# Patient Record
Sex: MALE | Race: WHITE | NOT HISPANIC OR LATINO | Employment: OTHER | URBAN - NONMETROPOLITAN AREA
[De-identification: names, ages, dates, MRNs, and addresses within clinical notes are randomized per-mention and may not be internally consistent; named-entity substitution may affect disease eponyms.]

---

## 2017-03-20 RX ORDER — PRASUGREL HYDROCHLORIDE 5 MG/1
TABLET, COATED ORAL
Qty: 30 TABLET | Refills: 0 | OUTPATIENT
Start: 2017-03-20

## 2017-03-22 NOTE — TELEPHONE ENCOUNTER
Needs refill of Effient 5mg sent to [facility].  Has appt October 16, 2017 for yearly.  Lamont Castellon, CMA

## 2017-03-23 RX ORDER — PRASUGREL 5 MG/1
5 TABLET, FILM COATED ORAL DAILY
Qty: 60 TABLET | Refills: 11 | Status: SHIPPED | OUTPATIENT
Start: 2017-03-23 | End: 2017-10-27 | Stop reason: SDUPTHER

## 2017-09-05 ENCOUNTER — HOSPITAL ENCOUNTER (OUTPATIENT)
Dept: GENERAL RADIOLOGY | Facility: HOSPITAL | Age: 52
Discharge: HOME | End: 2017-09-05
Attending: UROLOGY | Admitting: UROLOGY

## 2017-09-05 ENCOUNTER — OFFICE VISIT (OUTPATIENT)
Dept: UROLOGY | Facility: CLINIC | Age: 52
End: 2017-09-05

## 2017-09-05 ENCOUNTER — TELEPHONE (OUTPATIENT)
Dept: UROLOGY | Facility: CLINIC | Age: 52
End: 2017-09-05

## 2017-09-05 VITALS
TEMPERATURE: 98.6 F | WEIGHT: 164.4 LBS | BODY MASS INDEX: 22.27 KG/M2 | HEIGHT: 72 IN | DIASTOLIC BLOOD PRESSURE: 81 MMHG | SYSTOLIC BLOOD PRESSURE: 138 MMHG

## 2017-09-05 DIAGNOSIS — R10.9 FLANK PAIN: ICD-10-CM

## 2017-09-05 DIAGNOSIS — N20.0 KIDNEY STONE: Primary | ICD-10-CM

## 2017-09-05 DIAGNOSIS — Z87.442 HISTORY OF KIDNEY STONES: Primary | ICD-10-CM

## 2017-09-05 LAB
BILIRUB BLD-MCNC: NEGATIVE MG/DL
CLARITY, POC: CLEAR
COLOR UR: YELLOW
GLUCOSE UR STRIP-MCNC: NEGATIVE MG/DL
KETONES UR QL: NEGATIVE
LEUKOCYTE EST, POC: NEGATIVE
NITRITE UR-MCNC: NEGATIVE MG/ML
PH UR: 5.5 [PH] (ref 5–8)
PROT UR STRIP-MCNC: NEGATIVE MG/DL
RBC # UR STRIP: ABNORMAL /UL
SP GR UR: 1.03 (ref 1–1.03)
UROBILINOGEN UR QL: NORMAL

## 2017-09-05 PROCEDURE — 99213 OFFICE O/P EST LOW 20 MIN: CPT | Performed by: UROLOGY

## 2017-09-05 PROCEDURE — 81003 URINALYSIS AUTO W/O SCOPE: CPT | Performed by: UROLOGY

## 2017-09-05 PROCEDURE — 74000 HC ABDOMEN KUB: CPT

## 2017-09-05 NOTE — PROGRESS NOTES
Mr. Francis is 51 y.o. male    Chief Complaint   Patient presents with   • Flank Pain       Flank Pain   This is a new problem. The current episode started today. The problem occurs constantly. The problem is unchanged. The pain is present in the gluteal. The pain is severe. The pain is the same all the time. The symptoms are aggravated by position. Pertinent negatives include no abdominal pain or fever. Risk factors: history of kidney stones. He has tried nothing for the symptoms. The treatment provided no relief.       The following portions of the patient's history were reviewed and updated as appropriate: allergies, current medications, past family history, past medical history, past social history, past surgical history and problem list.    Review of Systems   Constitutional: Negative for chills and fever.   Gastrointestinal: Negative for abdominal pain, anal bleeding and blood in stool.   Genitourinary: Positive for flank pain. Negative for difficulty urinating, frequency, hematuria and urgency.         Current Outpatient Prescriptions:   •  aspirin 81 MG EC tablet, Take 81 mg by mouth daily., Disp: , Rfl:   •  atorvastatin (LIPITOR) 40 MG tablet, Take 40 mg by mouth daily., Disp: , Rfl:   •  doxycycline (VIBRAMYCIN) 100 MG capsule, Take 100 mg by mouth 2 (two) times a day., Disp: , Rfl:   •  Fexofenadine HCl (MUCINEX ALLERGY PO), Take  by mouth., Disp: , Rfl:   •  ketorolac (TORADOL) 10 MG tablet, Take  by mouth every 6 (six) hours as needed for moderate pain (4-6)., Disp: , Rfl:   •  losartan (COZAAR) 50 MG tablet, Take 50 mg by mouth daily., Disp: , Rfl:   •  METOPROLOL SUCCINATE ER PO, Take 25 mg by mouth Daily. 1/2 tablet qhs, Disp: , Rfl:   •  prasugrel (EFFIENT) 5 MG tablet, Take 1 tablet by mouth Daily., Disp: 60 tablet, Rfl: 11    Past Medical History:   Diagnosis Date   • Bilateral kidney stones    • Heart attack    • Left ureteral stone        Past Surgical History:   Procedure Laterality Date   •  "CORONARY ANGIOPLASTY WITH STENT PLACEMENT     • SHOULDER SURGERY     • TUMOR REMOVAL         Social History     Social History   • Marital status:      Spouse name: N/A   • Number of children: N/A   • Years of education: N/A     Social History Main Topics   • Smoking status: Never Smoker   • Smokeless tobacco: Never Used   • Alcohol use No      Comment: occassionally   • Drug use: None   • Sexual activity: Not Asked     Other Topics Concern   • None     Social History Narrative       Family History   Problem Relation Age of Onset   • No Known Problems Father    • No Known Problems Mother        Objective    /81  Temp 98.6 °F (37 °C)  Ht 72\" (182.9 cm)  Wt 164 lb 6.4 oz (74.6 kg)  BMI 22.3 kg/m2    Physical Exam    Office Visit on 09/08/2016   Component Date Value Ref Range Status   • Color 09/08/2016 Yellow  Yellow, Straw, Dark Yellow, Vandana Final   • Clarity, UA 09/08/2016 Clear  Clear Final   • Glucose, UA 09/08/2016 Negative  Negative mg/dL Final   • Bilirubin 09/08/2016 Negative  Negative Final   • Ketones, UA 09/08/2016 Negative  Negative Final   • Specific Gravity  09/08/2016 1.020  1.005 - 1.030 Final   • Blood, UA 09/08/2016 Negative  Negative Final   • pH, Urine 09/08/2016 5.5  5.0 - 8.0 Final   • Protein, POC 09/08/2016 Trace* Negative mg/dL Final   • Urobilinogen, UA 09/08/2016 Normal  Normal Final   • Leukocytes 09/08/2016 Negative  Negative Final   • Nitrite, UA 09/08/2016 Negative  Negative Final       Results for orders placed or performed in visit on 09/05/17   POC Urinalysis Dipstick, Automated   Result Value Ref Range    Color Yellow Yellow, Straw, Dark Yellow, Vandana    Clarity, UA Clear Clear    Glucose, UA Negative Negative, 1000 mg/dL (3+) mg/dL    Bilirubin Negative Negative    Ketones, UA Negative Negative    Specific Gravity  1.030 1.005 - 1.030    Blood, UA Trace (A) Negative    pH, Urine 5.5 5.0 - 8.0    Protein, POC Negative Negative mg/dL    Urobilinogen, UA Normal Normal "    Leukocytes Negative Negative    Nitrite, UA Negative Negative     Assessment and Plan    Iftikhar was seen today for flank pain.    Diagnoses and all orders for this visit:    History of kidney stones  -     POC Urinalysis Dipstick, Automated  -     Cancel: POC Urinalysis Dipstick, Automated    Flank pain  -     CT Abdomen Pelvis Stone Protocol    New flank pain.  He has a history of stones.  I reviewed his KUB as below.  Possible calcification in the region of the kidney but nothing in the area of the presumed ureter.  I discussed with him that with his significant history of stones as well as severe flank pain this could be a small stone which is either poorly calcified or not visible due to overlying gas.  The next best step would be CT abdomen pelvis stone protocol to rule out stone.  He is afebrile without nausea or vomiting.  I discussed with him that to get a CT scan done today he would need to go to the emergency room.  Otherwise I can see him in my office tomorrow with a CT scan prior.  At this point he would like to try to see me in the office but no 0 emergency room with increased pain chills fevers nausea or vomiting.    KUB independent review    A KUB is available for me to review today.  The image is inspected for a bowel gas pattern and the general bone structure of the spine and pelvis. The kidneys are then inspected closely.  Renal outline is noted if identifiable. The kidney, collecting system, and anticipated path of the ureter are examined for calcifications including those in the true pelvis.  This film reveals:    On the right there is a calcification in what appears to be the upper pole of the kidney, no ureteral stones..    On the left there are no calcificaitons seen in the kidney or the expected course of the ureter. .

## 2017-10-27 ENCOUNTER — OFFICE VISIT (OUTPATIENT)
Dept: CARDIOLOGY | Facility: CLINIC | Age: 52
End: 2017-10-27

## 2017-10-27 VITALS
HEIGHT: 72 IN | OXYGEN SATURATION: 98 % | DIASTOLIC BLOOD PRESSURE: 70 MMHG | WEIGHT: 165 LBS | HEART RATE: 87 BPM | SYSTOLIC BLOOD PRESSURE: 110 MMHG | BODY MASS INDEX: 22.35 KG/M2

## 2017-10-27 DIAGNOSIS — I25.10 CORONARY ARTERY DISEASE INVOLVING NATIVE CORONARY ARTERY OF NATIVE HEART WITHOUT ANGINA PECTORIS: Primary | ICD-10-CM

## 2017-10-27 DIAGNOSIS — Z95.5 S/P CORONARY ARTERY STENT PLACEMENT: ICD-10-CM

## 2017-10-27 DIAGNOSIS — K21.9 GASTROESOPHAGEAL REFLUX DISEASE WITHOUT ESOPHAGITIS: ICD-10-CM

## 2017-10-27 DIAGNOSIS — I10 ESSENTIAL HYPERTENSION: ICD-10-CM

## 2017-10-27 DIAGNOSIS — E78.2 MIXED HYPERLIPIDEMIA: ICD-10-CM

## 2017-10-27 PROCEDURE — 99213 OFFICE O/P EST LOW 20 MIN: CPT | Performed by: NURSE PRACTITIONER

## 2017-10-27 PROCEDURE — 93000 ELECTROCARDIOGRAM COMPLETE: CPT | Performed by: NURSE PRACTITIONER

## 2017-10-27 RX ORDER — METOPROLOL SUCCINATE 25 MG/1
12.5 TABLET, EXTENDED RELEASE ORAL DAILY
Qty: 45 TABLET | Refills: 3 | Status: SHIPPED | OUTPATIENT
Start: 2017-10-27 | End: 2018-11-06 | Stop reason: SDUPTHER

## 2017-10-27 RX ORDER — LOSARTAN POTASSIUM 50 MG/1
50 TABLET ORAL DAILY
Qty: 90 TABLET | Refills: 3 | Status: SHIPPED | OUTPATIENT
Start: 2017-10-27 | End: 2018-10-18 | Stop reason: SDUPTHER

## 2017-10-27 RX ORDER — PRASUGREL 5 MG/1
5 TABLET, FILM COATED ORAL DAILY
Qty: 90 TABLET | Refills: 3 | Status: SHIPPED | OUTPATIENT
Start: 2017-10-27 | End: 2018-11-06 | Stop reason: SDUPTHER

## 2017-10-27 RX ORDER — ATORVASTATIN CALCIUM 40 MG/1
40 TABLET, FILM COATED ORAL DAILY
Qty: 90 TABLET | Refills: 3 | Status: SHIPPED | OUTPATIENT
Start: 2017-10-27 | End: 2018-10-29 | Stop reason: SDUPTHER

## 2017-10-27 NOTE — PROGRESS NOTES
Subjective:     Encounter Date:10/27/2017      Patient ID: Iftikhar Francis is a 51 y.o. male. He presents today for follow up of coronary artery disease s/p drug-eluting stent placement to the right coronary artery in 2014, hypertension, hyperlipidemia and gastroesophageal reflux disease. He denies any chest pain, shortness of breath, palpitations, dizziness, syncope, orthopnea, PND or swelling. He does report decreased stamina since 2014, but he relates this to deconditioning. Blood pressure and cholesterol are reported to be well controlled. He states that overall he has felt well since his last visit.     Chief Complaint:  Coronary Artery Disease   Presents for follow-up visit. Pertinent negatives include no chest pain, chest pressure, chest tightness, dizziness, leg swelling, muscle weakness, palpitations, shortness of breath or weight gain. Risk factors include hyperlipidemia. The symptoms have been stable. Compliance with diet is variable. Compliance with exercise is variable. Compliance with medications is good.   Hypertension   This is a chronic problem. The current episode started more than 1 year ago. The problem is controlled. Pertinent negatives include no anxiety, blurred vision, chest pain, headaches, malaise/fatigue, neck pain, orthopnea, palpitations, peripheral edema, PND, shortness of breath or sweats. Risk factors for coronary artery disease include dyslipidemia and male gender. Past treatments include alpha 1 blockers and beta blockers. The current treatment provides significant improvement. Hypertensive end-organ damage includes CAD/MI.   Hyperlipidemia   This is a chronic problem. The current episode started more than 1 year ago. The problem is controlled. Recent lipid tests were reviewed and are normal. Pertinent negatives include no chest pain or shortness of breath. Current antihyperlipidemic treatment includes statins. The current treatment provides significant improvement of lipids.  Risk factors for coronary artery disease include dyslipidemia, hypertension and male sex.       The following portions of the patient's history were reviewed and updated as appropriate: allergies, current medications, past family history, past medical history, past social history, past surgical history and problem list.     Allergies   Allergen Reactions   • Levaquin [Levofloxacin]        Current Outpatient Prescriptions:   •  aspirin 81 MG EC tablet, Take 81 mg by mouth daily., Disp: , Rfl:   •  atorvastatin (LIPITOR) 40 MG tablet, Take 1 tablet by mouth Daily., Disp: 90 tablet, Rfl: 3  •  losartan (COZAAR) 50 MG tablet, Take 1 tablet by mouth Daily., Disp: 90 tablet, Rfl: 3  •  metoprolol succinate XL (TOPROL-XL) 25 MG 24 hr tablet, Take 0.5 tablets by mouth Daily. 1/2 tablet qhs, Disp: 45 tablet, Rfl: 3  •  prasugrel (EFFIENT) 5 MG tablet, Take 1 tablet by mouth Daily., Disp: 90 tablet, Rfl: 3  Past Medical History:   Diagnosis Date   • Bilateral kidney stones    • Heart attack    • Left ureteral stone    • Sleep apnea      Past Surgical History:   Procedure Laterality Date   • CORONARY ANGIOPLASTY WITH STENT PLACEMENT     • SHOULDER SURGERY     • TUMOR REMOVAL       Family History   Problem Relation Age of Onset   • No Known Problems Father    • No Known Problems Mother      Social History     Social History   • Marital status:      Spouse name: N/A   • Number of children: N/A   • Years of education: N/A     Occupational History   • Not on file.     Social History Main Topics   • Smoking status: Never Smoker   • Smokeless tobacco: Never Used   • Alcohol use Yes      Comment: occassionally   • Drug use: No   • Sexual activity: Defer     Other Topics Concern   • Not on file     Social History Narrative         Review of Systems   Constitution: Negative for chills, decreased appetite, fever, weakness, malaise/fatigue, night sweats, weight gain and weight loss.   HENT: Negative for nosebleeds.    Eyes:  "Negative for blurred vision and visual disturbance.   Cardiovascular: Negative for chest pain, dyspnea on exertion, leg swelling, near-syncope, orthopnea, palpitations, paroxysmal nocturnal dyspnea and syncope.   Respiratory: Negative for chest tightness, cough, hemoptysis, shortness of breath, snoring and wheezing.    Endocrine: Negative for cold intolerance and heat intolerance.   Hematologic/Lymphatic: Does not bruise/bleed easily.   Skin: Negative for rash.   Musculoskeletal: Negative for back pain, falls, muscle weakness and neck pain.   Gastrointestinal: Negative for abdominal pain, change in bowel habit, constipation, diarrhea, dysphagia, heartburn, nausea and vomiting.   Genitourinary: Negative for hematuria.   Neurological: Negative for dizziness, headaches and light-headedness.   Psychiatric/Behavioral: Negative for altered mental status.   Allergic/Immunologic: Negative for persistent infections.         ECG 12 Lead  Date/Time: 10/27/2017 2:58 PM  Performed by: SEJAL MARTINES  Authorized by: SEJAL MARTINES   Comparison: compared with previous ECG from 7/19/2016  Similar to previous ECG  Rhythm: sinus rhythm  Rate: normal  BPM: 78  Clinical impression: normal ECG        /70  Pulse 87  Ht 72\" (182.9 cm)  Wt 165 lb (74.8 kg)  SpO2 98%  BMI 22.38 kg/m2         Objective:     Physical Exam   Constitutional: He is oriented to person, place, and time. Vital signs are normal. He appears well-developed and well-nourished. No distress.   HENT:   Head: Normocephalic and atraumatic.   Right Ear: External ear normal.   Left Ear: External ear normal.   Eyes: Conjunctivae are normal. Pupils are equal, round, and reactive to light. Right eye exhibits no discharge. Left eye exhibits no discharge.   Neck: Normal range of motion. Neck supple. No JVD present. Carotid bruit is not present. No thyromegaly present.   Cardiovascular: Normal rate, regular rhythm, normal heart sounds and intact distal pulses.  PMI is " not displaced.  Exam reveals no gallop, no friction rub and no decreased pulses.    No murmur heard.  Pulses:       Radial pulses are 2+ on the right side, and 2+ on the left side.        Dorsalis pedis pulses are 2+ on the right side, and 2+ on the left side.        Posterior tibial pulses are 2+ on the right side, and 2+ on the left side.   Pulmonary/Chest: Effort normal and breath sounds normal. No respiratory distress. He has no decreased breath sounds. He has no wheezes. He has no rhonchi. He has no rales. He exhibits no tenderness.   Abdominal: Soft. Bowel sounds are normal. He exhibits no distension. There is no tenderness.   Musculoskeletal: Normal range of motion. He exhibits no edema.   Neurological: He is alert and oriented to person, place, and time.   Skin: Skin is warm and dry. No rash noted. He is not diaphoretic. No erythema. No pallor.   Psychiatric: He has a normal mood and affect. His behavior is normal. Judgment and thought content normal.   Vitals reviewed.        Assessment:          Diagnosis Plan   1. Coronary artery disease involving native coronary artery of native heart without angina pectoris  No clinical signs of ischemia.    2. S/P coronary artery stent placement  Remains on effient, aspirin, ARB and ACE.    3. Essential hypertension  Well controlled.    4. Mixed hyperlipidemia  Obtain records from Dr. Eng.    5. Gastroesophageal reflux disease without esophagitis            Plan:       1. Continue all medications as previously prescribed.  2. Continue heart healthy diet and regular exercise.  3. Report any worsening symptoms.  4. Follow up with PCP for blood pressure and cholesterol management and routine lab work. Have records sent to [facility] office.   5. Follow up with Dr. Perkins in one year, or sooner if needed.

## 2018-03-30 ENCOUNTER — OFFICE VISIT (OUTPATIENT)
Dept: NEUROLOGY | Age: 53
End: 2018-03-30
Payer: COMMERCIAL

## 2018-03-30 VITALS
BODY MASS INDEX: 22.48 KG/M2 | SYSTOLIC BLOOD PRESSURE: 118 MMHG | WEIGHT: 166 LBS | HEIGHT: 72 IN | DIASTOLIC BLOOD PRESSURE: 76 MMHG

## 2018-03-30 DIAGNOSIS — Z88.9 HISTORY OF ADVERSE DRUG REACTION: ICD-10-CM

## 2018-03-30 DIAGNOSIS — Z86.79 HISTORY OF CORONARY ARTERY DISEASE: ICD-10-CM

## 2018-03-30 DIAGNOSIS — H53.2 DIPLOPIA: Primary | ICD-10-CM

## 2018-03-30 DIAGNOSIS — H53.2 DIPLOPIA: ICD-10-CM

## 2018-03-30 PROCEDURE — 99204 OFFICE O/P NEW MOD 45 MIN: CPT | Performed by: PSYCHIATRY & NEUROLOGY

## 2018-03-30 RX ORDER — PYRIDOSTIGMINE BROMIDE 60 MG/1
TABLET ORAL
Qty: 90 TABLET | Refills: 5 | Status: SHIPPED | OUTPATIENT
Start: 2018-03-30 | End: 2018-04-30

## 2018-03-31 NOTE — PROGRESS NOTES
marked  Sleep: []  Insomnia [] Sleep Disturbance [] Snoring [] Restless Legs [] Daytime Sleepiness [] Sleep Apnea  [x] Denies all unless marked         Current Outpatient Prescriptions   Medication Sig Dispense Refill    pyridostigmine (MESTINON) 60 MG tablet Take 1/2 to 1 pill up to 3 times daily 90 tablet 5    losartan (COZAAR) 50 MG tablet Take 50 mg by mouth daily      prasugrel (EFFIENT) 5 MG TABS Take 5 mg by mouth daily      Aspirin (ASPIR-81 PO) Take 1 tablet by mouth daily       atorvastatin (LIPITOR) 40 MG tablet Take 40 mg by mouth daily      metoprolol (TOPROL-XL) 25 MG XL tablet Take 12.5 mg by mouth daily        No current facility-administered medications for this visit. Outpatient Prescriptions Marked as Taking for the 3/30/18 encounter (Office Visit) with Socrates Mckeon MD   Medication Sig Dispense Refill    pyridostigmine (MESTINON) 60 MG tablet Take 1/2 to 1 pill up to 3 times daily 90 tablet 5    losartan (COZAAR) 50 MG tablet Take 50 mg by mouth daily      prasugrel (EFFIENT) 5 MG TABS Take 5 mg by mouth daily      Aspirin (ASPIR-81 PO) Take 1 tablet by mouth daily       atorvastatin (LIPITOR) 40 MG tablet Take 40 mg by mouth daily      metoprolol (TOPROL-XL) 25 MG XL tablet Take 12.5 mg by mouth daily          /76   Ht 6' (1.829 m)   Wt 166 lb (75.3 kg)   BMI 22.51 kg/m²       Constitutional - well developed, well nourished. Eyes - conjunctiva normal.  Pupils react to light  Ear, nose, throat -hearing intact to finger rub No scars, masses, or lesions over external nose or ears, no atrophy of tongue  Neck-symmetric, no masses noted, no jugular vein distension. No bruits noted.   Respiration- chest wall appears symmetric, good expansion,   normal effort without use of accessory muscles  Cardiovascular- RRR  Musculoskeletal - no significant wasting of muscles noted, no bony deformities, gait no gross ataxia  Extremities-no clubbing, cyanosis or edema  Skin - warm,

## 2018-04-02 LAB
ACETYLCHOL MODUL AB: 19 %
ACETYLCHOLINE BLOCKING AB: 24 % (ref 0–26)

## 2018-04-03 ENCOUNTER — TELEPHONE (OUTPATIENT)
Dept: NEUROLOGY | Age: 53
End: 2018-04-03

## 2018-04-03 LAB
ACETYLCHOLINE BINDING ANTIBODY: 3.9 NMOL/L (ref 0–0.4)
STRIATED MUSCLE AB TITER: ABNORMAL
STRIATED MUSCLE AB: DETECTED

## 2018-04-06 ENCOUNTER — OFFICE VISIT (OUTPATIENT)
Dept: NEUROLOGY | Age: 53
End: 2018-04-06
Payer: COMMERCIAL

## 2018-04-06 VITALS
HEIGHT: 72 IN | BODY MASS INDEX: 22.21 KG/M2 | SYSTOLIC BLOOD PRESSURE: 116 MMHG | WEIGHT: 164 LBS | DIASTOLIC BLOOD PRESSURE: 80 MMHG

## 2018-04-06 DIAGNOSIS — G70.00 MYASTHENIA GRAVIS (HCC): Primary | ICD-10-CM

## 2018-04-06 DIAGNOSIS — H53.2 DIPLOPIA: ICD-10-CM

## 2018-04-06 DIAGNOSIS — Z88.9 HISTORY OF ADVERSE DRUG REACTION: ICD-10-CM

## 2018-04-06 PROCEDURE — 99215 OFFICE O/P EST HI 40 MIN: CPT | Performed by: PSYCHIATRY & NEUROLOGY

## 2018-04-06 RX ORDER — GUAIFENESIN 600 MG/1
1200 TABLET, EXTENDED RELEASE ORAL 2 TIMES DAILY
Qty: 120 TABLET | Refills: 5 | Status: SHIPPED | OUTPATIENT
Start: 2018-04-06 | End: 2018-11-30

## 2018-04-06 RX ORDER — VENLAFAXINE HYDROCHLORIDE 37.5 MG/1
37.5 CAPSULE, EXTENDED RELEASE ORAL DAILY
Qty: 30 CAPSULE | Refills: 2 | Status: SHIPPED | OUTPATIENT
Start: 2018-04-06 | End: 2018-04-30 | Stop reason: CLARIF

## 2018-04-06 RX ORDER — PYRIDOSTIGMINE BROMIDE 60 MG/1
TABLET ORAL
Qty: 150 TABLET | Refills: 5 | Status: SHIPPED | OUTPATIENT
Start: 2018-04-06 | End: 2018-04-30

## 2018-04-06 RX ORDER — GLYCOPYRROLATE 2 MG/1
2 TABLET ORAL 2 TIMES DAILY
Qty: 90 TABLET | Refills: 3 | Status: SHIPPED | OUTPATIENT
Start: 2018-04-06 | End: 2018-08-30

## 2018-04-12 ENCOUNTER — TELEPHONE (OUTPATIENT)
Dept: CARDIOLOGY | Age: 53
End: 2018-04-12

## 2018-04-19 ENCOUNTER — TELEPHONE (OUTPATIENT)
Dept: NEUROLOGY | Age: 53
End: 2018-04-19

## 2018-04-20 RX ORDER — OMEPRAZOLE 20 MG/1
20 CAPSULE, DELAYED RELEASE ORAL DAILY
Qty: 30 CAPSULE | Refills: 3 | Status: SHIPPED | OUTPATIENT
Start: 2018-04-20 | End: 2018-08-22 | Stop reason: SDUPTHER

## 2018-04-20 RX ORDER — PREDNISONE 20 MG/1
TABLET ORAL
Qty: 60 TABLET | Refills: 0 | Status: SHIPPED | OUTPATIENT
Start: 2018-04-20 | End: 2018-05-25 | Stop reason: SDUPTHER

## 2018-04-30 ENCOUNTER — OFFICE VISIT (OUTPATIENT)
Dept: NEUROLOGY | Age: 53
End: 2018-04-30
Payer: COMMERCIAL

## 2018-04-30 VITALS
HEIGHT: 72 IN | DIASTOLIC BLOOD PRESSURE: 70 MMHG | WEIGHT: 166 LBS | SYSTOLIC BLOOD PRESSURE: 108 MMHG | BODY MASS INDEX: 22.48 KG/M2

## 2018-04-30 DIAGNOSIS — Z86.79 HISTORY OF CORONARY ARTERY DISEASE: ICD-10-CM

## 2018-04-30 DIAGNOSIS — G70.00 MYASTHENIA GRAVIS (HCC): Primary | ICD-10-CM

## 2018-04-30 DIAGNOSIS — Z88.9 HISTORY OF ADVERSE DRUG REACTION: ICD-10-CM

## 2018-04-30 DIAGNOSIS — H53.2 DIPLOPIA: ICD-10-CM

## 2018-04-30 PROCEDURE — 99214 OFFICE O/P EST MOD 30 MIN: CPT | Performed by: PSYCHIATRY & NEUROLOGY

## 2018-04-30 RX ORDER — ESCITALOPRAM OXALATE 10 MG/1
10 TABLET ORAL DAILY
Qty: 30 TABLET | Refills: 3 | Status: SHIPPED | OUTPATIENT
Start: 2018-04-30 | End: 2018-08-30

## 2018-05-22 ENCOUNTER — TELEPHONE (OUTPATIENT)
Dept: NEUROLOGY | Age: 53
End: 2018-05-22

## 2018-05-25 RX ORDER — PREDNISONE 20 MG/1
TABLET ORAL
Qty: 60 TABLET | Refills: 0 | Status: SHIPPED | OUTPATIENT
Start: 2018-05-25 | End: 2018-05-30 | Stop reason: SDUPTHER

## 2018-05-30 ENCOUNTER — OFFICE VISIT (OUTPATIENT)
Dept: NEUROLOGY | Age: 53
End: 2018-05-30
Payer: COMMERCIAL

## 2018-05-30 VITALS
DIASTOLIC BLOOD PRESSURE: 76 MMHG | HEIGHT: 72 IN | BODY MASS INDEX: 22.21 KG/M2 | SYSTOLIC BLOOD PRESSURE: 117 MMHG | WEIGHT: 164 LBS

## 2018-05-30 DIAGNOSIS — G70.00 MYASTHENIA GRAVIS (HCC): Primary | ICD-10-CM

## 2018-05-30 DIAGNOSIS — Z88.9 HISTORY OF ADVERSE DRUG REACTION: ICD-10-CM

## 2018-05-30 DIAGNOSIS — G70.00 MYASTHENIA GRAVIS (HCC): ICD-10-CM

## 2018-05-30 DIAGNOSIS — H53.2 DIPLOPIA: ICD-10-CM

## 2018-05-30 LAB
ALBUMIN SERPL-MCNC: 4.6 G/DL (ref 3.5–5.2)
ALP BLD-CCNC: 81 U/L (ref 40–130)
ALT SERPL-CCNC: 21 U/L (ref 5–41)
ANION GAP SERPL CALCULATED.3IONS-SCNC: 17 MMOL/L (ref 7–19)
AST SERPL-CCNC: 13 U/L (ref 5–40)
BILIRUB SERPL-MCNC: 0.6 MG/DL (ref 0.2–1.2)
BUN BLDV-MCNC: 25 MG/DL (ref 6–20)
CALCIUM SERPL-MCNC: 9.6 MG/DL (ref 8.6–10)
CHLORIDE BLD-SCNC: 93 MMOL/L (ref 98–111)
CO2: 27 MMOL/L (ref 22–29)
CREAT SERPL-MCNC: 0.9 MG/DL (ref 0.5–1.2)
GFR NON-AFRICAN AMERICAN: >60
GLUCOSE BLD-MCNC: 146 MG/DL (ref 74–109)
POTASSIUM SERPL-SCNC: 4.5 MMOL/L (ref 3.5–5)
SODIUM BLD-SCNC: 137 MMOL/L (ref 136–145)
TOTAL PROTEIN: 8.2 G/DL (ref 6.6–8.7)

## 2018-05-30 PROCEDURE — 99214 OFFICE O/P EST MOD 30 MIN: CPT | Performed by: PSYCHIATRY & NEUROLOGY

## 2018-05-30 RX ORDER — ESCITALOPRAM OXALATE 20 MG/1
20 TABLET ORAL DAILY
Qty: 30 TABLET | Refills: 3 | Status: SHIPPED | OUTPATIENT
Start: 2018-05-30 | End: 2019-03-18

## 2018-05-30 RX ORDER — PREDNISONE 20 MG/1
TABLET ORAL
Qty: 60 TABLET | Refills: 5 | Status: SHIPPED | OUTPATIENT
Start: 2018-05-30 | End: 2018-11-30 | Stop reason: SDUPTHER

## 2018-05-31 ENCOUNTER — TELEPHONE (OUTPATIENT)
Dept: NEUROLOGY | Age: 53
End: 2018-05-31

## 2018-08-22 RX ORDER — OMEPRAZOLE 20 MG/1
CAPSULE, DELAYED RELEASE ORAL
Qty: 30 CAPSULE | Refills: 3 | Status: SHIPPED | OUTPATIENT
Start: 2018-08-22 | End: 2018-11-30 | Stop reason: SDUPTHER

## 2018-08-30 ENCOUNTER — OFFICE VISIT (OUTPATIENT)
Dept: NEUROLOGY | Age: 53
End: 2018-08-30
Payer: COMMERCIAL

## 2018-08-30 VITALS
HEIGHT: 72 IN | DIASTOLIC BLOOD PRESSURE: 98 MMHG | WEIGHT: 172 LBS | SYSTOLIC BLOOD PRESSURE: 142 MMHG | BODY MASS INDEX: 23.3 KG/M2

## 2018-08-30 DIAGNOSIS — G70.00 MYASTHENIA GRAVIS (HCC): ICD-10-CM

## 2018-08-30 DIAGNOSIS — Z86.79 HISTORY OF CORONARY ARTERY DISEASE: ICD-10-CM

## 2018-08-30 DIAGNOSIS — H53.2 DIPLOPIA: ICD-10-CM

## 2018-08-30 DIAGNOSIS — Z88.9 HISTORY OF ADVERSE DRUG REACTION: ICD-10-CM

## 2018-08-30 DIAGNOSIS — G62.9 NEUROPATHY: ICD-10-CM

## 2018-08-30 DIAGNOSIS — G70.00 MYASTHENIA GRAVIS (HCC): Primary | ICD-10-CM

## 2018-08-30 LAB
AST SERPL-CCNC: 14 U/L (ref 5–40)
C-REACTIVE PROTEIN: 1.42 MG/DL (ref 0–0.5)

## 2018-08-30 PROCEDURE — 99214 OFFICE O/P EST MOD 30 MIN: CPT | Performed by: PSYCHIATRY & NEUROLOGY

## 2018-08-30 RX ORDER — KETOROLAC TROMETHAMINE 10 MG/1
TABLET, FILM COATED ORAL
COMMUNITY
End: 2018-11-30 | Stop reason: CLARIF

## 2018-08-31 NOTE — PROGRESS NOTES
metoprolol (TOPROL-XL) 25 MG XL tablet Take 12.5 mg by mouth daily        No current facility-administered medications for this visit. Outpatient Prescriptions Marked as Taking for the 8/30/18 encounter (Office Visit) with Angeli Jerez MD   Medication Sig Dispense Refill    ketorolac (TORADOL) 10 MG tablet ketorolac 10 mg tablet      omeprazole (PRILOSEC) 20 MG delayed release capsule TAKE 1 CAPSULE BY MOUTH ONCE DAILY 30 capsule 3    predniSONE (DELTASONE) 20 MG tablet Take two each am 60 tablet 5    escitalopram (LEXAPRO) 20 MG tablet Take 1 tablet by mouth daily 30 tablet 3    guaiFENesin (MUCINEX) 600 MG extended release tablet Take 2 tablets by mouth 2 times daily 120 tablet 5    losartan (COZAAR) 50 MG tablet Take 50 mg by mouth daily      prasugrel (EFFIENT) 5 MG TABS Take 5 mg by mouth daily      Aspirin (ASPIR-81 PO) Take 1 tablet by mouth daily       atorvastatin (LIPITOR) 40 MG tablet Take 40 mg by mouth daily      metoprolol (TOPROL-XL) 25 MG XL tablet Take 12.5 mg by mouth daily          BP (!) 142/98   Ht 6' (1.829 m)   Wt 172 lb (78 kg)   BMI 23.33 kg/m²       Constitutional - well developed, well nourished. Eyes - conjunctiva normal.  Pupils react to light  Ear, nose, throat -hearing intact to finger rub No scars, masses, or lesions over external nose or ears, no atrophy of tongue  Neck-symmetric, no masses noted, no jugular vein distension. No bruits noted. Respiration- chest wall appears symmetric, good expansion,   normal effort without use of accessory muscles  Cardiovascular- RRR  Musculoskeletal - no significant wasting of muscles noted, no bony deformities, gait no gross ataxia  Extremities-no clubbing, cyanosis or edema  Skin - warm, dry, and intact. No rash, erythema, or pallor.   Psychiatric - mood, affect, and behavior appear normal.      Neurological exam  Awake, alert, fluent oriented x 3 appropriate affect  Attention and concentration appear appropriate  Recent and remote memory appears unremarkable  Speech normal without dysarthria  No clear issues with language of fund of knowledge    Cranial Nerve Exam   CN II- Visual fields grossly unremarkable. VA adequate. Discs sharp  CN III, IV,VI- PERRLA, EOMI, No nystagmus, conjugate eye movements, No ptosis. No double vision. CN VII-no facial asymmetry  CN VIII-Hearing intact   CN IX and X- Palate elevates in midline  CN XI-good shoulder shrug  CN XII-Tongue midline with no fasciculations or fibrillations    Motor Exam  V/V throughout upper and lower extremities bilaterally, no cogwheeling, normal tone    Reflexes   2+ biceps bilaterally  2+ brachioradialis  2+ triceps  2+patella  2+ ankle jerks  No clonus ankles  No Lazcano's sign bilateral hands. No Babinski sign. sensory-decreased pinprick in toes. Normal vibration sense  Tremors- no tremors in hands or head noted    Gait  Normal base and speed  No ataxia. No Romberg sign    Coordination  Finger to nose and SULMA-unremarkable    No results found for: [id number]  Lab Results   Component Value Date    WBC 18.7 (H) 07/18/2016    HGB 14.9 07/18/2016    HCT 43.7 07/18/2016    MCV 87.6 07/18/2016     (H) 07/18/2016     Lab Results   Component Value Date     05/30/2018    K 4.5 05/30/2018    CL 93 (L) 05/30/2018    CO2 27 05/30/2018    BUN 25 (H) 05/30/2018    CREATININE 0.9 05/30/2018    GLUCOSE 146 (H) 05/30/2018    CALCIUM 9.6 05/30/2018    PROT 8.2 05/30/2018    LABALBU 4.6 05/30/2018    BILITOT 0.6 05/30/2018    ALKPHOS 81 05/30/2018    AST 14 08/30/2018    ALT 21 05/30/2018    LABGLOM >60 05/30/2018    GLOB 3.8 07/18/2016           Assessment    ICD-10-CM ICD-9-CM    1. Myasthenia gravis ([facility] G70.00 358.00 Miscellaneous Sendout 1      Electrophoresis Protein, Serum with Reflex to Immunofixation      C-Reactive Protein      AST      Nerve Conduction Test with EMG      Meningococcal MPSV4 (age 2y and older) SC ([address] [place] [address])   2.  History of adverse drug reaction Z88.9

## 2018-09-02 LAB
ALBUMIN SERPL-MCNC: 4.53 G/DL (ref 3.75–5.01)
ALPHA-1-GLOBULIN: 0.43 G/DL (ref 0.19–0.46)
ALPHA-2-GLOBULIN: 1.21 G/DL (ref 0.48–1.05)
BETA GLOBULIN: 0.93 G/DL (ref 0.48–1.1)
GAMMA GLOBULIN: 1.11 G/DL (ref 0.62–1.51)
IMMUNOFIXATION REFLEX: ABNORMAL
SPE/IFE INTERPRETATION: ABNORMAL
TOTAL PROTEIN: 8.2 G/DL (ref 6–8.3)

## 2018-09-11 ENCOUNTER — HOSPITAL ENCOUNTER (OUTPATIENT)
Dept: NEUROLOGY | Age: 53
Discharge: HOME | End: 2018-09-11
Payer: COMMERCIAL

## 2018-09-11 ENCOUNTER — TELEPHONE (OUTPATIENT)
Dept: NEUROLOGY | Age: 53
End: 2018-09-11

## 2018-09-11 DIAGNOSIS — G70.00 MYASTHENIA GRAVIS (HCC): Primary | ICD-10-CM

## 2018-09-11 DIAGNOSIS — G70.00 MYASTHENIA GRAVIS (HCC): ICD-10-CM

## 2018-09-11 DIAGNOSIS — G62.9 NEUROPATHY: ICD-10-CM

## 2018-09-11 LAB
ALBUMIN SERPL-MCNC: 4.6 G/DL (ref 3.5–5.2)
ALP BLD-CCNC: 105 U/L (ref 40–130)
ALT SERPL-CCNC: 38 U/L (ref 5–41)
ANION GAP SERPL CALCULATED.3IONS-SCNC: 13 MMOL/L (ref 7–19)
AST SERPL-CCNC: 14 U/L (ref 5–40)
BILIRUB SERPL-MCNC: 0.5 MG/DL (ref 0.2–1.2)
BUN BLDV-MCNC: 31 MG/DL (ref 6–20)
CALCIUM SERPL-MCNC: 9.8 MG/DL (ref 8.6–10)
CHLORIDE BLD-SCNC: 99 MMOL/L (ref 98–111)
CO2: 28 MMOL/L (ref 22–29)
CREAT SERPL-MCNC: 1 MG/DL (ref 0.5–1.2)
GFR NON-AFRICAN AMERICAN: >60
GLUCOSE BLD-MCNC: 165 MG/DL (ref 74–109)
MAGNESIUM: 2.1 MG/DL (ref 1.6–2.6)
POTASSIUM SERPL-SCNC: 4.7 MMOL/L (ref 3.5–5)
SODIUM BLD-SCNC: 140 MMOL/L (ref 136–145)
TOTAL PROTEIN: 7.2 G/DL (ref 6.6–8.7)

## 2018-09-11 PROCEDURE — 95909 NRV CNDJ TST 5-6 STUDIES: CPT

## 2018-09-11 PROCEDURE — 95909 NRV CNDJ TST 5-6 STUDIES: CPT | Performed by: PSYCHIATRY & NEUROLOGY

## 2018-09-11 PROCEDURE — 95886 MUSC TEST DONE W/N TEST COMP: CPT

## 2018-09-11 PROCEDURE — 95886 MUSC TEST DONE W/N TEST COMP: CPT | Performed by: PSYCHIATRY & NEUROLOGY

## 2018-09-11 RX ORDER — BUSPIRONE HYDROCHLORIDE 10 MG/1
10 TABLET ORAL 3 TIMES DAILY
Qty: 90 TABLET | Refills: 5 | Status: SHIPPED | OUTPATIENT
Start: 2018-09-11

## 2018-09-11 NOTE — TELEPHONE ENCOUNTER
----- Message from Quinton Guthrie MD sent at 9/11/2018 11:26 AM CDT -----  Let pt know that his labs were all ok except for elevate BG of 165.   Send these to pcp too please

## 2018-09-13 ENCOUNTER — TELEPHONE (OUTPATIENT)
Dept: NEUROLOGY | Age: 53
End: 2018-09-13

## 2018-09-17 ENCOUNTER — TELEPHONE (OUTPATIENT)
Dept: NEUROLOGY | Age: 53
End: 2018-09-17

## 2018-09-17 NOTE — TELEPHONE ENCOUNTER
Called gillian and she stated patient is wanting to get infusion done on oct 16 that will do vac A the first week of oct

## 2018-09-17 NOTE — TELEPHONE ENCOUNTER
The patients wife called and stated that she has some questions about a shot and the patients infusion.

## 2018-09-17 NOTE — TELEPHONE ENCOUNTER
Call from [facility] at Dr Maryan Kwon request call about patients injection.

## 2018-09-18 NOTE — TELEPHONE ENCOUNTER
Brenda Gonzales called back and stated that they can not get meningococcal group b because it comes in a group and they don't use that enough for then to order that much. Patient can actually pick it up at [facility] and take to them to give. Called gillian and informed of all this and understands.

## 2018-09-24 ENCOUNTER — TELEPHONE (OUTPATIENT)
Dept: NEUROLOGY | Age: 53
End: 2018-09-24

## 2018-09-27 ENCOUNTER — TELEPHONE (OUTPATIENT)
Dept: NEUROLOGY | Age: 53
End: 2018-09-27

## 2018-10-12 LAB
Lab: NORMAL
REPORT: NORMAL
THIS TEST SENT TO: NORMAL

## 2018-10-16 ENCOUNTER — HOSPITAL ENCOUNTER (OUTPATIENT)
Dept: INFUSION THERAPY | Age: 53
Setting detail: INFUSION SERIES
Discharge: HOME | End: 2018-10-16
Payer: COMMERCIAL

## 2018-10-16 VITALS
DIASTOLIC BLOOD PRESSURE: 82 MMHG | TEMPERATURE: 97.8 F | OXYGEN SATURATION: 98 % | HEART RATE: 84 BPM | SYSTOLIC BLOOD PRESSURE: 132 MMHG

## 2018-10-16 DIAGNOSIS — G70.00 MYASTHENIA GRAVIS (HCC): ICD-10-CM

## 2018-10-16 DIAGNOSIS — G70.00 MG (MYASTHENIA GRAVIS) (HCC): Primary | ICD-10-CM

## 2018-10-16 PROCEDURE — 6360000002 HC RX W HCPCS: Performed by: PSYCHIATRY & NEUROLOGY

## 2018-10-16 PROCEDURE — 2580000003 HC RX 258: Performed by: PSYCHIATRY & NEUROLOGY

## 2018-10-16 PROCEDURE — 96413 CHEMO IV INFUSION 1 HR: CPT

## 2018-10-16 RX ADMIN — ECULIZUMAB 900 MG: 300 INJECTION, SOLUTION, CONCENTRATE INTRAVENOUS at 09:20

## 2018-10-18 RX ORDER — LOSARTAN POTASSIUM 50 MG/1
TABLET ORAL
Qty: 30 TABLET | Refills: 1 | Status: SHIPPED | OUTPATIENT
Start: 2018-10-18 | End: 2018-11-06 | Stop reason: SDUPTHER

## 2018-10-22 ENCOUNTER — HOSPITAL ENCOUNTER (OUTPATIENT)
Dept: INFUSION THERAPY | Age: 53
Setting detail: INFUSION SERIES
Discharge: HOME | End: 2018-10-22
Payer: COMMERCIAL

## 2018-10-22 VITALS
HEART RATE: 88 BPM | TEMPERATURE: 97.8 F | SYSTOLIC BLOOD PRESSURE: 147 MMHG | RESPIRATION RATE: 17 BRPM | DIASTOLIC BLOOD PRESSURE: 98 MMHG

## 2018-10-22 DIAGNOSIS — G70.00 MYASTHENIA GRAVIS (HCC): ICD-10-CM

## 2018-10-22 PROCEDURE — 6360000002 HC RX W HCPCS: Performed by: PSYCHIATRY & NEUROLOGY

## 2018-10-22 PROCEDURE — 96365 THER/PROPH/DIAG IV INF INIT: CPT

## 2018-10-22 PROCEDURE — 2580000003 HC RX 258: Performed by: PSYCHIATRY & NEUROLOGY

## 2018-10-22 RX ADMIN — ECULIZUMAB 900 MG: 300 INJECTION, SOLUTION, CONCENTRATE INTRAVENOUS at 07:35

## 2018-10-29 ENCOUNTER — HOSPITAL ENCOUNTER (OUTPATIENT)
Dept: INFUSION THERAPY | Age: 53
Setting detail: INFUSION SERIES
Discharge: HOME | End: 2018-10-29
Payer: COMMERCIAL

## 2018-10-29 VITALS
TEMPERATURE: 97.7 F | HEART RATE: 96 BPM | RESPIRATION RATE: 17 BRPM | SYSTOLIC BLOOD PRESSURE: 136 MMHG | DIASTOLIC BLOOD PRESSURE: 89 MMHG

## 2018-10-29 DIAGNOSIS — G70.00 MYASTHENIA GRAVIS (HCC): ICD-10-CM

## 2018-10-29 PROCEDURE — 6360000002 HC RX W HCPCS: Performed by: PSYCHIATRY & NEUROLOGY

## 2018-10-29 PROCEDURE — 96365 THER/PROPH/DIAG IV INF INIT: CPT

## 2018-10-29 PROCEDURE — 2580000003 HC RX 258: Performed by: PSYCHIATRY & NEUROLOGY

## 2018-10-29 RX ORDER — ATORVASTATIN CALCIUM 40 MG/1
TABLET, FILM COATED ORAL
Qty: 90 TABLET | Refills: 3 | Status: SHIPPED | OUTPATIENT
Start: 2018-10-29 | End: 2018-11-06 | Stop reason: SDUPTHER

## 2018-10-29 RX ADMIN — ECULIZUMAB 900 MG: 300 INJECTION, SOLUTION, CONCENTRATE INTRAVENOUS at 07:33

## 2018-11-05 ENCOUNTER — HOSPITAL ENCOUNTER (OUTPATIENT)
Dept: INFUSION THERAPY | Age: 53
Setting detail: INFUSION SERIES
Discharge: HOME | End: 2018-11-05
Payer: COMMERCIAL

## 2018-11-05 VITALS
SYSTOLIC BLOOD PRESSURE: 140 MMHG | RESPIRATION RATE: 16 BRPM | DIASTOLIC BLOOD PRESSURE: 88 MMHG | HEART RATE: 110 BPM | TEMPERATURE: 98.5 F

## 2018-11-05 DIAGNOSIS — G70.00 MYASTHENIA GRAVIS (HCC): ICD-10-CM

## 2018-11-05 DIAGNOSIS — G70.00 MG (MYASTHENIA GRAVIS) (HCC): Primary | ICD-10-CM

## 2018-11-05 PROCEDURE — 96413 CHEMO IV INFUSION 1 HR: CPT

## 2018-11-05 PROCEDURE — 6360000002 HC RX W HCPCS: Performed by: PSYCHIATRY & NEUROLOGY

## 2018-11-05 PROCEDURE — 2580000003 HC RX 258: Performed by: PSYCHIATRY & NEUROLOGY

## 2018-11-05 RX ORDER — PROMETHAZINE HYDROCHLORIDE 25 MG/ML
25 INJECTION, SOLUTION INTRAMUSCULAR; INTRAVENOUS ONCE
Status: COMPLETED | OUTPATIENT
Start: 2018-11-05 | End: 2018-11-05

## 2018-11-05 RX ADMIN — ECULIZUMAB 900 MG: 300 INJECTION, SOLUTION, CONCENTRATE INTRAVENOUS at 07:52

## 2018-11-05 RX ADMIN — PROMETHAZINE HYDROCHLORIDE 25 MG: 25 INJECTION INTRAMUSCULAR; INTRAVENOUS at 09:09

## 2018-11-05 NOTE — PROGRESS NOTES
TALKED WITH PATIENT WIFE, INFORMED THAT PATIENT IS DROWSY AFTER PHENERGAN, AND THAT HE WOULD NEED SOMEONE TO DRIVE HIM HOME.

## 2018-11-05 NOTE — PROGRESS NOTES
PATIENT C/O NAUSEA, DRY HEAVES, 140/86, , R 16, DR. Brie Herron OFFICE NOTIFIED, DR. Brie Herron NO IN TODAY, ORDERS RECEIVED FROM DR. AZUL FOR PHENERGAN.

## 2018-11-06 ENCOUNTER — OFFICE VISIT (OUTPATIENT)
Dept: CARDIOLOGY | Facility: CLINIC | Age: 53
End: 2018-11-06

## 2018-11-06 VITALS
HEART RATE: 117 BPM | WEIGHT: 169 LBS | BODY MASS INDEX: 22.89 KG/M2 | HEIGHT: 72 IN | DIASTOLIC BLOOD PRESSURE: 86 MMHG | SYSTOLIC BLOOD PRESSURE: 112 MMHG

## 2018-11-06 DIAGNOSIS — E78.2 MIXED HYPERLIPIDEMIA: ICD-10-CM

## 2018-11-06 DIAGNOSIS — I25.10 CORONARY ARTERY DISEASE INVOLVING NATIVE CORONARY ARTERY OF NATIVE HEART WITHOUT ANGINA PECTORIS: Primary | ICD-10-CM

## 2018-11-06 DIAGNOSIS — I10 ESSENTIAL HYPERTENSION: ICD-10-CM

## 2018-11-06 DIAGNOSIS — Z95.5 S/P CORONARY ARTERY STENT PLACEMENT: ICD-10-CM

## 2018-11-06 DIAGNOSIS — K21.9 GASTROESOPHAGEAL REFLUX DISEASE WITHOUT ESOPHAGITIS: ICD-10-CM

## 2018-11-06 DIAGNOSIS — G70.00 MYASTHENIA GRAVIS (HCC): ICD-10-CM

## 2018-11-06 PROCEDURE — 93000 ELECTROCARDIOGRAM COMPLETE: CPT | Performed by: NURSE PRACTITIONER

## 2018-11-06 PROCEDURE — 99214 OFFICE O/P EST MOD 30 MIN: CPT | Performed by: NURSE PRACTITIONER

## 2018-11-06 RX ORDER — OMEPRAZOLE 20 MG/1
20 CAPSULE, DELAYED RELEASE ORAL DAILY
COMMUNITY

## 2018-11-06 RX ORDER — PRASUGREL 5 MG/1
5 TABLET, FILM COATED ORAL DAILY
Qty: 90 TABLET | Refills: 3 | Status: SHIPPED | OUTPATIENT
Start: 2018-11-06 | End: 2019-11-08

## 2018-11-06 RX ORDER — PREDNISONE 10 MG/1
20 TABLET ORAL DAILY
Status: ON HOLD | COMMUNITY
End: 2022-12-08

## 2018-11-06 RX ORDER — METOPROLOL SUCCINATE 25 MG/1
25 TABLET, EXTENDED RELEASE ORAL DAILY
Qty: 90 TABLET | Refills: 3 | Status: SHIPPED | OUTPATIENT
Start: 2018-11-06 | End: 2020-11-13 | Stop reason: SDUPTHER

## 2018-11-06 RX ORDER — LOSARTAN POTASSIUM 50 MG/1
50 TABLET ORAL DAILY
Qty: 90 TABLET | Refills: 3 | Status: SHIPPED | OUTPATIENT
Start: 2018-11-06 | End: 2019-12-02 | Stop reason: SDUPTHER

## 2018-11-06 RX ORDER — ATORVASTATIN CALCIUM 40 MG/1
40 TABLET, FILM COATED ORAL DAILY
Qty: 90 TABLET | Refills: 3 | Status: SHIPPED | OUTPATIENT
Start: 2018-11-06 | End: 2019-12-30

## 2018-11-06 NOTE — PROGRESS NOTES
Subjective:     Encounter Date:11/06/2018      Patient ID: Iftikhar Francis is a 53 y.o. male. He presents today for follow up of coronary artery disease s/p drug-eluting stent placement to the right coronary artery in 2014, hypertension, hyperlipidemia and gastroesophageal reflux disease. He denies any chest pain, shortness of breath, palpitations, dizziness, syncope, orthopnea, PND or swelling. He does report decreased stamina and fatigue, however he has recently been diagnosed with and is being treated for myasthenia gravis. Blood pressure and cholesterol are reported to be well controlled. He states that overall he has felt well since his last visit.     Chief Complaint: routine follow up  Coronary Artery Disease   Presents for follow-up visit. Pertinent negatives include no chest pain, chest pressure, chest tightness, dizziness, leg swelling, muscle weakness, palpitations, shortness of breath or weight gain. Risk factors include hyperlipidemia. The symptoms have been stable. Compliance with diet is variable. Compliance with exercise is variable. Compliance with medications is good.   Hypertension   This is a chronic problem. The current episode started more than 1 year ago. The problem is controlled. Pertinent negatives include no anxiety, blurred vision, chest pain, headaches, malaise/fatigue, neck pain, orthopnea, palpitations, peripheral edema, PND, shortness of breath or sweats. Risk factors for coronary artery disease include dyslipidemia and male gender. Current antihypertension treatment includes alpha 1 blockers and beta blockers. The current treatment provides significant improvement. Hypertensive end-organ damage includes CAD/MI.   Hyperlipidemia   This is a chronic problem. The current episode started more than 1 year ago. The problem is controlled. Recent lipid tests were reviewed and are normal. Pertinent negatives include no chest pain or shortness of breath. Current antihyperlipidemic  treatment includes statins. The current treatment provides significant improvement of lipids. Risk factors for coronary artery disease include dyslipidemia, hypertension and male sex.       The following portions of the patient's history were reviewed and updated as appropriate: allergies, current medications, past family history, past medical history, past social history, past surgical history and problem list.     Allergies   Allergen Reactions   • Avelox [Moxifloxacin] Unknown (See Comments)     Not Documented   • Levaquin [Levofloxacin]        Current Outpatient Prescriptions:   •  aspirin 81 MG EC tablet, Take 81 mg by mouth daily., Disp: , Rfl:   •  atorvastatin (LIPITOR) 40 MG tablet, TAKE ONE TABLET BY MOUTH ONCE DAILY, Disp: 90 tablet, Rfl: 3  •  losartan (COZAAR) 50 MG tablet, TAKE ONE TABLET BY MOUTH ONCE DAILY, Disp: 30 tablet, Rfl: 1  •  metFORMIN (GLUCOPHAGE) 500 MG tablet, Take 500 mg by mouth 2 (Two) Times a Day With Meals., Disp: , Rfl:   •  metoprolol succinate XL (TOPROL-XL) 25 MG 24 hr tablet, Take 0.5 tablets by mouth Daily. 1/2 tablet qhs, Disp: 45 tablet, Rfl: 3  •  omeprazole (priLOSEC) 20 MG capsule, Take 20 mg by mouth Daily., Disp: , Rfl:   •  prasugrel (EFFIENT) 5 MG tablet, Take 1 tablet by mouth Daily., Disp: 90 tablet, Rfl: 3  •  predniSONE (DELTASONE) 20 MG tablet, Take 20 mg by mouth Daily., Disp: , Rfl:   Past Medical History:   Diagnosis Date   • Bilateral kidney stones    • Heart attack (CMS/HCC)    • Left ureteral stone    • Peptic ulceration    • Sleep apnea      Past Surgical History:   Procedure Laterality Date   • CORONARY ANGIOPLASTY WITH STENT PLACEMENT     • HERNIA REPAIR Left     Inguinal Hernia   • SHOULDER SURGERY     • TUMOR REMOVAL       Family History   Problem Relation Age of Onset   • No Known Problems Father    • No Known Problems Mother      Social History     Social History   • Marital status:      Spouse name: N/A   • Number of children: N/A   • Years of  "education: N/A     Occupational History   • Not on file.     Social History Main Topics   • Smoking status: Never Smoker   • Smokeless tobacco: Never Used   • Alcohol use Yes      Comment: occassionally   • Drug use: No   • Sexual activity: Defer     Other Topics Concern   • Not on file     Social History Narrative   • No narrative on file         Review of Systems   Constitution: Negative for chills, decreased appetite, fever, weakness, malaise/fatigue, night sweats, weight gain and weight loss.   HENT: Negative for nosebleeds.    Eyes: Negative for blurred vision and visual disturbance.   Cardiovascular: Negative for chest pain, dyspnea on exertion, leg swelling, near-syncope, orthopnea, palpitations, paroxysmal nocturnal dyspnea and syncope.   Respiratory: Negative for chest tightness, cough, hemoptysis, shortness of breath, snoring and wheezing.    Endocrine: Negative for cold intolerance and heat intolerance.   Hematologic/Lymphatic: Does not bruise/bleed easily.   Skin: Negative for rash.   Musculoskeletal: Negative for back pain, falls, muscle weakness and neck pain.   Gastrointestinal: Negative for abdominal pain, change in bowel habit, constipation, diarrhea, dysphagia, heartburn, nausea and vomiting.   Genitourinary: Negative for hematuria.   Neurological: Negative for dizziness, headaches and light-headedness.   Psychiatric/Behavioral: Negative for altered mental status.   Allergic/Immunologic: Negative for persistent infections.         ECG 12 Lead  Date/Time: 11/6/2018 3:18 PM  Performed by: SEJAL MARTINES  Authorized by: SEJAL MARTINES   Comparison: compared with previous ECG from 10/27/2017  Rhythm: sinus tachycardia  Rate: tachycardic  BPM: 117  Clinical impression: abnormal ECG        /86   Pulse 117   Ht 182.9 cm (72\")   Wt 76.7 kg (169 lb)   BMI 22.92 kg/m²          Objective:     Physical Exam   Constitutional: He is oriented to person, place, and time. Vital signs are normal. He " appears well-developed and well-nourished. No distress.   HENT:   Head: Normocephalic and atraumatic.   Right Ear: External ear normal.   Left Ear: External ear normal.   Eyes: Pupils are equal, round, and reactive to light. Conjunctivae are normal. Right eye exhibits no discharge. Left eye exhibits no discharge.   Neck: Normal range of motion. Neck supple. No JVD present. Carotid bruit is not present. No thyromegaly present.   Cardiovascular: Regular rhythm, normal heart sounds and intact distal pulses.  Tachycardia present.  PMI is not displaced.  Exam reveals no gallop, no friction rub and no decreased pulses.    No murmur heard.  Pulses:       Radial pulses are 2+ on the right side, and 2+ on the left side.        Dorsalis pedis pulses are 2+ on the right side, and 2+ on the left side.        Posterior tibial pulses are 2+ on the right side, and 2+ on the left side.   Pulmonary/Chest: Effort normal and breath sounds normal. No respiratory distress. He has no decreased breath sounds. He has no wheezes. He has no rhonchi. He has no rales. He exhibits no tenderness.   Abdominal: Soft. Bowel sounds are normal. He exhibits no distension. There is no tenderness.   Musculoskeletal: Normal range of motion. He exhibits no edema.   Neurological: He is alert and oriented to person, place, and time.   Skin: Skin is warm and dry. No rash noted. He is not diaphoretic. No erythema. No pallor.   Psychiatric: He has a normal mood and affect. His behavior is normal. Judgment and thought content normal.   Vitals reviewed.        Assessment:          Diagnosis Plan   1. Coronary artery disease involving native coronary artery of native heart without angina pectoris  No clinical signs of ischemia.    2. S/P coronary artery stent placement  Remains on effient, aspirin, ARB and ACE.    3. Essential hypertension  Well controlled.    4. Mixed hyperlipidemia  Managed by PCP. On statin.    5. Gastroesophageal reflux disease without  esophagitis            Plan:       1. Tachycardic in office and reports has been tachycardic recently. Increase metoprolol succinate to 25 mg daily. Continue medications as previously prescribed.  2. Report any worsening symptoms.  3. Report any signs of bleeding.  4. Continue heart healthy diet and regular exercise as tolerated.   5. Follow up with PCP for blood pressure and cholesterol management and routine lab work.  6. Follow up with Dr. Perkins in one year, or sooner if needed.

## 2018-11-12 ENCOUNTER — HOSPITAL ENCOUNTER (OUTPATIENT)
Dept: INFUSION THERAPY | Age: 53
Setting detail: INFUSION SERIES
Discharge: HOME | End: 2018-11-12
Payer: COMMERCIAL

## 2018-11-12 VITALS
HEART RATE: 95 BPM | SYSTOLIC BLOOD PRESSURE: 125 MMHG | RESPIRATION RATE: 17 BRPM | TEMPERATURE: 97.3 F | DIASTOLIC BLOOD PRESSURE: 83 MMHG

## 2018-11-12 DIAGNOSIS — G70.00 MYASTHENIA GRAVIS (HCC): ICD-10-CM

## 2018-11-12 PROCEDURE — 2580000003 HC RX 258: Performed by: PSYCHIATRY & NEUROLOGY

## 2018-11-12 PROCEDURE — 96365 THER/PROPH/DIAG IV INF INIT: CPT

## 2018-11-12 PROCEDURE — 6360000002 HC RX W HCPCS: Performed by: PSYCHIATRY & NEUROLOGY

## 2018-11-12 RX ADMIN — ECULIZUMAB 1200 MG: 300 INJECTION, SOLUTION, CONCENTRATE INTRAVENOUS at 07:30

## 2018-11-26 ENCOUNTER — HOSPITAL ENCOUNTER (OUTPATIENT)
Dept: INFUSION THERAPY | Age: 53
Setting detail: INFUSION SERIES
Discharge: HOME | End: 2018-11-26
Payer: COMMERCIAL

## 2018-11-26 VITALS
HEART RATE: 98 BPM | DIASTOLIC BLOOD PRESSURE: 81 MMHG | RESPIRATION RATE: 17 BRPM | SYSTOLIC BLOOD PRESSURE: 130 MMHG | TEMPERATURE: 97.7 F

## 2018-11-26 DIAGNOSIS — G70.00 MYASTHENIA GRAVIS (HCC): ICD-10-CM

## 2018-11-26 PROCEDURE — 96413 CHEMO IV INFUSION 1 HR: CPT

## 2018-11-26 PROCEDURE — 96365 THER/PROPH/DIAG IV INF INIT: CPT

## 2018-11-26 PROCEDURE — 6360000002 HC RX W HCPCS: Performed by: PSYCHIATRY & NEUROLOGY

## 2018-11-26 PROCEDURE — 2580000003 HC RX 258: Performed by: PSYCHIATRY & NEUROLOGY

## 2018-11-26 RX ADMIN — ECULIZUMAB 1200 MG: 300 INJECTION, SOLUTION, CONCENTRATE INTRAVENOUS at 07:48

## 2018-11-30 ENCOUNTER — OFFICE VISIT (OUTPATIENT)
Dept: NEUROLOGY | Age: 53
End: 2018-11-30
Payer: COMMERCIAL

## 2018-11-30 VITALS
BODY MASS INDEX: 26.31 KG/M2 | WEIGHT: 194 LBS | SYSTOLIC BLOOD PRESSURE: 134 MMHG | DIASTOLIC BLOOD PRESSURE: 89 MMHG | HEART RATE: 96 BPM

## 2018-11-30 DIAGNOSIS — R73.9 HYPERGLYCEMIA: ICD-10-CM

## 2018-11-30 DIAGNOSIS — G70.00 MG (MYASTHENIA GRAVIS) (HCC): Primary | ICD-10-CM

## 2018-11-30 DIAGNOSIS — G70.00 MG (MYASTHENIA GRAVIS) (HCC): ICD-10-CM

## 2018-11-30 DIAGNOSIS — G62.9 NEUROPATHY: ICD-10-CM

## 2018-11-30 LAB
ALBUMIN SERPL-MCNC: 4.5 G/DL (ref 3.5–5.2)
ALP BLD-CCNC: 85 U/L (ref 40–130)
ALT SERPL-CCNC: 100 U/L (ref 5–41)
ANION GAP SERPL CALCULATED.3IONS-SCNC: 18 MMOL/L (ref 7–19)
AST SERPL-CCNC: 28 U/L (ref 5–40)
BASOPHILS ABSOLUTE: 0 K/UL (ref 0–0.2)
BASOPHILS RELATIVE PERCENT: 0.3 % (ref 0–1)
BILIRUB SERPL-MCNC: 0.5 MG/DL (ref 0.2–1.2)
BUN BLDV-MCNC: 23 MG/DL (ref 6–20)
CALCIUM SERPL-MCNC: 9.5 MG/DL (ref 8.6–10)
CHLORIDE BLD-SCNC: 97 MMOL/L (ref 98–111)
CO2: 27 MMOL/L (ref 22–29)
CREAT SERPL-MCNC: 1 MG/DL (ref 0.5–1.2)
EOSINOPHILS ABSOLUTE: 0.1 K/UL (ref 0–0.6)
EOSINOPHILS RELATIVE PERCENT: 1 % (ref 0–5)
GFR NON-AFRICAN AMERICAN: >60
GLUCOSE BLD-MCNC: 149 MG/DL (ref 74–109)
HBA1C MFR BLD: 7.7 % (ref 4–6)
HCT VFR BLD CALC: 46.8 % (ref 42–52)
HEMOGLOBIN: 15.1 G/DL (ref 14–18)
LYMPHOCYTES ABSOLUTE: 2.4 K/UL (ref 1.1–4.5)
LYMPHOCYTES RELATIVE PERCENT: 20.2 % (ref 20–40)
MCH RBC QN AUTO: 29.9 PG (ref 27–31)
MCHC RBC AUTO-ENTMCNC: 32.3 G/DL (ref 33–37)
MCV RBC AUTO: 92.7 FL (ref 80–94)
MONOCYTES ABSOLUTE: 1 K/UL (ref 0–0.9)
MONOCYTES RELATIVE PERCENT: 8.4 % (ref 0–10)
NEUTROPHILS ABSOLUTE: 8 K/UL (ref 1.5–7.5)
NEUTROPHILS RELATIVE PERCENT: 68.8 % (ref 50–65)
PDW BLD-RTO: 15 % (ref 11.5–14.5)
PLATELET # BLD: 289 K/UL (ref 130–400)
PMV BLD AUTO: 9.7 FL (ref 9.4–12.4)
POTASSIUM SERPL-SCNC: 4 MMOL/L (ref 3.5–5)
RBC # BLD: 5.05 M/UL (ref 4.7–6.1)
SODIUM BLD-SCNC: 142 MMOL/L (ref 136–145)
TOTAL PROTEIN: 7.2 G/DL (ref 6.6–8.7)
WBC # BLD: 11.7 K/UL (ref 4.8–10.8)

## 2018-11-30 PROCEDURE — 99214 OFFICE O/P EST MOD 30 MIN: CPT | Performed by: PSYCHIATRY & NEUROLOGY

## 2018-11-30 RX ORDER — OMEPRAZOLE 20 MG/1
20 CAPSULE, DELAYED RELEASE ORAL 2 TIMES DAILY
Qty: 60 CAPSULE | Refills: 5 | Status: SHIPPED | OUTPATIENT
Start: 2018-11-30 | End: 2019-12-02 | Stop reason: SDUPTHER

## 2018-11-30 RX ORDER — BUSPIRONE HYDROCHLORIDE 15 MG/1
15 TABLET ORAL 3 TIMES DAILY
Qty: 90 TABLET | Refills: 5 | Status: SHIPPED | OUTPATIENT
Start: 2018-11-30 | End: 2019-03-18

## 2018-11-30 RX ORDER — PREDNISONE 20 MG/1
TABLET ORAL
Qty: 60 TABLET | Refills: 5 | Status: SHIPPED | OUTPATIENT
Start: 2018-11-30 | End: 2019-06-10

## 2018-12-01 NOTE — PROGRESS NOTES
increasing his Prilosec to twice a day and adding Zantac. Active Ambulatory Problems     Diagnosis Date Noted    Biceps tendinitis of left shoulder 06/22/2016    Myasthenia gravis [facility])      Resolved Ambulatory Problems     Diagnosis Date Noted    No Resolved Ambulatory Problems     Past Medical History:   Diagnosis Date    CAD (coronary artery disease)     GERD (gastroesophageal reflux disease)     Hyperlipidemia     Hypertension     IBS (irritable bowel syndrome)     Myasthenia gravis (HCC)     Shoulder pain        Past Surgical History:   Procedure Laterality Date    CORONARY ANGIOPLASTY WITH STENT PLACEMENT  2 yr. 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 Social History Main Topics    Smoking status: Never Smoker    Smokeless tobacco: Never Used    Alcohol use 0.6 oz/week     1 Cans of beer per week      Comment: 2-3 per month    Drug use: No    Sexual activity: Not on file     Other Topics Concern    Not on file     Social History Narrative    No narrative on file       Review of Systems    Constitutional - No fever or chills. yes diaphoresis or significant fatigue. HENT -  No tinnitus or significant hearing loss.   Eyes - no sudden vision change or eye pain  Respiratory - no significant shortness of breath or cough  Cardiovascular - no chest pain No palpitations or significant leg asymmetry. Face is puffy appearing  CN VIII-Hearing intact   CN IX and X- Palate elevates in midline  CN XI-good shoulder shrug  CN XII-Tongue midline with no fasciculations or fibrillations    Motor Exam  V/V throughout upper and lower extremities bilaterally, no cogwheeling, normal tone    Reflexes   2+ biceps bilaterally  2+ brachioradialis  2+ triceps  2+patella  2+ ankle jerks  No clonus ankles  No Lazcano's sign bilateral hands. No Babinski sign. sensory-decreased pinprick in toes. Normal vibration sense  Tremors- no tremors in hands or head noted    Gait  Normal base and speed  No ataxia. No Romberg sign    Coordination  Finger to nose and SULMA-unremarkable    No results found for: [id number]  Lab Results   Component Value Date    WBC 11.7 (H) 11/30/2018    HGB 15.1 11/30/2018    HCT 46.8 11/30/2018    MCV 92.7 11/30/2018     11/30/2018     Lab Results   Component Value Date     11/30/2018    K 4.0 11/30/2018    CL 97 (L) 11/30/2018    CO2 27 11/30/2018    BUN 23 (H) 11/30/2018    CREATININE 1.0 11/30/2018    GLUCOSE 149 (H) 11/30/2018    CALCIUM 9.5 11/30/2018    PROT 7.2 11/30/2018    LABALBU 4.5 11/30/2018    BILITOT 0.5 11/30/2018    ALKPHOS 85 11/30/2018    AST 28 11/30/2018     (H) 11/30/2018    LABGLOM >60 11/30/2018    GLOB 3.8 07/18/2016           Assessment    ICD-10-CM    1. MG (myasthenia gravis) ([facility] [address] G70.00 CBC Auto Differential     Comprehensive Metabolic Panel     Hemoglobin A1C     busPIRone (BUSPAR) 15 MG tablet   2. Neuropathy G62.9    3. Hyperglycemia R73.9 Hemoglobin A1C     His myasthenia gravis has definitely improved . At this point I would like him to try to reduce his prednisone to 30 mg daily. I would like him to get it off of this as soon as we can. Hopefully after a couple of more months of Soliris he will be able to. Blood work was ordered. BuSpar will be increased.  He has been asked to get on over-the-counter calcium and vitamin D supplements as well.  Plan          Pt warned of potential side effects of medication changes/new medicines. They are to call for new or ongoing difficulties. Return in about 3 months (around 2/28/2019).     (Please note that portions of this note were completed with a voice recognition program. Efforts were made to edit the dictations but occasionally words are mis-transcribed.)

## 2018-12-10 ENCOUNTER — HOSPITAL ENCOUNTER (OUTPATIENT)
Dept: INFUSION THERAPY | Age: 53
Setting detail: INFUSION SERIES
Discharge: HOME | End: 2018-12-10
Payer: COMMERCIAL

## 2018-12-10 VITALS
SYSTOLIC BLOOD PRESSURE: 123 MMHG | DIASTOLIC BLOOD PRESSURE: 81 MMHG | TEMPERATURE: 97.8 F | HEART RATE: 94 BPM | RESPIRATION RATE: 18 BRPM | OXYGEN SATURATION: 99 %

## 2018-12-10 DIAGNOSIS — G70.00 MYASTHENIA GRAVIS (HCC): ICD-10-CM

## 2018-12-10 PROCEDURE — 96413 CHEMO IV INFUSION 1 HR: CPT

## 2018-12-10 PROCEDURE — 6360000002 HC RX W HCPCS: Performed by: PSYCHIATRY & NEUROLOGY

## 2018-12-10 PROCEDURE — 2580000003 HC RX 258: Performed by: PSYCHIATRY & NEUROLOGY

## 2018-12-10 RX ADMIN — ECULIZUMAB 1200 MG: 300 INJECTION, SOLUTION, CONCENTRATE INTRAVENOUS at 07:31

## 2018-12-26 ENCOUNTER — HOSPITAL ENCOUNTER (OUTPATIENT)
Dept: INFUSION THERAPY | Age: 53
Setting detail: INFUSION SERIES
Discharge: HOME | End: 2018-12-26
Payer: COMMERCIAL

## 2018-12-26 VITALS
SYSTOLIC BLOOD PRESSURE: 161 MMHG | HEART RATE: 94 BPM | RESPIRATION RATE: 17 BRPM | TEMPERATURE: 98 F | DIASTOLIC BLOOD PRESSURE: 97 MMHG

## 2018-12-26 DIAGNOSIS — G70.00 MYASTHENIA GRAVIS (HCC): ICD-10-CM

## 2018-12-26 PROCEDURE — 6360000002 HC RX W HCPCS: Performed by: PSYCHIATRY & NEUROLOGY

## 2018-12-26 PROCEDURE — 2580000003 HC RX 258: Performed by: PSYCHIATRY & NEUROLOGY

## 2018-12-26 PROCEDURE — 96413 CHEMO IV INFUSION 1 HR: CPT

## 2018-12-26 RX ADMIN — ECULIZUMAB 1200 MG: 300 INJECTION, SOLUTION, CONCENTRATE INTRAVENOUS at 08:15

## 2019-01-02 ENCOUNTER — APPOINTMENT (OUTPATIENT)
Dept: CT IMAGING | Facility: HOSPITAL | Age: 54
End: 2019-01-02

## 2019-01-02 ENCOUNTER — HOSPITAL ENCOUNTER (EMERGENCY)
Facility: HOSPITAL | Age: 54
Discharge: HOME | End: 2019-01-02
Attending: EMERGENCY MEDICINE | Admitting: EMERGENCY MEDICINE

## 2019-01-02 ENCOUNTER — TELEPHONE (OUTPATIENT)
Dept: UROLOGY | Facility: CLINIC | Age: 54
End: 2019-01-02

## 2019-01-02 VITALS
HEART RATE: 112 BPM | RESPIRATION RATE: 12 BRPM | BODY MASS INDEX: 26.41 KG/M2 | OXYGEN SATURATION: 98 % | HEIGHT: 72 IN | DIASTOLIC BLOOD PRESSURE: 76 MMHG | SYSTOLIC BLOOD PRESSURE: 148 MMHG | WEIGHT: 195 LBS | TEMPERATURE: 99 F

## 2019-01-02 DIAGNOSIS — N39.0 ACUTE UTI: ICD-10-CM

## 2019-01-02 DIAGNOSIS — N20.0 KIDNEY STONE: Primary | ICD-10-CM

## 2019-01-02 DIAGNOSIS — N20.1 RIGHT URETERAL STONE: Primary | ICD-10-CM

## 2019-01-02 LAB
ALBUMIN SERPL-MCNC: 4.5 G/DL (ref 3.5–5)
ALBUMIN/GLOB SERPL: 1.5 G/DL (ref 1.1–2.5)
ALP SERPL-CCNC: 95 U/L (ref 24–120)
ALT SERPL W P-5'-P-CCNC: 141 U/L (ref 0–54)
ANION GAP SERPL CALCULATED.3IONS-SCNC: 14 MMOL/L (ref 4–13)
AST SERPL-CCNC: 124 U/L (ref 7–45)
BACTERIA UR QL AUTO: ABNORMAL /HPF
BASOPHILS # BLD AUTO: 0.04 10*3/MM3 (ref 0–0.2)
BASOPHILS NFR BLD AUTO: 0.2 % (ref 0–2)
BILIRUB SERPL-MCNC: 0.8 MG/DL (ref 0.1–1)
BILIRUB UR QL STRIP: ABNORMAL
BUN BLD-MCNC: 19 MG/DL (ref 5–21)
BUN/CREAT SERPL: 18.3 (ref 7–25)
CALCIUM SPEC-SCNC: 9.2 MG/DL (ref 8.4–10.4)
CHLORIDE SERPL-SCNC: 96 MMOL/L (ref 98–110)
CLARITY UR: ABNORMAL
CO2 SERPL-SCNC: 29 MMOL/L (ref 24–31)
COLOR UR: ABNORMAL
CREAT BLD-MCNC: 1.04 MG/DL (ref 0.5–1.4)
DEPRECATED RDW RBC AUTO: 47.4 FL (ref 40–54)
EOSINOPHIL # BLD AUTO: 0.02 10*3/MM3 (ref 0–0.7)
EOSINOPHIL NFR BLD AUTO: 0.1 % (ref 0–4)
ERYTHROCYTE [DISTWIDTH] IN BLOOD BY AUTOMATED COUNT: 14.7 % (ref 12–15)
GFR SERPL CREATININE-BSD FRML MDRD: 75 ML/MIN/1.73
GLOBULIN UR ELPH-MCNC: 3.1 GM/DL
GLUCOSE BLD-MCNC: 233 MG/DL (ref 70–100)
GLUCOSE UR STRIP-MCNC: ABNORMAL MG/DL
HCT VFR BLD AUTO: 41.8 % (ref 40–52)
HGB BLD-MCNC: 14.4 G/DL (ref 14–18)
HGB UR QL STRIP.AUTO: ABNORMAL
IMM GRANULOCYTES # BLD AUTO: 0.14 10*3/MM3 (ref 0–0.03)
IMM GRANULOCYTES NFR BLD AUTO: 0.9 % (ref 0–5)
KETONES UR QL STRIP: ABNORMAL
LEUKOCYTE ESTERASE UR QL STRIP.AUTO: NEGATIVE
LYMPHOCYTES # BLD AUTO: 0.72 10*3/MM3 (ref 0.72–4.86)
LYMPHOCYTES NFR BLD AUTO: 4.4 % (ref 15–45)
MCH RBC QN AUTO: 30.3 PG (ref 28–32)
MCHC RBC AUTO-ENTMCNC: 34.4 G/DL (ref 33–36)
MCV RBC AUTO: 87.8 FL (ref 82–95)
MONOCYTES # BLD AUTO: 0.38 10*3/MM3 (ref 0.19–1.3)
MONOCYTES NFR BLD AUTO: 2.3 % (ref 4–12)
NEUTROPHILS # BLD AUTO: 15 10*3/MM3 (ref 1.87–8.4)
NEUTROPHILS NFR BLD AUTO: 92.1 % (ref 39–78)
NITRITE UR QL STRIP: NEGATIVE
NRBC BLD AUTO-RTO: 0 /100 WBC (ref 0–0)
PH UR STRIP.AUTO: <=5 [PH] (ref 5–8)
PLATELET # BLD AUTO: 382 10*3/MM3 (ref 130–400)
PMV BLD AUTO: 9.2 FL (ref 6–12)
POTASSIUM BLD-SCNC: 4.2 MMOL/L (ref 3.5–5.3)
PROT SERPL-MCNC: 7.6 G/DL (ref 6.3–8.7)
PROT UR QL STRIP: ABNORMAL
RBC # BLD AUTO: 4.76 10*6/MM3 (ref 4.8–5.9)
RBC # UR: ABNORMAL /HPF
REF LAB TEST METHOD: ABNORMAL
SODIUM BLD-SCNC: 139 MMOL/L (ref 135–145)
SP GR UR STRIP: 1.03 (ref 1–1.03)
SQUAMOUS #/AREA URNS HPF: ABNORMAL /HPF
UROBILINOGEN UR QL STRIP: ABNORMAL
WBC NRBC COR # BLD: 16.3 10*3/MM3 (ref 4.8–10.8)
WBC UR QL AUTO: ABNORMAL /HPF

## 2019-01-02 PROCEDURE — 96374 THER/PROPH/DIAG INJ IV PUSH: CPT

## 2019-01-02 PROCEDURE — 80053 COMPREHEN METABOLIC PANEL: CPT | Performed by: EMERGENCY MEDICINE

## 2019-01-02 PROCEDURE — 74176 CT ABD & PELVIS W/O CONTRAST: CPT

## 2019-01-02 PROCEDURE — 25010000002 CEFTRIAXONE PER 250 MG: Performed by: EMERGENCY MEDICINE

## 2019-01-02 PROCEDURE — 85025 COMPLETE CBC W/AUTO DIFF WBC: CPT | Performed by: EMERGENCY MEDICINE

## 2019-01-02 PROCEDURE — 25010000002 ONDANSETRON PER 1 MG: Performed by: EMERGENCY MEDICINE

## 2019-01-02 PROCEDURE — 81001 URINALYSIS AUTO W/SCOPE: CPT | Performed by: EMERGENCY MEDICINE

## 2019-01-02 PROCEDURE — 87086 URINE CULTURE/COLONY COUNT: CPT | Performed by: EMERGENCY MEDICINE

## 2019-01-02 PROCEDURE — 99283 EMERGENCY DEPT VISIT LOW MDM: CPT

## 2019-01-02 PROCEDURE — 96375 TX/PRO/DX INJ NEW DRUG ADDON: CPT

## 2019-01-02 PROCEDURE — 96376 TX/PRO/DX INJ SAME DRUG ADON: CPT

## 2019-01-02 RX ORDER — CEFDINIR 300 MG/1
300 CAPSULE ORAL 2 TIMES DAILY
Qty: 14 CAPSULE | Refills: 0 | Status: SHIPPED | OUTPATIENT
Start: 2019-01-02 | End: 2019-11-08

## 2019-01-02 RX ORDER — ONDANSETRON 2 MG/ML
4 INJECTION INTRAMUSCULAR; INTRAVENOUS ONCE
Status: COMPLETED | OUTPATIENT
Start: 2019-01-02 | End: 2019-01-02

## 2019-01-02 RX ORDER — SODIUM CHLORIDE 0.9 % (FLUSH) 0.9 %
10 SYRINGE (ML) INJECTION AS NEEDED
Status: DISCONTINUED | OUTPATIENT
Start: 2019-01-02 | End: 2019-01-02 | Stop reason: HOSPADM

## 2019-01-02 RX ORDER — OXYCODONE AND ACETAMINOPHEN 7.5; 325 MG/1; MG/1
1 TABLET ORAL EVERY 4 HOURS PRN
Qty: 20 TABLET | Refills: 0 | Status: SHIPPED | OUTPATIENT
Start: 2019-01-02 | End: 2020-11-13

## 2019-01-02 RX ADMIN — HYDROMORPHONE HYDROCHLORIDE 1 MG: 1 INJECTION, SOLUTION INTRAMUSCULAR; INTRAVENOUS; SUBCUTANEOUS at 11:25

## 2019-01-02 RX ADMIN — CEFTRIAXONE SODIUM 1 G: 1 INJECTION, POWDER, FOR SOLUTION INTRAMUSCULAR; INTRAVENOUS at 12:10

## 2019-01-02 RX ADMIN — ONDANSETRON 4 MG: 2 INJECTION INTRAMUSCULAR; INTRAVENOUS at 10:06

## 2019-01-02 RX ADMIN — HYDROMORPHONE HYDROCHLORIDE 1 MG: 1 INJECTION, SOLUTION INTRAMUSCULAR; INTRAVENOUS; SUBCUTANEOUS at 10:06

## 2019-01-02 NOTE — ED PROVIDER NOTES
Subjective   Patient c/o continued right flank pain.  Was seen on 12/25 at [facility] and diagnosed with 3 mm stone on right.  Thought it would pass but it has not.  Went to Dr. Pearce's office without appointment to try to get help but they could not see him and sent him here.  Still hurting.        History provided by:  Patient   used: No    Flank Pain   Pain location:  R flank  Pain quality: aching    Pain radiates to:  Does not radiate  Pain severity:  Severe  Onset quality:  Gradual  Duration:  1 week  Timing:  Constant  Progression:  Unchanged  Chronicity:  New  Context: not alcohol use, not awakening from sleep, not diet changes, not eating, not laxative use, not medication withdrawal, not previous surgeries, not recent illness, not recent sexual activity, not recent travel, not retching, not sick contacts, not suspicious food intake and not trauma    Relieved by:  Nothing  Worsened by:  Nothing  Ineffective treatments:  None tried  Associated symptoms: nausea    Associated symptoms: no anorexia, no belching, no chest pain, no chills, no constipation, no cough, no diarrhea, no dysuria, no fatigue, no fever, no flatus, no hematemesis, no hematochezia, no hematuria, no melena, no shortness of breath, no sore throat, no vaginal bleeding, no vaginal discharge and no vomiting    Risk factors: no alcohol abuse, no aspirin use, not elderly, has not had multiple surgeries, no NSAID use, not obese, not pregnant and no recent hospitalization        Review of Systems   Constitutional: Negative.  Negative for chills, fatigue and fever.   HENT: Negative.  Negative for sore throat.    Respiratory: Negative.  Negative for cough and shortness of breath.    Cardiovascular: Negative.  Negative for chest pain.   Gastrointestinal: Positive for nausea. Negative for anorexia, constipation, diarrhea, flatus, hematemesis, hematochezia, melena and vomiting.   Genitourinary: Positive for flank pain. Negative for  dysuria, hematuria, vaginal bleeding and vaginal discharge.   Neurological: Negative.    Hematological: Negative.    Psychiatric/Behavioral: Negative.    All other systems reviewed and are negative.      Past Medical History:   Diagnosis Date   • Bilateral kidney stones    • Heart attack (CMS/HCC)    • Left ureteral stone    • Peptic ulceration    • Sleep apnea        Allergies   Allergen Reactions   • Avelox [Moxifloxacin] Unknown (See Comments)     Not Documented   • Levaquin [Levofloxacin]        Past Surgical History:   Procedure Laterality Date   • CORONARY ANGIOPLASTY WITH STENT PLACEMENT     • HERNIA REPAIR Left     Inguinal Hernia   • SHOULDER SURGERY     • TUMOR REMOVAL         Family History   Problem Relation Age of Onset   • No Known Problems Father    • No Known Problems Mother        Social History     Socioeconomic History   • Marital status:      Spouse name: Not on file   • Number of children: Not on file   • Years of education: Not on file   • Highest education level: Not on file   Tobacco Use   • Smoking status: Never Smoker   • Smokeless tobacco: Never Used   Substance and Sexual Activity   • Alcohol use: Yes     Comment: occassionally   • Drug use: No   • Sexual activity: Defer       Prior to Admission medications    Medication Sig Start Date End Date Taking? Authorizing Provider   aspirin 81 MG EC tablet Take 81 mg by mouth daily.    Provider, MD Rodger   atorvastatin (LIPITOR) 40 MG tablet Take 1 tablet by mouth Daily. 11/6/18   Luz Calderon APRN   losartan (COZAAR) 50 MG tablet Take 1 tablet by mouth Daily. 11/6/18   Luz Calderon APRN   metFORMIN (GLUCOPHAGE) 500 MG tablet Take 500 mg by mouth 2 (Two) Times a Day With Meals.    Provider, MD Rodger   metoprolol succinate XL (TOPROL-XL) 25 MG 24 hr tablet Take 1 tablet by mouth Daily. 1/2 tablet qhs 11/6/18   Luz Calderon APRN   omeprazole (priLOSEC) 20 MG capsule Take 20 mg by mouth Daily.    Provider, Rodger,  MD   prasugrel (EFFIENT) 5 MG tablet Take 1 tablet by mouth Daily. 11/6/18   Luz Calderon APRN   predniSONE (DELTASONE) 20 MG tablet Take 20 mg by mouth Daily.    Provider, Rodger, MD       Medications   sodium chloride 0.9 % flush 10 mL (not administered)   cefTRIAXone (ROCEPHIN) 1 g/10mL IV PUSH syringe (not administered)   HYDROmorphone (DILAUDID) injection solution 1 mg (1 mg Intravenous Given 1/2/19 1006)   ondansetron (ZOFRAN) injection 4 mg (4 mg Intravenous Given 1/2/19 1006)   HYDROmorphone (DILAUDID) injection solution 1 mg (1 mg Intravenous Given 1/2/19 1125)       Vitals:    01/02/19 0946   BP: 132/89   Pulse: 119   Resp: 18   Temp: 98 °F (36.7 °C)   SpO2: 97%         Objective   Physical Exam   Constitutional: He is oriented to person, place, and time. He appears well-developed and well-nourished.   Cardiovascular: Normal rate and regular rhythm.   Pulmonary/Chest: Effort normal and breath sounds normal.   Abdominal: Soft. Bowel sounds are normal.   Neurological: He is alert and oriented to person, place, and time.   Skin: Skin is warm and dry.   Psychiatric: He has a normal mood and affect. His behavior is normal.   Nursing note and vitals reviewed.      Procedures         Lab Results (last 24 hours)     Procedure Component Value Units Date/Time    CBC & Differential [[id number]] Collected:  01/02/19 1005    Specimen:  Blood Updated:  01/02/19 1016    Narrative:       The following orders were created for panel order CBC & Differential.  Procedure                               Abnormality         Status                     ---------                               -----------         ------                     CBC Auto Differential[[id number]]        Abnormal            Final result                 Please view results for these tests on the individual orders.    Comprehensive Metabolic Panel [[id number]]  (Abnormal) Collected:  01/02/19 1005    Specimen:  Blood Updated:  01/02/19 1039     Glucose  233 mg/dL      BUN 19 mg/dL      Creatinine 1.04 mg/dL      Sodium 139 mmol/L      Potassium 4.2 mmol/L      Chloride 96 mmol/L      CO2 29.0 mmol/L      Calcium 9.2 mg/dL      Total Protein 7.6 g/dL      Albumin 4.50 g/dL      ALT (SGPT) 141 U/L      AST (SGOT) 124 U/L      Alkaline Phosphatase 95 U/L      Total Bilirubin 0.8 mg/dL      eGFR Non African Amer 75 mL/min/1.73      Globulin 3.1 gm/dL      A/G Ratio 1.5 g/dL      BUN/Creatinine Ratio 18.3     Anion Gap 14.0 mmol/L     CBC Auto Differential [[id number]]  (Abnormal) Collected:  01/02/19 1005    Specimen:  Blood Updated:  01/02/19 1016     WBC 16.30 10*3/mm3      RBC 4.76 10*6/mm3      Hemoglobin 14.4 g/dL      Hematocrit 41.8 %      MCV 87.8 fL      MCH 30.3 pg      MCHC 34.4 g/dL      RDW 14.7 %      RDW-SD 47.4 fl      MPV 9.2 fL      Platelets 382 10*3/mm3      Neutrophil % 92.1 %      Lymphocyte % 4.4 %      Monocyte % 2.3 %      Eosinophil % 0.1 %      Basophil % 0.2 %      Immature Grans % 0.9 %      Neutrophils, Absolute 15.00 10*3/mm3      Lymphocytes, Absolute 0.72 10*3/mm3      Monocytes, Absolute 0.38 10*3/mm3      Eosinophils, Absolute 0.02 10*3/mm3      Basophils, Absolute 0.04 10*3/mm3      Immature Grans, Absolute 0.14 10*3/mm3      nRBC 0.0 /100 WBC     Urinalysis With Culture If Indicated - Urine, Clean Catch [[id number]]  (Abnormal) Collected:  01/02/19 1127    Specimen:  Urine, Clean Catch Updated:  01/02/19 1154     Color, UA Dark Yellow     Appearance, UA Turbid     pH, UA <=5.0     Specific Gravity, UA 1.028     Glucose,  mg/dL (1+)     Ketones, UA Trace     Bilirubin, UA Small (1+)     Blood, UA Moderate (2+)     Protein, UA 30 mg/dL (1+)     Leuk Esterase, UA Negative     Nitrite, UA Negative     Urobilinogen, UA 1.0 E.U./dL    Urinalysis, Microscopic Only - Urine, Clean Catch [[id number]]  (Abnormal) Collected:  01/02/19 1127    Specimen:  Urine, Clean Catch Updated:  01/02/19 1154     RBC, UA 21-30 /HPF      WBC, UA 3-5 /HPF       Bacteria, UA 2+ /HPF      Squamous Epithelial Cells, UA 0-2 /HPF      Methodology Manual Light Microscopy          CT Abdomen Pelvis Without Contrast   Final Result   1. 2-3 mm partially obstructing right renal stone at the UVJ. Mild   upstream hydroureteronephrosis.   2. Additional bilateral nonobstructing renal stones.   3. Heterogeneous fatty infiltration throughout the liver.   4. Mild bronchiolitis in the posterior basal segment left lower lobe,   likely infectious.    5. Cholelithiasis.           This report was finalized on 01/02/2019 10:41 by Dr Alvino Wilson, .          ED Course  ED Course as of Jan 02 1204   Wed Jan 02, 2019   1132 Still hurting.  Have told them of CT findings.  Will give more pain meds while waiting on u/a.  [TR]   1203 Told patient of results.  He has appointment with Dr. Pearce in AM and I told him to keep it because his stone has not passed in all this time.  In meantime we will treat for pain and infection and he is stable for DC.  [TR]      ED Course User Index  [TR] John Medina Jr., MD          MDM  Number of Diagnoses or Management Options  Acute UTI: new and requires workup  Right ureteral stone: new and requires workup     Amount and/or Complexity of Data Reviewed  Clinical lab tests: ordered and reviewed  Tests in the radiology section of CPT®: reviewed and ordered  Tests in the medicine section of CPT®: ordered and reviewed    Risk of Complications, Morbidity, and/or Mortality  Presenting problems: moderate  Diagnostic procedures: moderate  Management options: moderate    Patient Progress  Patient progress: stable      Final diagnoses:   Right ureteral stone   Acute UTI          John Medina Jr., MD  01/02/19 1204

## 2019-01-02 NOTE — TELEPHONE ENCOUNTER
This patient of Dr Pearce's that was last seen Sept 2017 came to the office this morning. He states he had been to the [facility] ED 12/25/18. He presented a copy of his CT showing a ureteral stone. Dr Whalen is on call so he reviewed the scan. Dr Whalen stated to tell the patient that he could go to the [facility] ED or wait to be seen today. He said to make sure the patient knew that he was in clinic until 3pm and we would not be able to give pain medication. The patient chose to make appt with Dr Pearce tomorrow with a KUB prior. If he feels that he needs to go to the ED he will do that. I instructed him to bring his disc from [facility]. He voiced understanding.

## 2019-01-03 ENCOUNTER — TELEPHONE (OUTPATIENT)
Dept: UROLOGY | Facility: CLINIC | Age: 54
End: 2019-01-03

## 2019-01-03 NOTE — TELEPHONE ENCOUNTER
Patient called and cancelled his appointment for today because he had already passed his kidney stone and didn't want to keep his appointment. I informed the patient of our 24 hr cancellation and no show policy since he was canceling on the same day. Patient voiced understanding.

## 2019-01-04 LAB — BACTERIA SPEC AEROBE CULT: NORMAL

## 2019-01-07 ENCOUNTER — HOSPITAL ENCOUNTER (OUTPATIENT)
Dept: INFUSION THERAPY | Age: 54
Setting detail: INFUSION SERIES
Discharge: HOME | End: 2019-01-07
Payer: COMMERCIAL

## 2019-01-07 VITALS
SYSTOLIC BLOOD PRESSURE: 137 MMHG | RESPIRATION RATE: 17 BRPM | TEMPERATURE: 97.2 F | HEART RATE: 92 BPM | DIASTOLIC BLOOD PRESSURE: 92 MMHG

## 2019-01-07 DIAGNOSIS — G70.00 MYASTHENIA GRAVIS (HCC): ICD-10-CM

## 2019-01-07 PROCEDURE — 2580000003 HC RX 258: Performed by: PSYCHIATRY & NEUROLOGY

## 2019-01-07 PROCEDURE — 6360000002 HC RX W HCPCS: Performed by: PSYCHIATRY & NEUROLOGY

## 2019-01-07 PROCEDURE — 96413 CHEMO IV INFUSION 1 HR: CPT

## 2019-01-07 RX ADMIN — ECULIZUMAB 1200 MG: 300 INJECTION, SOLUTION, CONCENTRATE INTRAVENOUS at 07:58

## 2019-01-09 RX ORDER — OMEPRAZOLE 20 MG/1
20 CAPSULE, DELAYED RELEASE ORAL DAILY
Qty: 30 CAPSULE | Refills: 3 | Status: SHIPPED | OUTPATIENT
Start: 2019-01-09 | End: 2019-03-18

## 2019-01-21 ENCOUNTER — HOSPITAL ENCOUNTER (OUTPATIENT)
Dept: INFUSION THERAPY | Age: 54
Setting detail: INFUSION SERIES
Discharge: HOME | End: 2019-01-21
Payer: COMMERCIAL

## 2019-01-21 VITALS
SYSTOLIC BLOOD PRESSURE: 146 MMHG | TEMPERATURE: 97.8 F | DIASTOLIC BLOOD PRESSURE: 94 MMHG | RESPIRATION RATE: 17 BRPM | HEART RATE: 107 BPM

## 2019-01-21 DIAGNOSIS — G70.00 MYASTHENIA GRAVIS (HCC): ICD-10-CM

## 2019-01-21 PROCEDURE — 96413 CHEMO IV INFUSION 1 HR: CPT

## 2019-01-21 PROCEDURE — 6360000002 HC RX W HCPCS: Performed by: PSYCHIATRY & NEUROLOGY

## 2019-01-21 PROCEDURE — 2580000003 HC RX 258: Performed by: PSYCHIATRY & NEUROLOGY

## 2019-01-21 RX ADMIN — ECULIZUMAB 1200 MG: 300 INJECTION, SOLUTION, CONCENTRATE INTRAVENOUS at 13:52

## 2019-01-28 ENCOUNTER — TELEPHONE (OUTPATIENT)
Dept: NEUROLOGY | Age: 54
End: 2019-01-28

## 2019-02-04 ENCOUNTER — HOSPITAL ENCOUNTER (OUTPATIENT)
Dept: INFUSION THERAPY | Age: 54
Setting detail: INFUSION SERIES
Discharge: HOME | End: 2019-02-04
Payer: COMMERCIAL

## 2019-02-04 VITALS
DIASTOLIC BLOOD PRESSURE: 98 MMHG | TEMPERATURE: 97.3 F | RESPIRATION RATE: 17 BRPM | SYSTOLIC BLOOD PRESSURE: 144 MMHG | HEART RATE: 94 BPM

## 2019-02-04 DIAGNOSIS — G70.00 MYASTHENIA GRAVIS (HCC): ICD-10-CM

## 2019-02-04 PROCEDURE — 6360000002 HC RX W HCPCS: Performed by: PSYCHIATRY & NEUROLOGY

## 2019-02-04 PROCEDURE — 96413 CHEMO IV INFUSION 1 HR: CPT

## 2019-02-04 PROCEDURE — 2580000003 HC RX 258: Performed by: PSYCHIATRY & NEUROLOGY

## 2019-02-04 RX ADMIN — ECULIZUMAB 1200 MG: 300 INJECTION, SOLUTION, CONCENTRATE INTRAVENOUS at 07:30

## 2019-02-12 ENCOUNTER — TELEPHONE (OUTPATIENT)
Dept: NEUROLOGY | Age: 54
End: 2019-02-12

## 2019-02-18 ENCOUNTER — HOSPITAL ENCOUNTER (OUTPATIENT)
Dept: INFUSION THERAPY | Age: 54
Setting detail: INFUSION SERIES
Discharge: HOME | End: 2019-02-18
Payer: COMMERCIAL

## 2019-02-18 VITALS
HEART RATE: 97 BPM | TEMPERATURE: 98.2 F | RESPIRATION RATE: 17 BRPM | OXYGEN SATURATION: 96 % | SYSTOLIC BLOOD PRESSURE: 128 MMHG | DIASTOLIC BLOOD PRESSURE: 81 MMHG

## 2019-02-18 DIAGNOSIS — G70.00 MYASTHENIA GRAVIS (HCC): ICD-10-CM

## 2019-02-18 PROCEDURE — 96365 THER/PROPH/DIAG IV INF INIT: CPT

## 2019-02-18 PROCEDURE — 6360000002 HC RX W HCPCS: Performed by: PSYCHIATRY & NEUROLOGY

## 2019-02-18 PROCEDURE — 96413 CHEMO IV INFUSION 1 HR: CPT

## 2019-02-18 PROCEDURE — 2580000003 HC RX 258: Performed by: PSYCHIATRY & NEUROLOGY

## 2019-02-18 RX ADMIN — ECULIZUMAB 1200 MG: 300 INJECTION, SOLUTION, CONCENTRATE INTRAVENOUS at 08:26

## 2019-03-04 ENCOUNTER — HOSPITAL ENCOUNTER (OUTPATIENT)
Dept: INFUSION THERAPY | Age: 54
Setting detail: INFUSION SERIES
Discharge: HOME | End: 2019-03-04
Payer: COMMERCIAL

## 2019-03-04 VITALS
TEMPERATURE: 98.1 F | SYSTOLIC BLOOD PRESSURE: 139 MMHG | DIASTOLIC BLOOD PRESSURE: 95 MMHG | RESPIRATION RATE: 17 BRPM | HEART RATE: 104 BPM

## 2019-03-04 DIAGNOSIS — G70.00 MYASTHENIA GRAVIS (HCC): Primary | ICD-10-CM

## 2019-03-04 PROCEDURE — 6360000002 HC RX W HCPCS: Performed by: PSYCHIATRY & NEUROLOGY

## 2019-03-04 PROCEDURE — 96413 CHEMO IV INFUSION 1 HR: CPT

## 2019-03-04 PROCEDURE — 2580000003 HC RX 258: Performed by: PSYCHIATRY & NEUROLOGY

## 2019-03-04 RX ADMIN — ECULIZUMAB 1200 MG: 300 INJECTION, SOLUTION, CONCENTRATE INTRAVENOUS at 07:13

## 2019-03-18 ENCOUNTER — HOSPITAL ENCOUNTER (OUTPATIENT)
Dept: INFUSION THERAPY | Age: 54
Setting detail: INFUSION SERIES
Discharge: HOME | End: 2019-03-18
Payer: COMMERCIAL

## 2019-03-18 ENCOUNTER — OFFICE VISIT (OUTPATIENT)
Dept: NEUROLOGY | Age: 54
End: 2019-03-18
Payer: COMMERCIAL

## 2019-03-18 VITALS
HEIGHT: 72 IN | SYSTOLIC BLOOD PRESSURE: 118 MMHG | DIASTOLIC BLOOD PRESSURE: 82 MMHG | HEART RATE: 104 BPM | WEIGHT: 198 LBS | BODY MASS INDEX: 26.82 KG/M2

## 2019-03-18 VITALS
RESPIRATION RATE: 17 BRPM | TEMPERATURE: 97.2 F | DIASTOLIC BLOOD PRESSURE: 89 MMHG | HEART RATE: 102 BPM | SYSTOLIC BLOOD PRESSURE: 136 MMHG

## 2019-03-18 DIAGNOSIS — G62.9 NEUROPATHY: ICD-10-CM

## 2019-03-18 DIAGNOSIS — Z88.9 HISTORY OF ADVERSE DRUG REACTION: ICD-10-CM

## 2019-03-18 DIAGNOSIS — G70.00 MG (MYASTHENIA GRAVIS) (HCC): Primary | ICD-10-CM

## 2019-03-18 DIAGNOSIS — R11.0 NAUSEA: ICD-10-CM

## 2019-03-18 DIAGNOSIS — G70.00 MYASTHENIA GRAVIS (HCC): Primary | ICD-10-CM

## 2019-03-18 PROCEDURE — 99214 OFFICE O/P EST MOD 30 MIN: CPT | Performed by: PSYCHIATRY & NEUROLOGY

## 2019-03-18 PROCEDURE — 96413 CHEMO IV INFUSION 1 HR: CPT

## 2019-03-18 PROCEDURE — 6360000002 HC RX W HCPCS: Performed by: PSYCHIATRY & NEUROLOGY

## 2019-03-18 PROCEDURE — 2580000003 HC RX 258: Performed by: PSYCHIATRY & NEUROLOGY

## 2019-03-18 RX ORDER — PREDNISONE 10 MG/1
TABLET ORAL
Qty: 30 TABLET | Refills: 0 | Status: SHIPPED | OUTPATIENT
Start: 2019-03-18 | End: 2020-04-20 | Stop reason: SDUPTHER

## 2019-03-18 RX ADMIN — ECULIZUMAB 1200 MG: 300 INJECTION, SOLUTION, CONCENTRATE INTRAVENOUS at 07:30

## 2019-03-18 NOTE — DISCHARGE INSTR - COC
Continuity of Care Form    Patient Name: Judit Ruffin   :  1965  MRN:  [id number]    Admit date:  3/18/2019  Discharge date:  ***    Code Status Order: No Order   Advance Directives:     Admitting Physician:  No admitting provider for patient encounter. PCP: Anastasiya Taylor MD    Discharging Nurse: [facility] Unit/Room#: No information available for this encounter. Discharging Unit Phone Number: ***    Emergency Contact:   Extended Emergency Contact Information  Primary Emergency Contact: Karli Ta  Address: [address] [facility], [address] [place], [place] [address] Phone: [phone]  Mobile Phone: [phone]  Relation: Spouse    Past Surgical History:  Past Surgical History:   Procedure Laterality Date    CORONARY ANGIOPLASTY WITH STENT PLACEMENT  2 yr. ago    HERNIA REPAIR      SHOULDER ARTHROSCOPY Left 2016    LEFT SHOULDER ARTHROSCOPY, BICEPS TENODESIS, SUBACROMIAL DECOMPRESSION performed by Carola Lazo MD at [address] [place] [address]      \"thynoma tumor\"; per dr. Aisha Cosby       Immunization History: There is no immunization history for the selected administration types on file for this patient.     Active Problems:  Patient Active Problem List   Diagnosis Code    Biceps tendinitis of left shoulder M75.22    [facility] G70.00       Isolation/Infection:   Isolation          No Isolation            Nurse Assessment:  Last Vital Signs: /87   Pulse 86   Temp 97.2 °F (36.2 °C)   Resp 17     Last documented pain score (0-10 scale):    Last Weight:   Wt Readings from Last 1 Encounters:   18 194 lb (88 kg)     Mental Status:  {IP PT MENTAL STATUS:}    IV Access:  { [facility] IV ACCESS:163302873}    Nursing Mobility/ADLs:  Walking   {CHP DME ATXK:378049791}  Transfer  {CHP DME WEVJ:344429609}  Bathing  {CHP DME RWJW:037586192}  Dressing  {CHP DME VGJW:686086925}  Toileting  {CHP DME TIQ}  Feeding  {CHP DME KD:027777827}  Med Admin  {[facility] DME DYLZ:985491336}  Med Delivery   { MIL[facility]RO MED Delivery:196201292}    Wound Care Documentation and Therapy:        Elimination:  Continence:   · Bowel: {YES / GC:24530}  · Bladder: {YES / ND:99556}  Urinary Catheter: {Urinary Catheter:516427122}   Colostomy/Ileostomy/Ileal Conduit: {YES / MV:13463}       Date of Last BM: ***  No intake or output data in the 24 hours ending 19 0712  No intake/output data recorded.     Safety Concerns:     [address] [employer] Safety Concerns:686936572}    Impairments/Disabilities:      [address] [employer] Impairments/Disabilities:881339749}    Nutrition Therapy:  Current Nutrition Therapy:   [address] [employer] Diet List:830979312}    Routes of Feeding: {CHP DME Other Feedings:236247310}  Liquids: {Slp liquid thickness:66241}  Daily Fluid Restriction: {CHP DME Yes amt example:596491616}  Last Modified Barium Swallow with Video (Video Swallowing Test): {Done Not Done RBYD:556750946}    Treatments at the Time of Hospital Discharge:   Respiratory Treatments: ***  Oxygen Therapy:  {Therapy; copd oxygen:49632}  Ventilator:    { CC Vent RJZE:442675622}    Rehab Therapies: {THERAPEUTIC INTERVENTION:4499643482}  Weight Bearing Status/Restrictions: [employer] Weight Bearin}  Other Medical Equipment (for information only, NOT a DME order):  {EQUIPMENT:757642219}  Other Treatments: ***    Patient's personal belongings (please select all that are sent with patient):  {[facility] DME Belongings:895194409}    RN SIGNATURE:  {Esignature:632978143}    CASE MANAGEMENT/SOCIAL WORK SECTION    Inpatient Status Date: ***    Readmission Risk Assessment Score:  Readmission Risk              Risk of Unplanned Readmission:        0           Discharging to Facility/ Agency   · Name:   · Address:  · Phone:  · Fax:    Dialysis Facility (if applicable)   · Name:  · Address:  · Dialysis Schedule:  · Phone:  · Fax:    / signature: {Esignature:149042749}    PHYSICIAN SECTION    Prognosis: {Prognosis:9860667262}    Condition at Discharge: Rah Parker Patient Condition:211814410}    Rehab Potential (if transferring to Rehab): {Prognosis:6272382558}    Recommended Labs or Other Treatments After Discharge: ***    Physician Certification: I certify the above information and transfer of Camilla Cantor  is necessary for the continuing treatment of the diagnosis listed and that he requires {Admit to Appropriate Level of Care:89124} for {GREATER/LESS:121744546} 30 days.      Update Admission H&P: {CHP DME Changes in MDGBV:178480368}    PHYSICIAN SIGNATURE:  {Esignature:718484541}

## 2019-03-28 ENCOUNTER — TELEPHONE (OUTPATIENT)
Dept: CARDIOLOGY | Facility: CLINIC | Age: 54
End: 2019-03-28

## 2019-03-28 NOTE — TELEPHONE ENCOUNTER
Lina farooq sent a request for surgical clearance through the fax to have pt come off the Effient x 3 days prior to endoscopy 3/29/19.  This was in the blue folder but not addressed yet.  They have called to get the clearance.  Can you please advise as soon as possible so I can tell them something today.  Lamont Castellon, CMA

## 2019-04-01 ENCOUNTER — HOSPITAL ENCOUNTER (OUTPATIENT)
Dept: INFUSION THERAPY | Age: 54
Setting detail: INFUSION SERIES
Discharge: HOME | End: 2019-04-01
Payer: COMMERCIAL

## 2019-04-01 VITALS
RESPIRATION RATE: 17 BRPM | HEART RATE: 97 BPM | TEMPERATURE: 97.5 F | DIASTOLIC BLOOD PRESSURE: 98 MMHG | SYSTOLIC BLOOD PRESSURE: 158 MMHG

## 2019-04-01 DIAGNOSIS — G70.00 MYASTHENIA GRAVIS (HCC): Primary | ICD-10-CM

## 2019-04-01 PROCEDURE — 96413 CHEMO IV INFUSION 1 HR: CPT

## 2019-04-01 PROCEDURE — 6360000002 HC RX W HCPCS: Performed by: PSYCHIATRY & NEUROLOGY

## 2019-04-01 PROCEDURE — 2580000003 HC RX 258: Performed by: PSYCHIATRY & NEUROLOGY

## 2019-04-01 RX ADMIN — ECULIZUMAB 1200 MG: 300 INJECTION, SOLUTION, CONCENTRATE INTRAVENOUS at 07:21

## 2019-04-15 ENCOUNTER — HOSPITAL ENCOUNTER (OUTPATIENT)
Dept: INFUSION THERAPY | Age: 54
Setting detail: INFUSION SERIES
Discharge: HOME | End: 2019-04-15
Payer: COMMERCIAL

## 2019-04-15 VITALS
DIASTOLIC BLOOD PRESSURE: 94 MMHG | SYSTOLIC BLOOD PRESSURE: 151 MMHG | TEMPERATURE: 98.4 F | HEART RATE: 71 BPM | RESPIRATION RATE: 17 BRPM

## 2019-04-15 DIAGNOSIS — G70.00 MYASTHENIA GRAVIS (HCC): Primary | ICD-10-CM

## 2019-04-15 PROCEDURE — 96413 CHEMO IV INFUSION 1 HR: CPT

## 2019-04-15 PROCEDURE — 2580000003 HC RX 258: Performed by: PSYCHIATRY & NEUROLOGY

## 2019-04-15 PROCEDURE — 6360000002 HC RX W HCPCS: Performed by: PSYCHIATRY & NEUROLOGY

## 2019-04-15 RX ADMIN — ECULIZUMAB 1200 MG: 300 INJECTION, SOLUTION, CONCENTRATE INTRAVENOUS at 07:30

## 2019-04-29 ENCOUNTER — HOSPITAL ENCOUNTER (OUTPATIENT)
Dept: INFUSION THERAPY | Age: 54
Setting detail: INFUSION SERIES
Discharge: HOME | End: 2019-04-29
Payer: COMMERCIAL

## 2019-04-29 VITALS
RESPIRATION RATE: 17 BRPM | SYSTOLIC BLOOD PRESSURE: 130 MMHG | DIASTOLIC BLOOD PRESSURE: 83 MMHG | TEMPERATURE: 97 F | HEART RATE: 86 BPM

## 2019-04-29 DIAGNOSIS — G70.00 MYASTHENIA GRAVIS (HCC): Primary | ICD-10-CM

## 2019-04-29 PROCEDURE — 96413 CHEMO IV INFUSION 1 HR: CPT

## 2019-04-29 PROCEDURE — 2580000003 HC RX 258: Performed by: PSYCHIATRY & NEUROLOGY

## 2019-04-29 PROCEDURE — 6360000002 HC RX W HCPCS: Performed by: PSYCHIATRY & NEUROLOGY

## 2019-04-29 RX ADMIN — ECULIZUMAB 1200 MG: 300 INJECTION, SOLUTION, CONCENTRATE INTRAVENOUS at 07:46

## 2019-05-07 RX ORDER — PREDNISONE 10 MG/1
TABLET ORAL
Qty: 30 TABLET | Refills: 0 | OUTPATIENT
Start: 2019-05-07

## 2019-05-08 ENCOUNTER — TELEPHONE (OUTPATIENT)
Dept: NEUROLOGY | Age: 54
End: 2019-05-08

## 2019-05-08 NOTE — TELEPHONE ENCOUNTER
Paitents wife Zeenat Chiu states that at last visit you all discuss that Willis Pantoja has had a cpap for many years but doesn't tolerate well. She said that you suggested he might tolerate a bipap a little better and they were to let us know if interested. Patient would like to get the process for this started. Please advise. ..

## 2019-05-09 DIAGNOSIS — Z86.69 HISTORY OF SLEEP APNEA: Primary | ICD-10-CM

## 2019-05-09 NOTE — TELEPHONE ENCOUNTER
I think that's a good idea. Please talk with our sleep people and see what needs to be done to get the ball rolling.  Thank you

## 2019-05-09 NOTE — TELEPHONE ENCOUNTER
I contacted patients wife Chrissy Shana to let her know we had placed the order for Stephany Montero to see Dr Zuri Johnson for sleep and while in the chart noticed the referral had already been processed and that he had an appt for 6/24 @ 3:00 with a 2:45 arrival so I went ahead and let her know this as well. Chrissy Saldana voiced understanding.

## 2019-05-09 NOTE — TELEPHONE ENCOUNTER
I was told in order for them to see Wally or Alisha Lamas there needs to be something in your office note documenting the sleep issue and that he needs to see one of the sleep providers. There also needs to be a sleep referral put on the chart but I think I can do that for you.

## 2019-05-13 ENCOUNTER — HOSPITAL ENCOUNTER (OUTPATIENT)
Dept: INFUSION THERAPY | Age: 54
Setting detail: INFUSION SERIES
Discharge: HOME | End: 2019-05-13
Payer: COMMERCIAL

## 2019-05-13 VITALS
TEMPERATURE: 97.4 F | SYSTOLIC BLOOD PRESSURE: 120 MMHG | HEART RATE: 85 BPM | DIASTOLIC BLOOD PRESSURE: 85 MMHG | RESPIRATION RATE: 17 BRPM

## 2019-05-13 DIAGNOSIS — G70.00 MYASTHENIA GRAVIS (HCC): Primary | ICD-10-CM

## 2019-05-13 PROCEDURE — 6360000002 HC RX W HCPCS: Performed by: PSYCHIATRY & NEUROLOGY

## 2019-05-13 PROCEDURE — 2580000003 HC RX 258: Performed by: PSYCHIATRY & NEUROLOGY

## 2019-05-13 PROCEDURE — 96413 CHEMO IV INFUSION 1 HR: CPT

## 2019-05-13 RX ADMIN — ECULIZUMAB 1200 MG: 300 INJECTION, SOLUTION, CONCENTRATE INTRAVENOUS at 07:16

## 2019-05-28 ENCOUNTER — HOSPITAL ENCOUNTER (OUTPATIENT)
Dept: INFUSION THERAPY | Age: 54
Setting detail: INFUSION SERIES
Discharge: HOME | End: 2019-05-28
Payer: COMMERCIAL

## 2019-05-28 VITALS
HEART RATE: 83 BPM | TEMPERATURE: 97.4 F | DIASTOLIC BLOOD PRESSURE: 81 MMHG | RESPIRATION RATE: 17 BRPM | SYSTOLIC BLOOD PRESSURE: 115 MMHG

## 2019-05-28 DIAGNOSIS — G70.00 MYASTHENIA GRAVIS (HCC): Primary | ICD-10-CM

## 2019-05-28 PROCEDURE — 96413 CHEMO IV INFUSION 1 HR: CPT

## 2019-05-28 PROCEDURE — 6360000002 HC RX W HCPCS: Performed by: PSYCHIATRY & NEUROLOGY

## 2019-05-28 PROCEDURE — 2580000003 HC RX 258: Performed by: PSYCHIATRY & NEUROLOGY

## 2019-05-28 RX ADMIN — ECULIZUMAB 1200 MG: 300 INJECTION, SOLUTION, CONCENTRATE INTRAVENOUS at 07:08

## 2019-05-28 NOTE — DISCHARGE INSTR - COC
Continuity of Care Form    Patient Name: Branden Gonzalez   :  1965  MRN:  [id number]    Admit date:  2019  Discharge date:  ***    Code Status Order: No Order   Advance Directives:     Admitting Physician:  No admitting provider for patient encounter. PCP: Trevor Christy MD    Discharging Nurse: [facility] Unit/Room#: No information available for this encounter. Discharging Unit Phone Number: ***    Emergency Contact:   Extended Emergency Contact Information  Primary Emergency Contact: Karli Ta  Address: [address] [place] [address], [address] [place] [address] Phone: [phone]  Mobile Phone: [phone]  Relation: Spouse    Past Surgical History:  Past Surgical History:   Procedure Laterality Date    CORONARY ANGIOPLASTY WITH STENT PLACEMENT  2 yr. ago    HERNIA REPAIR      SHOULDER ARTHROSCOPY Left 2016    LEFT SHOULDER ARTHROSCOPY, BICEPS TENODESIS, SUBACROMIAL DECOMPRESSION performed by Sukhjinder Watts MD at [address] [place] [address]      \"thynoma tumor\"; per dr. Claudy Leblanc       Immunization History: There is no immunization history for the selected administration types on file for this patient.     Active Problems:  Patient Active Problem List   Diagnosis Code    Biceps tendinitis of left shoulder M75.22    [facility] G70.00       Isolation/Infection:   Isolation          No Isolation            Nurse Assessment:  Last Vital Signs: /79   Pulse 70   Temp 97.4 °F (36.3 °C)   Resp 17     Last documented pain score (0-10 scale):    Last Weight:   Wt Readings from Last 1 Encounters:   19 198 lb (89.8 kg)     Mental Status:  {IP PT MENTAL STATUS:}    IV Access:  { MIL[place]RO IV ACCESS:522856397}    Nursing Mobility/ADLs:  Walking   {CHP DME JVOR:523126782}  Transfer  {CHP DME KTRQ:579430693}  Bathing  {CHP DME SEIM:822725583}  Dressing  {CHP DME RRZH:046502055}  Toileting  {CHP DME WXGI:459932336}  Feeding  {CHP DME SMVA:710562866}  Med Admin  {[facility] DME KUED:657586595}  Med Delivery   { [facility] MED Delivery:698279677}    Wound Care Documentation and Therapy:        Elimination:  Continence:   · Bowel: {YES / DO:01165}  · Bladder: {YES / IQ:97388}  Urinary Catheter: {Urinary Catheter:940189826}   Colostomy/Ileostomy/Ileal Conduit: {YES / KT:81867}       Date of Last BM: ***  No intake or output data in the 24 hours ending 19 0812  No intake/output data recorded.     Safety Concerns:     [employer] Safety Concerns:993556245}    Impairments/Disabilities:      [address] [employer] Impairments/Disabilities:584149174}    Nutrition Therapy:  Current Nutrition Therapy:   [employer] Diet List:726897470}    Routes of Feeding: {CHP DME Other Feedings:917135581}  Liquids: {Slp liquid thickness:94727}  Daily Fluid Restriction: {CHP DME Yes amt example:515683326}  Last Modified Barium Swallow with Video (Video Swallowing Test): {Done Not Done CRNV:522865351}    Treatments at the Time of Hospital Discharge:   Respiratory Treatments: ***  Oxygen Therapy:  {Therapy; copd oxygen:74930}  Ventilator:    { CC Vent BTFT:479807292}    Rehab Therapies: {THERAPEUTIC INTERVENTION:2926170020}  Weight Bearing Status/Restrictions: [address] [employer] Weight Bearin}  Other Medical Equipment (for information only, NOT a DME order):  {EQUIPMENT:869163161}  Other Treatments: ***    Patient's personal belongings (please select all that are sent with patient):  {[facility] DME Belongings:285780999}    RN SIGNATURE:  {Esignature:918685954}    CASE MANAGEMENT/SOCIAL WORK SECTION    Inpatient Status Date: ***    Readmission Risk Assessment Score:  Readmission Risk              Risk of Unplanned Readmission:        0           Discharging to Facility/ Agency   · Name:   · Address:  · Phone:  · Fax:    Dialysis Facility (if applicable)   · Name:  · Address:  · Dialysis Schedule:  · Phone:  · Fax:    / signature: {Esignature:520410346}    PHYSICIAN SECTION    Prognosis: {Prognosis:2022407605}    Condition at Discharge: Rah Parker Patient Condition:303817144}    Rehab Potential (if transferring to Rehab): {Prognosis:4977190561}    Recommended Labs or Other Treatments After Discharge: ***    Physician Certification: I certify the above information and transfer of Dahlia Torres  is necessary for the continuing treatment of the diagnosis listed and that he requires {Admit to Appropriate Level of Care:28498} for {GREATER/LESS:790720413} 30 days.      Update Admission H&P: {CHP DME Changes in XXTHN:271245238}    PHYSICIAN SIGNATURE:  {Esignature:154604688}

## 2019-06-10 ENCOUNTER — HOSPITAL ENCOUNTER (OUTPATIENT)
Dept: INFUSION THERAPY | Age: 54
Setting detail: INFUSION SERIES
Discharge: HOME | End: 2019-06-10
Payer: COMMERCIAL

## 2019-06-10 ENCOUNTER — OFFICE VISIT (OUTPATIENT)
Dept: NEUROLOGY | Age: 54
End: 2019-06-10
Payer: COMMERCIAL

## 2019-06-10 VITALS
WEIGHT: 192 LBS | BODY MASS INDEX: 26.01 KG/M2 | DIASTOLIC BLOOD PRESSURE: 94 MMHG | HEIGHT: 72 IN | HEART RATE: 71 BPM | SYSTOLIC BLOOD PRESSURE: 141 MMHG

## 2019-06-10 VITALS
DIASTOLIC BLOOD PRESSURE: 92 MMHG | RESPIRATION RATE: 17 BRPM | SYSTOLIC BLOOD PRESSURE: 132 MMHG | TEMPERATURE: 97.5 F | HEART RATE: 68 BPM

## 2019-06-10 DIAGNOSIS — G62.9 NEUROPATHY: ICD-10-CM

## 2019-06-10 DIAGNOSIS — G70.00 MG (MYASTHENIA GRAVIS) (HCC): ICD-10-CM

## 2019-06-10 DIAGNOSIS — Z88.9 HISTORY OF ADVERSE DRUG REACTION: ICD-10-CM

## 2019-06-10 DIAGNOSIS — G70.00 MG (MYASTHENIA GRAVIS) (HCC): Primary | ICD-10-CM

## 2019-06-10 DIAGNOSIS — Z86.69 HISTORY OF SLEEP APNEA: ICD-10-CM

## 2019-06-10 DIAGNOSIS — G70.00 MYASTHENIA GRAVIS (HCC): Primary | ICD-10-CM

## 2019-06-10 LAB
ALBUMIN SERPL-MCNC: 4.3 G/DL (ref 3.5–5.2)
ALP BLD-CCNC: 90 U/L (ref 40–130)
ALT SERPL-CCNC: 30 U/L (ref 5–41)
ANION GAP SERPL CALCULATED.3IONS-SCNC: 15 MMOL/L (ref 7–19)
AST SERPL-CCNC: 25 U/L (ref 5–40)
BASOPHILS ABSOLUTE: 0 K/UL (ref 0–0.2)
BASOPHILS RELATIVE PERCENT: 0.4 % (ref 0–1)
BILIRUB SERPL-MCNC: 0.4 MG/DL (ref 0.2–1.2)
BUN BLDV-MCNC: 14 MG/DL (ref 6–20)
CALCIUM SERPL-MCNC: 9.6 MG/DL (ref 8.6–10)
CHLORIDE BLD-SCNC: 100 MMOL/L (ref 98–111)
CO2: 26 MMOL/L (ref 22–29)
CREAT SERPL-MCNC: 1 MG/DL (ref 0.5–1.2)
EOSINOPHILS ABSOLUTE: 0.1 K/UL (ref 0–0.6)
EOSINOPHILS RELATIVE PERCENT: 0.5 % (ref 0–5)
GFR NON-AFRICAN AMERICAN: >60
GLUCOSE BLD-MCNC: 145 MG/DL (ref 74–109)
HCT VFR BLD CALC: 43.2 % (ref 42–52)
HEMOGLOBIN: 13.8 G/DL (ref 14–18)
LYMPHOCYTES ABSOLUTE: 1.3 K/UL (ref 1.1–4.5)
LYMPHOCYTES RELATIVE PERCENT: 12.8 % (ref 20–40)
MCH RBC QN AUTO: 28 PG (ref 27–31)
MCHC RBC AUTO-ENTMCNC: 31.9 G/DL (ref 33–37)
MCV RBC AUTO: 87.6 FL (ref 80–94)
MONOCYTES ABSOLUTE: 0.6 K/UL (ref 0–0.9)
MONOCYTES RELATIVE PERCENT: 5.3 % (ref 0–10)
NEUTROPHILS ABSOLUTE: 8.3 K/UL (ref 1.5–7.5)
NEUTROPHILS RELATIVE PERCENT: 80.4 % (ref 50–65)
PDW BLD-RTO: 13.8 % (ref 11.5–14.5)
PLATELET # BLD: 518 K/UL (ref 130–400)
PMV BLD AUTO: 9.6 FL (ref 9.4–12.4)
POTASSIUM SERPL-SCNC: 4.6 MMOL/L (ref 3.5–5)
RBC # BLD: 4.93 M/UL (ref 4.7–6.1)
SODIUM BLD-SCNC: 141 MMOL/L (ref 136–145)
TOTAL CK: 152 U/L (ref 39–308)
TOTAL PROTEIN: 8.1 G/DL (ref 6.6–8.7)
TSH REFLEX FT4: 0.94 UIU/ML (ref 0.35–5.5)
WBC # BLD: 10.3 K/UL (ref 4.8–10.8)

## 2019-06-10 PROCEDURE — 6360000002 HC RX W HCPCS: Performed by: PSYCHIATRY & NEUROLOGY

## 2019-06-10 PROCEDURE — 2580000003 HC RX 258: Performed by: PSYCHIATRY & NEUROLOGY

## 2019-06-10 PROCEDURE — 96413 CHEMO IV INFUSION 1 HR: CPT

## 2019-06-10 PROCEDURE — 99214 OFFICE O/P EST MOD 30 MIN: CPT | Performed by: PSYCHIATRY & NEUROLOGY

## 2019-06-10 RX ORDER — BUSPIRONE HYDROCHLORIDE 15 MG/1
15 TABLET ORAL 2 TIMES DAILY
Qty: 60 TABLET | Refills: 5 | Status: SHIPPED | OUTPATIENT
Start: 2019-06-10 | End: 2020-02-04 | Stop reason: SDUPTHER

## 2019-06-10 RX ADMIN — ECULIZUMAB 1200 MG: 300 INJECTION, SOLUTION, CONCENTRATE INTRAVENOUS at 07:19

## 2019-06-10 NOTE — PROGRESS NOTES
Chief Complaint   Patient presents with    Follow-up     Patient is here for a 3mo follow up for myasthenia gravis. Keo Haynes is a 48y.o. year old male who is seen for evaluation Of his  myasthenia gravis. He had been having double vision for a couple of months prior to presentation. Has resolved since getting on prednisone . In 9/18 I had him reduce it to 30 mg every other day alternating with 40 mg. Double vision returned. He has been taking Soliris the past several months. Currently getting this every 2 weeks. He has been able to once again reduce his prednisone to 5 mg 3 times a week. He is holding his own. His cushingoid difficulties have been significantly reduced. Denies any droopy eyelids or trouble chewing, swallowing, shortness of air. He does complain of easy fatigability  which is worse at the end of the day. He also has obstructive sleep apnea but does not wear his machine. He has an appointment with Dr. Jess Wilhelm later in the month. His early morning nausea has been some better as of late. He had a relatively unremarkable EGD. He has not lost weight. Interestingly, he had a thymus tumor removed back in 2004. He has a history of having Bilateral ptosis in the past when exposed to Levaquin and Avelox. He had this problem before he had his thymus removed. Had an MRI of the head that was normal. His acetylcholine receptor antibodies came back positive and supportive of diagnosis of myasthenia gravis. He took Mestinon 120 mg 3 times a day and noticed no clear improvement. He is no longer on this. Anxiety continues to be problematic for him. He cannot remember to take his BuSpar but twice a day. He has a history of bronchiectasis and has some chronic lung difficulties and cough. Also has a history of coronary artery disease and stents. Otherwise denies any focal neurological signs or symptoms. Does have some acquired hearing loss for unclear reasons remotely. Takes Effient. Recently had a normal vitamin D level. Nerve conduction studies 9/18 showed a mild neuropathy. Antibodies for Lambert-Eaton syndrome were negative. Active Ambulatory Problems     Diagnosis Date Noted    Biceps tendinitis of left shoulder 06/22/2016    Myasthenia gravis [facility])      Resolved Ambulatory Problems     Diagnosis Date Noted    No Resolved Ambulatory Problems     Past Medical History:   Diagnosis Date    CAD (coronary artery disease)     GERD (gastroesophageal reflux disease)     Hyperlipidemia     Hypertension     IBS (irritable bowel syndrome)     Kidney stones     Myasthenia gravis ([address]     Shoulder pain        Past Surgical History:   Procedure Laterality Date    CORONARY ANGIOPLASTY WITH STENT PLACEMENT  2 yr. ago    HERNIA REPAIR      SHOULDER ARTHROSCOPY Left 6/23/2016    LEFT SHOULDER ARTHROSCOPY, BICEPS TENODESIS, SUBACROMIAL DECOMPRESSION performed by Arti Kern MD at [address] [place] [address]      \"thynoma tumor\"; per dr. Gurwinder Franz       Family History   Problem Relation Age of Onset    Heart Disease Maternal Grandfather     Heart Disease Paternal Grandfather        Allergies   Allergen Reactions    Avelox [Moxifloxacin]     Levaquin [Levofloxacin]        Social History     Socioeconomic History    Marital status:      Spouse name: Not on file    Number of children: Not on file    Years of education: Not on file    Highest education level: Not on file   Occupational History    Not on file   Social Needs    Financial resource strain: Not on file    Food insecurity:     Worry: Not on file     Inability: Not on file    Transportation needs:     Medical: Not on file     Non-medical: Not on file   Tobacco Use    Smoking status: Never Smoker    Smokeless tobacco: Never Used   Substance and Sexual Activity    Alcohol use:  Yes     Alcohol/week: 0.6 oz     Types: 1 Cans of beer per week     Comment: 2-3 per month    Drug use: No    Sexual activity: Not on file   Lifestyle    Physical activity:     Days per week: Not on file     Minutes per session: Not on file    Stress: Not on file   Relationships    Social connections:     Talks on phone: Not on file     Gets together: Not on file     Attends [employer] service: Not on file     Active member of club or organization: Not on file     Attends meetings of clubs or organizations: Not on file     Relationship status: Not on file    Intimate partner violence:     Fear of current or ex partner: Not on file     Emotionally abused: Not on file     Physically abused: Not on file     Forced sexual activity: Not on file   Other Topics Concern    Not on file   Social History Narrative    Not on file       Review of Systems       Constitutional: []Fever []Sweats []Chills [] Recent Injury   [x] Denies all unless marked  HENT:[]Headache  [] Head Injury  [] Sore Throat  [] Ear Pain  [] Dizziness [] Hearing Loss   [x] Denies all unless marked  Spine:  [] Neck pain  [] Back pain  [] Sciaticia  [x] Denies all unless marked  Cardiovascular:[]Chest Pain []Palpitations [] Heart Disease  [x] Denies all unless marked  Pulmonary: []Shortness of Breath []Cough   [x] Denies all unless marked  Gastrointestinal:  []Abdominal Pain  []Blood in Stool  []Diarrhea []Constipation []Nausea  []Vomiting  [x] Denies all unless marked  Genitourinary:  [] Dysuria [] Frequency  [] Incontinence [] Urgency   [x] Denies all unless marked  Musculoskeletal: [] Arthralgia  [] Myalgias [] Muscle cramps  [] Muscle twitches   [x] Denies all unless marked   Extremities:   [] Pain   [] Swelling   [x] Denies all unless marked  Skin:[] Rash  [] Color Change  [x] Denies all unless marked  Neurological:[] Visual Disturbance [] Double Vision [] Slurred Speech [] Trouble swallowing  [] Vertigo [] Tingling [] Numbness [] Weakness [] Loss of Balance   [] Loss of Consciousness [] Memory Loss [] Seizures  [x] Denies all unless marked  Psychiatric/Behavioral:[] Depression [] Anxiety  [x] Denies all unless marked  Sleep: []  Insomnia [] Sleep Disturbance [] Snoring [] Restless Legs [] Daytime Sleepiness [] Sleep Apnea  [x] Denies all unless marked               Current Outpatient Medications   Medication Sig Dispense Refill    busPIRone (BUSPAR) 15 MG tablet Take 15 mg by mouth 2 times daily 60 tablet 5    predniSONE (DELTASONE) 10 MG tablet Take 1 every other day (Patient taking differently: Take 5 mg by mouth every other day Take 1 every other day) 30 tablet 0    metFORMIN (GLUCOPHAGE) 500 MG tablet Take 500 mg by mouth 2 times daily (with meals)      omeprazole (PRILOSEC) 20 MG delayed release capsule Take 1 capsule by mouth 2 times daily 60 capsule 5    eculizumab (SOLIRIS) 300 MG/30ML SOLN Infuse 120 mLs intravenously every 14 days week 5, week 7, week 9 and so on. 180.04 mL 3    busPIRone (BUSPAR) 10 MG tablet Take 1 tablet by mouth 3 times daily 90 tablet 5    losartan (COZAAR) 50 MG tablet Take 50 mg by mouth daily      prasugrel (EFFIENT) 5 MG TABS Take 5 mg by mouth daily      Aspirin (ASPIR-81 PO) Take 1 tablet by mouth daily       atorvastatin (LIPITOR) 40 MG tablet Take 40 mg by mouth daily Indications: taking 0.5 tablets by mouth daily       metoprolol succinate (TOPROL XL) 100 MG extended release tablet Take 150 mg by mouth daily Indications: 1.5 daily        No current facility-administered medications for this visit.         Outpatient Medications Marked as Taking for the 6/10/19 encounter (Office Visit) with Howard Villela MD   Medication Sig Dispense Refill    busPIRone (BUSPAR) 15 MG tablet Take 15 mg by mouth 2 times daily 60 tablet 5    predniSONE (DELTASONE) 10 MG tablet Take 1 every other day (Patient taking differently: Take 5 mg by mouth every other day Take 1 every other day) 30 tablet 0    metFORMIN (GLUCOPHAGE) 500 MG tablet Take 500 mg by mouth 2 times daily (with meals)      omeprazole (PRILOSEC) 20 MG delayed release capsule Take 1 capsule by mouth 2 times daily 60 capsule 5    eculizumab (SOLIRIS) 300 MG/30ML SOLN Infuse 120 mLs intravenously every 14 days week 5, week 7, week 9 and so on. 180.04 mL 3    busPIRone (BUSPAR) 10 MG tablet Take 1 tablet by mouth 3 times daily 90 tablet 5    losartan (COZAAR) 50 MG tablet Take 50 mg by mouth daily      prasugrel (EFFIENT) 5 MG TABS Take 5 mg by mouth daily      Aspirin (ASPIR-81 PO) Take 1 tablet by mouth daily       atorvastatin (LIPITOR) 40 MG tablet Take 40 mg by mouth daily Indications: taking 0.5 tablets by mouth daily       metoprolol succinate (TOPROL XL) 100 MG extended release tablet Take 150 mg by mouth daily Indications: 1.5 daily          BP (!) 141/94   Pulse 71   Ht 6' (1.829 m)   Wt 192 lb (87.1 kg)   BMI 26.04 kg/m²       Constitutional - well developed, well nourished. Eyes - conjunctiva normal.  Pupils react to light  Ear, nose, throat -hearing intact to finger rub No scars, masses, or lesions over external nose or ears, no atrophy of tongue  Neck-symmetric, no masses noted, no jugular vein distension. No bruits noted. Respiration- chest wall appears symmetric, good expansion,   normal effort without use of accessory muscles  Cardiovascular- RRR  Musculoskeletal - no significant wasting of muscles noted, no bony deformities, gait no gross ataxia  Extremities-no clubbing, cyanosis or edema  Skin - warm, dry, and intact. No rash, erythema, or pallor. Psychiatric - mood, affect, and behavior appear normal.      Neurological exam  Awake, alert, fluent oriented x 3 appropriate affect  Attention and concentration appear appropriate  Recent and remote memory appears unremarkable  Speech normal without dysarthria  No clear issues with language of fund of knowledge    Cranial Nerve Exam   CN II- Visual fields grossly unremarkable. VA adequate. Discs sharp  CN III, IV,VI- PERRLA, EOMI, No nystagmus, conjugate eye movements, No ptosis. No double vision.   CN VII-no facial asymmetry. CN VIII-Hearing intact   CN IX and X- Palate elevates in midline  CN XI-good shoulder shrug  CN XII-Tongue midline with no fasciculations or fibrillations  Normal obicularis oris and oculi strength  Motor Exam  V/V throughout upper and lower extremities bilaterally, no cogwheeling, normal tone    Reflexes   2+ biceps bilaterally  2+ brachioradialis  2+ triceps  2+patella  2+ ankle jerks  Sensory-normal vibration sense. Decreased pinprick in the toes  Tremors- no tremors in hands or head noted    Gait  Normal base and speed  No ataxia. No Romberg sign    Coordination  Finger to nose and SULMA-unremarkable    No results found for: [id number]  Lab Results   Component Value Date    WBC 11.7 (H) 11/30/2018    HGB 15.1 11/30/2018    HCT 46.8 11/30/2018    MCV 92.7 11/30/2018     11/30/2018     Lab Results   Component Value Date     11/30/2018    K 4.0 11/30/2018    CL 97 (L) 11/30/2018    CO2 27 11/30/2018    BUN 23 (H) 11/30/2018    CREATININE 1.0 11/30/2018    GLUCOSE 149 (H) 11/30/2018    CALCIUM 9.5 11/30/2018    PROT 7.2 11/30/2018    LABALBU 4.5 11/30/2018    BILITOT 0.5 11/30/2018    ALKPHOS 85 11/30/2018    AST 28 11/30/2018     (H) 11/30/2018    LABGLOM >60 11/30/2018    GLOB 3.8 07/18/2016           Assessment    ICD-10-CM    1. MG (myasthenia gravis) ([facility] G70.00 CK     TSH WITH REFLEX TO FT4     CBC Auto Differential     Comprehensive Metabolic Panel   2. History of sleep apnea Z86.69    3. Neuropathy G62.9    4. History of adverse drug reaction Z88.9      His myasthenia gravis is stable. He will continue his current treatment strategies which include prednisone 5 mg 3 times a week and Solaris. BuSpar increased from 10 to 15 mg twice a day. Blood work ordered. Follow-up with Dr. Ahsan Mahan for sleep apnea. Neuropathy is no worse. Plan          Return in about 3 months (around 9/10/2019).     (Please note that portions of this note were completed with a voice recognition

## 2019-06-25 ENCOUNTER — OFFICE VISIT (OUTPATIENT)
Dept: NEUROLOGY | Age: 54
End: 2019-06-25
Payer: COMMERCIAL

## 2019-06-25 ENCOUNTER — HOSPITAL ENCOUNTER (OUTPATIENT)
Dept: INFUSION THERAPY | Age: 54
Setting detail: INFUSION SERIES
Discharge: HOME | End: 2019-06-25
Payer: COMMERCIAL

## 2019-06-25 VITALS
TEMPERATURE: 97.5 F | HEART RATE: 69 BPM | DIASTOLIC BLOOD PRESSURE: 74 MMHG | SYSTOLIC BLOOD PRESSURE: 110 MMHG | OXYGEN SATURATION: 96 % | RESPIRATION RATE: 1 BRPM

## 2019-06-25 VITALS
OXYGEN SATURATION: 94 % | DIASTOLIC BLOOD PRESSURE: 83 MMHG | WEIGHT: 188 LBS | BODY MASS INDEX: 25.47 KG/M2 | HEART RATE: 91 BPM | HEIGHT: 72 IN | SYSTOLIC BLOOD PRESSURE: 127 MMHG

## 2019-06-25 DIAGNOSIS — G47.33 SLEEP APNEA, OBSTRUCTIVE: Primary | ICD-10-CM

## 2019-06-25 DIAGNOSIS — G70.00 MYASTHENIA GRAVIS (HCC): Primary | ICD-10-CM

## 2019-06-25 PROCEDURE — 6360000002 HC RX W HCPCS: Performed by: PSYCHIATRY & NEUROLOGY

## 2019-06-25 PROCEDURE — 2580000003 HC RX 258: Performed by: PSYCHIATRY & NEUROLOGY

## 2019-06-25 PROCEDURE — 96413 CHEMO IV INFUSION 1 HR: CPT

## 2019-06-25 PROCEDURE — 99203 OFFICE O/P NEW LOW 30 MIN: CPT | Performed by: PSYCHIATRY & NEUROLOGY

## 2019-06-25 RX ADMIN — ECULIZUMAB 1200 MG: 300 INJECTION, SOLUTION, CONCENTRATE INTRAVENOUS at 07:04

## 2019-06-25 NOTE — PROGRESS NOTES
PLACEMENT  2 yr. ago    HERNIA REPAIR      SHOULDER ARTHROSCOPY Left 6/23/2016    LEFT SHOULDER ARTHROSCOPY, BICEPS TENODESIS, SUBACROMIAL DECOMPRESSION performed by Velvet Bills MD at [facility]      \"thynoma tumor\"; per dr. Camilo Jurado  · None    Significant Injuries  · None    Habits  [facility] reports that he has never smoked. He has never used smokeless tobacco. He reports that he drinks about 0.6 oz of alcohol per week. He reports that he does not use drugs. Current Outpatient Medications   Medication Sig Dispense Refill    busPIRone (BUSPAR) 15 MG tablet Take 15 mg by mouth 2 times daily 60 tablet 5    predniSONE (DELTASONE) 10 MG tablet Take 1 every other day (Patient taking differently: Take 5 mg by mouth every other day Take 1 every other day) 30 tablet 0    metFORMIN (GLUCOPHAGE) 500 MG tablet Take 500 mg by mouth 2 times daily (with meals)      omeprazole (PRILOSEC) 20 MG delayed release capsule Take 1 capsule by mouth 2 times daily 60 capsule 5    eculizumab (SOLIRIS) 300 MG/30ML SOLN Infuse 120 mLs intravenously every 14 days week 5, week 7, week 9 and so on. 180.04 mL 3    busPIRone (BUSPAR) 10 MG tablet Take 1 tablet by mouth 3 times daily 90 tablet 5    losartan (COZAAR) 50 MG tablet Take 50 mg by mouth daily      prasugrel (EFFIENT) 5 MG TABS Take 5 mg by mouth daily      Aspirin (ASPIR-81 PO) Take 1 tablet by mouth daily       atorvastatin (LIPITOR) 40 MG tablet Take 40 mg by mouth daily Indications: taking 0.5 tablets by mouth daily       metoprolol succinate (TOPROL XL) 100 MG extended release tablet Take 150 mg by mouth daily Indications: 1.5 daily        No current facility-administered medications for this visit.         Allergies as of 06/25/2019 - Review Complete 06/25/2019   Allergen Reaction Noted    Avelox [moxifloxacin]  01/18/2016    Levaquin [levofloxacin]  01/18/2016       FAMILY HISTORY:   Family History   Problem Relation Age of Onset    Heart Disease Maternal Grandfather     Heart Disease Paternal Grandfather        SOCIAL HISTORY:   Army Garcia is , lives in [place], [place], and works at [employer]. REVIEW OF SYSTEMS:  Review of Systems   Constitutional: Positive for fatigue. Negative for chills and fever. HENT: Positive for hearing loss. Negative for congestion, rhinorrhea, sinus pressure and tinnitus. Eyes: Negative for photophobia and visual disturbance. Respiratory: Negative for cough and shortness of breath. Cardiovascular: Negative for chest pain and palpitations. Gastrointestinal: Negative for nausea and vomiting. Endocrine: Negative for cold intolerance and heat intolerance. Genitourinary: Negative for frequency and urgency. Musculoskeletal: Positive for arthralgias. Negative for back pain. Skin: Negative for rash and wound. Allergic/Immunologic: Negative for environmental allergies and food allergies. Neurological: Negative for dizziness, tremors, seizures, syncope, facial asymmetry, speech difficulty, weakness, light-headedness, numbness and headaches. Hematological: Negative for adenopathy. Does not bruise/bleed easily. Psychiatric/Behavioral: Negative for dysphoric mood. The patient is nervous/anxious. Sleep: Positive for snoring, periods of not breathing, decreased concentration, excessive daytime sleepiness, difficulty falling asleep once awakened, morning fatigue, daytime fatigue and witnessed apneic episodes. Negative for sinus problems, restless legs.      PHYSICAL EXAMINATION:  Examination:  Vitals:  /83   Pulse 91   Ht 6' (1.829 m)   Wt 188 lb (85.3 kg)   SpO2 94%   BMI 25.50 kg/m²   General appearance:  alert and cooperative with exam  HEENT:  Sclera clear, anicteric, Oropharynx clear, no lesions, Neck supple with midline trachea, Thyroid without masses and Trachea midline  Mallampati 3  Heart[de-identified]  regular rate and rhythm, S1, S2 normal, no murmur, click, rub or gallop  Lungs:  clear to auscultation bilaterally  Extremities:extremities normal, atraumatic, no cyanosis or edema  Neurologic:  Extraocular movements are intact without nystagmus. Visual fields are full to confrontation. Facial movements are symmetrical and normal.  Speech is precise. Extremitystrength is normal in both uppers and lowers. Deep tendon reflexes are intact and symmetrical.  Rapid alternating movements are unimpaired. Finger-to-nose testing is performed well, without dysmetria. Gait is normal.      IMPRESSION:    ICD-10-CM    1. Sleep apnea, obstructive G47.33      I discussed the diagnosis of obstructive sleep apnea with Debbie Nicole including thepathophysiology (namely the mechanism of breathing and obstruction of upper airway, interruptions of sleep, hypoxemia, hypercapnia, and results of repetitive sympathetic activation), risks, evaluation, and treatment options. I discussed the risks of driving when drowsy and advised that Debbie Nicole not drive when drowsy and avoid sedatingmedications and respiratory suppressants. PLAN:  1. Split night PSG, may need BiPAP since he failed CPAP in the past  2.  FU per protocol    Laine Chadwick M.D.

## 2019-07-02 RX ORDER — PREDNISONE 1 MG/1
TABLET ORAL
Qty: 30 TABLET | Refills: 0 | Status: SHIPPED | OUTPATIENT
Start: 2019-07-02

## 2019-07-08 ENCOUNTER — APPOINTMENT (OUTPATIENT)
Dept: INFUSION THERAPY | Age: 54
End: 2019-07-08
Payer: COMMERCIAL

## 2019-07-09 ENCOUNTER — HOSPITAL ENCOUNTER (OUTPATIENT)
Dept: INFUSION THERAPY | Age: 54
Setting detail: INFUSION SERIES
Discharge: HOME | End: 2019-07-09
Payer: COMMERCIAL

## 2019-07-09 VITALS
RESPIRATION RATE: 16 BRPM | DIASTOLIC BLOOD PRESSURE: 77 MMHG | TEMPERATURE: 98 F | HEART RATE: 88 BPM | SYSTOLIC BLOOD PRESSURE: 125 MMHG

## 2019-07-09 DIAGNOSIS — G70.00 MYASTHENIA GRAVIS (HCC): Primary | ICD-10-CM

## 2019-07-09 PROCEDURE — 96413 CHEMO IV INFUSION 1 HR: CPT

## 2019-07-09 PROCEDURE — 6360000002 HC RX W HCPCS: Performed by: PSYCHIATRY & NEUROLOGY

## 2019-07-09 PROCEDURE — 2580000003 HC RX 258: Performed by: PSYCHIATRY & NEUROLOGY

## 2019-07-09 RX ADMIN — ECULIZUMAB 1200 MG: 300 INJECTION, SOLUTION, CONCENTRATE INTRAVENOUS at 14:33

## 2019-07-12 ASSESSMENT — ENCOUNTER SYMPTOMS
VOMITING: 0
SHORTNESS OF BREATH: 0
SINUS PRESSURE: 0
RHINORRHEA: 0
NAUSEA: 0
COUGH: 0
BACK PAIN: 0
PHOTOPHOBIA: 0

## 2019-07-19 DIAGNOSIS — G70.00 MG (MYASTHENIA GRAVIS) (HCC): ICD-10-CM

## 2019-07-22 ENCOUNTER — HOSPITAL ENCOUNTER (OUTPATIENT)
Dept: INFUSION THERAPY | Age: 54
Setting detail: INFUSION SERIES
Discharge: HOME | End: 2019-07-22
Payer: COMMERCIAL

## 2019-07-22 VITALS
TEMPERATURE: 97 F | HEART RATE: 71 BPM | DIASTOLIC BLOOD PRESSURE: 75 MMHG | RESPIRATION RATE: 18 BRPM | SYSTOLIC BLOOD PRESSURE: 111 MMHG

## 2019-07-22 DIAGNOSIS — G70.00 MYASTHENIA GRAVIS (HCC): Primary | ICD-10-CM

## 2019-07-22 PROCEDURE — 2580000003 HC RX 258: Performed by: PSYCHIATRY & NEUROLOGY

## 2019-07-22 PROCEDURE — 6360000002 HC RX W HCPCS: Performed by: PSYCHIATRY & NEUROLOGY

## 2019-07-22 PROCEDURE — 96413 CHEMO IV INFUSION 1 HR: CPT

## 2019-07-22 RX ADMIN — ECULIZUMAB 1200 MG: 300 INJECTION, SOLUTION, CONCENTRATE INTRAVENOUS at 07:15

## 2019-07-30 ENCOUNTER — TELEPHONE (OUTPATIENT)
Dept: NEUROLOGY | Age: 54
End: 2019-07-30

## 2019-07-30 NOTE — TELEPHONE ENCOUNTER
Stephy Louis @ [facility] called stated that a PA is needed for the medication Soliris - call [phone] use reference # [id number]

## 2019-08-05 ENCOUNTER — HOSPITAL ENCOUNTER (OUTPATIENT)
Dept: INFUSION THERAPY | Age: 54
Setting detail: INFUSION SERIES
Discharge: HOME | End: 2019-08-05
Payer: COMMERCIAL

## 2019-08-05 ENCOUNTER — TELEPHONE (OUTPATIENT)
Dept: NEUROLOGY | Age: 54
End: 2019-08-05

## 2019-08-05 VITALS
RESPIRATION RATE: 17 BRPM | SYSTOLIC BLOOD PRESSURE: 127 MMHG | DIASTOLIC BLOOD PRESSURE: 84 MMHG | HEART RATE: 67 BPM | TEMPERATURE: 96.9 F

## 2019-08-05 DIAGNOSIS — G70.00 MYASTHENIA GRAVIS (HCC): Primary | ICD-10-CM

## 2019-08-05 PROCEDURE — 2580000003 HC RX 258: Performed by: PSYCHIATRY & NEUROLOGY

## 2019-08-05 PROCEDURE — 96413 CHEMO IV INFUSION 1 HR: CPT

## 2019-08-05 PROCEDURE — 6360000002 HC RX W HCPCS: Performed by: PSYCHIATRY & NEUROLOGY

## 2019-08-05 RX ADMIN — ECULIZUMAB 1200 MG: 300 INJECTION, SOLUTION, CONCENTRATE INTRAVENOUS at 07:16

## 2019-08-05 NOTE — TELEPHONE ENCOUNTER
Called patient to let him know that his appointment for 09-16-19 with Dr. Javier Rao had to be rescheduled due to the provider will be out of the office. NO answer. Left a VM regarding when I have his appointment rescheduled too.

## 2019-08-05 NOTE — TELEPHONE ENCOUNTER
Patient/Patient Spouse contacted the office on the need to contact about the Prior authorization on the infusion for Soliris. Called infusion locally and was instructed to contact Ilana. Called [phone] and spoke with Cristina Cortez. This patient has been denied for this infusion do to the Provider is not part of the Soliris REMS program.Found out information for the program and signed up Dr. Brianna Mera with the help of his MA. (See additional information in [facility] [address] this does not directly pertain to the single patient,wanted it on file for records.)    Patient is aware of the steps the office is currently taking and understands that the office will contact with an update as soon as one is available. Information for a Peer to Peer for insurance if needed [phone]  Case # [id number].

## 2019-08-07 ENCOUNTER — TELEPHONE (OUTPATIENT)
Dept: NEUROLOGY | Age: 54
End: 2019-08-07

## 2019-08-07 NOTE — TELEPHONE ENCOUNTER
Called and spoke with the patients Wife [facility]. Let the patient know that the Doctor has been enrolled into the REMS Program.

## 2019-08-14 ENCOUNTER — TELEPHONE (OUTPATIENT)
Dept: NEUROLOGY | Age: 54
End: 2019-08-14

## 2019-08-15 ENCOUNTER — TELEPHONE (OUTPATIENT)
Dept: NEUROLOGY | Age: 54
End: 2019-08-15

## 2019-08-15 NOTE — TELEPHONE ENCOUNTER
[address] [place] [address]. Called [phone] and left a voicemail with Tobi Garcia. Left information to do a prior authorization for The lapse of authorization from Feb/March -July . Call [phone]  Ref # [id number]      Patient wife states she believes the last infusion they paid for was in March ?

## 2019-08-19 ENCOUNTER — HOSPITAL ENCOUNTER (OUTPATIENT)
Dept: INFUSION THERAPY | Age: 54
Setting detail: INFUSION SERIES
Discharge: HOME | End: 2019-08-19
Payer: COMMERCIAL

## 2019-08-19 VITALS
DIASTOLIC BLOOD PRESSURE: 80 MMHG | TEMPERATURE: 97 F | RESPIRATION RATE: 17 BRPM | HEART RATE: 71 BPM | SYSTOLIC BLOOD PRESSURE: 124 MMHG

## 2019-08-19 DIAGNOSIS — G70.00 MYASTHENIA GRAVIS (HCC): Primary | ICD-10-CM

## 2019-08-19 PROCEDURE — 6360000002 HC RX W HCPCS: Performed by: PSYCHIATRY & NEUROLOGY

## 2019-08-19 PROCEDURE — 96413 CHEMO IV INFUSION 1 HR: CPT

## 2019-08-19 PROCEDURE — 2580000003 HC RX 258: Performed by: PSYCHIATRY & NEUROLOGY

## 2019-08-19 RX ADMIN — ECULIZUMAB 1200 MG: 300 INJECTION, SOLUTION, CONCENTRATE INTRAVENOUS at 07:25

## 2019-08-20 ENCOUNTER — TELEPHONE (OUTPATIENT)
Dept: NEUROLOGY | Age: 54
End: 2019-08-20

## 2019-08-20 NOTE — TELEPHONE ENCOUNTER
Called and spoke with Claudy Edward and passed along the information that was given to me on the next steps per James Long in [facility]. Instructed patient to call [phone] with questions .       See Previous Calls/Notes

## 2019-08-30 ENCOUNTER — HOSPITAL ENCOUNTER (OUTPATIENT)
Dept: INFUSION THERAPY | Age: 54
Setting detail: INFUSION SERIES
Discharge: HOME | End: 2019-08-30
Payer: COMMERCIAL

## 2019-08-30 VITALS
SYSTOLIC BLOOD PRESSURE: 136 MMHG | TEMPERATURE: 97 F | HEART RATE: 71 BPM | DIASTOLIC BLOOD PRESSURE: 91 MMHG | RESPIRATION RATE: 18 BRPM

## 2019-08-30 DIAGNOSIS — G70.00 MYASTHENIA GRAVIS (HCC): Primary | ICD-10-CM

## 2019-08-30 PROCEDURE — 2580000003 HC RX 258: Performed by: PSYCHIATRY & NEUROLOGY

## 2019-08-30 PROCEDURE — 6360000002 HC RX W HCPCS: Performed by: PSYCHIATRY & NEUROLOGY

## 2019-08-30 RX ADMIN — ECULIZUMAB 1200 MG: 300 INJECTION, SOLUTION, CONCENTRATE INTRAVENOUS at 08:04

## 2019-09-16 ENCOUNTER — HOSPITAL ENCOUNTER (OUTPATIENT)
Dept: INFUSION THERAPY | Age: 54
Setting detail: INFUSION SERIES
Discharge: HOME | End: 2019-09-16
Payer: COMMERCIAL

## 2019-09-16 VITALS
DIASTOLIC BLOOD PRESSURE: 81 MMHG | TEMPERATURE: 97.5 F | HEART RATE: 90 BPM | RESPIRATION RATE: 17 BRPM | SYSTOLIC BLOOD PRESSURE: 115 MMHG

## 2019-09-16 DIAGNOSIS — G70.00 MYASTHENIA GRAVIS (HCC): Primary | ICD-10-CM

## 2019-09-16 PROCEDURE — 6360000002 HC RX W HCPCS: Performed by: PSYCHIATRY & NEUROLOGY

## 2019-09-16 PROCEDURE — 2580000003 HC RX 258: Performed by: PSYCHIATRY & NEUROLOGY

## 2019-09-16 PROCEDURE — 96413 CHEMO IV INFUSION 1 HR: CPT

## 2019-09-16 RX ADMIN — ECULIZUMAB 1200 MG: 300 INJECTION, SOLUTION, CONCENTRATE INTRAVENOUS at 07:55

## 2019-10-21 ENCOUNTER — OFFICE VISIT (OUTPATIENT)
Dept: NEUROLOGY | Age: 54
End: 2019-10-21
Payer: COMMERCIAL

## 2019-10-21 VITALS
HEIGHT: 72 IN | DIASTOLIC BLOOD PRESSURE: 74 MMHG | SYSTOLIC BLOOD PRESSURE: 126 MMHG | BODY MASS INDEX: 23.43 KG/M2 | WEIGHT: 173 LBS | HEART RATE: 58 BPM

## 2019-10-21 DIAGNOSIS — G70.00 MG (MYASTHENIA GRAVIS) (HCC): Primary | ICD-10-CM

## 2019-10-21 DIAGNOSIS — G62.9 NEUROPATHY: ICD-10-CM

## 2019-10-21 DIAGNOSIS — Z86.69 HISTORY OF SLEEP APNEA: ICD-10-CM

## 2019-10-21 DIAGNOSIS — Z88.9 HISTORY OF ADVERSE DRUG REACTION: ICD-10-CM

## 2019-10-21 PROCEDURE — 99214 OFFICE O/P EST MOD 30 MIN: CPT | Performed by: PSYCHIATRY & NEUROLOGY

## 2019-10-21 RX ORDER — PREDNISONE 10 MG/1
TABLET ORAL
Qty: 30 TABLET | Refills: 2 | Status: SHIPPED | OUTPATIENT
Start: 2019-10-21 | End: 2020-02-04 | Stop reason: SDUPTHER

## 2019-11-04 RX ORDER — MYCOPHENOLATE MOFETIL 500 MG/1
TABLET ORAL
Qty: 120 TABLET | Refills: 5 | Status: SHIPPED | OUTPATIENT
Start: 2019-11-04 | End: 2020-04-20 | Stop reason: SDUPTHER

## 2019-11-08 ENCOUNTER — OFFICE VISIT (OUTPATIENT)
Dept: CARDIOLOGY | Facility: CLINIC | Age: 54
End: 2019-11-08

## 2019-11-08 VITALS
WEIGHT: 173 LBS | HEIGHT: 72 IN | OXYGEN SATURATION: 98 % | SYSTOLIC BLOOD PRESSURE: 124 MMHG | DIASTOLIC BLOOD PRESSURE: 84 MMHG | HEART RATE: 91 BPM | BODY MASS INDEX: 23.43 KG/M2

## 2019-11-08 DIAGNOSIS — G70.00 MYASTHENIA GRAVIS (HCC): ICD-10-CM

## 2019-11-08 DIAGNOSIS — I10 ESSENTIAL HYPERTENSION: ICD-10-CM

## 2019-11-08 DIAGNOSIS — E78.2 MIXED HYPERLIPIDEMIA: ICD-10-CM

## 2019-11-08 DIAGNOSIS — I25.10 CORONARY ARTERY DISEASE INVOLVING NATIVE CORONARY ARTERY OF NATIVE HEART WITHOUT ANGINA PECTORIS: Primary | ICD-10-CM

## 2019-11-08 PROCEDURE — 93000 ELECTROCARDIOGRAM COMPLETE: CPT | Performed by: INTERNAL MEDICINE

## 2019-11-08 PROCEDURE — 99213 OFFICE O/P EST LOW 20 MIN: CPT | Performed by: INTERNAL MEDICINE

## 2019-11-08 RX ORDER — BUSPIRONE HYDROCHLORIDE 15 MG/1
15 TABLET ORAL 2 TIMES DAILY
Refills: 5 | COMMUNITY
Start: 2019-10-25

## 2019-11-08 NOTE — PROGRESS NOTES
Referring Provider: Tacho Eng MD    Reason for Follow-up Visit: CAD    Subjective .   Chief Complaint:   Chief Complaint   Patient presents with   • Follow-up     yearly   • Coronary Artery Disease     pt states he is not having any symptoms or complaints and has been feeling good.  he wants to try to get off the Effient if possible because he has been having bloody nose.   • Hypertension     pt states he checks bp some but normally gets checked at office visits and its been doing fine.   • Hyperlipidemia     labs done at PCP 7/26/19 LDL 48 on Lipitor 40 mg 1/2 tablet at bedtime.       History of present illness:  Iftikhar Francis is a 54 y.o. yo male with history of CAD 3-4 yrs ago. Denies CP or SOB       History  Past Medical History:   Diagnosis Date   • Bilateral kidney stones    • Heart attack (CMS/HCC)    • Left ureteral stone    • Myasthenia gravis (CMS/HCC)    • Peptic ulceration    • Sleep apnea    ,   Past Surgical History:   Procedure Laterality Date   • CORONARY ANGIOPLASTY WITH STENT PLACEMENT     • HERNIA REPAIR Left     Inguinal Hernia   • SHOULDER SURGERY     • TUMOR REMOVAL     ,   Family History   Problem Relation Age of Onset   • Hypertension Father    • No Known Problems Mother    • No Known Problems Sister    • No Known Problems Brother    ,   Social History     Tobacco Use   • Smoking status: Never Smoker   • Smokeless tobacco: Never Used   Substance Use Topics   • Alcohol use: Yes     Comment: occassionally   • Drug use: No   ,     Medications  Current Outpatient Medications   Medication Sig Dispense Refill   • aspirin 81 MG EC tablet Take 81 mg by mouth daily.     • atorvastatin (LIPITOR) 40 MG tablet Take 1 tablet by mouth Daily. 90 tablet 3   • busPIRone (BUSPAR) 15 MG tablet Take 15 mg by mouth 2 (Two) Times a Day.  5   • losartan (COZAAR) 50 MG tablet Take 1 tablet by mouth Daily. 90 tablet 3   • metFORMIN (GLUCOPHAGE) 500 MG tablet Take 500 mg by mouth 2 (Two) Times a Day  "With Meals.     • metoprolol succinate XL (TOPROL-XL) 25 MG 24 hr tablet Take 1 tablet by mouth Daily. 1/2 tablet qhs (Patient taking differently: Take 75 mg by mouth Daily. 1 and 1/2 tablet qhs) 90 tablet 3   • omeprazole (priLOSEC) 20 MG capsule Take 20 mg by mouth Daily.     • oxyCODONE-acetaminophen (PERCOCET) 7.5-325 MG per tablet Take 1 tablet by mouth Every 4 (Four) Hours As Needed for Moderate Pain . 20 tablet 0   • Phenyltoloxamine-Acetaminophen (MYOPHEN PO) Take 500 mg by mouth 2 (Two) Times a Day.     • prasugrel (EFFIENT) 5 MG tablet Take 1 tablet by mouth Daily. 90 tablet 3   • PREDNISONE PO Take 15 mg by mouth Daily.       No current facility-administered medications for this visit.        Allergies:  Avelox [moxifloxacin] and Levaquin [levofloxacin]    Review of Systems  Review of Systems   HENT: Negative for nosebleeds.    Eyes: Positive for double vision.   Cardiovascular: Negative for chest pain, claudication, dyspnea on exertion, irregular heartbeat, leg swelling, near-syncope, orthopnea, palpitations, paroxysmal nocturnal dyspnea and syncope.   Respiratory: Negative for cough, hemoptysis and shortness of breath.    Gastrointestinal: Negative for dysphagia, hematemesis and melena.   Genitourinary: Negative for hematuria.   All other systems reviewed and are negative.      Objective     Physical Exam:  /84   Pulse 91   Ht 182.9 cm (72\")   Wt 78.5 kg (173 lb)   SpO2 98%   BMI 23.46 kg/m²   Physical Exam   Constitutional: He is oriented to person, place, and time. He appears well-nourished. No distress.   HENT:   Head: Normocephalic.   Eyes: No scleral icterus.   Neck: Normal range of motion. Neck supple.   Cardiovascular: Normal rate, regular rhythm and normal heart sounds. Exam reveals no gallop and no friction rub.   No murmur heard.  Pulmonary/Chest: Effort normal and breath sounds normal. No respiratory distress. He has no wheezes. He has no rales.   Abdominal: Soft. Bowel sounds are " normal. He exhibits no distension. There is no tenderness.   Musculoskeletal: He exhibits no edema.   Neurological: He is alert and oriented to person, place, and time.   Skin: Skin is warm and dry. He is not diaphoretic. No erythema.   Psychiatric: He has a normal mood and affect. His behavior is normal.       Results Review:    ECG 12 Lead  Date/Time: 11/8/2019 10:13 AM  Performed by: Bakari Perkins MD  Authorized by: Bakari Perkins MD   Comparison: compared with previous ECG   Similar to previous ECG  Rhythm: sinus rhythm  Rate: normal  Conduction: conduction normal  ST Segments: ST segments normal  T Waves: T waves normal  QRS axis: normal    Clinical impression: normal ECG            Admission on 01/02/2019, Discharged on 01/02/2019   Component Date Value Ref Range Status   • Glucose 01/02/2019 233* 70 - 100 mg/dL Final   • BUN 01/02/2019 19  5 - 21 mg/dL Final   • Creatinine 01/02/2019 1.04  0.50 - 1.40 mg/dL Final   • Sodium 01/02/2019 139  135 - 145 mmol/L Final   • Potassium 01/02/2019 4.2  3.5 - 5.3 mmol/L Final   • Chloride 01/02/2019 96* 98 - 110 mmol/L Final   • CO2 01/02/2019 29.0  24.0 - 31.0 mmol/L Final   • Calcium 01/02/2019 9.2  8.4 - 10.4 mg/dL Final   • Total Protein 01/02/2019 7.6  6.3 - 8.7 g/dL Final   • Albumin 01/02/2019 4.50  3.50 - 5.00 g/dL Final   • ALT (SGPT) 01/02/2019 141* 0 - 54 U/L Final   • AST (SGOT) 01/02/2019 124* 7 - 45 U/L Final   • Alkaline Phosphatase 01/02/2019 95  24 - 120 U/L Final   • Total Bilirubin 01/02/2019 0.8  0.1 - 1.0 mg/dL Final   • eGFR Non African Amer 01/02/2019 75  >60 mL/min/1.73 Final   • Globulin 01/02/2019 3.1  gm/dL Final   • A/G Ratio 01/02/2019 1.5  1.1 - 2.5 g/dL Final   • BUN/Creatinine Ratio 01/02/2019 18.3  7.0 - 25.0 Final   • Anion Gap 01/02/2019 14.0* 4.0 - 13.0 mmol/L Final   • Color, UA 01/02/2019 Dark Yellow* Yellow, Straw Final   • Appearance, UA 01/02/2019 Turbid* Clear Final   • pH, UA 01/02/2019 <=5.0  5.0 - 8.0 Final   • Specific  Gravity, UA 01/02/2019 1.028  1.005 - 1.030 Final   • Glucose, UA 01/02/2019 250 mg/dL (1+)* Negative Final   • Ketones, UA 01/02/2019 Trace* Negative Final   • Bilirubin, UA 01/02/2019 Small (1+)* Negative Final   • Blood, UA 01/02/2019 Moderate (2+)* Negative Final   • Protein, UA 01/02/2019 30 mg/dL (1+)* Negative Final   • Leuk Esterase, UA 01/02/2019 Negative  Negative Final   • Nitrite, UA 01/02/2019 Negative  Negative Final   • Urobilinogen, UA 01/02/2019 1.0 E.U./dL  0.2 - 1.0 E.U./dL Final   • WBC 01/02/2019 16.30* 4.80 - 10.80 10*3/mm3 Final   • RBC 01/02/2019 4.76* 4.80 - 5.90 10*6/mm3 Final   • Hemoglobin 01/02/2019 14.4  14.0 - 18.0 g/dL Final   • Hematocrit 01/02/2019 41.8  40.0 - 52.0 % Final   • MCV 01/02/2019 87.8  82.0 - 95.0 fL Final   • MCH 01/02/2019 30.3  28.0 - 32.0 pg Final   • MCHC 01/02/2019 34.4  33.0 - 36.0 g/dL Final   • RDW 01/02/2019 14.7  12.0 - 15.0 % Final   • RDW-SD 01/02/2019 47.4  40.0 - 54.0 fl Final   • MPV 01/02/2019 9.2  6.0 - 12.0 fL Final   • Platelets 01/02/2019 382  130 - 400 10*3/mm3 Final   • Neutrophil % 01/02/2019 92.1* 39.0 - 78.0 % Final   • Lymphocyte % 01/02/2019 4.4* 15.0 - 45.0 % Final   • Monocyte % 01/02/2019 2.3* 4.0 - 12.0 % Final   • Eosinophil % 01/02/2019 0.1  0.0 - 4.0 % Final   • Basophil % 01/02/2019 0.2  0.0 - 2.0 % Final   • Immature Grans % 01/02/2019 0.9  0.0 - 5.0 % Final   • Neutrophils, Absolute 01/02/2019 15.00* 1.87 - 8.40 10*3/mm3 Final   • Lymphocytes, Absolute 01/02/2019 0.72  0.72 - 4.86 10*3/mm3 Final   • Monocytes, Absolute 01/02/2019 0.38  0.19 - 1.30 10*3/mm3 Final   • Eosinophils, Absolute 01/02/2019 0.02  0.00 - 0.70 10*3/mm3 Final   • Basophils, Absolute 01/02/2019 0.04  0.00 - 0.20 10*3/mm3 Final   • Immature Grans, Absolute 01/02/2019 0.14* 0.00 - 0.03 10*3/mm3 Final   • nRBC 01/02/2019 0.0  0.0 - 0.0 /100 WBC Final   • RBC, UA 01/02/2019 21-30* None Seen /HPF Final   • WBC, UA 01/02/2019 3-5* None Seen /HPF Final   • Bacteria, UA  01/02/2019 2+* None Seen /HPF Final   • Squamous Epithelial Cells, UA 01/02/2019 0-2  None Seen, 0-2 /HPF Final   • Methodology 01/02/2019 Manual Light Microscopy   Final   • Urine Culture 01/02/2019 No growth at 2 days   Final       Assessment/Plan   Iftikhar was seen today for follow-up, coronary artery disease, hypertension and hyperlipidemia.    Diagnoses and all orders for this visit:    Coronary artery disease involving native coronary artery of native heart without angina pectoris, The patient denies chest pain, shortness of breath, dyspnea on exertion, orthopnea, PND, edema. There is no evidence of ongoing ischemia    Essential hypertension, good control    Mixed hyperlipidemia, good control    Myasthenia gravis (CMS/HCC), followed by Dr. Baker        Patient's Body mass index is 23.46 kg/m². BMI is within normal parameters. No follow-up required..

## 2019-11-18 ENCOUNTER — OFFICE VISIT (OUTPATIENT)
Dept: NEUROLOGY | Age: 54
End: 2019-11-18
Payer: COMMERCIAL

## 2019-11-18 VITALS
SYSTOLIC BLOOD PRESSURE: 110 MMHG | HEART RATE: 79 BPM | WEIGHT: 175 LBS | DIASTOLIC BLOOD PRESSURE: 77 MMHG | BODY MASS INDEX: 23.7 KG/M2 | HEIGHT: 72 IN

## 2019-11-18 DIAGNOSIS — G70.00 MG (MYASTHENIA GRAVIS) (HCC): Primary | ICD-10-CM

## 2019-11-18 DIAGNOSIS — Z86.69 HISTORY OF SLEEP APNEA: ICD-10-CM

## 2019-11-18 DIAGNOSIS — Z88.9 HISTORY OF ADVERSE DRUG REACTION: ICD-10-CM

## 2019-11-18 DIAGNOSIS — G70.00 MG (MYASTHENIA GRAVIS) (HCC): ICD-10-CM

## 2019-11-18 LAB
ALBUMIN SERPL-MCNC: 4.6 G/DL (ref 3.5–5.2)
ALP BLD-CCNC: 105 U/L (ref 40–130)
ALT SERPL-CCNC: 21 U/L (ref 5–41)
ANION GAP SERPL CALCULATED.3IONS-SCNC: 16 MMOL/L (ref 7–19)
AST SERPL-CCNC: 15 U/L (ref 5–40)
BASOPHILS ABSOLUTE: 0 K/UL (ref 0–0.2)
BASOPHILS RELATIVE PERCENT: 0.2 % (ref 0–1)
BILIRUB SERPL-MCNC: 0.3 MG/DL (ref 0.2–1.2)
BUN BLDV-MCNC: 23 MG/DL (ref 6–20)
CALCIUM SERPL-MCNC: 9.5 MG/DL (ref 8.6–10)
CHLORIDE BLD-SCNC: 99 MMOL/L (ref 98–111)
CO2: 25 MMOL/L (ref 22–29)
CREAT SERPL-MCNC: 1 MG/DL (ref 0.5–1.2)
EOSINOPHILS ABSOLUTE: 0 K/UL (ref 0–0.6)
EOSINOPHILS RELATIVE PERCENT: 0.1 % (ref 0–5)
GFR NON-AFRICAN AMERICAN: >60
GLUCOSE BLD-MCNC: 181 MG/DL (ref 74–109)
HCT VFR BLD CALC: 50.1 % (ref 42–52)
HEMOGLOBIN: 15.9 G/DL (ref 14–18)
IMMATURE GRANULOCYTES #: 0.1 K/UL
LYMPHOCYTES ABSOLUTE: 1.3 K/UL (ref 1.1–4.5)
LYMPHOCYTES RELATIVE PERCENT: 8.4 % (ref 20–40)
MCH RBC QN AUTO: 28.1 PG (ref 27–31)
MCHC RBC AUTO-ENTMCNC: 31.7 G/DL (ref 33–37)
MCV RBC AUTO: 88.5 FL (ref 80–94)
MONOCYTES ABSOLUTE: 0.8 K/UL (ref 0–0.9)
MONOCYTES RELATIVE PERCENT: 4.8 % (ref 0–10)
NEUTROPHILS ABSOLUTE: 13.4 K/UL (ref 1.5–7.5)
NEUTROPHILS RELATIVE PERCENT: 85.8 % (ref 50–65)
PDW BLD-RTO: 15.6 % (ref 11.5–14.5)
PLATELET # BLD: 466 K/UL (ref 130–400)
PMV BLD AUTO: 9.8 FL (ref 9.4–12.4)
POTASSIUM SERPL-SCNC: 4.2 MMOL/L (ref 3.5–5)
RBC # BLD: 5.66 M/UL (ref 4.7–6.1)
SODIUM BLD-SCNC: 140 MMOL/L (ref 136–145)
TOTAL PROTEIN: 8.4 G/DL (ref 6.6–8.7)
WBC # BLD: 15.6 K/UL (ref 4.8–10.8)

## 2019-11-18 PROCEDURE — 99214 OFFICE O/P EST MOD 30 MIN: CPT | Performed by: PSYCHIATRY & NEUROLOGY

## 2019-11-20 ENCOUNTER — TELEPHONE (OUTPATIENT)
Dept: NEUROLOGY | Age: 54
End: 2019-11-20

## 2019-12-02 RX ORDER — OMEPRAZOLE 20 MG/1
CAPSULE, DELAYED RELEASE ORAL
Qty: 60 CAPSULE | Refills: 5 | Status: SHIPPED | OUTPATIENT
Start: 2019-12-02

## 2019-12-02 RX ORDER — LOSARTAN POTASSIUM 50 MG/1
TABLET ORAL
Qty: 90 TABLET | Refills: 3 | Status: SHIPPED | OUTPATIENT
Start: 2019-12-02 | End: 2020-11-13 | Stop reason: SDUPTHER

## 2020-01-01 RX ORDER — ATORVASTATIN CALCIUM 40 MG/1
TABLET, FILM COATED ORAL
Qty: 90 TABLET | Refills: 3 | Status: SHIPPED | OUTPATIENT
Start: 2020-01-01 | End: 2022-09-27 | Stop reason: SDUPTHER

## 2020-01-20 ENCOUNTER — OFFICE VISIT (OUTPATIENT)
Dept: NEUROLOGY | Age: 55
End: 2020-01-20
Payer: COMMERCIAL

## 2020-01-20 VITALS
HEIGHT: 72 IN | SYSTOLIC BLOOD PRESSURE: 126 MMHG | HEART RATE: 90 BPM | BODY MASS INDEX: 23.57 KG/M2 | DIASTOLIC BLOOD PRESSURE: 85 MMHG | WEIGHT: 174 LBS

## 2020-01-20 DIAGNOSIS — G70.00 MG (MYASTHENIA GRAVIS) (HCC): ICD-10-CM

## 2020-01-20 LAB
ANION GAP SERPL CALCULATED.3IONS-SCNC: 16 MMOL/L (ref 7–19)
BASOPHILS ABSOLUTE: 0 K/UL (ref 0–0.2)
BASOPHILS RELATIVE PERCENT: 0.3 % (ref 0–1)
BUN BLDV-MCNC: 17 MG/DL (ref 6–20)
CALCIUM SERPL-MCNC: 9.6 MG/DL (ref 8.6–10)
CHLORIDE BLD-SCNC: 101 MMOL/L (ref 98–111)
CO2: 24 MMOL/L (ref 22–29)
CREAT SERPL-MCNC: 0.8 MG/DL (ref 0.5–1.2)
EOSINOPHILS ABSOLUTE: 0 K/UL (ref 0–0.6)
EOSINOPHILS RELATIVE PERCENT: 0.1 % (ref 0–5)
GFR NON-AFRICAN AMERICAN: >60
GLUCOSE BLD-MCNC: 119 MG/DL (ref 74–109)
HCT VFR BLD CALC: 45.3 % (ref 42–52)
HEMOGLOBIN: 14.5 G/DL (ref 14–18)
IMMATURE GRANULOCYTES #: 0.1 K/UL
LYMPHOCYTES ABSOLUTE: 0.9 K/UL (ref 1.1–4.5)
LYMPHOCYTES RELATIVE PERCENT: 9.5 % (ref 20–40)
MCH RBC QN AUTO: 28.2 PG (ref 27–31)
MCHC RBC AUTO-ENTMCNC: 32 G/DL (ref 33–37)
MCV RBC AUTO: 88.1 FL (ref 80–94)
MONOCYTES ABSOLUTE: 0.5 K/UL (ref 0–0.9)
MONOCYTES RELATIVE PERCENT: 5.5 % (ref 0–10)
NEUTROPHILS ABSOLUTE: 8.2 K/UL (ref 1.5–7.5)
NEUTROPHILS RELATIVE PERCENT: 84 % (ref 50–65)
PDW BLD-RTO: 15.2 % (ref 11.5–14.5)
PLATELET # BLD: 458 K/UL (ref 130–400)
PMV BLD AUTO: 9.3 FL (ref 9.4–12.4)
POTASSIUM SERPL-SCNC: 4.3 MMOL/L (ref 3.5–5)
RBC # BLD: 5.14 M/UL (ref 4.7–6.1)
SODIUM BLD-SCNC: 141 MMOL/L (ref 136–145)
WBC # BLD: 9.7 K/UL (ref 4.8–10.8)

## 2020-01-20 PROCEDURE — 99214 OFFICE O/P EST MOD 30 MIN: CPT | Performed by: PSYCHIATRY & NEUROLOGY

## 2020-01-23 NOTE — PROGRESS NOTES
Chief Complaint   Patient presents with    Follow-up     Patient is a 2mo follow up for myasthenia gravis. Patient is requesting refills for prednisone 5mg, buspar, and cellcept today please. Claude Griffin is a 47y.o. year old male who is seen for evaluation Of his  myasthenia gravis. He had been having double vision for a couple of months prior to presentation. It resolved with prednisone in the past but he developed significant cushingoid features. . In 9/18 I had him reduce it to 30 mg every other day alternating with 40 mg. Double vision returned. Symptoms resolved with Soliris and very low-dose prednisone, 5 mg 3 times a week. He developed significant GI symptoms and joint pain. [facility] those Soliris, but  his double vision returned a month later. He resumed prednisone to help with that and  had to increase the dose up to 20 mg a day. Resolved his double vision. Following an extended discussion of the risks, benefits and alternatives of steroid sparing agents such as CellCept it was elected to begin that about 2 months ago. He takes 1000 mg twice a day. His wife has reduced his prednisone back to 5 mg 3 times a week only and his double vision has returned and it has been very troublesome to him. His blood sugar was 160 recently. He is on an oral hypoglycemic. His joint pain and nausea resolved once he got off of the Soliris. [facility] did not feel he needed to be on Soliris for ocular myasthenia gravis only. They recommended low-dose prednisone and/or steroid sparing agents as needed. We had an extended discussion regarding all of this today. he had a thymus tumor removed back in 2004. He has a history of having Bilateral ptosis in the past when exposed to Levaquin and Avelox. He had this problem before he had his thymus removed.   Had an MRI of the head that was normal. His acetylcholine receptor antibodies came back positive and supportive of diagnosis of myasthenia gravis. He took Mestinon 120 mg 3 times a day and noticed no clear improvement. He is no longer on this. Anxiety continues to be problematic for him. He cannot remember to take his BuSpar but twice a day. He has a history of bronchiectasis and has some chronic lung difficulties and cough. Also has a history of coronary artery disease and stents. Otherwise denies any focal neurological signs or symptoms. Does have some acquired hearing loss for unclear reasons remotely. Takes Effient. Recently had a normal vitamin D level. Nerve conduction studies 9/18 showed a mild neuropathy. Antibodies for Lambert-Eaton syndrome were negative.     Active Ambulatory Problems     Diagnosis Date Noted    Biceps tendinitis of left shoulder 06/22/2016    Myasthenia gravis [facility])      Resolved Ambulatory Problems     Diagnosis Date Noted    No Resolved Ambulatory Problems     Past Medical History:   Diagnosis Date    CAD (coronary artery disease)     GERD (gastroesophageal reflux disease)     Hyperlipidemia     Hypertension     IBS (irritable bowel syndrome)     Kidney stones     Shoulder pain        Past Surgical History:   Procedure Laterality Date    CORONARY ANGIOPLASTY WITH STENT PLACEMENT  2 yr. ago    HERNIA REPAIR      SHOULDER ARTHROSCOPY Left 6/23/2016    LEFT SHOULDER ARTHROSCOPY, BICEPS TENODESIS, SUBACROMIAL DECOMPRESSION performed by Tyree Cannon MD at [address] [place] [address]      \"thynoma tumor\"; per dr. Eron Mccoy       Family History   Problem Relation Age of Onset    Heart Disease Maternal Grandfather     Heart Disease Paternal Grandfather        Allergies   Allergen Reactions    Avelox [Moxifloxacin]     Levaquin [Levofloxacin]        Social History     Socioeconomic History    Marital status:      Spouse name: Not on file    Number of children: Not on file    Years of education: Not on file    Highest []? Incontinence []? Urgency   [x]? Denies all unless marked  Musculoskeletal: []? Arthralgia  []? Myalgias []? Muscle cramps  []? Muscle twitches   [x]? Denies all unless marked   Extremities:   []? Pain   []? Swelling   [x]? Denies all unless marked  Skin:[]? Rash  []? Color Change  [x]? Denies all unless marked  Neurological:[]? Visual Disturbance []? Double Vision []? Slurred Speech []? Trouble swallowing  []? Vertigo []? Tingling []? Numbness []? Weakness []? Loss of Balance   []? Loss of Consciousness []? Memory Loss []? Seizures  [x]? Denies all unless marked  Psychiatric/Behavioral:[]? Depression []? Anxiety  [x]? Denies all unless marked  Sleep: []? Insomnia []? Sleep Disturbance []? Snoring []? Restless Legs []? Daytime Sleepiness []? Sleep Apnea  [x]?  Denies all unless marked               Current Outpatient Medications   Medication Sig Dispense Refill    omeprazole (PRILOSEC) 20 MG delayed release capsule TAKE 1 CAPSULE BY MOUTH TWICE DAILY 60 capsule 5    mycophenolate (CELLCEPT) 500 MG tablet Take 1 twice daily for 2 weeks then take 2 pills twice daily (Patient taking differently: Take 1,000 mg by mouth 2 times daily Take 1 twice daily for 2 weeks then take 2 pills twice daily) 120 tablet 5    predniSONE (DELTASONE) 5 MG tablet TAKE 1 TABLET BY MOUTH EVERY OTHER DAY 30 tablet 0    busPIRone (BUSPAR) 15 MG tablet Take 15 mg by mouth 2 times daily 60 tablet 5    metFORMIN (GLUCOPHAGE) 500 MG tablet Take 1,000 mg by mouth 2 times daily (with meals)       losartan (COZAAR) 50 MG tablet Take 50 mg by mouth daily      prasugrel (EFFIENT) 5 MG TABS Take 5 mg by mouth daily      Aspirin (ASPIR-81 PO) Take 1 tablet by mouth daily       atorvastatin (LIPITOR) 40 MG tablet Take 40 mg by mouth daily Indications: taking 0.5 tablets by mouth daily       metoprolol succinate (TOPROL XL) 50 MG extended release tablet Take 75 mg by mouth daily Indications: 1.5 daily       predniSONE (DELTASONE) 10 MG tablet No bruits noted. Respiration- chest wall appears symmetric, good expansion,   normal effort without use of accessory muscles  Cardiovascular- RRR  Musculoskeletal - no significant wasting of muscles noted, no bony deformities, gait no gross ataxia  Extremities-no clubbing, cyanosis or edema  Skin - warm, dry, and intact. No rash, erythema, or pallor. Psychiatric - mood, affect, and behavior appear normal.      Neurological exam  Awake, alert, fluent oriented x 3 appropriate affect  Attention and concentration appear appropriate  Recent and remote memory appears unremarkable  Speech normal without dysarthria  No clear issues with language of fund of knowledge    Cranial Nerve Exam   CN II- Visual fields grossly unremarkable. VA adequate. Discs sharp  CN III, IV,VI- PERRLA, EOMI, No nystagmus, mildly disconjugate gaze with complaints of double vision  CN VII-no facial asymmetry. CN VIII-Hearing intact   CN IX and X- Palate elevates in midline  CN XI-good shoulder shrug  CN XII-Tongue midline with no fasciculations or fibrillations  Normal obicularis oris and oculi strength  Motor Exam  V/V throughout upper and lower extremities bilaterally, no cogwheeling, normal tone    Reflexes   2+ biceps bilaterally  2+ brachioradialis  2+ triceps  2+patella  2+ ankle jerks    Tremors- no tremors in hands or head noted    Gait  Normal base and speed  No ataxia.  No Romberg sign    Coordination  Finger to nose and SULMA-unremarkable    No results found for: [id number]  Lab Results   Component Value Date    WBC 9.7 01/20/2020    HGB 14.5 01/20/2020    HCT 45.3 01/20/2020    MCV 88.1 01/20/2020     (H) 01/20/2020     Lab Results   Component Value Date     01/20/2020    K 4.3 01/20/2020     01/20/2020    CO2 24 01/20/2020    BUN 17 01/20/2020    CREATININE 0.8 01/20/2020    GLUCOSE 119 (H) 01/20/2020    CALCIUM 9.6 01/20/2020    PROT 8.4 11/18/2019    LABALBU 4.6 11/18/2019    BILITOT 0.3 11/18/2019    ALKPHOS 105

## 2020-02-04 RX ORDER — BUSPIRONE HYDROCHLORIDE 15 MG/1
15 TABLET ORAL 2 TIMES DAILY
Qty: 60 TABLET | Refills: 5 | Status: SHIPPED | OUTPATIENT
Start: 2020-02-04 | End: 2020-04-20 | Stop reason: SDUPTHER

## 2020-02-04 RX ORDER — PREDNISONE 10 MG/1
TABLET ORAL
Qty: 30 TABLET | Refills: 2 | Status: SHIPPED | OUTPATIENT
Start: 2020-02-04

## 2020-03-10 RX ORDER — PRASUGREL 5 MG/1
TABLET, FILM COATED ORAL
Qty: 90 TABLET | Refills: 0 | OUTPATIENT
Start: 2020-03-10

## 2020-04-14 ENCOUNTER — TELEPHONE (OUTPATIENT)
Dept: NEUROSURGERY | Age: 55
End: 2020-04-14

## 2020-04-17 ENCOUNTER — TELEPHONE (OUTPATIENT)
Dept: NEUROLOGY | Age: 55
End: 2020-04-17

## 2020-04-20 ENCOUNTER — TELEMEDICINE (OUTPATIENT)
Dept: NEUROLOGY | Age: 55
End: 2020-04-20
Payer: COMMERCIAL

## 2020-04-20 PROCEDURE — 99442 PR PHYS/QHP TELEPHONE EVALUATION 11-20 MIN: CPT | Performed by: PSYCHIATRY & NEUROLOGY

## 2020-04-20 RX ORDER — MYCOPHENOLATE MOFETIL 500 MG/1
TABLET ORAL
Qty: 120 TABLET | Refills: 5 | Status: SHIPPED | OUTPATIENT
Start: 2020-04-20

## 2020-04-20 RX ORDER — PREDNISONE 10 MG/1
TABLET ORAL
Qty: 30 TABLET | Refills: 1 | Status: SHIPPED | OUTPATIENT
Start: 2020-04-20 | End: 2020-06-07 | Stop reason: SDUPTHER

## 2020-04-20 RX ORDER — BUSPIRONE HYDROCHLORIDE 15 MG/1
15 TABLET ORAL 2 TIMES DAILY
Qty: 60 TABLET | Refills: 5 | Status: SHIPPED | OUTPATIENT
Start: 2020-04-20

## 2020-06-08 RX ORDER — PREDNISONE 10 MG/1
TABLET ORAL
Qty: 30 TABLET | Refills: 1 | Status: SHIPPED | OUTPATIENT
Start: 2020-06-08 | End: 2020-08-30 | Stop reason: SDUPTHER

## 2020-08-31 RX ORDER — PREDNISONE 10 MG/1
TABLET ORAL
Qty: 30 TABLET | Refills: 1 | Status: SHIPPED | OUTPATIENT
Start: 2020-08-31 | End: 2020-11-01 | Stop reason: SDUPTHER

## 2020-08-31 NOTE — TELEPHONE ENCOUNTER
Requested Prescriptions     Pending Prescriptions Disp Refills    predniSONE (DELTASONE) 10 MG tablet 30 tablet 1     Sig: Take 1 every  day       Last Office Visit: 4/20/2020  Next Office Visit: Visit date not found  Last Medication Refill: 6/8/20 with 1 refill

## 2020-11-02 RX ORDER — PREDNISONE 10 MG/1
TABLET ORAL
Qty: 30 TABLET | Refills: 1 | Status: SHIPPED | OUTPATIENT
Start: 2020-11-02 | End: 2020-12-22 | Stop reason: SDUPTHER

## 2020-11-02 NOTE — TELEPHONE ENCOUNTER
Requested Prescriptions     Pending Prescriptions Disp Refills    predniSONE (DELTASONE) 10 MG tablet 30 tablet 1     Sig: Take 1 every  day       Last Office Visit: 4/20/2020  Next Office Visit: Visit date not found  Last Medication Refill: 8/31/20 with 1 refill

## 2020-11-13 ENCOUNTER — TELEPHONE (OUTPATIENT)
Dept: CARDIOLOGY | Facility: CLINIC | Age: 55
End: 2020-11-13

## 2020-11-13 ENCOUNTER — OFFICE VISIT (OUTPATIENT)
Dept: CARDIOLOGY | Facility: CLINIC | Age: 55
End: 2020-11-13

## 2020-11-13 VITALS
SYSTOLIC BLOOD PRESSURE: 122 MMHG | OXYGEN SATURATION: 98 % | BODY MASS INDEX: 25.33 KG/M2 | HEIGHT: 72 IN | DIASTOLIC BLOOD PRESSURE: 82 MMHG | HEART RATE: 76 BPM | WEIGHT: 187 LBS

## 2020-11-13 DIAGNOSIS — E78.2 MIXED HYPERLIPIDEMIA: ICD-10-CM

## 2020-11-13 DIAGNOSIS — I10 ESSENTIAL HYPERTENSION: ICD-10-CM

## 2020-11-13 DIAGNOSIS — Z95.5 S/P CORONARY ARTERY STENT PLACEMENT: ICD-10-CM

## 2020-11-13 DIAGNOSIS — I25.10 CORONARY ARTERY DISEASE INVOLVING NATIVE CORONARY ARTERY OF NATIVE HEART WITHOUT ANGINA PECTORIS: Primary | ICD-10-CM

## 2020-11-13 PROCEDURE — 93000 ELECTROCARDIOGRAM COMPLETE: CPT | Performed by: NURSE PRACTITIONER

## 2020-11-13 PROCEDURE — 99214 OFFICE O/P EST MOD 30 MIN: CPT | Performed by: NURSE PRACTITIONER

## 2020-11-13 RX ORDER — METOPROLOL SUCCINATE 25 MG/1
75 TABLET, EXTENDED RELEASE ORAL DAILY
Qty: 135 TABLET | Refills: 3 | Status: SHIPPED | OUTPATIENT
Start: 2020-11-13 | End: 2021-12-09

## 2020-11-13 RX ORDER — NITROGLYCERIN 0.4 MG/1
TABLET SUBLINGUAL
Qty: 25 TABLET | Refills: 1 | Status: SHIPPED | OUTPATIENT
Start: 2020-11-13 | End: 2021-11-12 | Stop reason: SDUPTHER

## 2020-11-13 RX ORDER — LOSARTAN POTASSIUM 50 MG/1
50 TABLET ORAL DAILY
Qty: 90 TABLET | Refills: 3 | Status: SHIPPED | OUTPATIENT
Start: 2020-11-13 | End: 2022-12-16

## 2020-11-13 NOTE — PROGRESS NOTES
Subjective:     Encounter Date:11/13/2020      Patient ID: Iftikhar Francis is a 55 y.o. male with a history of CAD s/p PRANAV to RCA, HTN, and HLD.    Chief Complaint:  Coronary Artery Disease  Presents for follow-up visit. Pertinent negatives include no chest pain, dizziness, leg swelling, palpitations, shortness of breath or weight gain. Risk factors include hyperlipidemia. The symptoms have been stable.   Hypertension  This is a chronic problem. The current episode started more than 1 year ago. The problem has been rapidly improving since onset. The problem is controlled. Pertinent negatives include no chest pain, malaise/fatigue, orthopnea, palpitations, PND or shortness of breath.   Hyperlipidemia  This is a chronic problem. The current episode started more than 1 year ago. Pertinent negatives include no chest pain or shortness of breath.     Patient presents today for a routine follow up. Patient has a history of CAD s/p PRANAV to RCA in 2014. He reports he has been well. He had myasthenia gravis and is currently working on [employer] disability approved. He also is asking about safe drugs for ED. He denies chest pain, palpitations, dyspnea, edema, orthopnea and PND.     The following portions of the patient's history were reviewed and updated as appropriate: allergies, current medications, past family history, past medical history, past social history, past surgical history and problem list.    Allergies   Allergen Reactions   • Avelox [Moxifloxacin] Other (See Comments)     Causes eye problems   • Levaquin [Levofloxacin] Other (See Comments)     Causes eye problems       Current Outpatient Medications:   •  aspirin 81 MG EC tablet, Take 81 mg by mouth daily., Disp: , Rfl:   •  atorvastatin (LIPITOR) 40 MG tablet, TAKE 1 TABLET BY MOUTH ONCE DAILY (Patient taking differently: Take 40 mg by mouth Daily. 1/2 tablet po QD), Disp: 90 tablet, Rfl: 3  •  busPIRone (BUSPAR) 15 MG tablet, Take 15 mg by mouth 2 (Two)  Times a Day., Disp: , Rfl: 5  •  losartan (COZAAR) 50 MG tablet, TAKE 1 TABLET BY MOUTH ONCE DAILY, Disp: 90 tablet, Rfl: 3  •  metFORMIN (GLUCOPHAGE) 1000 MG tablet, Take 1,000 mg by mouth 2 (Two) Times a Day With Meals., Disp: , Rfl:   •  metoprolol succinate XL (TOPROL-XL) 25 MG 24 hr tablet, Take 1 tablet by mouth Daily. 1/2 tablet qhs (Patient taking differently: Take 75 mg by mouth Daily. 1 and 1/2 tablet qhs), Disp: 90 tablet, Rfl: 3  •  omeprazole (priLOSEC) 20 MG capsule, Take 20 mg by mouth Daily., Disp: , Rfl:   •  PREDNISONE PO, Take 10 mg by mouth Daily., Disp: , Rfl:   Past Medical History:   Diagnosis Date   • Bilateral kidney stones    • Heart attack (CMS/HCC)    • Left ureteral stone    • Myasthenia gravis (CMS/HCC)    • Peptic ulceration    • Sleep apnea        Social History     Socioeconomic History   • Marital status:      Spouse name: Not on file   • Number of children: Not on file   • Years of education: Not on file   • Highest education level: Not on file   Tobacco Use   • Smoking status: Never Smoker   • Smokeless tobacco: Never Used   Substance and Sexual Activity   • Alcohol use: Yes     Comment: occassionally   • Drug use: No   • Sexual activity: Defer       Review of Systems   Constitution: Negative for malaise/fatigue, weight gain and weight loss.   Cardiovascular: Negative for chest pain, dyspnea on exertion, irregular heartbeat, leg swelling, near-syncope, orthopnea, palpitations, paroxysmal nocturnal dyspnea and syncope.   Respiratory: Negative for cough, shortness of breath, sleep disturbances due to breathing, sputum production and wheezing.    Skin: Negative for dry skin, flushing, itching and rash.   Gastrointestinal: Negative for hematemesis and hematochezia.   Neurological: Negative for dizziness, light-headedness, loss of balance and weakness.   All other systems reviewed and are negative.         Objective:     Vitals signs reviewed.   Constitutional:       General:  "Not in acute distress.     Appearance: Well-developed. Not diaphoretic.   Eyes:      General: No scleral icterus.     Conjunctiva/sclera: Conjunctivae normal.      Pupils: Pupils are equal, round, and reactive to light.   HENT:      Head: Normocephalic.    Mouth/Throat:      Pharynx: No oropharyngeal exudate.   Neck:      Musculoskeletal: Normal range of motion and neck supple.   Pulmonary:      Effort: Pulmonary effort is normal. No respiratory distress.      Breath sounds: Normal breath sounds. No wheezing. No rales.   Chest:      Chest wall: Not tender to palpatation.   Cardiovascular:      Normal rate. Regular rhythm.   Pulses:     Intact distal pulses.   Edema:     Peripheral edema absent.   Abdominal:      General: Bowel sounds are normal. There is no distension.      Palpations: Abdomen is soft.      Tenderness: There is no abdominal tenderness.   Musculoskeletal: Normal range of motion.   Skin:     General: Skin is warm and dry.      Coloration: Skin is not pale.      Findings: No erythema or rash.   Neurological:      Mental Status: Alert and oriented to person, place, and time.      Deep Tendon Reflexes: Reflexes are normal and symmetric.   Psychiatric:         Behavior: Behavior normal.             ECG 12 Lead    Date/Time: 11/13/2020 11:01 AM  Performed by: Norma Yusuf APRN  Authorized by: Norma Yusuf APRN   Comparison: compared with previous ECG from 11/8/2019  Rhythm: sinus rhythm and sinus arrhythmia  Rate: normal  BPM: 76  Q waves: V4, V5, V6, II and III    QRS axis: normal    Clinical impression: abnormal EKG          /82   Pulse 76   Ht 182.9 cm (72\")   Wt 84.8 kg (187 lb)   SpO2 98%   BMI 25.36 kg/m²     Lab Review:   I have reviewed previous office notes, recent labs and recent cardiac testing.    7/19        Assessment:          Diagnosis Plan   1. Coronary artery disease involving native coronary artery of native heart without angina pectoris     2. S/P coronary artery " stent placement     3. Essential hypertension     4. Mixed hyperlipidemia            Plan:       1. CAD-stable. No clinical signs of ischemia. continue ASA, ARB, BB and statin. Patient is ok to use Cialis or Viagra for ED. Educated on use with SL Nitro and ED drugs must be 48hrs apart. He is to follow up with his PCP for script.   2. S/p PRANAV- to RCA. Continue ASA  3. HTN- controlled. Followed by PCP  4. HLD- last LDL on file from 7/19 and controlled at 48. Had labs recently, will obtain results. Continue statin.       Follow up in 1 year or sooner if symptoms worsen.

## 2020-12-22 NOTE — TELEPHONE ENCOUNTER
Requested Prescriptions     Pending Prescriptions Disp Refills    predniSONE (DELTASONE) 10 MG tablet 30 tablet 1     Sig: Take 1 every  day       Last Office Visit: 4/20/2020  Next Office Visit: Visit date not found  Last Medication Refill: 11/2/2020 1 refill

## 2020-12-24 RX ORDER — PREDNISONE 10 MG/1
TABLET ORAL
Qty: 30 TABLET | Refills: 1 | Status: SHIPPED | OUTPATIENT
Start: 2020-12-24

## 2021-11-12 ENCOUNTER — OFFICE VISIT (OUTPATIENT)
Dept: CARDIOLOGY | Facility: CLINIC | Age: 56
End: 2021-11-12

## 2021-11-12 VITALS
DIASTOLIC BLOOD PRESSURE: 70 MMHG | HEART RATE: 72 BPM | HEIGHT: 72 IN | SYSTOLIC BLOOD PRESSURE: 128 MMHG | WEIGHT: 191 LBS | OXYGEN SATURATION: 96 % | BODY MASS INDEX: 25.87 KG/M2

## 2021-11-12 DIAGNOSIS — I10 ESSENTIAL HYPERTENSION: ICD-10-CM

## 2021-11-12 DIAGNOSIS — E78.2 MIXED HYPERLIPIDEMIA: Primary | ICD-10-CM

## 2021-11-12 DIAGNOSIS — I25.10 CORONARY ARTERY DISEASE INVOLVING NATIVE CORONARY ARTERY OF NATIVE HEART WITHOUT ANGINA PECTORIS: ICD-10-CM

## 2021-11-12 PROCEDURE — 93000 ELECTROCARDIOGRAM COMPLETE: CPT | Performed by: INTERNAL MEDICINE

## 2021-11-12 PROCEDURE — 99214 OFFICE O/P EST MOD 30 MIN: CPT | Performed by: INTERNAL MEDICINE

## 2021-11-12 RX ORDER — SITAGLIPTIN 100 MG/1
100 TABLET, FILM COATED ORAL DAILY
COMMUNITY
Start: 2021-09-21

## 2021-11-12 RX ORDER — FENOFIBRATE 160 MG/1
160 TABLET ORAL DAILY
COMMUNITY

## 2021-11-12 RX ORDER — ONDANSETRON 4 MG/1
4 TABLET, FILM COATED ORAL EVERY 8 HOURS PRN
COMMUNITY

## 2021-11-12 RX ORDER — NITROGLYCERIN 0.4 MG/1
TABLET SUBLINGUAL
Qty: 25 TABLET | Refills: 1 | Status: SHIPPED | OUTPATIENT
Start: 2021-11-12 | End: 2022-09-27 | Stop reason: SDUPTHER

## 2021-11-12 NOTE — PROGRESS NOTES
Referring Provider: Tacho Eng MD    Reason for Follow-up Visit: CASD    Subjective .   Chief Complaint:   Chief Complaint   Patient presents with   • Follow-up     yearly   • Coronary Artery Disease     pt states he is doing well with no complaints of symptoms   • Hypertension     pt states he checks at home and its usually good.   • Hyperlipidemia     labs obtained 11/2020 LDL unable to calculate.  pt is on Lipitor 40       History of present illness:  Iftikhar Francis is a 56 y.o. yo male with CAD, s/p PRANAV around 2014 in for routine follow up. He denies any chest pain or SOB.       History  Past Medical History:   Diagnosis Date   • Bilateral kidney stones    • Heart attack (HCC)    • Left ureteral stone    • Myasthenia gravis (HCC)    • Peptic ulceration    • Sleep apnea    ,   Past Surgical History:   Procedure Laterality Date   • CORONARY ANGIOPLASTY WITH STENT PLACEMENT     • HERNIA REPAIR Left     Inguinal Hernia   • SHOULDER SURGERY     • TUMOR REMOVAL     ,   Family History   Problem Relation Age of Onset   • Hypertension Father    • No Known Problems Mother    • No Known Problems Sister    • No Known Problems Brother    ,   Social History     Tobacco Use   • Smoking status: Never Smoker   • Smokeless tobacco: Never Used   Vaping Use   • Vaping Use: Never used   Substance Use Topics   • Alcohol use: Yes     Comment: occassionally   • Drug use: Never   ,     Medications  Current Outpatient Medications   Medication Sig Dispense Refill   • aspirin 81 MG EC tablet Take 81 mg by mouth daily.     • atorvastatin (LIPITOR) 40 MG tablet TAKE 1 TABLET BY MOUTH ONCE DAILY (Patient taking differently: Take 20 mg by mouth Daily.) 90 tablet 3   • busPIRone (BUSPAR) 15 MG tablet Take 15 mg by mouth 2 (Two) Times a Day.  5   • fenofibrate 160 MG tablet Take 160 mg by mouth Daily.     • Januvia 100 MG tablet Take 100 mg by mouth Daily.     • losartan (COZAAR) 50 MG tablet Take 1 tablet by mouth Daily. 90  "tablet 3   • metFORMIN (GLUCOPHAGE) 1000 MG tablet Take 1,000 mg by mouth 2 (Two) Times a Day With Meals.     • metoprolol succinate XL (TOPROL-XL) 25 MG 24 hr tablet Take 3 tablets by mouth Daily. 1/2 tablet qhs 135 tablet 3   • nitroglycerin (NITROSTAT) 0.4 MG SL tablet 1 under the tongue as needed for angina, may repeat q5mins for up three doses 25 tablet 1   • omeprazole (priLOSEC) 20 MG capsule Take 20 mg by mouth Daily.     • ondansetron (ZOFRAN) 4 MG tablet Take 4 mg by mouth Every 8 (Eight) Hours As Needed for Nausea or Vomiting.     • predniSONE (DELTASONE) 20 MG tablet Take 20 mg by mouth Daily.       No current facility-administered medications for this visit.       Allergies:  Avelox [moxifloxacin] and Levaquin [levofloxacin]    Review of Systems  Review of Systems   HENT: Negative for nosebleeds.    Cardiovascular: Negative for chest pain, claudication, dyspnea on exertion, leg swelling, near-syncope, orthopnea, palpitations, paroxysmal nocturnal dyspnea and syncope.   Respiratory: Negative for hemoptysis and shortness of breath.    Gastrointestinal: Negative for melena.   Genitourinary: Negative for hematuria.   All other systems reviewed and are negative.      Objective     Physical Exam:  /70   Pulse 72   Ht 182.9 cm (72\")   Wt 86.6 kg (191 lb)   SpO2 96%   BMI 25.90 kg/m²   Pulmonary:      Effort: Pulmonary effort is normal.      Breath sounds: Normal breath sounds.   Cardiovascular:      Normal rate. Regular rhythm.      Murmurs: There is no murmur.   Edema:     Peripheral edema absent.         Results Review:    ECG 12 Lead    Date/Time: 11/12/2021 10:06 AM  Performed by: Bakari Perkins MD  Authorized by: Bakari Perkins MD   Comparison: compared with previous ECG   Similar to previous ECG  Rhythm: sinus rhythm  Rate: normal  Conduction: conduction normal  ST Segments: ST segments normal  T Waves: T waves normal  QRS axis: normal    Clinical impression: normal ECG            Admission on " 01/02/2019, Discharged on 01/02/2019   Component Date Value Ref Range Status   • Glucose 01/02/2019 233* 70 - 100 mg/dL Final   • BUN 01/02/2019 19  5 - 21 mg/dL Final   • Creatinine 01/02/2019 1.04  0.50 - 1.40 mg/dL Final   • Sodium 01/02/2019 139  135 - 145 mmol/L Final   • Potassium 01/02/2019 4.2  3.5 - 5.3 mmol/L Final   • Chloride 01/02/2019 96* 98 - 110 mmol/L Final   • CO2 01/02/2019 29.0  24.0 - 31.0 mmol/L Final   • Calcium 01/02/2019 9.2  8.4 - 10.4 mg/dL Final   • Total Protein 01/02/2019 7.6  6.3 - 8.7 g/dL Final   • Albumin 01/02/2019 4.50  3.50 - 5.00 g/dL Final   • ALT (SGPT) 01/02/2019 141* 0 - 54 U/L Final   • AST (SGOT) 01/02/2019 124* 7 - 45 U/L Final   • Alkaline Phosphatase 01/02/2019 95  24 - 120 U/L Final   • Total Bilirubin 01/02/2019 0.8  0.1 - 1.0 mg/dL Final   • eGFR Non African Amer 01/02/2019 75  >60 mL/min/1.73 Final   • Globulin 01/02/2019 3.1  gm/dL Final   • A/G Ratio 01/02/2019 1.5  1.1 - 2.5 g/dL Final   • BUN/Creatinine Ratio 01/02/2019 18.3  7.0 - 25.0 Final   • Anion Gap 01/02/2019 14.0* 4.0 - 13.0 mmol/L Final   • Color, UA 01/02/2019 Dark Yellow* Yellow, Straw Final   • Appearance, UA 01/02/2019 Turbid* Clear Final   • pH, UA 01/02/2019 <=5.0  5.0 - 8.0 Final   • Specific Gravity, UA 01/02/2019 1.028  1.005 - 1.030 Final   • Glucose, UA 01/02/2019 250 mg/dL (1+)* Negative Final   • Ketones, UA 01/02/2019 Trace* Negative Final   • Bilirubin, UA 01/02/2019 Small (1+)* Negative Final   • Blood, UA 01/02/2019 Moderate (2+)* Negative Final   • Protein, UA 01/02/2019 30 mg/dL (1+)* Negative Final   • Leuk Esterase, UA 01/02/2019 Negative  Negative Final   • Nitrite, UA 01/02/2019 Negative  Negative Final   • Urobilinogen, UA 01/02/2019 1.0 E.U./dL  0.2 - 1.0 E.U./dL Final   • WBC 01/02/2019 16.30* 4.80 - 10.80 10*3/mm3 Final   • RBC 01/02/2019 4.76* 4.80 - 5.90 10*6/mm3 Final   • Hemoglobin 01/02/2019 14.4  14.0 - 18.0 g/dL Final   • Hematocrit 01/02/2019 41.8  40.0 - 52.0 % Final    • MCV 01/02/2019 87.8  82.0 - 95.0 fL Final   • MCH 01/02/2019 30.3  28.0 - 32.0 pg Final   • MCHC 01/02/2019 34.4  33.0 - 36.0 g/dL Final   • RDW 01/02/2019 14.7  12.0 - 15.0 % Final   • RDW-SD 01/02/2019 47.4  40.0 - 54.0 fl Final   • MPV 01/02/2019 9.2  6.0 - 12.0 fL Final   • Platelets 01/02/2019 382  130 - 400 10*3/mm3 Final   • Neutrophil % 01/02/2019 92.1* 39.0 - 78.0 % Final   • Lymphocyte % 01/02/2019 4.4* 15.0 - 45.0 % Final   • Monocyte % 01/02/2019 2.3* 4.0 - 12.0 % Final   • Eosinophil % 01/02/2019 0.1  0.0 - 4.0 % Final   • Basophil % 01/02/2019 0.2  0.0 - 2.0 % Final   • Immature Grans % 01/02/2019 0.9  0.0 - 5.0 % Final   • Neutrophils, Absolute 01/02/2019 15.00* 1.87 - 8.40 10*3/mm3 Final   • Lymphocytes, Absolute 01/02/2019 0.72  0.72 - 4.86 10*3/mm3 Final   • Monocytes, Absolute 01/02/2019 0.38  0.19 - 1.30 10*3/mm3 Final   • Eosinophils, Absolute 01/02/2019 0.02  0.00 - 0.70 10*3/mm3 Final   • Basophils, Absolute 01/02/2019 0.04  0.00 - 0.20 10*3/mm3 Final   • Immature Grans, Absolute 01/02/2019 0.14* 0.00 - 0.03 10*3/mm3 Final   • nRBC 01/02/2019 0.0  0.0 - 0.0 /100 WBC Final   • RBC, UA 01/02/2019 21-30* None Seen /HPF Final   • WBC, UA 01/02/2019 3-5* None Seen /HPF Final   • Bacteria, UA 01/02/2019 2+* None Seen /HPF Final   • Squamous Epithelial Cells, UA 01/02/2019 0-2  None Seen, 0-2 /HPF Final   • Methodology 01/02/2019 Manual Light Microscopy   Final   • Urine Culture 01/02/2019 No growth at 2 days   Final       Assessment/Plan   Problem List Items Addressed This Visit        Cardiac and Vasculature    Coronary artery disease involving native coronary artery of native heart without angina pectoris    Current Assessment & Plan     The patient denies chest pain, shortness of breath, dyspnea on exertion, orthopnea, PND, edema. There is no evidence of ongoing ischemia           Essential hypertension    Current Assessment & Plan     Adequate control         Mixed hyperlipidemia - Primary     Current Assessment & Plan     Triglycerides are very high and making measurement of LDL impossible. Followed by Dr. Eng.  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 His beta blocker may need to be changed to Cardizem also.         Relevant Medications    fenofibrate 160 MG tablet          Medical Complexity  must have 2 out of 3     Moderate Complexity Level 4           1 of the following medical problems:          []One chronic illness with mild exacerbation         [x]Two or more stable chronic illness          [x]One new problem  []One acute illness with systemic symptoms    Complexity of Data  Reviewed (1 out of the 3 following categories)      Category 1 tests, documents, historian (must have 3 points)       []Review of prior external records  []Review of results of unique tests  []Ordering unique tests  []Assessment requires an independent historian    Category 2 Interpretation of tests   []Independent interpretation of test read by another doc    Category 3 Discuss Management/tests  []Discussion with external physician    Risk of complications and/or morbidity          [x]Prescription Drug Management

## 2021-11-12 NOTE — ASSESSMENT & PLAN NOTE
Triglycerides are very high and making measurement of LDL impossible. Followed by Dr. Eng.  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 His beta blocker may need to be changed to Cardizem also.

## 2021-12-02 ENCOUNTER — TELEPHONE (OUTPATIENT)
Dept: CARDIOLOGY | Facility: CLINIC | Age: 56
End: 2021-12-02

## 2021-12-02 NOTE — TELEPHONE ENCOUNTER
PTS WIFE CALLED YESTERDAY  LEFT A  MSG ASKING IF WE GOT PTS LAST LAB FROM HIS PCP.  I CHECKED AND CALLED HER BACK TODAY AND LEFT A  MSG LETTING HER KNOW THE LAST LAB WE GOT WAS FROM 11/11/21 AND THE TRIGLYCERIDES WERE HIGH AND THIS WAS ADDRESSED AT HIS LAST OV ON 11/12.  I TOLD HER WE HAD NOT GOTTEN ANYTHING AFTER THAT DATE.  HIS PCP MANAGES HIS CHOLESTEROL.  I TOLD HER TO CALL ME BACK SO WE CAN DISCUSS. THIS.  Lamont Castellon, CMA

## 2021-12-08 ENCOUNTER — TELEPHONE (OUTPATIENT)
Dept: CARDIOLOGY | Facility: CLINIC | Age: 56
End: 2021-12-08

## 2021-12-08 NOTE — TELEPHONE ENCOUNTER
Pts wife called to let us know that pt had lab done and this was sent to us from his pcp.  She said you wanted to see them to adjust his medication.  Please review, these are posted under the lab tab.  Let me know what you want to do and I will call him.  Lamont Castellon, CMA

## 2021-12-09 DIAGNOSIS — E78.2 MIXED HYPERLIPIDEMIA: Primary | ICD-10-CM

## 2021-12-09 RX ORDER — DILTIAZEM HYDROCHLORIDE 180 MG/1
180 CAPSULE, COATED, EXTENDED RELEASE ORAL DAILY
Qty: 90 CAPSULE | Refills: 3 | Status: SHIPPED | OUTPATIENT
Start: 2021-12-09 | End: 2022-09-27

## 2021-12-09 NOTE — TELEPHONE ENCOUNTER
I stopped his beta blocker and started cardizem. Ordered a TSH and a repeat lipid profile    Message text from Dr. Perkins     I called Dr. Eng's office and spoke with Azul because we did not get the TSH.  All we got was the Lipid and CMP.  They will fax his last one that was done in May.  I called the pt and spoke with his wife Deborah and told her about the change in the medication.  She stated understanding.  I also told her we did not get the TSH so I have mailed her the order to take to local lab to get done.  She stated understanding.  Lamont Castellon, CMA

## 2022-01-05 ENCOUNTER — TELEPHONE (OUTPATIENT)
Dept: CARDIOLOGY | Facility: CLINIC | Age: 57
End: 2022-01-05

## 2022-01-05 NOTE — TELEPHONE ENCOUNTER
Pts wife called asking if we got the labs pt [place] [facility] [place] lipid and [place]SH.  I told her no but I can request them.  I sent a request to [facility] lab and medical records to obtain the labs.  Lamont Castellon, CMA

## 2022-01-06 RX ORDER — ICOSAPENT ETHYL 1000 MG/1
2 CAPSULE ORAL 2 TIMES DAILY WITH MEALS
Qty: 360 CAPSULE | Refills: 3 | Status: SHIPPED | OUTPATIENT
Start: 2022-01-06

## 2022-01-07 ENCOUNTER — TELEPHONE (OUTPATIENT)
Dept: CARDIOLOGY | Facility: CLINIC | Age: 57
End: 2022-01-07

## 2022-01-07 NOTE — TELEPHONE ENCOUNTER
I called Pt to inform of lab results for Lipid panel and that the trig were really high, spoke to his wife Deborah.  Told her the Vascepa has been sent to his pharmacy and that we will need to have labs done again in 3 months.  Pt will be having his labs done again in April by his PCP.  Told pts wife to make sure we get a copy so we can see if this medication is helping.  She stated understanding.  Lamont Castellon, CMA

## 2022-09-10 ENCOUNTER — HOSPITAL ENCOUNTER (INPATIENT)
Facility: HOSPITAL | Age: 57
LOS: 3 days | Discharge: HOME | End: 2022-09-13
Attending: EMERGENCY MEDICINE | Admitting: EMERGENCY MEDICINE

## 2022-09-10 DIAGNOSIS — I21.4 NSTEMI (NON-ST ELEVATED MYOCARDIAL INFARCTION): Primary | ICD-10-CM

## 2022-09-10 LAB
ANION GAP SERPL CALCULATED.3IONS-SCNC: 12 MMOL/L (ref 5–15)
APTT PPP: 192.4 SECONDS (ref 24.1–35)
APTT PPP: 33.6 SECONDS (ref 24.1–35)
BASOPHILS # BLD AUTO: 0.02 10*3/MM3 (ref 0–0.2)
BASOPHILS NFR BLD AUTO: 0.2 % (ref 0–1.5)
BUN SERPL-MCNC: 15 MG/DL (ref 6–20)
BUN/CREAT SERPL: 22.1 (ref 7–25)
CALCIUM SPEC-SCNC: 9.5 MG/DL (ref 8.6–10.5)
CHLORIDE SERPL-SCNC: 93 MMOL/L (ref 98–107)
CO2 SERPL-SCNC: 25 MMOL/L (ref 22–29)
CREAT SERPL-MCNC: 0.68 MG/DL (ref 0.76–1.27)
DEPRECATED RDW RBC AUTO: 47.4 FL (ref 37–54)
EGFRCR SERPLBLD CKD-EPI 2021: 109.1 ML/MIN/1.73
EOSINOPHIL # BLD AUTO: 0.02 10*3/MM3 (ref 0–0.4)
EOSINOPHIL NFR BLD AUTO: 0.2 % (ref 0.3–6.2)
ERYTHROCYTE [DISTWIDTH] IN BLOOD BY AUTOMATED COUNT: 14.7 % (ref 12.3–15.4)
GLUCOSE BLDC GLUCOMTR-MCNC: 242 MG/DL (ref 70–130)
GLUCOSE SERPL-MCNC: 223 MG/DL (ref 65–99)
HCT VFR BLD AUTO: 45.7 % (ref 37.5–51)
HGB BLD-MCNC: 15.3 G/DL (ref 13–17.7)
IMM GRANULOCYTES # BLD AUTO: 0.08 10*3/MM3 (ref 0–0.05)
IMM GRANULOCYTES NFR BLD AUTO: 0.7 % (ref 0–0.5)
INR PPP: 0.87 (ref 0.91–1.09)
LYMPHOCYTES # BLD AUTO: 0.67 10*3/MM3 (ref 0.7–3.1)
LYMPHOCYTES NFR BLD AUTO: 6.1 % (ref 19.6–45.3)
MCH RBC QN AUTO: 29.6 PG (ref 26.6–33)
MCHC RBC AUTO-ENTMCNC: 33.5 G/DL (ref 31.5–35.7)
MCV RBC AUTO: 88.4 FL (ref 79–97)
MONOCYTES # BLD AUTO: 0.28 10*3/MM3 (ref 0.1–0.9)
MONOCYTES NFR BLD AUTO: 2.6 % (ref 5–12)
NEUTROPHILS NFR BLD AUTO: 9.88 10*3/MM3 (ref 1.7–7)
NEUTROPHILS NFR BLD AUTO: 90.2 % (ref 42.7–76)
NRBC BLD AUTO-RTO: 0 /100 WBC (ref 0–0.2)
PLATELET # BLD AUTO: 362 10*3/MM3 (ref 140–450)
PMV BLD AUTO: 10.1 FL (ref 6–12)
POTASSIUM SERPL-SCNC: 5.6 MMOL/L (ref 3.5–5.2)
PROTHROMBIN TIME: 11.5 SECONDS (ref 11.9–14.6)
RBC # BLD AUTO: 5.17 10*6/MM3 (ref 4.14–5.8)
SODIUM SERPL-SCNC: 130 MMOL/L (ref 136–145)
TROPONIN T SERPL-MCNC: 0.23 NG/ML (ref 0–0.03)
WBC NRBC COR # BLD: 10.95 10*3/MM3 (ref 3.4–10.8)

## 2022-09-10 PROCEDURE — 25010000002 HEPARIN (PORCINE) PER 1000 UNITS: Performed by: EMERGENCY MEDICINE

## 2022-09-10 PROCEDURE — 84484 ASSAY OF TROPONIN QUANT: CPT | Performed by: EMERGENCY MEDICINE

## 2022-09-10 PROCEDURE — 80048 BASIC METABOLIC PNL TOTAL CA: CPT | Performed by: EMERGENCY MEDICINE

## 2022-09-10 PROCEDURE — 63710000001 DIPHENHYDRAMINE PER 50 MG: Performed by: EMERGENCY MEDICINE

## 2022-09-10 PROCEDURE — 63710000001 PREDNISONE PER 5 MG: Performed by: EMERGENCY MEDICINE

## 2022-09-10 PROCEDURE — 63710000001 INSULIN LISPRO (HUMAN) PER 5 UNITS: Performed by: EMERGENCY MEDICINE

## 2022-09-10 PROCEDURE — 85025 COMPLETE CBC W/AUTO DIFF WBC: CPT | Performed by: EMERGENCY MEDICINE

## 2022-09-10 PROCEDURE — 25010000002 HEPARIN (PORCINE) 25000-0.45 UT/250ML-% SOLUTION: Performed by: EMERGENCY MEDICINE

## 2022-09-10 PROCEDURE — 99223 1ST HOSP IP/OBS HIGH 75: CPT | Performed by: EMERGENCY MEDICINE

## 2022-09-10 PROCEDURE — 85730 THROMBOPLASTIN TIME PARTIAL: CPT | Performed by: EMERGENCY MEDICINE

## 2022-09-10 PROCEDURE — 82962 GLUCOSE BLOOD TEST: CPT

## 2022-09-10 PROCEDURE — 85610 PROTHROMBIN TIME: CPT | Performed by: EMERGENCY MEDICINE

## 2022-09-10 RX ORDER — MULTIPLE VITAMINS W/ MINERALS TAB 9MG-400MCG
1 TAB ORAL DAILY
COMMUNITY
End: 2022-09-27

## 2022-09-10 RX ORDER — PREDNISONE 10 MG/1
10 TABLET ORAL 2 TIMES DAILY WITH MEALS
Status: DISCONTINUED | OUTPATIENT
Start: 2022-09-10 | End: 2022-09-13 | Stop reason: HOSPADM

## 2022-09-10 RX ORDER — DILTIAZEM HYDROCHLORIDE 180 MG/1
180 CAPSULE, COATED, EXTENDED RELEASE ORAL DAILY
Status: DISCONTINUED | OUTPATIENT
Start: 2022-09-11 | End: 2022-09-13 | Stop reason: HOSPADM

## 2022-09-10 RX ORDER — NITROGLYCERIN 20 MG/100ML
10-50 INJECTION INTRAVENOUS
Status: DISCONTINUED | OUTPATIENT
Start: 2022-09-10 | End: 2022-09-11

## 2022-09-10 RX ORDER — SODIUM CHLORIDE 0.9 % (FLUSH) 0.9 %
10 SYRINGE (ML) INJECTION AS NEEDED
Status: DISCONTINUED | OUTPATIENT
Start: 2022-09-10 | End: 2022-09-13 | Stop reason: HOSPADM

## 2022-09-10 RX ORDER — DEXTROSE MONOHYDRATE 25 G/50ML
25 INJECTION, SOLUTION INTRAVENOUS
Status: DISCONTINUED | OUTPATIENT
Start: 2022-09-10 | End: 2022-09-13 | Stop reason: HOSPADM

## 2022-09-10 RX ORDER — PANTOPRAZOLE SODIUM 40 MG/1
40 TABLET, DELAYED RELEASE ORAL EVERY MORNING
Status: DISCONTINUED | OUTPATIENT
Start: 2022-09-11 | End: 2022-09-13 | Stop reason: HOSPADM

## 2022-09-10 RX ORDER — ACETAMINOPHEN 325 MG/1
650 TABLET ORAL EVERY 6 HOURS PRN
Status: DISCONTINUED | OUTPATIENT
Start: 2022-09-10 | End: 2022-09-13 | Stop reason: HOSPADM

## 2022-09-10 RX ORDER — HEPARIN SODIUM 10000 [USP'U]/100ML
11.8 INJECTION, SOLUTION INTRAVENOUS
Status: DISCONTINUED | OUTPATIENT
Start: 2022-09-10 | End: 2022-09-11

## 2022-09-10 RX ORDER — ATORVASTATIN CALCIUM 40 MG/1
40 TABLET, FILM COATED ORAL NIGHTLY
Status: DISCONTINUED | OUTPATIENT
Start: 2022-09-10 | End: 2022-09-13 | Stop reason: HOSPADM

## 2022-09-10 RX ORDER — NITROGLYCERIN 20 MG/100ML
INJECTION INTRAVENOUS
Status: COMPLETED
Start: 2022-09-10 | End: 2022-09-10

## 2022-09-10 RX ORDER — NITROGLYCERIN 0.4 MG/1
0.4 TABLET SUBLINGUAL
Status: DISCONTINUED | OUTPATIENT
Start: 2022-09-10 | End: 2022-09-13 | Stop reason: HOSPADM

## 2022-09-10 RX ORDER — DIPHENHYDRAMINE HCL 25 MG
25 CAPSULE ORAL NIGHTLY PRN
Status: DISCONTINUED | OUTPATIENT
Start: 2022-09-10 | End: 2022-09-13 | Stop reason: HOSPADM

## 2022-09-10 RX ORDER — HEPARIN SODIUM 1000 [USP'U]/ML
60 INJECTION, SOLUTION INTRAVENOUS; SUBCUTANEOUS AS NEEDED
Status: DISCONTINUED | OUTPATIENT
Start: 2022-09-10 | End: 2022-09-11

## 2022-09-10 RX ORDER — SODIUM CHLORIDE 0.9 % (FLUSH) 0.9 %
10 SYRINGE (ML) INJECTION EVERY 12 HOURS SCHEDULED
Status: DISCONTINUED | OUTPATIENT
Start: 2022-09-10 | End: 2022-09-13 | Stop reason: HOSPADM

## 2022-09-10 RX ORDER — LOSARTAN POTASSIUM 50 MG/1
50 TABLET ORAL DAILY
Status: DISCONTINUED | OUTPATIENT
Start: 2022-09-11 | End: 2022-09-13 | Stop reason: HOSPADM

## 2022-09-10 RX ORDER — ASPIRIN 81 MG/1
81 TABLET ORAL DAILY
Status: DISCONTINUED | OUTPATIENT
Start: 2022-09-11 | End: 2022-09-13 | Stop reason: HOSPADM

## 2022-09-10 RX ORDER — HEPARIN SODIUM 1000 [USP'U]/ML
4000 INJECTION, SOLUTION INTRAVENOUS; SUBCUTANEOUS ONCE
Status: COMPLETED | OUTPATIENT
Start: 2022-09-10 | End: 2022-09-10

## 2022-09-10 RX ORDER — INSULIN LISPRO 100 [IU]/ML
0-14 INJECTION, SOLUTION INTRAVENOUS; SUBCUTANEOUS
Status: DISCONTINUED | OUTPATIENT
Start: 2022-09-10 | End: 2022-09-13 | Stop reason: HOSPADM

## 2022-09-10 RX ORDER — NICOTINE POLACRILEX 4 MG
15 LOZENGE BUCCAL
Status: DISCONTINUED | OUTPATIENT
Start: 2022-09-10 | End: 2022-09-13 | Stop reason: HOSPADM

## 2022-09-10 RX ORDER — GLIMEPIRIDE 4 MG/1
4 TABLET ORAL
COMMUNITY
End: 2022-09-27

## 2022-09-10 RX ORDER — HEPARIN SODIUM 1000 [USP'U]/ML
30 INJECTION, SOLUTION INTRAVENOUS; SUBCUTANEOUS AS NEEDED
Status: DISCONTINUED | OUTPATIENT
Start: 2022-09-10 | End: 2022-09-11

## 2022-09-10 RX ADMIN — Medication 10 ML: at 21:02

## 2022-09-10 RX ADMIN — SODIUM ZIRCONIUM CYCLOSILICATE 10 G: 10 POWDER, FOR SUSPENSION ORAL at 18:52

## 2022-09-10 RX ADMIN — NITROGLYCERIN 5 MCG/MIN: 20 INJECTION INTRAVENOUS at 15:52

## 2022-09-10 RX ADMIN — HEPARIN SODIUM 11.8 UNITS/KG/HR: 10000 INJECTION, SOLUTION INTRAVENOUS at 17:39

## 2022-09-10 RX ADMIN — INSULIN LISPRO 5 UNITS: 100 INJECTION, SOLUTION INTRAVENOUS; SUBCUTANEOUS at 18:52

## 2022-09-10 RX ADMIN — HEPARIN SODIUM 4000 UNITS: 1000 INJECTION, SOLUTION INTRAVENOUS; SUBCUTANEOUS at 17:39

## 2022-09-10 RX ADMIN — ACETAMINOPHEN 650 MG: 325 TABLET ORAL at 21:02

## 2022-09-10 RX ADMIN — BUSPIRONE HYDROCHLORIDE 15 MG: 10 TABLET ORAL at 21:02

## 2022-09-10 RX ADMIN — SODIUM BICARBONATE 25 MEQ: 84 INJECTION INTRAVENOUS at 19:37

## 2022-09-10 RX ADMIN — DIPHENHYDRAMINE HYDROCHLORIDE 25 MG: 25 CAPSULE ORAL at 21:02

## 2022-09-10 RX ADMIN — PREDNISONE 10 MG: 10 TABLET ORAL at 21:03

## 2022-09-10 RX ADMIN — ATORVASTATIN CALCIUM 40 MG: 40 TABLET, FILM COATED ORAL at 21:03

## 2022-09-10 NOTE — H&P
[facility] - CARDIOLOGY  HISTORY AND PHYSICAL    Date of Admission: 9/10/2022  Primary Care Physician: Tacho Eng MD    Subjective     Chief Complaint: Chest pain    History of Present Illness     Patient is a 56-year-old male with a past medical history of coronary artery disease status post stent placement in the remote past, diabetes, hypertension, hyperlipidemia and myasthenia gravis who presented to [facility] from an outside facility for NSTEMI.    Patient states that he has been feeling poorly for the past week.  He has been getting periodic episodes of chest pressure and tightness.  He has also had progressively worsening numbness and tingling in his left arm and right arm.    Initial troponin was 146 (upper limit of normal is 60 at outside facility) with repeat this morning trending up to 439.  This afternoon, repeat trended up to 1037.  Another repeat trended up to 1563.  Echocardiogram obtained showed normal systolic function with ejection fraction 50%.  Small hypokinetic area in the inferior lateral wall noticed.  No significant valve disease.    Patient was evaluated by Dr. Gallo, cardiology, who recommended transfer to [facility] for further management.    Review of Systems   Constitutional: Negative for fatigue.   HENT: Negative for trouble swallowing.    Eyes: Negative for pain.   Respiratory: Positive for chest tightness. Negative for cough, shortness of breath and wheezing.    Cardiovascular: Negative for chest pain, palpitations and leg swelling.   Gastrointestinal: Negative for abdominal pain, nausea and vomiting.   Musculoskeletal: Negative for arthralgias.   Skin: Negative for color change.   Neurological: Positive for weakness and numbness.   Psychiatric/Behavioral: Negative for confusion.      Otherwise complete ROS reviewed and negative except as mentioned in the HPI.    Past Medical History:   Past Medical History:   Diagnosis Date   • Bilateral kidney stones    •  Heart attack (HCC)    • Left ureteral stone    • Myasthenia gravis (HCC)    • Peptic ulceration    • Sleep apnea        Past Surgical History:  Past Surgical History:   Procedure Laterality Date   • CORONARY ANGIOPLASTY WITH STENT PLACEMENT     • HERNIA REPAIR Left     Inguinal Hernia   • SHOULDER SURGERY     • TUMOR REMOVAL         Family History: family history includes Hypertension in his father; No Known Problems in his brother, mother, and sister.    Social History:  reports that he has never smoked. He has never used smokeless tobacco. He reports current alcohol use. He reports that he does not use drugs.    Medications:  Prior to Admission medications    Medication Sig Start Date End Date Taking? Authorizing Provider   aspirin 81 MG EC tablet Take 81 mg by mouth daily.   Yes Rodger Schmitz MD   atorvastatin (LIPITOR) 40 MG tablet TAKE 1 TABLET BY MOUTH ONCE DAILY 1/1/20  Yes Bakari Perkins MD   busPIRone (BUSPAR) 15 MG tablet Take 15 mg by mouth 2 (Two) Times a Day. 10/25/19  Yes Rodger Schmitz MD   Dapagliflozin Propanediol (FARXIGA PO) Take 5 mg by mouth Daily.   Yes ProviderRodger MD   dilTIAZem CD (Cardizem CD) 180 MG 24 hr capsule Take 1 capsule by mouth Daily. 12/9/21  Yes Bakari Perkins MD   fenofibrate 160 MG tablet Take 160 mg by mouth Daily.   Yes ProviderRodger MD   glimepiride (AMARYL) 4 MG tablet Take 4 mg by mouth Every Morning Before Breakfast.   Yes ProviderRodger MD   icosapent ethyl (VASCEPA) 1 g capsule capsule Take 2 g by mouth 2 (Two) Times a Day With Meals.  Patient taking differently: Take 1 g by mouth 2 (Two) Times a Day With Meals. 1/6/22  Yes Bakari Perkins MD   Januvia 100 MG tablet Take 100 mg by mouth Daily. 9/21/21  Yes ProviderRodger MD   losartan (COZAAR) 50 MG tablet Take 1 tablet by mouth Daily. 11/13/20  Yes Norma Yusuf APRN   metFORMIN (GLUCOPHAGE) 1000 MG tablet Take 1,000 mg by mouth 2 (Two) Times a Day With Meals.   Yes  "Rodger Schmitz MD   multivitamin with minerals (multivitamin, eye vitamin,) tablet tablet Take 1 tablet by mouth Daily.   Yes Rodger Schmitz MD   omeprazole (priLOSEC) 20 MG capsule Take 20 mg by mouth Daily.   Yes Rodger Schmitz MD   predniSONE (DELTASONE) 20 MG tablet Take 20 mg by mouth Daily.   Yes Rodger Schmitz MD   vitamin D3 125 MCG (5000 UT) capsule capsule Take 5,000 Units by mouth Daily.   Yes Rodger Schmitz MD   nitroglycerin (NITROSTAT) 0.4 MG SL tablet 1 under the tongue as needed for angina, may repeat q5mins for up three doses 11/12/21   Bakari Perkins MD   ondansetron (ZOFRAN) 4 MG tablet Take 4 mg by mouth Every 8 (Eight) Hours As Needed for Nausea or Vomiting.    Rodger Schmitz MD     Allergies:  Allergies   Allergen Reactions   • Avelox [Moxifloxacin] Other (See Comments)     Causes eye problems   • Levaquin [Levofloxacin] Other (See Comments)     Causes eye problems       Objective     Vital Signs: BP (!) 129/104   Pulse 102   Temp 98.5 °F (36.9 °C) (Oral)   Resp 14   Ht 182.9 cm (72\")   Wt 85 kg (187 lb 8 oz)   SpO2 95%   BMI 25.43 kg/m²     Vitals and nursing note reviewed.   Constitutional:       Appearance: Normal and healthy appearance. Well-developed and not in distress.   Eyes:      Extraocular Movements: Extraocular movements intact.      Pupils: Pupils are equal, round, and reactive to light.   HENT:      Head: Normocephalic and atraumatic.    Mouth/Throat:      Pharynx: Oropharynx is clear.   Neck:      Vascular: JVD normal.      Trachea: Trachea normal.   Pulmonary:      Effort: Pulmonary effort is normal.      Breath sounds: Normal breath sounds. No wheezing. No rhonchi. No rales.   Cardiovascular:      PMI at left midclavicular line. Tachycardia present. Regular rhythm. Normal S1. Normal S2.      Murmurs: There is no murmur.      No gallop. No rub.   Pulses:     Dorsalis pedis: 2+ bilaterally.     Posterior tibial: 2+ " bilaterally.  Abdominal:      General: Bowel sounds are normal.      Palpations: Abdomen is soft.      Tenderness: There is no abdominal tenderness.   Musculoskeletal: Normal range of motion.      Cervical back: Normal range of motion and neck supple. Skin:     General: Skin is warm and dry.      Capillary Refill: Capillary refill takes less than 2 seconds.   Feet:      Right foot:      Skin integrity: Skin integrity normal.      Left foot:      Skin integrity: Skin integrity normal.   Neurological:      Mental Status: Alert and oriented to person, place and time.      Cranial Nerves: Cranial nerves are intact.      Sensory: Sensation is intact.      Motor: Motor function is intact.      Coordination: Coordination is intact.   Psychiatric:         Speech: Speech normal.         Behavior: Behavior is cooperative.               Assessment / Plan        Active Hospital Problems    Diagnosis    • NSTEMI, initial episode of care (HCC)    • Myasthenia gravis ([facility], [facility])    • Coronary artery disease involving native coronary artery of native heart without angina pectoris    • S/P coronary artery stent placement    • Essential hypertension    • Mixed hyperlipidemia      Plan:    Continue on nitro drip  We will start heparin drip  Tentatively plan for diagnostic left heart catheterization in the morning to assess patient's coronary arteries    Modify cardiovascular risk factors and optimize medical regimen prior to discharge      Code Status: Full     I discussed the patients findings and my recommendations with: Patient and family    Estimated length of stay: 1 to 2 days    Tal Greenberg DO  Interventional cardiology  [facility]  09/10/22   16:31 CDT

## 2022-09-10 NOTE — PLAN OF CARE
Outcome Evaluation: Direct admit from [facility] with chest pain and upper extremity numbness.  Nitro drip infusing, currently rating chest pain 2/10.  Cardiology to evaluate.

## 2022-09-11 LAB
ANION GAP SERPL CALCULATED.3IONS-SCNC: 14 MMOL/L (ref 5–15)
APTT PPP: 39.3 SECONDS (ref 24.1–35)
APTT PPP: 82.4 SECONDS (ref 24.1–35)
BASOPHILS # BLD AUTO: 0.02 10*3/MM3 (ref 0–0.2)
BASOPHILS NFR BLD AUTO: 0.2 % (ref 0–1.5)
BUN SERPL-MCNC: 20 MG/DL (ref 6–20)
BUN/CREAT SERPL: 26 (ref 7–25)
CALCIUM SPEC-SCNC: 8.8 MG/DL (ref 8.6–10.5)
CHLORIDE SERPL-SCNC: 98 MMOL/L (ref 98–107)
CO2 SERPL-SCNC: 24 MMOL/L (ref 22–29)
CREAT SERPL-MCNC: 0.77 MG/DL (ref 0.76–1.27)
DEPRECATED RDW RBC AUTO: 47.1 FL (ref 37–54)
EGFRCR SERPLBLD CKD-EPI 2021: 105.1 ML/MIN/1.73
EOSINOPHIL # BLD AUTO: 0.1 10*3/MM3 (ref 0–0.4)
EOSINOPHIL NFR BLD AUTO: 1.2 % (ref 0.3–6.2)
ERYTHROCYTE [DISTWIDTH] IN BLOOD BY AUTOMATED COUNT: 14.7 % (ref 12.3–15.4)
GLUCOSE BLDC GLUCOMTR-MCNC: 244 MG/DL (ref 70–130)
GLUCOSE BLDC GLUCOMTR-MCNC: 245 MG/DL (ref 70–130)
GLUCOSE BLDC GLUCOMTR-MCNC: 253 MG/DL (ref 70–130)
GLUCOSE SERPL-MCNC: 218 MG/DL (ref 65–99)
HCT VFR BLD AUTO: 42.5 % (ref 37.5–51)
HGB BLD-MCNC: 14.7 G/DL (ref 13–17.7)
IMM GRANULOCYTES # BLD AUTO: 0.08 10*3/MM3 (ref 0–0.05)
IMM GRANULOCYTES NFR BLD AUTO: 0.9 % (ref 0–0.5)
LYMPHOCYTES # BLD AUTO: 1.56 10*3/MM3 (ref 0.7–3.1)
LYMPHOCYTES NFR BLD AUTO: 18.1 % (ref 19.6–45.3)
MCH RBC QN AUTO: 30.2 PG (ref 26.6–33)
MCHC RBC AUTO-ENTMCNC: 34.6 G/DL (ref 31.5–35.7)
MCV RBC AUTO: 87.4 FL (ref 79–97)
MONOCYTES # BLD AUTO: 0.73 10*3/MM3 (ref 0.1–0.9)
MONOCYTES NFR BLD AUTO: 8.5 % (ref 5–12)
NEUTROPHILS NFR BLD AUTO: 6.13 10*3/MM3 (ref 1.7–7)
NEUTROPHILS NFR BLD AUTO: 71.1 % (ref 42.7–76)
NRBC BLD AUTO-RTO: 0 /100 WBC (ref 0–0.2)
PLATELET # BLD AUTO: 342 10*3/MM3 (ref 140–450)
PMV BLD AUTO: 10.2 FL (ref 6–12)
POTASSIUM SERPL-SCNC: 4.1 MMOL/L (ref 3.5–5.2)
RBC # BLD AUTO: 4.86 10*6/MM3 (ref 4.14–5.8)
SODIUM SERPL-SCNC: 136 MMOL/L (ref 136–145)
TROPONIN T SERPL-MCNC: 0.11 NG/ML (ref 0–0.03)
WBC NRBC COR # BLD: 8.62 10*3/MM3 (ref 3.4–10.8)

## 2022-09-11 PROCEDURE — C9600 PERC DRUG-EL COR STENT SING: HCPCS | Performed by: EMERGENCY MEDICINE

## 2022-09-11 PROCEDURE — 99153 MOD SED SAME PHYS/QHP EA: CPT | Performed by: EMERGENCY MEDICINE

## 2022-09-11 PROCEDURE — C1874 STENT, COATED/COV W/DEL SYS: HCPCS | Performed by: EMERGENCY MEDICINE

## 2022-09-11 PROCEDURE — 93010 ELECTROCARDIOGRAM REPORT: CPT | Performed by: INTERNAL MEDICINE

## 2022-09-11 PROCEDURE — 93005 ELECTROCARDIOGRAM TRACING: CPT | Performed by: EMERGENCY MEDICINE

## 2022-09-11 PROCEDURE — C1725 CATH, TRANSLUMIN NON-LASER: HCPCS | Performed by: EMERGENCY MEDICINE

## 2022-09-11 PROCEDURE — 99152 MOD SED SAME PHYS/QHP 5/>YRS: CPT | Performed by: EMERGENCY MEDICINE

## 2022-09-11 PROCEDURE — 02713ZZ DILATION OF CORONARY ARTERY, TWO ARTERIES, PERCUTANEOUS APPROACH: ICD-10-PCS | Performed by: EMERGENCY MEDICINE

## 2022-09-11 PROCEDURE — 85730 THROMBOPLASTIN TIME PARTIAL: CPT | Performed by: EMERGENCY MEDICINE

## 2022-09-11 PROCEDURE — 25010000002 FENTANYL CITRATE (PF) 50 MCG/ML SOLUTION: Performed by: EMERGENCY MEDICINE

## 2022-09-11 PROCEDURE — 25010000002 DIPHENHYDRAMINE PER 50 MG: Performed by: EMERGENCY MEDICINE

## 2022-09-11 PROCEDURE — 0 IOPAMIDOL PER 1 ML: Performed by: EMERGENCY MEDICINE

## 2022-09-11 PROCEDURE — 63710000001 INSULIN LISPRO (HUMAN) PER 5 UNITS: Performed by: EMERGENCY MEDICINE

## 2022-09-11 PROCEDURE — 25010000002 EPTIFIBATIDE PER 5 MG: Performed by: EMERGENCY MEDICINE

## 2022-09-11 PROCEDURE — 25010000002 HEPARIN (PORCINE) PER 1000 UNITS: Performed by: EMERGENCY MEDICINE

## 2022-09-11 PROCEDURE — B2111ZZ FLUOROSCOPY OF MULTIPLE CORONARY ARTERIES USING LOW OSMOLAR CONTRAST: ICD-10-PCS | Performed by: EMERGENCY MEDICINE

## 2022-09-11 PROCEDURE — 25010000002 HEPARIN (PORCINE) 2000-0.9 UNIT/L-% SOLUTION: Performed by: EMERGENCY MEDICINE

## 2022-09-11 PROCEDURE — 92928 PRQ TCAT PLMT NTRAC ST 1 LES: CPT | Performed by: EMERGENCY MEDICINE

## 2022-09-11 PROCEDURE — 82962 GLUCOSE BLOOD TEST: CPT

## 2022-09-11 PROCEDURE — 93458 L HRT ARTERY/VENTRICLE ANGIO: CPT | Performed by: EMERGENCY MEDICINE

## 2022-09-11 PROCEDURE — 63710000001 PREDNISONE PER 5 MG: Performed by: EMERGENCY MEDICINE

## 2022-09-11 PROCEDURE — 25010000002 MIDAZOLAM HCL (PF) 5 MG/5ML SOLUTION: Performed by: EMERGENCY MEDICINE

## 2022-09-11 PROCEDURE — 85347 COAGULATION TIME ACTIVATED: CPT

## 2022-09-11 PROCEDURE — B2151ZZ FLUOROSCOPY OF LEFT HEART USING LOW OSMOLAR CONTRAST: ICD-10-PCS | Performed by: EMERGENCY MEDICINE

## 2022-09-11 PROCEDURE — 027136Z DILATION OF CORONARY ARTERY, TWO ARTERIES WITH THREE DRUG-ELUTING INTRALUMINAL DEVICES, PERCUTANEOUS APPROACH: ICD-10-PCS | Performed by: EMERGENCY MEDICINE

## 2022-09-11 PROCEDURE — 84484 ASSAY OF TROPONIN QUANT: CPT | Performed by: EMERGENCY MEDICINE

## 2022-09-11 PROCEDURE — 25010000002 HEPARIN (PORCINE) 1000-0.9 UT/500ML-% SOLUTION: Performed by: EMERGENCY MEDICINE

## 2022-09-11 PROCEDURE — C1887 CATHETER, GUIDING: HCPCS | Performed by: EMERGENCY MEDICINE

## 2022-09-11 PROCEDURE — C1769 GUIDE WIRE: HCPCS | Performed by: EMERGENCY MEDICINE

## 2022-09-11 PROCEDURE — 80048 BASIC METABOLIC PNL TOTAL CA: CPT | Performed by: EMERGENCY MEDICINE

## 2022-09-11 PROCEDURE — C1894 INTRO/SHEATH, NON-LASER: HCPCS | Performed by: EMERGENCY MEDICINE

## 2022-09-11 PROCEDURE — 4A023N7 MEASUREMENT OF CARDIAC SAMPLING AND PRESSURE, LEFT HEART, PERCUTANEOUS APPROACH: ICD-10-PCS | Performed by: EMERGENCY MEDICINE

## 2022-09-11 PROCEDURE — 63710000001 DIPHENHYDRAMINE PER 50 MG: Performed by: EMERGENCY MEDICINE

## 2022-09-11 PROCEDURE — 85025 COMPLETE CBC W/AUTO DIFF WBC: CPT | Performed by: EMERGENCY MEDICINE

## 2022-09-11 PROCEDURE — 25010000002 MORPHINE PER 10 MG: Performed by: EMERGENCY MEDICINE

## 2022-09-11 DEVICE — XIENCE SKYPOINT™ EVEROLIMUS ELUTING CORONARY STENT SYSTEM 3.00 MM X 18 MM / RAPID-EXCHANGE
Type: IMPLANTABLE DEVICE | Status: FUNCTIONAL
Brand: XIENCE SKYPOINT™

## 2022-09-11 DEVICE — XIENCE SKYPOINT™ EVEROLIMUS ELUTING CORONARY STENT SYSTEM 2.50 MM X 12 MM / RAPID-EXCHANGE
Type: IMPLANTABLE DEVICE | Status: FUNCTIONAL
Brand: XIENCE SKYPOINT™

## 2022-09-11 DEVICE — XIENCE SKYPOINT™ EVEROLIMUS ELUTING CORONARY STENT SYSTEM 2.75 MM X 15 MM / RAPID-EXCHANGE
Type: IMPLANTABLE DEVICE | Status: FUNCTIONAL
Brand: XIENCE SKYPOINT™

## 2022-09-11 RX ORDER — RANOLAZINE 500 MG/1
500 TABLET, EXTENDED RELEASE ORAL EVERY 12 HOURS SCHEDULED
Status: DISCONTINUED | OUTPATIENT
Start: 2022-09-11 | End: 2022-09-13 | Stop reason: HOSPADM

## 2022-09-11 RX ORDER — NITROGLYCERIN 20 MG/100ML
10-50 INJECTION INTRAVENOUS
Status: DISCONTINUED | OUTPATIENT
Start: 2022-09-11 | End: 2022-09-13 | Stop reason: HOSPADM

## 2022-09-11 RX ORDER — FENTANYL CITRATE 50 UG/ML
INJECTION, SOLUTION INTRAMUSCULAR; INTRAVENOUS AS NEEDED
Status: DISCONTINUED | OUTPATIENT
Start: 2022-09-11 | End: 2022-09-11 | Stop reason: HOSPADM

## 2022-09-11 RX ORDER — NALOXONE HCL 0.4 MG/ML
0.4 VIAL (ML) INJECTION
Status: DISCONTINUED | OUTPATIENT
Start: 2022-09-11 | End: 2022-09-13 | Stop reason: HOSPADM

## 2022-09-11 RX ORDER — ONDANSETRON 4 MG/1
4 TABLET, FILM COATED ORAL EVERY 6 HOURS PRN
Status: DISCONTINUED | OUTPATIENT
Start: 2022-09-11 | End: 2022-09-13 | Stop reason: HOSPADM

## 2022-09-11 RX ORDER — ISOSORBIDE MONONITRATE 60 MG/1
60 TABLET, EXTENDED RELEASE ORAL
Status: DISCONTINUED | OUTPATIENT
Start: 2022-09-11 | End: 2022-09-13 | Stop reason: HOSPADM

## 2022-09-11 RX ORDER — LIDOCAINE HYDROCHLORIDE 20 MG/ML
INJECTION, SOLUTION INFILTRATION; PERINEURAL AS NEEDED
Status: DISCONTINUED | OUTPATIENT
Start: 2022-09-11 | End: 2022-09-11 | Stop reason: HOSPADM

## 2022-09-11 RX ORDER — METOPROLOL TARTRATE 5 MG/5ML
5 INJECTION INTRAVENOUS EVERY 8 HOURS
Status: DISCONTINUED | OUTPATIENT
Start: 2022-09-11 | End: 2022-09-13 | Stop reason: HOSPADM

## 2022-09-11 RX ORDER — DIPHENHYDRAMINE HYDROCHLORIDE 50 MG/ML
INJECTION INTRAMUSCULAR; INTRAVENOUS AS NEEDED
Status: DISCONTINUED | OUTPATIENT
Start: 2022-09-11 | End: 2022-09-11 | Stop reason: HOSPADM

## 2022-09-11 RX ORDER — DILTIAZEM HYDROCHLORIDE 5 MG/ML
5 INJECTION INTRAVENOUS ONCE
Status: COMPLETED | OUTPATIENT
Start: 2022-09-11 | End: 2022-09-11

## 2022-09-11 RX ORDER — EPTIFIBATIDE 0.75 MG/ML
INJECTION, SOLUTION INTRAVENOUS CONTINUOUS PRN
Status: COMPLETED | OUTPATIENT
Start: 2022-09-11 | End: 2022-09-11

## 2022-09-11 RX ORDER — SODIUM CHLORIDE 9 MG/ML
1 INJECTION, SOLUTION INTRAVENOUS CONTINUOUS
Status: DISPENSED | OUTPATIENT
Start: 2022-09-11 | End: 2022-09-11

## 2022-09-11 RX ORDER — SODIUM CHLORIDE 9 MG/ML
100 INJECTION, SOLUTION INTRAVENOUS CONTINUOUS
Status: DISCONTINUED | OUTPATIENT
Start: 2022-09-11 | End: 2022-09-13 | Stop reason: HOSPADM

## 2022-09-11 RX ORDER — HEPARIN SODIUM 200 [USP'U]/100ML
INJECTION, SOLUTION INTRAVENOUS AS NEEDED
Status: DISCONTINUED | OUTPATIENT
Start: 2022-09-11 | End: 2022-09-11 | Stop reason: HOSPADM

## 2022-09-11 RX ORDER — ONDANSETRON 2 MG/ML
4 INJECTION INTRAMUSCULAR; INTRAVENOUS EVERY 6 HOURS PRN
Status: DISCONTINUED | OUTPATIENT
Start: 2022-09-11 | End: 2022-09-13 | Stop reason: HOSPADM

## 2022-09-11 RX ORDER — MIDAZOLAM HYDROCHLORIDE 1 MG/ML
INJECTION, SOLUTION INTRAMUSCULAR; INTRAVENOUS AS NEEDED
Status: DISCONTINUED | OUTPATIENT
Start: 2022-09-11 | End: 2022-09-11 | Stop reason: HOSPADM

## 2022-09-11 RX ORDER — ACETAMINOPHEN 325 MG/1
650 TABLET ORAL EVERY 4 HOURS PRN
Status: DISCONTINUED | OUTPATIENT
Start: 2022-09-11 | End: 2022-09-13 | Stop reason: HOSPADM

## 2022-09-11 RX ORDER — MORPHINE SULFATE 2 MG/ML
1 INJECTION, SOLUTION INTRAMUSCULAR; INTRAVENOUS EVERY 4 HOURS PRN
Status: DISPENSED | OUTPATIENT
Start: 2022-09-11 | End: 2022-09-12

## 2022-09-11 RX ORDER — HEPARIN SODIUM 1000 [USP'U]/ML
INJECTION, SOLUTION INTRAVENOUS; SUBCUTANEOUS AS NEEDED
Status: DISCONTINUED | OUTPATIENT
Start: 2022-09-11 | End: 2022-09-11 | Stop reason: HOSPADM

## 2022-09-11 RX ADMIN — HEPARIN SODIUM 5100 UNITS: 1000 INJECTION, SOLUTION INTRAVENOUS; SUBCUTANEOUS at 01:32

## 2022-09-11 RX ADMIN — PANTOPRAZOLE SODIUM 40 MG: 40 TABLET, DELAYED RELEASE ORAL at 07:38

## 2022-09-11 RX ADMIN — ASPIRIN 81 MG: 81 TABLET, COATED ORAL at 16:16

## 2022-09-11 RX ADMIN — MORPHINE SULFATE 1 MG: 2 INJECTION, SOLUTION INTRAMUSCULAR; INTRAVENOUS at 11:07

## 2022-09-11 RX ADMIN — INSULIN LISPRO 5 UNITS: 100 INJECTION, SOLUTION INTRAVENOUS; SUBCUTANEOUS at 11:55

## 2022-09-11 RX ADMIN — ACETAMINOPHEN 650 MG: 325 TABLET ORAL at 21:20

## 2022-09-11 RX ADMIN — INSULIN LISPRO 8 UNITS: 100 INJECTION, SOLUTION INTRAVENOUS; SUBCUTANEOUS at 16:57

## 2022-09-11 RX ADMIN — DILTIAZEM HYDROCHLORIDE 5 MG: 5 INJECTION INTRAVENOUS at 13:03

## 2022-09-11 RX ADMIN — Medication 10 ML: at 21:21

## 2022-09-11 RX ADMIN — ISOSORBIDE MONONITRATE 60 MG: 60 TABLET, EXTENDED RELEASE ORAL at 12:40

## 2022-09-11 RX ADMIN — BUSPIRONE HYDROCHLORIDE 15 MG: 10 TABLET ORAL at 21:20

## 2022-09-11 RX ADMIN — METOPROLOL TARTRATE 5 MG: 5 INJECTION INTRAVENOUS at 21:21

## 2022-09-11 RX ADMIN — SODIUM CHLORIDE 100 ML/HR: 9 INJECTION, SOLUTION INTRAVENOUS at 23:22

## 2022-09-11 RX ADMIN — BUSPIRONE HYDROCHLORIDE 15 MG: 10 TABLET ORAL at 16:16

## 2022-09-11 RX ADMIN — DILTIAZEM HYDROCHLORIDE 180 MG: 180 CAPSULE, COATED, EXTENDED RELEASE ORAL at 10:48

## 2022-09-11 RX ADMIN — TICAGRELOR 90 MG: 90 TABLET ORAL at 21:21

## 2022-09-11 RX ADMIN — ACETAMINOPHEN 650 MG: 325 TABLET ORAL at 11:46

## 2022-09-11 RX ADMIN — DIPHENHYDRAMINE HYDROCHLORIDE 25 MG: 25 CAPSULE ORAL at 21:21

## 2022-09-11 RX ADMIN — PREDNISONE 10 MG: 10 TABLET ORAL at 07:38

## 2022-09-11 RX ADMIN — LOSARTAN POTASSIUM 50 MG: 50 TABLET, FILM COATED ORAL at 10:48

## 2022-09-11 RX ADMIN — ATORVASTATIN CALCIUM 40 MG: 40 TABLET, FILM COATED ORAL at 21:21

## 2022-09-11 RX ADMIN — RANOLAZINE 500 MG: 500 TABLET, FILM COATED, EXTENDED RELEASE ORAL at 12:40

## 2022-09-11 RX ADMIN — PREDNISONE 10 MG: 10 TABLET ORAL at 17:17

## 2022-09-11 RX ADMIN — INSULIN LISPRO 5 UNITS: 100 INJECTION, SOLUTION INTRAVENOUS; SUBCUTANEOUS at 07:39

## 2022-09-11 RX ADMIN — RANOLAZINE 500 MG: 500 TABLET, FILM COATED, EXTENDED RELEASE ORAL at 21:20

## 2022-09-11 RX ADMIN — SODIUM CHLORIDE 100 ML/HR: 9 INJECTION, SOLUTION INTRAVENOUS at 11:47

## 2022-09-11 NOTE — OP NOTE
"  [facility]  Date of procedure: 9/11/2022     Procedures performed:     1.  Access to the right radial artery via modified Seldinger technique  2.  Left heart catheterization with retrograde crossing the aortic valve to the left ventricle  3.  Selective bilateral coronary angiography  4.  Successful PTCA to the left circumflex with a trek Rx 2.5 x 12 mm balloon times multiple inflations  5.  Successful PTCA to the obtuse marginal branch with a trek mini Rx 2 x 12 mm balloon  6.  Successful PCI to the distal left circumflex with a Xience lily point 2.5 x 12 mm stent deployed at 12 prasanth for 45 seconds  7.  Successful PCI to the mid left circumflex with a Xience lily point 2.75 x 15 mm stent deployed at 14 prasanth for 45 seconds  8.  Successful PTCA to the mid LAD with a trek Rx 2.5 x 12 mm balloon times multiple inflations  9.  Successful PCI to the mid LAD with a Xience lily point 3 x 18 mm drug-eluting stent deployed at 13 prasanth for 45 seconds  10.  Conscious sedation with continuous hemodynamic monitoring for 1 hour and 5 minutes  11.  Patent hemostasis achieved in the right radial artery access site using a TR band applied to the right wrist once the sheath was pulled    Risk, Benefits, and Alternatives discussed with the patient and/or family.  Plan is for moderate sedation, and the patient agrees to proceed with the procedure.  An immediate assessment was done prior to the administration of moderate sedation        Indication: NSTEMI  Premedication: Versed, Fentanyl  Contrast: Isovue 260 cc   radiation: Flouro time= 11 minutes. Air Kerma= 1210 mGy  Catheters: 5F TIG, pigtail  Guiding catheter: XB 3.5  PCI Hardware: .014\" BMW wire, 014 whisper extra-support wire, trek Rx 2.5 x 12 mm balloon, trek mini Rx 2 x 12 mm balloon, Xience lily point 2.5 x 12 mm drug-eluting stent, Xience lily point 2.75 x 15 mm drug-eluting stent, Xience lily point 3 x 18 mm drug-eluting stent    Procedural details:  The " patient was brought to the cath lab and prepped and draped in the usual fashion.  Access was obtained in the right radial artery via modified Seldinger technique.  A 6 Syriac sheath was placed into the artery and flushed.  Next, a TIG catheter was inserted and used to engage the left main and selective left coronary angiography was performed in multiple views.  This catheter was then used to engage the right coronary artery and selective right coronary angiography was performed in multiple views.  Next, pigtail catheter was inserted and used to cross the aortic valve to the left ventricle where pressures were recorded.  LV ventriculography was then performed.  Next, this catheter was exchanged for an XB 3.5 catheter which was used to engage the left main.  A BMW wire was inserted and advanced into the distal left circumflex.  A trek Rx 2.5 x 12 mm balloon was inserted into the distal vessel where it was inflated multiple times.  Due to plaque shift, we had loss of flow down one of the obtuse marginal branches.  A whisper extra-support wire was inserted and advanced into this obtuse marginal branch.  A trek mini Rx 2 x 12 mm balloon was inserted into the obtuse marginal where it was inflated.  Next, a Xience lily point 2.5 x 12 mm drug-eluting stent was deployed at 12 prasanth for 45 seconds in the distal left circumflex.  Post intervention angiography was performed.  Next, a Xience lily point 2.75 x 15 mm drug-eluting stent was inserted into the mid left circumflex at 14 prasanth for 45 seconds.  Post intervention angiography was performed.  Next, both of these wires were withdrawn.  The BMW wire was inserted into the distal LAD.  The trek Rx 2.5 x 12 mm balloon was inserted into the mid LAD where it was inflated multiple times.  Next, a Xience lily point 3 x 18 mm drug-eluting stent was inserted into the mid LAD where was deployed at 13 prasanth for 45 seconds.  Post intervention angiography was performed in multiple views.   Everything was then withdrawn through the sheath and the sheath was flushed.  Patent hemostasis was achieved in the right radial artery access site using a TR band applied to the right wrist once the sheath was pulled.  Patient tolerated the procedure well without any complications.  He left the Cath Lab in stable condition.        I supervised the administration of conscious sedation by nursing staff throughout the case.  First dose was given at 0926 and the end of my face-to-face encounter was at 1031, accounting for a total of 1 hour and 5 minutes of supervision.  During the case, continuous pulse oximetry, heart rate, blood pressure, and patient status were monitored.     Findings:    Hemodynamics:   Aortic: 125/84 mmHg  LV: 161/1 mmHg  LVEDP: 18 mmHg      Left ventriculography:  1. The contractility of the left ventricle is normal.  Estimated ejection fraction 56-60%.  2. The left ventricle is normal in wall thickness and chamber size.  3. There is no significant mitral regurgitation or aortic insufficiency.    Selective coronary angiography:   Left main: Left main is a large-caliber vessel that bifurcates into the LAD and left circumflex coronary arteries, no angiographic evidence of stenosis, PATI-3 flow    LAD: The LAD is a large-caliber vessel that wraps around to the apex, there is approximately 80 to 90% stenosis in the mid LAD, the remainder of the vessel has minor luminal irregularities, PATI-3 flow..  Status post successful PTCA and PCI we had continuation of PATI-3 flow and no residual stenosis remaining.    Diagonals: The first diagonal is a moderate caliber vessel with 20 to 30% stenosis, the remaining diagonals are small caliber    Left circumflex: Left circumflex is a large-caliber vessel that has 70 to 80% stenosis in the mid vessel, 90% stenosis in the distal vessel, PATI-3 flow.  Status post successful PTCA and PCI of both of these lesions we had continuation of PATI-3 flow and no residual  stenosis remaining    Obtuse marginals: The first and second obtuse marginal are small caliber vessels the third obtuse marginal is moderate caliber, the fourth obtuse marginal is moderate caliber.  This vessel underwent PTCA with continuation of PATI-3 flow and no residual stenosis remaining    RCA: Right coronary artery is a large-caliber vessel, there is a patent stent in the proximal/mid segment with approximately 30 to 40% stenosis prior to the stent, PATI-3 flow.    PDA/MATTHIEU: Both of these are moderate caliber with no significant disease      Estimated Blood Loss: Minimal    Specimens: None    Complications: None     Impression:  1.  Coronary artery disease as described above including hemodynamically significant lesions in both the left circumflex and LAD status post successful PCI with continuation of PATI-3 flow and no residual stenosis remaining  2.  Diabetes  3.  Hypertension  4.  Hyperlipidemia  5.  Myasthenia gravis     Plan:   1.  TR band off in 2 hours, monitor overnight in the ICU with plans for transfer to the floor tomorrow and home within the next 48 hours  2.  Continue aspirin 81mg daily indefinitely, Brilinta for a minimum of 1 year.  3.  High intensity statin with Lipitor 40  4.  Continue on Cardizem 180  5.  Started on Ranexa and Imdur therapies, wean nitro drip as tolerated  6.  Follow-up with cardiology on discharge  7.  Referral for cardiac rehab    Tal Greenberg DO  Interventional cardiology  [facility]  September 11, 2022

## 2022-09-12 ENCOUNTER — APPOINTMENT (OUTPATIENT)
Dept: CARDIOLOGY | Facility: HOSPITAL | Age: 57
End: 2022-09-12

## 2022-09-12 DIAGNOSIS — Z95.5 S/P DRUG ELUTING CORONARY STENT PLACEMENT: ICD-10-CM

## 2022-09-12 DIAGNOSIS — I21.4 NSTEMI (NON-ST ELEVATED MYOCARDIAL INFARCTION): Primary | ICD-10-CM

## 2022-09-12 LAB
ACT BLD: 132 SECONDS (ref 82–152)
ACT BLD: 242 SECONDS (ref 82–152)
ACT BLD: 312 SECONDS (ref 82–152)
ANION GAP SERPL CALCULATED.3IONS-SCNC: 10 MMOL/L (ref 5–15)
BH CV ECHO LEFT VENTRICLE GLOBAL LONGITUDINAL STRAIN: -13 %
BH CV ECHO MEAS - AO MAX PG: 6.8 MMHG
BH CV ECHO MEAS - AO MEAN PG: 3 MMHG
BH CV ECHO MEAS - AO ROOT DIAM: 3.2 CM
BH CV ECHO MEAS - AO V2 MAX: 130 CM/SEC
BH CV ECHO MEAS - AO V2 VTI: 21.4 CM
BH CV ECHO MEAS - AVA(I,D): 3.1 CM2
BH CV ECHO MEAS - EDV(CUBED): 122 ML
BH CV ECHO MEAS - EDV(MOD-SP4): 86.3 ML
BH CV ECHO MEAS - EF(MOD-SP4): 57.2 %
BH CV ECHO MEAS - ESV(CUBED): 30.4 ML
BH CV ECHO MEAS - ESV(MOD-SP4): 36.9 ML
BH CV ECHO MEAS - FS: 37.1 %
BH CV ECHO MEAS - IVS/LVPW: 1.17 CM
BH CV ECHO MEAS - IVSD: 1.09 CM
BH CV ECHO MEAS - LA DIMENSION: 3.6 CM
BH CV ECHO MEAS - LAT PEAK E' VEL: 7.6 CM/SEC
BH CV ECHO MEAS - LV DIASTOLIC VOL/BSA (35-75): 41.3 CM2
BH CV ECHO MEAS - LV MASS(C)D: 181.8 GRAMS
BH CV ECHO MEAS - LV MAX PG: 4.7 MMHG
BH CV ECHO MEAS - LV MEAN PG: 3 MMHG
BH CV ECHO MEAS - LV SYSTOLIC VOL/BSA (12-30): 17.7 CM2
BH CV ECHO MEAS - LV V1 MAX: 108 CM/SEC
BH CV ECHO MEAS - LV V1 VTI: 21.3 CM
BH CV ECHO MEAS - LVIDD: 5 CM
BH CV ECHO MEAS - LVIDS: 3.1 CM
BH CV ECHO MEAS - LVOT AREA: 3.1 CM2
BH CV ECHO MEAS - LVOT DIAM: 2 CM
BH CV ECHO MEAS - LVPWD: 0.93 CM
BH CV ECHO MEAS - MED PEAK E' VEL: 5.4 CM/SEC
BH CV ECHO MEAS - MV A MAX VEL: 104 CM/SEC
BH CV ECHO MEAS - MV DEC TIME: 0.2 MSEC
BH CV ECHO MEAS - MV E MAX VEL: 97 CM/SEC
BH CV ECHO MEAS - MV E/A: 0.93
BH CV ECHO MEAS - PA V2 MAX: 84.9 CM/SEC
BH CV ECHO MEAS - RAP SYSTOLE: 5 MMHG
BH CV ECHO MEAS - RVSP: 22 MMHG
BH CV ECHO MEAS - SI(MOD-SP4): 23.6 ML/M2
BH CV ECHO MEAS - SV(LVOT): 66.9 ML
BH CV ECHO MEAS - SV(MOD-SP4): 49.4 ML
BH CV ECHO MEAS - TR MAX PG: 17 MMHG
BH CV ECHO MEAS - TR MAX VEL: 206 CM/SEC
BH CV ECHO MEASUREMENTS AVERAGE E/E' RATIO: 14.92
BUN SERPL-MCNC: 20 MG/DL (ref 6–20)
BUN/CREAT SERPL: 26.7 (ref 7–25)
CALCIUM SPEC-SCNC: 8.5 MG/DL (ref 8.6–10.5)
CHLORIDE SERPL-SCNC: 101 MMOL/L (ref 98–107)
CO2 SERPL-SCNC: 25 MMOL/L (ref 22–29)
CREAT SERPL-MCNC: 0.75 MG/DL (ref 0.76–1.27)
DEPRECATED RDW RBC AUTO: 49 FL (ref 37–54)
EGFRCR SERPLBLD CKD-EPI 2021: 105.9 ML/MIN/1.73
ERYTHROCYTE [DISTWIDTH] IN BLOOD BY AUTOMATED COUNT: 15.1 % (ref 12.3–15.4)
GLUCOSE BLDC GLUCOMTR-MCNC: 219 MG/DL (ref 70–130)
GLUCOSE BLDC GLUCOMTR-MCNC: 302 MG/DL (ref 70–130)
GLUCOSE SERPL-MCNC: 225 MG/DL (ref 65–99)
HCT VFR BLD AUTO: 40 % (ref 37.5–51)
HGB BLD-MCNC: 13.1 G/DL (ref 13–17.7)
LEFT ATRIUM VOLUME INDEX: 22.6 ML/M2
LEFT ATRIUM VOLUME: 47.2 ML
MAXIMAL PREDICTED HEART RATE: 164 BPM
MCH RBC QN AUTO: 29.4 PG (ref 26.6–33)
MCHC RBC AUTO-ENTMCNC: 32.8 G/DL (ref 31.5–35.7)
MCV RBC AUTO: 89.7 FL (ref 79–97)
PLATELET # BLD AUTO: 329 10*3/MM3 (ref 140–450)
PMV BLD AUTO: 10.5 FL (ref 6–12)
POTASSIUM SERPL-SCNC: 3.9 MMOL/L (ref 3.5–5.2)
QT INTERVAL: 354 MS
QT INTERVAL: 354 MS
QT INTERVAL: 404 MS
QTC INTERVAL: 474 MS
QTC INTERVAL: 474 MS
QTC INTERVAL: 479 MS
RBC # BLD AUTO: 4.46 10*6/MM3 (ref 4.14–5.8)
SODIUM SERPL-SCNC: 136 MMOL/L (ref 136–145)
STRESS TARGET HR: 139 BPM
WBC NRBC COR # BLD: 10.07 10*3/MM3 (ref 3.4–10.8)

## 2022-09-12 PROCEDURE — 63710000001 PREDNISONE PER 5 MG: Performed by: EMERGENCY MEDICINE

## 2022-09-12 PROCEDURE — 80048 BASIC METABOLIC PNL TOTAL CA: CPT | Performed by: EMERGENCY MEDICINE

## 2022-09-12 PROCEDURE — 93306 TTE W/DOPPLER COMPLETE: CPT

## 2022-09-12 PROCEDURE — 93306 TTE W/DOPPLER COMPLETE: CPT | Performed by: INTERNAL MEDICINE

## 2022-09-12 PROCEDURE — 82962 GLUCOSE BLOOD TEST: CPT

## 2022-09-12 PROCEDURE — 93005 ELECTROCARDIOGRAM TRACING: CPT | Performed by: EMERGENCY MEDICINE

## 2022-09-12 PROCEDURE — 63710000001 INSULIN LISPRO (HUMAN) PER 5 UNITS: Performed by: EMERGENCY MEDICINE

## 2022-09-12 PROCEDURE — 93356 MYOCRD STRAIN IMG SPCKL TRCK: CPT | Performed by: INTERNAL MEDICINE

## 2022-09-12 PROCEDURE — 85027 COMPLETE CBC AUTOMATED: CPT | Performed by: EMERGENCY MEDICINE

## 2022-09-12 PROCEDURE — 99233 SBSQ HOSP IP/OBS HIGH 50: CPT | Performed by: INTERNAL MEDICINE

## 2022-09-12 PROCEDURE — 93356 MYOCRD STRAIN IMG SPCKL TRCK: CPT

## 2022-09-12 RX ADMIN — ATORVASTATIN CALCIUM 40 MG: 40 TABLET, FILM COATED ORAL at 21:03

## 2022-09-12 RX ADMIN — ASPIRIN 81 MG: 81 TABLET, COATED ORAL at 08:11

## 2022-09-12 RX ADMIN — INSULIN LISPRO 10 UNITS: 100 INJECTION, SOLUTION INTRAVENOUS; SUBCUTANEOUS at 18:33

## 2022-09-12 RX ADMIN — ACETAMINOPHEN 650 MG: 325 TABLET ORAL at 14:23

## 2022-09-12 RX ADMIN — ACETAMINOPHEN 650 MG: 325 TABLET ORAL at 04:18

## 2022-09-12 RX ADMIN — BUSPIRONE HYDROCHLORIDE 15 MG: 10 TABLET ORAL at 21:03

## 2022-09-12 RX ADMIN — PANTOPRAZOLE SODIUM 40 MG: 40 TABLET, DELAYED RELEASE ORAL at 06:23

## 2022-09-12 RX ADMIN — PREDNISONE 10 MG: 10 TABLET ORAL at 08:13

## 2022-09-12 RX ADMIN — ISOSORBIDE MONONITRATE 60 MG: 60 TABLET, EXTENDED RELEASE ORAL at 08:11

## 2022-09-12 RX ADMIN — DILTIAZEM HYDROCHLORIDE 180 MG: 180 CAPSULE, COATED, EXTENDED RELEASE ORAL at 08:11

## 2022-09-12 RX ADMIN — RANOLAZINE 500 MG: 500 TABLET, FILM COATED, EXTENDED RELEASE ORAL at 22:36

## 2022-09-12 RX ADMIN — TICAGRELOR 90 MG: 90 TABLET ORAL at 21:03

## 2022-09-12 RX ADMIN — RANOLAZINE 500 MG: 500 TABLET, FILM COATED, EXTENDED RELEASE ORAL at 08:11

## 2022-09-12 RX ADMIN — Medication 10 ML: at 22:36

## 2022-09-12 RX ADMIN — SODIUM CHLORIDE 100 ML/HR: 9 INJECTION, SOLUTION INTRAVENOUS at 09:45

## 2022-09-12 RX ADMIN — BUSPIRONE HYDROCHLORIDE 15 MG: 10 TABLET ORAL at 08:11

## 2022-09-12 RX ADMIN — TICAGRELOR 90 MG: 90 TABLET ORAL at 08:11

## 2022-09-12 RX ADMIN — PREDNISONE 10 MG: 10 TABLET ORAL at 18:34

## 2022-09-12 RX ADMIN — INSULIN LISPRO 5 UNITS: 100 INJECTION, SOLUTION INTRAVENOUS; SUBCUTANEOUS at 07:59

## 2022-09-12 RX ADMIN — Medication 10 ML: at 08:11

## 2022-09-12 RX ADMIN — METOPROLOL TARTRATE 5 MG: 5 INJECTION INTRAVENOUS at 22:36

## 2022-09-12 RX ADMIN — METOPROLOL TARTRATE 5 MG: 5 INJECTION INTRAVENOUS at 06:23

## 2022-09-12 RX ADMIN — LOSARTAN POTASSIUM 50 MG: 50 TABLET, FILM COATED ORAL at 08:11

## 2022-09-12 NOTE — PLAN OF CARE
Goal Outcome Evaluation:  Plan of Care Reviewed With: patient        Progress: no change   No c/o chest pain or SOA. Sat in chair most of the day. Right radial puncture site CDI pulses palp. Sinus 80-92 per tele. Will continue to monitor.

## 2022-09-12 NOTE — PROGRESS NOTES
LOS: 2 days   Patient Care Team:  Tacho Eng MD as PCP - General  Tacho Eng MD as PCP - Family Medicine    Chief Complaint: Chest pain with elevated troponin.     Subjective    Iftikhar Francis is a 56 y.o. male who is being seen  Overnight feels well  No chest pain  No palpitation  No presyncope  No syncope  No orthopnea  No paroxysmal nocturnal dyspnea  Hemodynamically stable  Labs reviewed  No new issues or events  Tolerating current medications well  Satisfactory bowel and bladder activity  Satisfactory oral intake  Resting well   No pain swelling redness or discharge in the right radial arteriotomy site    Telemetry: no malignant arrhythmia. No significant pauses.    Review of Systems   Constitutional: No chills   Has fatigue   No fever.   HENT: Negative.    Eyes: Negative.    Respiratory: Negative for cough,   No chest wall soreness,   Shortness of breath,   no wheezing, no stridor.    Cardiovascular: As above  Gastrointestinal: Negative for abdominal distention,  No abdominal pain,   No blood in stool,   No constipation,   No diarrhea,   No nausea   No vomiting.   Endocrine: Negative.    Genitourinary: Negative for difficulty urinating, dysuria, flank pain and hematuria.   Musculoskeletal: Negative.    Skin: Negative for rash and wound.   Allergic/Immunologic: Negative.    Neurological: Negative for dizziness, syncope, weakness,   No light-headedness  No  headaches.   Hematological: Does not bruise/bleed easily.   Psychiatric/Behavioral: Negative for agitation or behavioral problems,   No confusion,   the patient is  nervous/anxious.       History:   Past Medical History:   Diagnosis Date   • Bilateral kidney stones    • Heart attack (HCC)    • Left ureteral stone    • Myasthenia gravis (HCC)    • Peptic ulceration    • Sleep apnea      Past Surgical History:   Procedure Laterality Date   • CORONARY ANGIOPLASTY WITH STENT PLACEMENT     • HERNIA REPAIR Left     Inguinal Hernia   •  SHOULDER SURGERY     • TUMOR REMOVAL       Social History     Socioeconomic History   • Marital status:    Tobacco Use   • Smoking status: Never Smoker   • Smokeless tobacco: Never Used   Vaping Use   • Vaping Use: Never used   Substance and Sexual Activity   • Alcohol use: Yes     Comment: occassionally   • Drug use: Never   • Sexual activity: Defer     Family History   Problem Relation Age of Onset   • Hypertension Father    • No Known Problems Mother    • No Known Problems Sister    • No Known Problems Brother        Labs:  WBC WBC   Date Value Ref Range Status   09/12/2022 10.07 3.40 - 10.80 10*3/mm3 Final   09/11/2022 8.62 3.40 - 10.80 10*3/mm3 Final   09/10/2022 10.95 (H) 3.40 - 10.80 10*3/mm3 Final      HGB Hemoglobin   Date Value Ref Range Status   09/12/2022 13.1 13.0 - 17.7 g/dL Final   09/11/2022 14.7 13.0 - 17.7 g/dL Final   09/10/2022 15.3 13.0 - 17.7 g/dL Final      HCT Hematocrit   Date Value Ref Range Status   09/12/2022 40.0 37.5 - 51.0 % Final   09/11/2022 42.5 37.5 - 51.0 % Final   09/10/2022 45.7 37.5 - 51.0 % Final      Platelets Platelets   Date Value Ref Range Status   09/12/2022 329 140 - 450 10*3/mm3 Final   09/11/2022 342 140 - 450 10*3/mm3 Final   09/10/2022 362 140 - 450 10*3/mm3 Final      MCV MCV   Date Value Ref Range Status   09/12/2022 89.7 79.0 - 97.0 fL Final   09/11/2022 87.4 79.0 - 97.0 fL Final   09/10/2022 88.4 79.0 - 97.0 fL Final        Results from last 7 days   Lab Units 09/11/22  0523 09/10/22  1655   SODIUM mmol/L 136 130*   POTASSIUM mmol/L 4.1 5.6*   CHLORIDE mmol/L 98 93*   CO2 mmol/L 24.0 25.0   BUN mg/dL 20 15   CREATININE mg/dL 0.77 0.68*   CALCIUM mg/dL 8.8 9.5   GLUCOSE mg/dL 218* 223*     Lab Results   Component Value Date    CKTOTAL 152 06/10/2019    CKMB 111.00 (H) 06/09/2014    CKMBINDEX 11.9 (H) 06/09/2014    TROPONINI 44.300 (C) 06/09/2014    TROPONINT 0.108 (C) 09/11/2022     PT/INR:    Protime   Date Value Ref Range Status   09/10/2022 11.5 (L) 11.9  - 14.6 Seconds Final   /  INR   Date Value Ref Range Status   09/10/2022 0.87 (L) 0.91 - 1.09 Final       Imaging Results (Last 72 Hours)     ** No results found for the last 72 hours. **          Objective     Allergies   Allergen Reactions   • Avelox [Moxifloxacin] Other (See Comments)     Causes eye problems   • Levaquin [Levofloxacin] Other (See Comments)     Causes eye problems       Medication Review: Performed  Current Facility-Administered Medications   Medication Dose Route Frequency Provider Last Rate Last Admin   • acetaminophen (TYLENOL) tablet 650 mg  650 mg Oral Q6H PRN Tal Greenberg DO   650 mg at 09/10/22 2102   • acetaminophen (TYLENOL) tablet 650 mg  650 mg Oral Q4H PRN Tal Greenberg DO   650 mg at 09/12/22 0418   • aspirin EC tablet 81 mg  81 mg Oral Daily Tal Greenberg DO   81 mg at 09/11/22 1616   • atorvastatin (LIPITOR) tablet 40 mg  40 mg Oral Nightly Tal Greenberg DO   40 mg at 09/11/22 2121   • busPIRone (BUSPAR) tablet 15 mg  15 mg Oral BID Tal Greenberg DO   15 mg at 09/11/22 2120   • dextrose (D50W) (25 g/50 mL) IV injection 25 g  25 g Intravenous Q15 Min PRN Tal Greenberg DO       • dextrose (GLUTOSE) oral gel 15 g  15 g Oral Q15 Min PRN Tal Greenberg DO       • dilTIAZem CD (CARDIZEM CD) 24 hr capsule 180 mg  180 mg Oral Daily Tal Greenberg DO   180 mg at 09/11/22 1048   • diphenhydrAMINE (BENADRYL) capsule 25 mg  25 mg Oral Nightly PRN Tal Greenberg DO   25 mg at 09/11/22 2121   • glucagon (human recombinant) (GLUCAGEN DIAGNOSTIC) injection 1 mg  1 mg Intramuscular Q15 Min PRN Tal Greenberg DO       • Insulin Lispro (humaLOG) injection 0-14 Units  0-14 Units Subcutaneous TID AC Tal Greenberg DO   8 Units at 09/11/22 1657   • isosorbide mononitrate (IMDUR) 24 hr tablet 60 mg  60 mg Oral Q24H Tal Greenberg DO   60 mg at 09/11/22 1245    • losartan (COZAAR) tablet 50 mg  50 mg Oral Daily Tal Greenberg, DO   50 mg at 09/11/22 1048   • metoprolol tartrate (LOPRESSOR) injection 5 mg  5 mg Intravenous Q8H Tal Greenberg, DO   5 mg at 09/12/22 0623   • Morphine sulfate (PF) injection 1 mg  1 mg Intravenous Q4H PRN Tal Greenberg, DO   1 mg at 09/11/22 1107    And   • naloxone (NARCAN) injection 0.4 mg  0.4 mg Intravenous Q5 Min PRN Tal Greenberg DO       • nitroglycerin (NITROSTAT) SL tablet 0.4 mg  0.4 mg Sublingual Q5 Min PRN Tal Greenberg DO       • nitroglycerin (TRIDIL) 200 mcg/ml infusion  10-50 mcg/min Intravenous Titrated Tal Greenberg DO 3 mL/hr at 09/11/22 1303 10 mcg/min at 09/11/22 1303   • ondansetron (ZOFRAN) tablet 4 mg  4 mg Oral Q6H PRN Tal Greenberg DO        Or   • ondansetron (ZOFRAN) injection 4 mg  4 mg Intravenous Q6H PRN Tal Greenberg DO       • pantoprazole (PROTONIX) EC tablet 40 mg  40 mg Oral QAM Tal Greenberg, DO   40 mg at 09/12/22 0623   • predniSONE (DELTASONE) tablet 10 mg  10 mg Oral BID With Meals Tal Greenberg DO   10 mg at 09/11/22 1717   • ranolazine (RANEXA) 12 hr tablet 500 mg  500 mg Oral Q12H Tal Greenberg DO   500 mg at 09/11/22 2120   • sodium chloride 0.9 % flush 10 mL  10 mL Intravenous Q12H Tal Greenberg DO   10 mL at 09/11/22 2121   • sodium chloride 0.9 % flush 10 mL  10 mL Intravenous PRN Tal Greenberg DO       • sodium chloride 0.9 % infusion  100 mL/hr Intravenous Continuous Tal Greenberg  mL/hr at 09/11/22 2322 100 mL/hr at 09/11/22 2322   • ticagrelor (BRILINTA) tablet 90 mg  90 mg Oral BID Tal Greenberg DO   90 mg at 09/11/22 2121       Vital Sign Min/Max for last 24 hours  Temp  Min: 98 °F (36.7 °C)  Max: 98.6 °F (37 °C)   BP  Min: 86/62  Max: 152/99   Pulse  Min: 74  Max: 121   Resp  Min: 12  Max: 18  "  SpO2  Min: 93 %  Max: 98 %   No data recorded   Weight  Min: 86.6 kg (191 lb)  Max: 86.6 kg (191 lb)     Flowsheet Rows    Flowsheet Row First Filed Value   Admission Height 182.9 cm (72\") Documented at 09/10/2022 1525   Admission Weight 85 kg (187 lb 8 oz) Documented at 09/10/2022 1525              Physical Exam:    General Appearance: Awake, alert, in no acute distress  Eyes: Pupils equal and reactive    Ears: Appear intact with no abnormalities noted  Nose: Nares normal, no drainage  Neck: supple, trachea midline, no carotid bruit and no JVD  Back: no kyphosis present,    Lungs: respirations regular, respirations even and respirations unlabored  Heart: normal S1, S2, no significant murmurs   No gallops or rubs  no rub and no click  Abdomen: normal bowel sounds, no tenderness   Skin: no bleeding, bruising or rash  Extremities: no cyanosis  Psychiatric/Behavioral: Negative for agitation, behavioral problems, confusion, the patient does  appear to be nervous/anxious.     Arteriotomy site healthy,  No bleeding, swelling or excessive bruising. Preserved distal perfusion.     Results Review:   I reviewed the patient's new clinical results.  I reviewed the patient's new imaging results and agree with the interpretation.  I reviewed the patient's other test results and agree with the interpretation  I personally viewed and interpreted the patient's EKG/Telemetry data    Discussed with patient  Updated patient regarding any new or relevant abnormalities on review of records or any new findings on physical exam.   Mentioned to patient about purpose of visit and desirable health short and long term goals and objectives.     Reviewed available prior notes, consults, prior visits, laboratory findings, radiology and cardiology relevant reports.   Updated chart as applicable.   I have reviewed the patient's medical history in detail and updated the computerized patient record as relevant.          Assessment & Plan       " Coronary artery disease involving native coronary artery of native heart without angina pectoris    S/P coronary artery stent placement    Essential hypertension    Mixed hyperlipidemia    Myasthenia gravis ([facility])    NSTEMI, initial episode of care ([facility])    NSTEMI (non-ST elevated myocardial infarction) ([facility])  Drug-eluting stent placement to left anterior descending coronary artery  Drug-eluting stent placement to left circumflex coronary artery    Plan    Okay to move to telemetry floor  Continue on current medication  Increase ambulation  Anticipated Home tomorrow  Outpatient cardiology follow-up within 2 weeks of discharge  Results of cardiac catheterization including imaging reviewed  Continue on dual antiplatelet therapy  Monitor for any complications in the right radial arteriotomy site which overall looks excellent  Telemetry  Deep vein thrombosis prophylaxis/precautions  Appropriate diet, fluid, sodium, caffeine, stimulants intake   Questions were encouraged, asked and answered to the patient's  understanding and satisfaction.  Compliance to diet and medications       Jose Colon MD  09/12/22  07:41 CDT    EMR Dragon/Transcription was used to dictate part of this note

## 2022-09-13 ENCOUNTER — READMISSION MANAGEMENT (OUTPATIENT)
Dept: CALL CENTER | Facility: HOSPITAL | Age: 57
End: 2022-09-13

## 2022-09-13 VITALS
RESPIRATION RATE: 16 BRPM | SYSTOLIC BLOOD PRESSURE: 138 MMHG | OXYGEN SATURATION: 96 % | HEIGHT: 72 IN | TEMPERATURE: 98.1 F | BODY MASS INDEX: 26.16 KG/M2 | DIASTOLIC BLOOD PRESSURE: 88 MMHG | HEART RATE: 88 BPM | WEIGHT: 193.13 LBS

## 2022-09-13 LAB — GLUCOSE BLDC GLUCOMTR-MCNC: 266 MG/DL (ref 70–130)

## 2022-09-13 PROCEDURE — 63710000001 PREDNISONE PER 5 MG: Performed by: EMERGENCY MEDICINE

## 2022-09-13 PROCEDURE — 82962 GLUCOSE BLOOD TEST: CPT

## 2022-09-13 PROCEDURE — 63710000001 INSULIN LISPRO (HUMAN) PER 5 UNITS: Performed by: EMERGENCY MEDICINE

## 2022-09-13 PROCEDURE — 99239 HOSP IP/OBS DSCHRG MGMT >30: CPT | Performed by: INTERNAL MEDICINE

## 2022-09-13 RX ORDER — RANOLAZINE 500 MG/1
500 TABLET, EXTENDED RELEASE ORAL EVERY 12 HOURS SCHEDULED
Qty: 60 TABLET | Refills: 1 | Status: SHIPPED | OUTPATIENT
Start: 2022-09-13 | End: 2022-11-02

## 2022-09-13 RX ORDER — ISOSORBIDE MONONITRATE 60 MG/1
60 TABLET, EXTENDED RELEASE ORAL
Qty: 60 TABLET | Refills: 0 | Status: SHIPPED | OUTPATIENT
Start: 2022-09-13 | End: 2022-10-10

## 2022-09-13 RX ADMIN — METOPROLOL TARTRATE 5 MG: 5 INJECTION INTRAVENOUS at 06:23

## 2022-09-13 RX ADMIN — RANOLAZINE 500 MG: 500 TABLET, FILM COATED, EXTENDED RELEASE ORAL at 08:02

## 2022-09-13 RX ADMIN — ISOSORBIDE MONONITRATE 60 MG: 60 TABLET, EXTENDED RELEASE ORAL at 08:02

## 2022-09-13 RX ADMIN — PREDNISONE 10 MG: 10 TABLET ORAL at 08:03

## 2022-09-13 RX ADMIN — BUSPIRONE HYDROCHLORIDE 15 MG: 10 TABLET ORAL at 08:02

## 2022-09-13 RX ADMIN — DILTIAZEM HYDROCHLORIDE 180 MG: 180 CAPSULE, COATED, EXTENDED RELEASE ORAL at 08:02

## 2022-09-13 RX ADMIN — INSULIN LISPRO 8 UNITS: 100 INJECTION, SOLUTION INTRAVENOUS; SUBCUTANEOUS at 08:00

## 2022-09-13 RX ADMIN — ASPIRIN 81 MG: 81 TABLET, COATED ORAL at 08:03

## 2022-09-13 RX ADMIN — LOSARTAN POTASSIUM 50 MG: 50 TABLET, FILM COATED ORAL at 08:02

## 2022-09-13 RX ADMIN — PANTOPRAZOLE SODIUM 40 MG: 40 TABLET, DELAYED RELEASE ORAL at 06:23

## 2022-09-13 RX ADMIN — TICAGRELOR 90 MG: 90 TABLET ORAL at 08:02

## 2022-09-13 NOTE — PAYOR COMM NOTE
"9/13/22 [facility] [phone]    [phone]    FAXING UPDATE 9/13/22.              Iftikhar Francis (56 y.o. Male)             Date of Birth   1965    Social Security Number       Address   [address] [facility] [address]    Home Phone   [phone]    MRN   [id number]       Christianity   Jewish    Marital Status                               Admission Date   9/10/22    Admission Type   Urgent    Admitting Provider   Tal Greenberg DO    Attending Provider   Tal Greenberg DO    Department, Room/Bed   [address], 464/1       Discharge Date       Discharge Disposition   Home or Self Care    Discharge Destination                               Attending Provider: Tal Greenberg DO    Allergies: Avelox [Moxifloxacin], Levaquin [Levofloxacin]    Isolation: None   Infection: None   Code Status: CPR   Advance Care Planning Activity    Ht: 182.9 cm (72\")   Wt: 87.6 kg (193 lb 2 oz)    Admission Cmt: None   Principal Problem: None                Active Insurance as of 9/10/2022     Primary Coverage     Payor Plan Insurance Group Employer/Plan Group    [employer]HE[employer] BLUE CROSS ANTHEM BLUE CROSS BLUE SHIELD PPO [id number]     Payor Plan Address Payor Plan Phone Number Payor Plan Fax Number Effective Dates    [address] [phone]  1/1/2011 - None Entered    [place] [address]       Subscriber Name Subscriber Birth Date Member ID       DEBORAH FRANCIS 5/15/1967 [id number]                 Emergency Contacts      (Rel.) Home Phone Work Phone Mobile Phone    Deborah Francis (Spouse) [phone] -- --                                  [facility] Encounter Date/Time: 9/10/2022 [facility]   Hospital Account: [id number]    MRN: [id number]   Patient:  Iftikhar Francis   Contact Serial #: [id number]   SSN:          ENCOUNTER             Patient Class: Inpatient   Unit: [address] Service: " Medicine     Bed: 464/1   Admitting Provider: Tal Greenberg*   Referring Physician: Provider, No Known   Attending Provider: Tal Greenberg*   Adm Diagnosis: NSTEMI (non-ST elevated *               PATIENT          Name: Iftikhar Francis : 1965 (56 yrs)   Address: [address] Sex: Male   City: [place] [address]   County: [facility]   Marital Status:  Ethnicity: NOT                                                                              Race: WHITE   Primary Care Provider: Tacho Eng,* Patients Phone: Home Phone: [phone]         EMERGENCY CONTACT   Contact Name Legal Guardian? Relationship to Patient Home Phone Work Phone   1. PennyDeborah  2. *No Contact Specified*      Spouse    [phone]              GUARANTOR            Guarantor: Iftikhar Francis     : 1965   Address:  [address] Sex: [place]     [place] [address]     Relation to Patient: Self       Home Phone: [phone]   Guarantor ID: [id number]       Work Phone:     GUARANTOR EMPLOYER   Employer: \[employer]"" [employer]         Status: FULL TIME   COVERAGE          PRIMARY INSURANCE   Payor: [employer] Plan: ANTHEM BLUE CROSS BLUE SHIELD PPO   Group Number: [id number] Insurance Type: INDEMNITY   Subscriber Name: DEBORAH FRANCIS Subscriber : 5/15/1967   Subscriber ID: [id number] Coverage Address: [facility] [address]  [place] [address]   Pat. Rel. to Subscriber: Spouse Coverage Phone: [phone]   SECONDARY INSURANCE   Payor: N/A Plan: N/A   Group Number:   Insurance Type:     Subscriber Name:   Subscriber :     Subscriber ID:   Coverage Address:     Pat. Rel. to Subscriber:   Coverage Phone:        Contact Serial # ([id number])         2022    Chart ID ([id number])                       Jose Colon MD    Physician   Cardiology   Progress Notes       Signed   Date of Service:  22   Creation Time:  22              Signed        Expand  AllCollapse All            Show:Clear all  [x]Manual[x]Template[]Copied    Added by:  [x]Jose Colon MD      [][place] for details          LOS: 2 days   Patient Care Team:  Tacho Eng MD as PCP - General  Tacho Eng MD as PCP - Family Medicine     Chief Complaint: Chest pain with elevated troponin.     Subjective     Iftikhar Francis is a 56 y.o. male who is being seen  Overnight feels well  No chest pain  No palpitation  No presyncope  No syncope  No orthopnea  No paroxysmal nocturnal dyspnea  Hemodynamically stable  Labs reviewed  No new issues or events  Tolerating current medications well  Satisfactory bowel and bladder activity  Satisfactory oral intake  Resting well   No pain swelling redness or discharge in the right radial arteriotomy site     Telemetry: no malignant arrhythmia. No significant pauses.     Review of Systems   Constitutional: No chills   Has fatigue   No fever.   HENT: Negative.    Eyes: Negative.    Respiratory: Negative for cough,   No chest wall soreness,   Shortness of breath,   no wheezing, no stridor.    Cardiovascular: As above  Gastrointestinal: Negative for abdominal distention,  No abdominal pain,   No blood in stool,   No constipation,   No diarrhea,   No nausea   No vomiting.   Endocrine: Negative.    Genitourinary: Negative for difficulty urinating, dysuria, flank pain and hematuria.   Musculoskeletal: Negative.    Skin: Negative for rash and wound.   Allergic/Immunologic: Negative.    Neurological: Negative for dizziness, syncope, weakness,   No light-headedness  No  headaches.   Hematological: Does not bruise/bleed easily.   Psychiatric/Behavioral: Negative for agitation or behavioral problems,   No confusion,   the patient is  nervous/anxious.        History:        Past Medical History:   Diagnosis Date   • Bilateral kidney stones     • Heart attack (HCC)     • Left ureteral stone     • Myasthenia gravis (HCC)     • Peptic ulceration     • Sleep  apnea              Past Surgical History:   Procedure Laterality Date   • CORONARY ANGIOPLASTY WITH STENT PLACEMENT       • HERNIA REPAIR Left       Inguinal Hernia   • SHOULDER SURGERY       • TUMOR REMOVAL          Social History            Socioeconomic History   • Marital status:    Tobacco Use   • Smoking status: Never Smoker   • Smokeless tobacco: Never Used   Vaping Use   • Vaping Use: Never used   Substance and Sexual Activity   • Alcohol use: Yes       Comment: occassionally   • Drug use: Never   • Sexual activity: Defer      Family History   Problem Relation Age of Onset   • Hypertension Father     • No Known Problems Mother     • No Known Problems Sister     • No Known Problems Brother           Labs:  WBC WBC   Date Value Ref Range Status   09/12/2022 10.07 3.40 - 10.80 10*3/mm3 Final   09/11/2022 8.62 3.40 - 10.80 10*3/mm3 Final   09/10/2022 10.95 (H) 3.40 - 10.80 10*3/mm3 Final       HGB       Hemoglobin   Date Value Ref Range Status   09/12/2022 13.1 13.0 - 17.7 g/dL Final   09/11/2022 14.7 13.0 - 17.7 g/dL Final   09/10/2022 15.3 13.0 - 17.7 g/dL Final       HCT       Hematocrit   Date Value Ref Range Status   09/12/2022 40.0 37.5 - 51.0 % Final   09/11/2022 42.5 37.5 - 51.0 % Final   09/10/2022 45.7 37.5 - 51.0 % Final       Platelets       Platelets   Date Value Ref Range Status   09/12/2022 329 140 - 450 10*3/mm3 Final   09/11/2022 342 140 - 450 10*3/mm3 Final   09/10/2022 362 140 - 450 10*3/mm3 Final       MCV       MCV   Date Value Ref Range Status   09/12/2022 89.7 79.0 - 97.0 fL Final   09/11/2022 87.4 79.0 - 97.0 fL Final   09/10/2022 88.4 79.0 - 97.0 fL Final                Results from last 7 days   Lab Units 09/11/22  0523 09/10/22  1655   SODIUM mmol/L 136 130*   POTASSIUM mmol/L 4.1 5.6*   CHLORIDE mmol/L 98 93*   CO2 mmol/L 24.0 25.0   BUN mg/dL 20 15   CREATININE mg/dL 0.77 0.68*   CALCIUM mg/dL 8.8 9.5   GLUCOSE mg/dL 218* 223*            Lab Results   Component Value Date      CKTOTAL 152 06/10/2019     CKMB 111.00 (H) 06/09/2014     CKMBINDEX 11.9 (H) 06/09/2014     TROPONINI 44.300 (C) 06/09/2014     TROPONINT 0.108 (C) 09/11/2022      PT/INR:          Protime   Date Value Ref Range Status   09/10/2022 11.5 (L) 11.9 - 14.6 Seconds Final   /        INR   Date Value Ref Range Status   09/10/2022 0.87 (L) 0.91 - 1.09 Final             Imaging Results (Last 72 Hours)      ** No results found for the last 72 hours. **                Objective               Allergies   Allergen Reactions   • Avelox [Moxifloxacin] Other (See Comments)       Causes eye problems   • Levaquin [Levofloxacin] Other (See Comments)       Causes eye problems         Medication Review: Performed            Current Facility-Administered Medications   Medication Dose Route Frequency Provider Last Rate Last Admin   • acetaminophen (TYLENOL) tablet 650 mg  650 mg Oral Q6H PRN Tal Greenberg DO   650 mg at 09/10/22 2102   • acetaminophen (TYLENOL) tablet 650 mg  650 mg Oral Q4H PRN Tal Greenberg DO   650 mg at 09/12/22 0418   • aspirin EC tablet 81 mg  81 mg Oral Daily Tal Greenberg DO   81 mg at 09/11/22 1616   • atorvastatin (LIPITOR) tablet 40 mg  40 mg Oral Nightly Tal Greenberg DO   40 mg at 09/11/22 2121   • busPIRone (BUSPAR) tablet 15 mg  15 mg Oral BID Tal Greenberg DO   15 mg at 09/11/22 2120   • dextrose (D50W) (25 g/50 mL) IV injection 25 g  25 g Intravenous Q15 Min PRN Tal Greenberg DO       • dextrose (GLUTOSE) oral gel 15 g  15 g Oral Q15 Min PRN Tal Greenberg DO       • dilTIAZem CD (CARDIZEM CD) 24 hr capsule 180 mg  180 mg Oral Daily Tal Greenberg DO   180 mg at 09/11/22 1048   • diphenhydrAMINE (BENADRYL) capsule 25 mg  25 mg Oral Nightly PRN Tal Greenberg DO   25 mg at 09/11/22 2121   • glucagon (human recombinant) (GLUCAGEN DIAGNOSTIC) injection 1 mg  1 mg Intramuscular Q15 Min PRN  Tal Greenberg DO       • Insulin Lispro (humaLOG) injection 0-14 Units  0-14 Units Subcutaneous TID AC Tal Greenberg DO   8 Units at 09/11/22 1657   • isosorbide mononitrate (IMDUR) 24 hr tablet 60 mg  60 mg Oral Q24H Tal Greenberg DO   60 mg at 09/11/22 1240   • losartan (COZAAR) tablet 50 mg  50 mg Oral Daily Tal Greenberg DO   50 mg at 09/11/22 1048   • metoprolol tartrate (LOPRESSOR) injection 5 mg  5 mg Intravenous Q8H Tal Greenberg DO   5 mg at 09/12/22 0623   • Morphine sulfate (PF) injection 1 mg  1 mg Intravenous Q4H PRN Tal Greenberg DO   1 mg at 09/11/22 1107     And   • naloxone (NARCAN) injection 0.4 mg  0.4 mg Intravenous Q5 Min PRN Tal Greenberg DO       • nitroglycerin (NITROSTAT) SL tablet 0.4 mg  0.4 mg Sublingual Q5 Min PRN Tal Greenberg DO       • nitroglycerin (TRIDIL) 200 mcg/ml infusion  10-50 mcg/min Intravenous Titrated Tal Greenberg DO 3 mL/hr at 09/11/22 1303 10 mcg/min at 09/11/22 1303   • ondansetron (ZOFRAN) tablet 4 mg  4 mg Oral Q6H PRN Tal Greenberg DO         Or   • ondansetron (ZOFRAN) injection 4 mg  4 mg Intravenous Q6H PRN Tal Greenberg DO       • pantoprazole (PROTONIX) EC tablet 40 mg  40 mg Oral QAM Tal Greenberg DO   40 mg at 09/12/22 0623   • predniSONE (DELTASONE) tablet 10 mg  10 mg Oral BID With Meals Tal Greenberg DO   10 mg at 09/11/22 1717   • ranolazine (RANEXA) 12 hr tablet 500 mg  500 mg Oral Q12H Tal Greenberg DO   500 mg at 09/11/22 2120   • sodium chloride 0.9 % flush 10 mL  10 mL Intravenous Q12H [place], Tal Alton,    10 mL at 09/11/22 2121   • sodium chloride 0.9 % flush 10 mL  10 mL Intravenous PRN Tal Greenberg DO       • sodium chloride 0.9 % infusion  100 mL/hr Intravenous Continuous Tal Greenberg  mL/hr at 09/11/22 1342 100 mL/hr at  "09/11/22 2322   • ticagrelor (BRILINTA) tablet 90 mg  90 mg Oral BID Tal Greenberg, DO   90 mg at 09/11/22 2121         Vital Sign Min/Max for last 24 hours  Temp  Min: 98 °F (36.7 °C)  Max: 98.6 °F (37 °C)   BP  Min: 86/62  Max: 152/99   Pulse  Min: 74  Max: 121   Resp  Min: 12  Max: 18   SpO2  Min: 93 %  Max: 98 %   No data recorded   Weight  Min: 86.6 kg (191 lb)  Max: 86.6 kg (191 lb)          Flowsheet Rows    Flowsheet Row First Filed Value   Admission Height 182.9 cm (72\") Documented at 09/10/2022 1525   Admission Weight 85 kg (187 lb 8 oz) Documented at 09/10/2022 1525                Physical Exam:     General Appearance: Awake, alert, in no acute distress  Eyes: Pupils equal and reactive    Ears: Appear intact with no abnormalities noted  Nose: Nares normal, no drainage  Neck: supple, trachea midline, no carotid bruit and no JVD  Back: no kyphosis present,    Lungs: respirations regular, respirations even and respirations unlabored  Heart: normal S1, S2, no significant murmurs   No gallops or rubs  no rub and no click  Abdomen: normal bowel sounds, no tenderness   Skin: no bleeding, bruising or rash  Extremities: no cyanosis  Psychiatric/Behavioral: Negative for agitation, behavioral problems, confusion, the patient does  appear to be nervous/anxious.     Arteriotomy site healthy,  No bleeding, swelling or excessive bruising. Preserved distal perfusion.      Results Review:   I reviewed the patient's new clinical results.  I reviewed the patient's new imaging results and agree with the interpretation.  I reviewed the patient's other test results and agree with the interpretation  I personally viewed and interpreted the patient's EKG/Telemetry data     Discussed with patient  Updated patient regarding any new or relevant abnormalities on review of records or any new findings on physical exam.   Mentioned to patient about purpose of visit and desirable health short and long term goals and " objectives.      Reviewed available prior notes, consults, prior visits, laboratory findings, radiology and cardiology relevant reports.   Updated chart as applicable.   I have reviewed the patient's medical history in detail and updated the computerized patient record as relevant.                   Assessment & Plan          Coronary artery disease involving native coronary artery of native heart without angina pectoris    S/P coronary artery stent placement    Essential hypertension    Mixed hyperlipidemia    Myasthenia gravis ([facility])    NSTEMI, initial episode of care ([facility])    NSTEMI (non-ST elevated myocardial infarction) ([facility])  Drug-eluting stent placement to left anterior descending coronary artery  Drug-eluting stent placement to left circumflex coronary artery     Plan     Okay to move to telemetry floor         Vital Sign Min/Max for last 24 hours  Temp  Min: 98 °F (36.7 °C)  Max: 98.6 °F (37 °C)   BP  Min: 86/62  Max: 152/99   Pulse  Min: 74  Max: 121   Resp  Min: 12  Max: 18   SpO2  Min: 93 %  Max: 98 %   No data recorded   Weight  Min: 86.6 kg (191 lb)  Max: 86.6 kg (191 lb)         Coronary artery disease involving native coronary artery of native heart without angina pectoris    S/P coronary artery stent placement    Essential hypertension    Mixed hyperlipidemia    Myasthenia gravis ([facility])    NSTEMI, initial episode of care ([facility])    NSTEMI (non-ST elevated myocardial infarction) ([facility])  Drug-eluting stent placement to left anterior descending coronary artery  Drug-eluting stent placement to left circumflex coronary artery     Plan     Okay to move to telemetry floor  Continue on current medication  Increase ambulation  Anticipated Home tomorrow  Outpatient cardiology follow-up within 2 weeks of discharge  Results of cardiac catheterization including imaging reviewed  Continue on dual antiplatelet therapy  Monitor for any complications in the right radial arteriotomy site which overall looks  excellent  Telemetry  Deep vein thrombosis prophylaxis/precautions  Appropriate diet, fluid, sodium, caffeine, stimulants intake   Questions were encouraged, asked and answered to the patient's  understanding and satisfaction.  Compliance to diet and medications         Jose Colon MD  09/12/22  07:41 CDT     EMR Dragon/Transcription was used to dictate part of this note               Start   Ordered Stop   09/13/22 0000  ticagrelor (BRILINTA) 90 MG tablet tablet  90 mg,   Oral,   2 Times Daily         09/13/22 0701 --   09/13/22 0000  isosorbide mononitrate (IMDUR) 60 MG 24 hr tablet  60 mg,   Oral,   Every 24 Hours Scheduled         09/13/22 0711 --   09/13/22 0000  ranolazine (RANEXA) 500 MG 12 hr tablet  500 mg,   Oral,   Every 12 Hours Scheduled         09/13/22 0711 --   09/11/22 2100  ticagrelor (BRILINTA) tablet 90 mg  90 mg,   Oral,   2 Times Daily         09/11/22 1053 --   09/11/22 1300  isosorbide mononitrate (IMDUR) 24 hr tablet 60 mg  60 mg,   Oral,   Every 24 Hours Scheduled         09/11/22 1211 --   09/11/22 1300  ranolazine (RANEXA) 12 hr tablet 500 mg  500 mg,   Oral,   Every 12 Hours Scheduled         09/11/22 1211 --   09/11/22 1300  metoprolol tartrate (LOPRESSOR) injection 5 mg  5 mg,   Intravenous,   Every 8 Hours         09/11/22 1211 --   09/11/22 1145  sodium chloride 0.9 % infusion  100 mL/hr,   Intravenous,   Continuous         09/11/22 1053 --   09/11/22 1130  nitroglycerin (TRIDIL) 200 mcg/ml infusion  10-50 mcg/min,   Intravenous,   3-15 mL/hr,   Titrated         09/11/22 1135 --   09/11/22 0900  aspirin EC tablet 81 mg  81 mg,   Oral,   Daily         09/10/22 1639 --   09/11/22 0900  dilTIAZem CD (CARDIZEM CD) 24 hr capsule 180 mg  180 mg,   Oral,   Daily         09/10/22 1639 --   09/11/22 0900  losartan (COZAAR) tablet 50 mg  50 mg,   Oral,   Daily         09/10/22 1639 --   09/11/22 0700  pantoprazole (PROTONIX) EC tablet 40 mg  40 mg,   Oral,   Every Morning         09/10/22  1639 --   09/10/22 2100  atorvastatin (LIPITOR) tablet 40 mg  40 mg,   Oral,   Nightly         09/10/22 1639 --   09/10/22 2100  busPIRone (BUSPAR) tablet 15 mg  15 mg,   Oral,   2 Times Daily         09/10/22 1639 --   09/10/22 2100  sodium chloride 0.9 % flush 10 mL  10 mL,   Intravenous,   Every 12 Hours Scheduled         09/10/22 1639 --   09/10/22 2045  predniSONE (DELTASONE) tablet 10 mg  10 mg,   Oral,   2 Times Daily With Meals       Note to Pharmacy:  For Myasthenia Gravis    09/10/22 1957 --   09/10/22 1900         Start   Ordered Stop   09/11/22 1145  sodium chloride 0.9 % infusion  100 mL/hr,   Intravenous,   Continuous         09/11/22 1053 --   09/11/22 1130  nitroglycerin (TRIDIL) 200 mcg/ml infusion  10-50 mcg/min,   Intravenous,   3-15 mL/hr,   Titrated         09/11/22 1135 --                           Discharge Summary      Jose Colon MD at 09/13/22 0702               LOS: 3 days   Patient Care Team:  Tacho Eng MD as PCP - General  Tacho Eng MD as PCP - Family Medicine    Chief Complaint:   Coronary artery disease involving native coronary artery of native heart without angina pectoris    S/P coronary artery stent placement    Essential hypertension    Mixed hyperlipidemia    Myasthenia gravis ([facility])    NSTEMI, initial episode of care ([facility])    NSTEMI (non-ST elevated myocardial infarction) ([facility])    Shortness of breath    Subjective    Hard of hearing  Difficult to communicate with him  This gets him frustrated  Overnight feels well  No chest pain  No palpitation  No presyncope  No syncope  No orthopnea  No paroxysmal nocturnal dyspnea  Hemodynamically stable  Labs reviewed  No new issues or events  Tolerating current medications well  Resting well     Interval History: Improved overall    Denies chest pain currently. Denies excessive shortness of breath. Denies abdominal pain, nausea vomiting or diarrhoea.    Telemetry: no malignant arrhythmia. No significant  pauses.    Review of Systems   Constitutional: No chills   No fatigue   No fever.   HENT: Negative.    Eyes: Negative.    Respiratory: Negative for cough,   No chest wall soreness,   Mild shortness of breath,   no wheezing, no stridor.    Cardiovascular: Negative for chest pain,   No palpitations   No significant  leg swelling.   Gastrointestinal: Negative for abdominal distention,  No abdominal pain,   No blood in stool, constipation, diarrhea, nausea or vomiting.   Endocrine: Negative.    Genitourinary: Negative for difficulty urinating, dysuria, flank pain and hematuria.   Musculoskeletal: Negative.    Skin: Negative for rash and wound.   Allergic/Immunologic: Negative.    Neurological: Negative for dizziness, syncope, weakness,   No light-headedness or headaches.   Hematological: Does not bruise/bleed easily.   Psychiatric/Behavioral: Negative for agitation, behavioral problems, confusion, the patient does not appear to be nervous/anxious.       History:   Past Medical History:   Diagnosis Date   • Bilateral kidney stones    • Heart attack (HCC)    • Left ureteral stone    • Myasthenia gravis (HCC)    • Peptic ulceration    • Sleep apnea      Past Surgical History:   Procedure Laterality Date   • CARDIAC CATHETERIZATION Right 9/11/2022    Procedure: Left Heart Cath;  Surgeon: Tal Greenberg DO;  Location:  [facility];  Service: Cardiovascular;  Laterality: Right;   • CORONARY ANGIOPLASTY WITH STENT PLACEMENT     • HERNIA REPAIR Left     Inguinal Hernia   • SHOULDER SURGERY     • TUMOR REMOVAL       Social History     Socioeconomic History   • Marital status:    Tobacco Use   • Smoking status: Never Smoker   • Smokeless tobacco: Never Used   Vaping Use   • Vaping Use: Never used   Substance and Sexual Activity   • Alcohol use: Yes     Comment: occassionally   • Drug use: Never   • Sexual activity: Defer     Family History   Problem Relation Age of Onset   • Hypertension Father     • No Known Problems Mother    • No Known Problems Sister    • No Known Problems Brother        Labs:  WBC WBC   Date Value Ref Range Status   09/12/2022 10.07 3.40 - 10.80 10*3/mm3 Final   09/11/2022 8.62 3.40 - 10.80 10*3/mm3 Final   09/10/2022 10.95 (H) 3.40 - 10.80 10*3/mm3 Final      HGB Hemoglobin   Date Value Ref Range Status   09/12/2022 13.1 13.0 - 17.7 g/dL Final   09/11/2022 14.7 13.0 - 17.7 g/dL Final   09/10/2022 15.3 13.0 - 17.7 g/dL Final      HCT Hematocrit   Date Value Ref Range Status   09/12/2022 40.0 37.5 - 51.0 % Final   09/11/2022 42.5 37.5 - 51.0 % Final   09/10/2022 45.7 37.5 - 51.0 % Final      Platlets Platelets   Date Value Ref Range Status   09/12/2022 329 140 - 450 10*3/mm3 Final   09/11/2022 342 140 - 450 10*3/mm3 Final   09/10/2022 362 140 - 450 10*3/mm3 Final      MCV MCV   Date Value Ref Range Status   09/12/2022 89.7 79.0 - 97.0 fL Final   09/11/2022 87.4 79.0 - 97.0 fL Final   09/10/2022 88.4 79.0 - 97.0 fL Final        Results from last 7 days   Lab Units 09/12/22  0737 09/11/22  0523 09/10/22  1655   SODIUM mmol/L 136 136 130*   POTASSIUM mmol/L 3.9 4.1 5.6*   CHLORIDE mmol/L 101 98 93*   CO2 mmol/L 25.0 24.0 25.0   BUN mg/dL 20 20 15   CREATININE mg/dL 0.75* 0.77 0.68*   CALCIUM mg/dL 8.5* 8.8 9.5   GLUCOSE mg/dL 225* 218* 223*     Lab Results   Component Value Date    CKTOTAL 152 06/10/2019    CKMB 111.00 (H) 06/09/2014    CKMBINDEX 11.9 (H) 06/09/2014    TROPONINI 44.300 (C) 06/09/2014    TROPONINT 0.108 (C) 09/11/2022     PT/INR:    Protime   Date Value Ref Range Status   09/10/2022 11.5 (L) 11.9 - 14.6 Seconds Final   /  INR   Date Value Ref Range Status   09/10/2022 0.87 (L) 0.91 - 1.09 Final       Imaging Results (Last 72 Hours)     ** No results found for the last 72 hours. **          Objective     Allergies   Allergen Reactions   • Avelox [Moxifloxacin] Other (See Comments)     Causes eye problems   • Levaquin [Levofloxacin] Other (See Comments)     Causes eye  problems       Medication Review: Performed  Current Facility-Administered Medications   Medication Dose Route Frequency Provider Last Rate Last Admin   • acetaminophen (TYLENOL) tablet 650 mg  650 mg Oral Q6H PRN Tal Greenberg DO   650 mg at 09/12/22 1423   • acetaminophen (TYLENOL) tablet 650 mg  650 mg Oral Q4H PRN Tal Greenberg DO   650 mg at 09/12/22 0418   • aspirin EC tablet 81 mg  81 mg Oral Daily Tal Greenberg DO   81 mg at 09/12/22 0811   • atorvastatin (LIPITOR) tablet 40 mg  40 mg Oral Nightly Tal Greenberg DO   40 mg at 09/12/22 2103   • busPIRone (BUSPAR) tablet 15 mg  15 mg Oral BID Tal Greenberg DO   15 mg at 09/12/22 2103   • dextrose (D50W) (25 g/50 mL) IV injection 25 g  25 g Intravenous Q15 Min PRN Tal Greenberg DO       • dextrose (GLUTOSE) oral gel 15 g  15 g Oral Q15 Min PRN Tal Greenberg DO       • dilTIAZem CD (CARDIZEM CD) 24 hr capsule 180 mg  180 mg Oral Daily Tal Greenberg DO   180 mg at 09/12/22 0811   • diphenhydrAMINE (BENADRYL) capsule 25 mg  25 mg Oral Nightly PRN Tal Greenberg DO   25 mg at 09/11/22 2121   • glucagon (human recombinant) (GLUCAGEN DIAGNOSTIC) injection 1 mg  1 mg Intramuscular Q15 Min PRN Tal Greenberg DO       • Insulin Lispro (humaLOG) injection 0-14 Units  0-14 Units Subcutaneous TID AC Tal Greenberg DO   10 Units at 09/12/22 1833   • isosorbide mononitrate (IMDUR) 24 hr tablet 60 mg  60 mg Oral Q24H Tal Greenberg DO   60 mg at 09/12/22 0811   • losartan (COZAAR) tablet 50 mg  50 mg Oral Daily Tal Greenberg, DO   50 mg at 09/12/22 0811   • metoprolol tartrate (LOPRESSOR) injection 5 mg  5 mg Intravenous Q8H Tal Greenberg DO   5 mg at 09/13/22 0623   • naloxone (NARCAN) injection 0.4 mg  0.4 mg Intravenous Q5 Min PRN Tal Greenberg DO       • nitroglycerin (NITROSTAT) SL  "tablet 0.4 mg  0.4 mg Sublingual Q5 Min PRN Tal Greenberg DO       • nitroglycerin (TRIDIL) 200 mcg/ml infusion  10-50 mcg/min Intravenous Titrated Tal Greenberg DO 3 mL/hr at 09/11/22 1303 10 mcg/min at 09/11/22 1303   • ondansetron (ZOFRAN) tablet 4 mg  4 mg Oral Q6H PRN Tal Greenberg DO        Or   • ondansetron (ZOFRAN) injection 4 mg  4 mg Intravenous Q6H PRN Tal Greenberg DO       • pantoprazole (PROTONIX) EC tablet 40 mg  40 mg Oral QAM Tal Greenberg DO   40 mg at 09/13/22 0623   • predniSONE (DELTASONE) tablet 10 mg  10 mg Oral BID With Meals Tal Greenberg DO   10 mg at 09/12/22 1834   • ranolazine (RANEXA) 12 hr tablet 500 mg  500 mg Oral Q12H Tal Greenberg DO   500 mg at 09/12/22 2236   • sodium chloride 0.9 % flush 10 mL  10 mL Intravenous Q12H Tal Greenberg DO   10 mL at 09/12/22 2236   • sodium chloride 0.9 % flush 10 mL  10 mL Intravenous PRN Tal Greenberg DO       • sodium chloride 0.9 % infusion  100 mL/hr Intravenous Continuous Tal Greenberg  mL/hr at 09/12/22 0945 100 mL/hr at 09/12/22 0945   • ticagrelor (BRILINTA) tablet 90 mg  90 mg Oral BID Tal Greenberg DO   90 mg at 09/12/22 2103       Vital Sign Min/Max for last 24 hours  Temp  Min: 98 °F (36.7 °C)  Max: 98.6 °F (37 °C)   BP  Min: 107/96  Max: 137/97   Pulse  Min: 76  Max: 101   Resp  Min: 16  Max: 16   SpO2  Min: 94 %  Max: 98 %   Flow (L/min)  Min: 2  Max: 2   Weight  Min: 87.6 kg (193 lb 2 oz)  Max: 87.6 kg (193 lb 3.2 oz)     Flowsheet Rows    Flowsheet Row First Filed Value   Admission Height 182.9 cm (72\") Documented at 09/10/2022 1525   Admission Weight 85 kg (187 lb 8 oz) Documented at 09/10/2022 1525          Results for orders placed during the hospital encounter of 09/10/22    Adult Transthoracic Echo Complete W/ Cont if Necessary Per Protocol    Interpretation Summary  · Left " ventricular ejection fraction appears to be 56 - 60%. Left ventricular systolic function is normal.  · Left ventricular diastolic function was indeterminate.  · Abnormal global longitudinal LV strain (GLS) = -13.0%  · Estimated right ventricular systolic pressure from tricuspid regurgitation is normal (<35 mmHg).        Consults:   Consults     No orders found from 8/12/2022 to 9/11/2022.          Procedures Performed  Procedure(s):  Left Heart Cath       Physical Exam:  General Appearance: Awake, alert, in no acute distress  Eyes: Pupils equal and reactive    Ears: Appear intact with no abnormalities noted  Nose: Nares normal, no drainage  Neck: supple, trachea midline, no carotid bruit and no JVD  Back: no kyphosis present,    Lungs: respirations regular, respirations even and respirations unlabored  Heart: normal S1, S2,  2/6 pansystolic murmur in the left sternal border,  no rub and no click  Abdomen: normal bowel sounds, no masses, no hepatomegaly, no splenomegaly, guarding and no rebound tenderness   Skin: no bleeding, bruising or rash  Extremities: no cyanosis  Psychiatric/Behavioral: Negative for agitation, behavioral problems, confusion, the patient does not appear to be nervous/anxious.     Joint pain in small, medium and large joints      Results Review:   I reviewed the patient's new clinical results.  I reviewed the patient's new imaging results and agree with the interpretation.  I reviewed the patient's other test results and agree with the interpretation  I personally viewed and interpreted the patient's EKG/Telemetry data  Discussed with patient,        Discharge diagnosis with prior    Coronary artery disease involving native coronary artery of native heart without angina pectoris    S/P coronary artery stent placement    Essential hypertension    Mixed hyperlipidemia    Myasthenia gravis ([facility])    NSTEMI, initial episode of care ([facility])    NSTEMI (non-ST elevated myocardial infarction) ([facility])  Hard of  hearing    Hospital Course  Patient is a 56 y.o. male presented for indication as above subsequently underwent evaluation and treatment as below       Condition on Discharge: Stable and Improved  ______________________________________________________________________________________________________________________________________________________________________________________________________________________________       Plan on discharge  ______________________________________________________________________________________________________________________________________________________________________________________________________________________________       OK to discharge  Low salt cardiac diet.   Discharge to home and or self care with improvement or resolution of presenting symptoms or complaints    Follow up with primary provider and primary cardiologist as scheduled   Overall improved and deemed fit for discharge  Will be provided with written discharge instructions including diet and medications including prescriptions as required and labs as applicable    Questions were encouraged, asked and answered to the patient's  understanding and satisfaction.  Continue dual antiplatelet therapy  X 1 year from time of latest percutaneous coronary intervention and placement of drug-eluting stent  Take aspirin 81 mg daily for life.     Cardiac rehab as outpatient  Difficult to communicate with him due to hearing loss.  Advised him to look into getting hearing aids.  This will help him communicate better especially given he will require cardiac rehab in future    Go to nearest emergency room if recurrence of primary complaints or any suspected disabling or life threatening symptoms or complaints such as chest pain, increasing shortness of breath, profound dizziness, weakness, significant palpitations, passing out  35 minutes spent in the discharge process.  Discussed with patient current admission diagnosis,  prognosis, tests performed and their meaning, monitoring for any signs of complications  What to watch for in the short medium and long-term post discharge course  Counseled regarding diet, fluid, sodium restriction, caffeine restriction, stimulant intake and avoidance of nonsteroidal anti-inflammatory drugs  Counseled regarding strict compliance with diet, medications, regular testing and follow-up  _______________________________________________________________________________________________________________________________________________________________________________________________________________________________________    Discharge Disposition: To home and self care  Home or Self Care    Discharge Medications     Discharge Medications      New Medications      Instructions Start Date   ticagrelor 90 MG tablet tablet  Commonly known as: BRILINTA   90 mg, Oral, 2 Times Daily         Changes to Medications      Instructions Start Date   icosapent ethyl 1 g capsule capsule  Commonly known as: VASCEPA  What changed: how much to take   2 g, Oral, 2 Times Daily With Meals         Continue These Medications      Instructions Start Date   aspirin 81 MG EC tablet   81 mg, Oral, Daily      atorvastatin 40 MG tablet  Commonly known as: LIPITOR   TAKE 1 TABLET BY MOUTH ONCE DAILY      busPIRone 15 MG tablet  Commonly known as: BUSPAR   15 mg, Oral, 2 Times Daily      dilTIAZem  MG 24 hr capsule  Commonly known as: Cardizem CD   180 mg, Oral, Daily      FARXIGA PO   5 mg, Oral, Daily      fenofibrate 160 MG tablet   160 mg, Oral, Daily      glimepiride 4 MG tablet  Commonly known as: AMARYL   4 mg, Oral, Every Morning Before Breakfast      Januvia 100 MG tablet  Generic drug: SITagliptin   100 mg, Oral, Daily      losartan 50 MG tablet  Commonly known as: COZAAR   50 mg, Oral, Daily      metFORMIN 1000 MG tablet  Commonly known as: GLUCOPHAGE   1,000 mg, Oral, 2 Times Daily With Meals      multivitamin (eye  vitamin) tablet tablet   1 tablet, Oral, Daily      nitroglycerin 0.4 MG SL tablet  Commonly known as: NITROSTAT   1 under the tongue as needed for angina, may repeat q5mins for up three doses      omeprazole 20 MG capsule  Commonly known as: priLOSEC   20 mg, Oral, Daily      ondansetron 4 MG tablet  Commonly known as: ZOFRAN   4 mg, Oral, Every 8 Hours PRN      predniSONE 20 MG tablet  Commonly known as: DELTASONE   20 mg, Oral, Daily      vitamin D3 125 MCG (5000 UT) capsule capsule   5,000 Units, Oral, Daily             Discharge Diet:  Low salt heart healthy cardiac diet    Activity at Discharge:  As tolerated    Follow-up Appointments  Future Appointments   Date Time Provider Department Center   11/14/2022  9:15 AM Bakari Perkins MD [facility]         Test Results Pending at Discharge: None       Jose Colon MD  09/13/22  07:02 CDT        Electronically signed by Jose Colon MD at 09/13/22 0704

## 2022-09-13 NOTE — PLAN OF CARE
Problem: Adult Inpatient Plan of Care  Goal: Plan of Care Review  9/13/2022 0352 by Maggie Gutierrez RN  Outcome: Ongoing, Progressing  Flowsheets (Taken 9/13/2022 0352)  Progress: no change  Outcome Evaluation: R radial cath site SUJATA, CDI, soft. Pulses palpable. SR/ST HR:  on tele. VSS. Pt wearing 2L of O2 while he sleeps due to hx of sleep apnea & he wears a CPAP at home. No c/o pain noted. Up x adlib. Voiding per urinal. Urine dark sharmila/orage in color. Plans to be discharged home today. Safety maintained.

## 2022-09-13 NOTE — DISCHARGE SUMMARY
LOS: 3 days   Patient Care Team:  Tacho Eng MD as PCP - General  Tacho Eng MD as PCP - Family Medicine    Chief Complaint:   Coronary artery disease involving native coronary artery of native heart without angina pectoris    S/P coronary artery stent placement    Essential hypertension    Mixed hyperlipidemia    Myasthenia gravis ([facility])    NSTEMI, initial episode of care ([facility])    NSTEMI (non-ST elevated myocardial infarction) ([facility])    Shortness of breath    Subjective    Hard of hearing  Difficult to communicate with him  This gets him frustrated  Overnight feels well  No chest pain  No palpitation  No presyncope  No syncope  No orthopnea  No paroxysmal nocturnal dyspnea  Hemodynamically stable  Labs reviewed  No new issues or events  Tolerating current medications well  Resting well     Interval History: Improved overall    Denies chest pain currently. Denies excessive shortness of breath. Denies abdominal pain, nausea vomiting or diarrhoea.    Telemetry: no malignant arrhythmia. No significant pauses.    Review of Systems   Constitutional: No chills   No fatigue   No fever.   HENT: Negative.    Eyes: Negative.    Respiratory: Negative for cough,   No chest wall soreness,   Mild shortness of breath,   no wheezing, no stridor.    Cardiovascular: Negative for chest pain,   No palpitations   No significant  leg swelling.   Gastrointestinal: Negative for abdominal distention,  No abdominal pain,   No blood in stool, constipation, diarrhea, nausea or vomiting.   Endocrine: Negative.    Genitourinary: Negative for difficulty urinating, dysuria, flank pain and hematuria.   Musculoskeletal: Negative.    Skin: Negative for rash and wound.   Allergic/Immunologic: Negative.    Neurological: Negative for dizziness, syncope, weakness,   No light-headedness or headaches.   Hematological: Does not bruise/bleed easily.   Psychiatric/Behavioral: Negative for agitation, behavioral problems, confusion,  the patient does not appear to be nervous/anxious.       History:   Past Medical History:   Diagnosis Date   • Bilateral kidney stones    • Heart attack (HCC)    • Left ureteral stone    • Myasthenia gravis (HCC)    • Peptic ulceration    • Sleep apnea      Past Surgical History:   Procedure Laterality Date   • CARDIAC CATHETERIZATION Right 9/11/2022    Procedure: Left Heart Cath;  Surgeon: Tal Greenberg DO;  Location:  [facility];  Service: Cardiovascular;  Laterality: Right;   • CORONARY ANGIOPLASTY WITH STENT PLACEMENT     • HERNIA REPAIR Left     Inguinal Hernia   • SHOULDER SURGERY     • TUMOR REMOVAL       Social History     Socioeconomic History   • Marital status:    Tobacco Use   • Smoking status: Never Smoker   • Smokeless tobacco: Never Used   Vaping Use   • Vaping Use: Never used   Substance and Sexual Activity   • Alcohol use: Yes     Comment: occassionally   • Drug use: Never   • Sexual activity: Defer     Family History   Problem Relation Age of Onset   • Hypertension Father    • No Known Problems Mother    • No Known Problems Sister    • No Known Problems Brother        Labs:  WBC WBC   Date Value Ref Range Status   09/12/2022 10.07 3.40 - 10.80 10*3/mm3 Final   09/11/2022 8.62 3.40 - 10.80 10*3/mm3 Final   09/10/2022 10.95 (H) 3.40 - 10.80 10*3/mm3 Final      HGB Hemoglobin   Date Value Ref Range Status   09/12/2022 13.1 13.0 - 17.7 g/dL Final   09/11/2022 14.7 13.0 - 17.7 g/dL Final   09/10/2022 15.3 13.0 - 17.7 g/dL Final      HCT Hematocrit   Date Value Ref Range Status   09/12/2022 40.0 37.5 - 51.0 % Final   09/11/2022 42.5 37.5 - 51.0 % Final   09/10/2022 45.7 37.5 - 51.0 % Final      Platlets Platelets   Date Value Ref Range Status   09/12/2022 329 140 - 450 10*3/mm3 Final   09/11/2022 342 140 - 450 10*3/mm3 Final   09/10/2022 362 140 - 450 10*3/mm3 Final      MCV MCV   Date Value Ref Range Status   09/12/2022 89.7 79.0 - 97.0 fL Final   09/11/2022 87.4  79.0 - 97.0 fL Final   09/10/2022 88.4 79.0 - 97.0 fL Final        Results from last 7 days   Lab Units 09/12/22  0737 09/11/22  0523 09/10/22  1655   SODIUM mmol/L 136 136 130*   POTASSIUM mmol/L 3.9 4.1 5.6*   CHLORIDE mmol/L 101 98 93*   CO2 mmol/L 25.0 24.0 25.0   BUN mg/dL 20 20 15   CREATININE mg/dL 0.75* 0.77 0.68*   CALCIUM mg/dL 8.5* 8.8 9.5   GLUCOSE mg/dL 225* 218* 223*     Lab Results   Component Value Date    CKTOTAL 152 06/10/2019    CKMB 111.00 (H) 06/09/2014    CKMBINDEX 11.9 (H) 06/09/2014    TROPONINI 44.300 (C) 06/09/2014    TROPONINT 0.108 (C) 09/11/2022     PT/INR:    Protime   Date Value Ref Range Status   09/10/2022 11.5 (L) 11.9 - 14.6 Seconds Final   /  INR   Date Value Ref Range Status   09/10/2022 0.87 (L) 0.91 - 1.09 Final       Imaging Results (Last 72 Hours)     ** No results found for the last 72 hours. **          Objective     Allergies   Allergen Reactions   • Avelox [Moxifloxacin] Other (See Comments)     Causes eye problems   • Levaquin [Levofloxacin] Other (See Comments)     Causes eye problems       Medication Review: Performed  Current Facility-Administered Medications   Medication Dose Route Frequency Provider Last Rate Last Admin   • acetaminophen (TYLENOL) tablet 650 mg  650 mg Oral Q6H PRN Tal Greenberg DO   650 mg at 09/12/22 1423   • acetaminophen (TYLENOL) tablet 650 mg  650 mg Oral Q4H PRN Tal Greenberg DO   650 mg at 09/12/22 0418   • aspirin EC tablet 81 mg  81 mg Oral Daily Tal Greenberg DO   81 mg at 09/12/22 0811   • atorvastatin (LIPITOR) tablet 40 mg  40 mg Oral Nightly Tal Greenberg DO   40 mg at 09/12/22 2103   • busPIRone (BUSPAR) tablet 15 mg  15 mg Oral BID Tal Greenberg DO   15 mg at 09/12/22 2103   • dextrose (D50W) (25 g/50 mL) IV injection 25 g  25 g Intravenous Q15 Min PRN Tal Greenberg DO       • dextrose (GLUTOSE) oral gel 15 g  15 g Oral Q15 Min PRN Tal Greenberg  DO [place]       • dilTIAZem CD (CARDIZEM CD) 24 hr capsule 180 mg  180 mg Oral Daily Tal Greenberg DO   180 mg at 09/12/22 0811   • diphenhydrAMINE (BENADRYL) capsule 25 mg  25 mg Oral Nightly PRN Tal Greenberg DO   25 mg at 09/11/22 2121   • glucagon (human recombinant) (GLUCAGEN DIAGNOSTIC) injection 1 mg  1 mg Intramuscular Q15 Min PRN Tal Greenberg DO       • Insulin Lispro (humaLOG) injection 0-14 Units  0-14 Units Subcutaneous TID AC Tal Greenberg DO   10 Units at 09/12/22 1833   • isosorbide mononitrate (IMDUR) 24 hr tablet 60 mg  60 mg Oral Q24H Tal Greenberg DO   60 mg at 09/12/22 0811   • losartan (COZAAR) tablet 50 mg  50 mg Oral Daily Tal Greenberg DO   50 mg at 09/12/22 0811   • metoprolol tartrate (LOPRESSOR) injection 5 mg  5 mg Intravenous Q8H Tal Greenberg DO   5 mg at 09/13/22 0623   • naloxone (NARCAN) injection 0.4 mg  0.4 mg Intravenous Q5 Min PRN Tal Greenberg DO       • nitroglycerin (NITROSTAT) SL tablet 0.4 mg  0.4 mg Sublingual Q5 Min PRN Tal Greenberg DO       • nitroglycerin (TRIDIL) 200 mcg/ml infusion  10-50 mcg/min Intravenous Titrated Tal Greenberg DO 3 mL/hr at 09/11/22 1303 10 mcg/min at 09/11/22 1303   • ondansetron (ZOFRAN) tablet 4 mg  4 mg Oral Q6H PRN Tal Greenberg DO        Or   • ondansetron (ZOFRAN) injection 4 mg  4 mg Intravenous Q6H PRN Tal Greenberg DO       • pantoprazole (PROTONIX) EC tablet 40 mg  40 mg Oral QAM Tal Greenberg DO   40 mg at 09/13/22 0623   • predniSONE (DELTASONE) tablet 10 mg  10 mg Oral BID With Meals Tal Greenberg DO   10 mg at 09/12/22 1834   • ranolazine (RANEXA) 12 hr tablet 500 mg  500 mg Oral Q12H Tal Greenberg DO   500 mg at 09/12/22 2236   • sodium chloride 0.9 % flush 10 mL  10 mL Intravenous Q12H Tal Greenberg DO   10 mL at 09/12/22  "2236   • sodium chloride 0.9 % flush 10 mL  10 mL Intravenous PRN Tal Greenberg DO       • sodium chloride 0.9 % infusion  100 mL/hr Intravenous Continuous Tal Greenberg  mL/hr at 09/12/22 0945 100 mL/hr at 09/12/22 0945   • ticagrelor (BRILINTA) tablet 90 mg  90 mg Oral BID Tal Greenberg DO   90 mg at 09/12/22 2103       Vital Sign Min/Max for last 24 hours  Temp  Min: 98 °F (36.7 °C)  Max: 98.6 °F (37 °C)   BP  Min: 107/96  Max: 137/97   Pulse  Min: 76  Max: 101   Resp  Min: 16  Max: 16   SpO2  Min: 94 %  Max: 98 %   Flow (L/min)  Min: 2  Max: 2   Weight  Min: 87.6 kg (193 lb 2 oz)  Max: 87.6 kg (193 lb 3.2 oz)     Flowsheet Rows    Flowsheet Row First Filed Value   Admission Height 182.9 cm (72\") Documented at 09/10/2022 1525   Admission Weight 85 kg (187 lb 8 oz) Documented at 09/10/2022 1525          Results for orders placed during the hospital encounter of 09/10/22    Adult Transthoracic Echo Complete W/ Cont if Necessary Per Protocol    Interpretation Summary  · Left ventricular ejection fraction appears to be 56 - 60%. Left ventricular systolic function is normal.  · Left ventricular diastolic function was indeterminate.  · Abnormal global longitudinal LV strain (GLS) = -13.0%  · Estimated right ventricular systolic pressure from tricuspid regurgitation is normal (<35 mmHg).        Consults:   Consults     No orders found from 8/12/2022 to 9/11/2022.          Procedures Performed  Procedure(s):  Left Heart Cath       Physical Exam:  General Appearance: Awake, alert, in no acute distress  Eyes: Pupils equal and reactive    Ears: Appear intact with no abnormalities noted  Nose: Nares normal, no drainage  Neck: supple, trachea midline, no carotid bruit and no JVD  Back: no kyphosis present,    Lungs: respirations regular, respirations even and respirations unlabored  Heart: normal S1, S2,  2/6 pansystolic murmur in the left sternal border,  no rub and no " click  Abdomen: normal bowel sounds, no masses, no hepatomegaly, no splenomegaly, guarding and no rebound tenderness   Skin: no bleeding, bruising or rash  Extremities: no cyanosis  Psychiatric/Behavioral: Negative for agitation, behavioral problems, confusion, the patient does not appear to be nervous/anxious.     Joint pain in small, medium and large joints      Results Review:   I reviewed the patient's new clinical results.  I reviewed the patient's new imaging results and agree with the interpretation.  I reviewed the patient's other test results and agree with the interpretation  I personally viewed and interpreted the patient's EKG/Telemetry data  Discussed with patient,        Discharge diagnosis with prior    Coronary artery disease involving native coronary artery of native heart without angina pectoris    S/P coronary artery stent placement    Essential hypertension    Mixed hyperlipidemia    Myasthenia gravis (HCC)    NSTEMI, initial episode of care (HCC)    NSTEMI (non-ST elevated myocardial infarction) (HCC)  Hard of hearing    Hospital Course  Patient is a 56 y.o. male presented for indication as above subsequently underwent evaluation and treatment as below       Condition on Discharge: Stable and Improved  ______________________________________________________________________________________________________________________________________________________________________________________________________________________________       Plan on discharge  ______________________________________________________________________________________________________________________________________________________________________________________________________________________________       OK to discharge  Low salt cardiac diet.   Discharge to home and or self care with improvement or resolution of presenting symptoms or complaints    Follow up with primary provider and primary cardiologist as scheduled   Overall  improved and deemed fit for discharge  Will be provided with written discharge instructions including diet and medications including prescriptions as required and labs as applicable    Questions were encouraged, asked and answered to the patient's  understanding and satisfaction.  Continue dual antiplatelet therapy  X 1 year from time of latest percutaneous coronary intervention and placement of drug-eluting stent  Take aspirin 81 mg daily for life.     Cardiac rehab as outpatient  Difficult to communicate with him due to hearing loss.  Advised him to look into getting hearing aids.  This will help him communicate better especially given he will require cardiac rehab in future    Go to nearest emergency room if recurrence of primary complaints or any suspected disabling or life threatening symptoms or complaints such as chest pain, increasing shortness of breath, profound dizziness, weakness, significant palpitations, passing out  35 minutes spent in the discharge process.  Discussed with patient current admission diagnosis, prognosis, tests performed and their meaning, monitoring for any signs of complications  What to watch for in the short medium and long-term post discharge course  Counseled regarding diet, fluid, sodium restriction, caffeine restriction, stimulant intake and avoidance of nonsteroidal anti-inflammatory drugs  Counseled regarding strict compliance with diet, medications, regular testing and follow-up  _______________________________________________________________________________________________________________________________________________________________________________________________________________________________________    Discharge Disposition: To home and self care  Home or Self Care    Discharge Medications     Discharge Medications      New Medications      Instructions Start Date   ticagrelor 90 MG tablet tablet  Commonly known as: BRILINTA   90 mg, Oral, 2 Times Daily          Changes to Medications      Instructions Start Date   icosapent ethyl 1 g capsule capsule  Commonly known as: VASCEPA  What changed: how much to take   2 g, Oral, 2 Times Daily With Meals         Continue These Medications      Instructions Start Date   aspirin 81 MG EC tablet   81 mg, Oral, Daily      atorvastatin 40 MG tablet  Commonly known as: LIPITOR   TAKE 1 TABLET BY MOUTH ONCE DAILY      busPIRone 15 MG tablet  Commonly known as: BUSPAR   15 mg, Oral, 2 Times Daily      dilTIAZem  MG 24 hr capsule  Commonly known as: Cardizem CD   180 mg, Oral, Daily      FARXIGA PO   5 mg, Oral, Daily      fenofibrate 160 MG tablet   160 mg, Oral, Daily      glimepiride 4 MG tablet  Commonly known as: AMARYL   4 mg, Oral, Every Morning Before Breakfast      Januvia 100 MG tablet  Generic drug: SITagliptin   100 mg, Oral, Daily      losartan 50 MG tablet  Commonly known as: COZAAR   50 mg, Oral, Daily      metFORMIN 1000 MG tablet  Commonly known as: GLUCOPHAGE   1,000 mg, Oral, 2 Times Daily With Meals      multivitamin (eye vitamin) tablet tablet   1 tablet, Oral, Daily      nitroglycerin 0.4 MG SL tablet  Commonly known as: NITROSTAT   1 under the tongue as needed for angina, may repeat q5mins for up three doses      omeprazole 20 MG capsule  Commonly known as: priLOSEC   20 mg, Oral, Daily      ondansetron 4 MG tablet  Commonly known as: ZOFRAN   4 mg, Oral, Every 8 Hours PRN      predniSONE 20 MG tablet  Commonly known as: DELTASONE   20 mg, Oral, Daily      vitamin D3 125 MCG (5000 UT) capsule capsule   5,000 Units, Oral, Daily             Discharge Diet:  Low salt heart healthy cardiac diet    Activity at Discharge:  As tolerated    Follow-up Appointments  Future Appointments   Date Time Provider Department Center   11/14/2022  9:15 AM Bakari Perkins MD [facility]         Test Results Pending at Discharge: None       Jose Colon MD  09/13/22  07:02 CDT

## 2022-09-14 NOTE — OUTREACH NOTE
Prep Survey    Flowsheet Row Responses   [facility] discharged from? [place]   Is LACE score < 7 ? No   Emergency Room discharge w/ pulse ox? No   Eligibility Readm Mgmt   Discharge diagnosis NSTEMI   Does the patient have one of the following disease processes/diagnoses(primary or secondary)? Acute MI (STEMI,NSTEMI)   Does the patient have Home health ordered? No   Is there a DME ordered? No   Prep survey completed? Yes          DAMION LOPEZ - Registered Nurse

## 2022-09-15 ENCOUNTER — READMISSION MANAGEMENT (OUTPATIENT)
Dept: CALL CENTER | Facility: HOSPITAL | Age: 57
End: 2022-09-15

## 2022-09-15 NOTE — OUTREACH NOTE
AMI Week 1 Survey    Flowsheet Row Responses   [facility] patient discharged from? [place]   Does the patient have one of the following disease processes/diagnoses(primary or secondary)? Acute MI (STEMI,NSTEMI)   Week 1 attempt successful? Yes   Call start time 1701   Call end time 1703   Discharge diagnosis NSTEMI   Person spoke with today (if not patient) and relationship verna Cabrera   Meds reviewed with patient/caregiver? Yes   Is the patient having any side effects they believe may be caused by any medication additions or changes? No   Does the patient have all prescriptions related to this admission filled (includes statins,anticoagulants,HTN meds,anti-arrhythmia meds) Yes   Is the patient taking all medications as directed (includes completed medication regime)? Yes   Does the patient have a primary care provider?  Yes   Does the patient have an appointment with their PCP,cardiologist,or clinic within 7 days of discharge? Yes   Has the patient kept scheduled appointments due by today? N/A   Has home health visited the patient within 72 hours of discharge? N/A   Psychosocial issues? No   Did the patient receive a copy of their discharge instructions? Yes   Nursing interventions Reviewed instructions with patient   What is the patient's perception of their health status since discharge? Improving   Nursing interventions Nurse provided patient education   Is the patient/caregiver able to teach back signs and symptoms of when to call for help immediately: Sudden chest discomfort, Sudden discomfort in arms, back, neck or jaw, Shortness of breath at any time, Sudden sweating or clammy skin, Nausea or vomiting, Dizziness or lightheadedness, Irregular or rapid heart rate   Nursing interventions Nurse provided patient education   Is the pateint /caregiver able to teach back the importance of cardiac rehab? Yes   Nursing interventions Provided education on importance of cardiac rehab   Is the patient/caregiver  able to teach back lifestyle changes to help prevent MIs Heart healthy diet, Regular exercise as approved by provider, Maintaining a healthy weight, Reducing stress   Is the patient/caregiver able to teach back ways to prevent a second heart attack: Take medications, Follow up with MD, Participate in Cardiac Rehab   If the patient is a current smoker, are they able to teach back resources for cessation? Not a smoker   Is the patient/caregiver able to teach back the hierarchy of who to call/visit for symptoms/problems? PCP, Specialist, Home health nurse, Urgent Care, ED, 911 Yes   Week 1 call completed? Yes          MAGED CASTANEDA - Registered Nurse

## 2022-09-27 ENCOUNTER — TELEPHONE (OUTPATIENT)
Dept: CARDIOLOGY | Facility: CLINIC | Age: 57
End: 2022-09-27

## 2022-09-27 ENCOUNTER — OFFICE VISIT (OUTPATIENT)
Dept: CARDIOLOGY | Facility: CLINIC | Age: 57
End: 2022-09-27

## 2022-09-27 VITALS
HEART RATE: 86 BPM | OXYGEN SATURATION: 98 % | HEIGHT: 72 IN | SYSTOLIC BLOOD PRESSURE: 106 MMHG | WEIGHT: 183 LBS | BODY MASS INDEX: 24.79 KG/M2 | DIASTOLIC BLOOD PRESSURE: 78 MMHG

## 2022-09-27 DIAGNOSIS — Z79.4 TYPE 2 DIABETES MELLITUS WITH OTHER CIRCULATORY COMPLICATION, WITH LONG-TERM CURRENT USE OF INSULIN: ICD-10-CM

## 2022-09-27 DIAGNOSIS — E11.59 TYPE 2 DIABETES MELLITUS WITH OTHER CIRCULATORY COMPLICATION, WITH LONG-TERM CURRENT USE OF INSULIN: ICD-10-CM

## 2022-09-27 DIAGNOSIS — I10 ESSENTIAL HYPERTENSION: ICD-10-CM

## 2022-09-27 DIAGNOSIS — I25.10 CORONARY ARTERY DISEASE INVOLVING NATIVE CORONARY ARTERY OF NATIVE HEART WITHOUT ANGINA PECTORIS: Primary | ICD-10-CM

## 2022-09-27 DIAGNOSIS — E78.2 MIXED HYPERLIPIDEMIA: ICD-10-CM

## 2022-09-27 PROCEDURE — 93000 ELECTROCARDIOGRAM COMPLETE: CPT | Performed by: NURSE PRACTITIONER

## 2022-09-27 PROCEDURE — 99214 OFFICE O/P EST MOD 30 MIN: CPT | Performed by: NURSE PRACTITIONER

## 2022-09-27 RX ORDER — NITROGLYCERIN 0.4 MG/1
TABLET SUBLINGUAL
Qty: 25 TABLET | Refills: 1 | Status: ON HOLD | OUTPATIENT
Start: 2022-09-27 | End: 2022-12-08

## 2022-09-27 RX ORDER — DILTIAZEM HYDROCHLORIDE 120 MG/1
120 CAPSULE, EXTENDED RELEASE ORAL DAILY
COMMUNITY
Start: 2022-09-16 | End: 2022-12-16

## 2022-09-27 RX ORDER — PEN NEEDLE, DIABETIC 31 GX5/16"
NEEDLE, DISPOSABLE MISCELLANEOUS SEE ADMIN INSTRUCTIONS
Status: ON HOLD | COMMUNITY
Start: 2022-09-20 | End: 2022-12-08

## 2022-09-27 RX ORDER — INSULIN ASPART 100 [IU]/ML
1-10 INJECTION, SOLUTION INTRAVENOUS; SUBCUTANEOUS
COMMUNITY
Start: 2022-09-21

## 2022-09-27 RX ORDER — ATORVASTATIN CALCIUM 40 MG/1
40 TABLET, FILM COATED ORAL DAILY
Qty: 90 TABLET | Refills: 3 | Status: SHIPPED | OUTPATIENT
Start: 2022-09-27

## 2022-09-27 NOTE — TELEPHONE ENCOUNTER
----- Message from JAE Sahu sent at 9/27/2022  9:17 AM CDT -----  Can we get most recent lipid panel from PCP?

## 2022-09-27 NOTE — PROGRESS NOTES
"    Subjective:     Encounter Date:09/27/2022      Patient ID: Iftikhar Francis is a 56 y.o. male with CAD, HTN, HLD, Myasthenia gravis, and DM2.    Chief Complaint:\"no complaints\"  Coronary Artery Disease  Presents for follow-up visit. Pertinent negatives include no chest pain, dizziness, leg swelling, palpitations, shortness of breath or weight gain. Risk factors include hyperlipidemia. The symptoms have been stable.   Hypertension  This is a chronic problem. The current episode started more than 1 year ago. The problem has been waxing and waning since onset. The problem is controlled. Associated symptoms include malaise/fatigue (chronic due to myasthenia). Pertinent negatives include no chest pain, orthopnea, palpitations, PND or shortness of breath.   Hyperlipidemia  This is a chronic problem. The current episode started more than 1 year ago. Pertinent negatives include no chest pain or shortness of breath.     Patient presents today for a hospital follow up. He is followed for CAD with stenting to the RCA in 2014. He was admitted to [facility] on 9/10 with complaints of bilateral hand tingling which progressed to bilateral arm numbness with radiation into his chest. He notes his hands had been tingling for several weeks prior to his ER visit and attributed this to his BG being elevated. He also had acute generalized lower abdominal pain. On arrival to [facility] he was noted to be hypertensive and tachycardic. High sensitivity troponin was noted to be elevated at 146 (0-60.4) and continued to increase to 1563. Echo obtained at [facility] revealed hypokinesis in the inferolateral wall. He was transferred to [facility] for cardiology intervention. Troponin on arrival to [facility] was elevated at 0.232 with repeat being 0.108. EKG at [facility] and [facility] were essentially unremarkable. He was taken to the cath lab where angiography revealed 80-90% stenosis in the mid LAD, 70-80% stenosis in the mid LCX, 90% stenosis in the distal LCX and loss of flow " down one of the OM branches due to plaque shift when the wire was advanced into the distal LCX. The mid LAD, distal LCX, and mid LCX were treated with a PRANAV and the acutely occluded OM branch was treated with balloon angioplasty. Previously placed stent within the RCA was noted to be patent. LVEF per echo was noted to be normal.     Today he reports he has been well since discharge. He saw his PCP after d/c and was started on insulin. He denies further bilateral arm tingling. He has not had labs drawn but thinks his A1C is 9 and attributes this to steroid use for his myasthenia gravis. He denies ever having chest pain, chest pressure, or chest tightness prior to his ER trip.     The following portions of the patient's history were reviewed and updated as appropriate: allergies, current medications, past family history, past medical history, past social history, past surgical history and problem list.    Allergies   Allergen Reactions   • Levofloxacin Other (See Comments)     Causes eye problems  Double vision.     • Moxifloxacin Other (See Comments)     Causes eye problems  Double vision       Current Outpatient Medications:   •  aspirin 81 MG EC tablet, Take 81 mg by mouth daily., Disp: , Rfl:   •  atorvastatin (LIPITOR) 40 MG tablet, Take 1 tablet by mouth Daily., Disp: 90 tablet, Rfl: 3  •  B-D ULTRAFINE III SHORT PEN 31G X 8 MM misc, See Admin Instructions., Disp: , Rfl:   •  busPIRone (BUSPAR) 15 MG tablet, Take 15 mg by mouth 2 (Two) Times a Day., Disp: , Rfl: 5  •  Dapagliflozin Propanediol (FARXIGA PO), Take 5 mg by mouth Daily., Disp: , Rfl:   •  dilTIAZem (TIAZAC) 120 MG 24 hr capsule, , Disp: , Rfl:   •  fenofibrate 160 MG tablet, Take 160 mg by mouth Daily., Disp: , Rfl:   •  icosapent ethyl (VASCEPA) 1 g capsule capsule, Take 2 g by mouth 2 (Two) Times a Day With Meals. (Patient taking differently: Take 1 g by mouth 2 (Two) Times a Day With Meals.), Disp: 360 capsule, Rfl: 3  •  isosorbide mononitrate  (IMDUR) 60 MG 24 hr tablet, Take 1 tablet by mouth Daily., Disp: 60 tablet, Rfl: 0  •  Januvia 100 MG tablet, Take 100 mg by mouth Daily., Disp: , Rfl:   •  losartan (COZAAR) 50 MG tablet, Take 1 tablet by mouth Daily., Disp: 90 tablet, Rfl: 3  •  metFORMIN (GLUCOPHAGE) 1000 MG tablet, Take 1,000 mg by mouth 2 (Two) Times a Day With Meals., Disp: , Rfl:   •  nitroglycerin (NITROSTAT) 0.4 MG SL tablet, 1 under the tongue as needed for angina, may repeat q5mins for up three doses, Disp: 25 tablet, Rfl: 1  •  NovoLOG FlexPen 100 UNIT/ML solution pen-injector sc pen, , Disp: , Rfl:   •  omeprazole (priLOSEC) 20 MG capsule, Take 20 mg by mouth Daily., Disp: , Rfl:   •  ondansetron (ZOFRAN) 4 MG tablet, Take 4 mg by mouth Every 8 (Eight) Hours As Needed for Nausea or Vomiting., Disp: , Rfl:   •  predniSONE (DELTASONE) 20 MG tablet, Take 20 mg by mouth Daily., Disp: , Rfl:   •  ranolazine (RANEXA) 500 MG 12 hr tablet, Take 1 tablet by mouth Every 12 (Twelve) Hours., Disp: 60 tablet, Rfl: 1  •  ticagrelor (BRILINTA) 90 MG tablet tablet, Take 1 tablet by mouth 2 (Two) Times a Day., Disp: 60 tablet, Rfl: 11  •  vitamin D3 125 MCG (5000 UT) capsule capsule, Take 5,000 Units by mouth Daily., Disp: , Rfl:   Past Medical History:   Diagnosis Date   • Bilateral kidney stones    • Heart attack (HCC)    • Left ureteral stone    • Myasthenia gravis (HCC)    • Peptic ulceration    • Sleep apnea    • Type 2 diabetes mellitus with circulatory disorder, with long-term current use of insulin (HCC) 9/27/2022       Social History     Socioeconomic History   • Marital status:    Tobacco Use   • Smoking status: Never Smoker   • Smokeless tobacco: Never Used   Vaping Use   • Vaping Use: Never used   Substance and Sexual Activity   • Alcohol use: Yes     Comment: occassionally   • Drug use: Never   • Sexual activity: Defer       Review of Systems   Constitutional: Positive for malaise/fatigue (chronic due to myasthenia). Negative for  weight gain and weight loss.   Cardiovascular: Negative for chest pain, dyspnea on exertion, irregular heartbeat, leg swelling, near-syncope, orthopnea, palpitations, paroxysmal nocturnal dyspnea and syncope.   Respiratory: Negative for cough, shortness of breath, sleep disturbances due to breathing, sputum production and wheezing.    Skin: Negative for dry skin, flushing, itching and rash.   Gastrointestinal: Negative for hematemesis and hematochezia.   Neurological: Negative for dizziness, light-headedness, loss of balance and weakness.   All other systems reviewed and are negative.         Objective:     Vitals reviewed.   Constitutional:       General: Not in acute distress.     Appearance: Healthy appearance. Well-developed. Not diaphoretic.   Eyes:      General: No scleral icterus.     Conjunctiva/sclera: Conjunctivae normal.      Pupils: Pupils are equal, round, and reactive to light.   HENT:      Head: Normocephalic.    Mouth/Throat:      Pharynx: No oropharyngeal exudate.   Neck:      Vascular: No JVR.   Pulmonary:      Effort: Pulmonary effort is normal. No respiratory distress.      Breath sounds: Normal breath sounds. No wheezing. No rhonchi. No rales.   Chest:      Chest wall: Not tender to palpatation.   Cardiovascular:      Normal rate. Regular rhythm.   Pulses:     Intact distal pulses.   Edema:     Peripheral edema absent.   Abdominal:      General: Bowel sounds are normal. There is no distension.      Palpations: Abdomen is soft.      Tenderness: There is no abdominal tenderness.   Musculoskeletal: Normal range of motion.      Cervical back: Normal range of motion and neck supple. Skin:     General: Skin is warm and dry.      Coloration: Skin is not pale.      Findings: No erythema or rash.   Neurological:      Mental Status: Alert, oriented to person, place, and time and oriented to person, place and time.      Deep Tendon Reflexes: Reflexes are normal and symmetric.   Psychiatric:          "Behavior: Behavior normal.             ECG 12 Lead    Date/Time: 9/27/2022 11:18 AM  Performed by: Norma Yusuf APRN  Authorized by: Norma Yusuf APRN   Comparison: compared with previous ECG from 9/12/2022  Similar to previous ECG  Comparison to previous ECG: QTc is no longer prolonged  Rhythm: sinus rhythm  Rate: normal  BPM: 86  Q waves: II, III, aVF, V4, V5, V6 and V3    ST Segments: ST segments normal  T Waves: T waves normal  QRS axis: normal  Other: no other findings    Clinical impression: abnormal EKG          /78 (BP Location: Left arm, Patient Position: Sitting)   Pulse 86   Ht 182.9 cm (72\")   Wt 83 kg (183 lb)   SpO2 98%   BMI 24.82 kg/m²     Lab Review:   I have reviewed previous office notes, recent hospital records, recent labs and recent cardiac testing.   Results for orders placed during the hospital encounter of 09/10/22    Adult Transthoracic Echo Complete W/ Cont if Necessary Per Protocol    Interpretation Summary  · Left ventricular ejection fraction appears to be 56 - 60%. Left ventricular systolic function is normal.  · Left ventricular diastolic function was indeterminate.  · Abnormal global longitudinal LV strain (GLS) = -13.0%  · Estimated right ventricular systolic pressure from tricuspid regurgitation is normal (<35 mmHg).      Cath 9/11/2022:  Impression:  1.  Coronary artery disease as described above including hemodynamically significant lesions in both the left circumflex and LAD status post successful PCI with continuation of PATI-3 flow and no residual stenosis remaining  2.  Diabetes  3.  Hypertension  4.  Hyperlipidemia  5.  Myasthenia gravis             Assessment:          Diagnosis Plan   1. Coronary artery disease involving native coronary artery of native heart without angina pectoris     2. Essential hypertension     3. Mixed hyperlipidemia     4. Type 2 diabetes mellitus with other circulatory complication, with long-term current use of insulin (HCC)   "          Plan:       1. CAD- stable. No clinical signs of ongoing ischemia. PRANAV to the RCA in 2014 and to the LAD and LCX on 9/11. Continue DAPT, ASA and Brilinta, for a minimum of 1 year with no missed doses. Continue Arb, Imdur, ranexa, and statin  2. HTN- elevated on arrival to ER but currently remains controlled. Continue current therapy.   3. HLD- most recent lipid on file from 12/2021 and LDL was unable to be calculated due to elevated trigs. Will obtain more recent copy from PCP. If LDL is unable to be calculated will obtain a direct LDL. I have discussed the importance of cholesterol control with a LDL goal of <70 as recommended by the AHA.    4. DM2 with CAD- likely steroid induced. Recently started on insulin per PCP. /I have discussed the importance of A1C control with a goal of <7.0.         Follow up in 3 months with Dr. Perkins or sooner if symptoms worsen.     I spent 30 minutes caring for Iftikhar on this date of service. This time includes time spent by me in the following activities:preparing for the visit, reviewing tests, obtaining and/or reviewing a separately obtained history, performing a medically appropriate examination and/or evaluation , counseling and educating the patient/family/caregiver, ordering medications, tests, or procedures, documenting information in the medical record, independently interpreting results and communicating that information with the patient/family/caregiver and care coordination     Current outpatient and discharge medications have been reconciled for the patient.  Reviewed by: JAE Sahu

## 2022-10-10 RX ORDER — ISOSORBIDE MONONITRATE 60 MG/1
TABLET, EXTENDED RELEASE ORAL
Qty: 90 TABLET | Refills: 3 | Status: ON HOLD | OUTPATIENT
Start: 2022-10-10 | End: 2022-12-08

## 2022-10-27 ENCOUNTER — TELEPHONE (OUTPATIENT)
Dept: CARDIOLOGY | Facility: CLINIC | Age: 57
End: 2022-10-27

## 2022-10-27 NOTE — TELEPHONE ENCOUNTER
Mrs Francis called to ask what Mr Francis needed to do about complaints of hematuria since his office visit with you. He feels it has been since he was started on his Brilinta.  Please advise

## 2022-10-28 NOTE — TELEPHONE ENCOUNTER
Patient's wife stated that he woke up with back pain today also with hematuria  and went to primary care for possible kidney stone. She was also informed of Mr. Francis having to stay on ASA and Brillenta. She voiced understanding.   Laura Crawley MA

## 2022-11-02 RX ORDER — RANOLAZINE 500 MG/1
TABLET, EXTENDED RELEASE ORAL
Qty: 60 TABLET | Refills: 11 | Status: ON HOLD | OUTPATIENT
Start: 2022-11-02 | End: 2022-12-08

## 2022-12-07 ENCOUNTER — HOSPITAL ENCOUNTER (INPATIENT)
Facility: HOSPITAL | Age: 57
LOS: 5 days | Discharge: HOME | End: 2022-12-12
Attending: FAMILY MEDICINE | Admitting: INTERNAL MEDICINE

## 2022-12-07 ENCOUNTER — APPOINTMENT (OUTPATIENT)
Dept: GENERAL RADIOLOGY | Facility: HOSPITAL | Age: 57
End: 2022-12-07

## 2022-12-07 ENCOUNTER — OFFICE VISIT (OUTPATIENT)
Dept: CARDIOLOGY | Facility: CLINIC | Age: 57
End: 2022-12-07

## 2022-12-07 VITALS
HEIGHT: 72 IN | BODY MASS INDEX: 24.24 KG/M2 | SYSTOLIC BLOOD PRESSURE: 92 MMHG | DIASTOLIC BLOOD PRESSURE: 60 MMHG | HEART RATE: 87 BPM | OXYGEN SATURATION: 98 % | WEIGHT: 179 LBS

## 2022-12-07 DIAGNOSIS — R00.0 WIDE-COMPLEX TACHYCARDIA: Primary | ICD-10-CM

## 2022-12-07 DIAGNOSIS — R00.0 WIDE-COMPLEX TACHYCARDIA: ICD-10-CM

## 2022-12-07 DIAGNOSIS — I47.20 VENTRICULAR TACHYCARDIA: Primary | ICD-10-CM

## 2022-12-07 LAB
ALBUMIN SERPL-MCNC: 3.7 G/DL (ref 3.5–5.2)
ALBUMIN/GLOB SERPL: 1.5 G/DL
ALP SERPL-CCNC: 70 U/L (ref 39–117)
ALT SERPL W P-5'-P-CCNC: 22 U/L (ref 1–41)
ANION GAP SERPL CALCULATED.3IONS-SCNC: 11 MMOL/L (ref 5–15)
ANION GAP SERPL CALCULATED.3IONS-SCNC: 15 MMOL/L (ref 5–15)
APTT PPP: 32.3 SECONDS (ref 24.1–35)
AST SERPL-CCNC: 17 U/L (ref 1–40)
BASOPHILS # BLD AUTO: 0.03 10*3/MM3 (ref 0–0.2)
BASOPHILS # BLD AUTO: 0.04 10*3/MM3 (ref 0–0.2)
BASOPHILS NFR BLD AUTO: 0.2 % (ref 0–1.5)
BASOPHILS NFR BLD AUTO: 0.3 % (ref 0–1.5)
BILIRUB SERPL-MCNC: 0.2 MG/DL (ref 0–1.2)
BUN SERPL-MCNC: 27 MG/DL (ref 6–20)
BUN SERPL-MCNC: 36 MG/DL (ref 6–20)
BUN/CREAT SERPL: 23.3 (ref 7–25)
BUN/CREAT SERPL: 29 (ref 7–25)
CALCIUM SPEC-SCNC: 8.9 MG/DL (ref 8.6–10.5)
CALCIUM SPEC-SCNC: 9.1 MG/DL (ref 8.6–10.5)
CHLORIDE SERPL-SCNC: 104 MMOL/L (ref 98–107)
CHLORIDE SERPL-SCNC: 106 MMOL/L (ref 98–107)
CHOLEST SERPL-MCNC: 188 MG/DL (ref 0–200)
CO2 SERPL-SCNC: 19 MMOL/L (ref 22–29)
CO2 SERPL-SCNC: 22 MMOL/L (ref 22–29)
CREAT SERPL-MCNC: 1.16 MG/DL (ref 0.76–1.27)
CREAT SERPL-MCNC: 1.24 MG/DL (ref 0.76–1.27)
DEPRECATED RDW RBC AUTO: 49.2 FL (ref 37–54)
DEPRECATED RDW RBC AUTO: 50 FL (ref 37–54)
EGFRCR SERPLBLD CKD-EPI 2021: 67.8 ML/MIN/1.73
EGFRCR SERPLBLD CKD-EPI 2021: 73.5 ML/MIN/1.73
EOSINOPHIL # BLD AUTO: 0.05 10*3/MM3 (ref 0–0.4)
EOSINOPHIL # BLD AUTO: 0.14 10*3/MM3 (ref 0–0.4)
EOSINOPHIL NFR BLD AUTO: 0.3 % (ref 0.3–6.2)
EOSINOPHIL NFR BLD AUTO: 1.5 % (ref 0.3–6.2)
ERYTHROCYTE [DISTWIDTH] IN BLOOD BY AUTOMATED COUNT: 14.6 % (ref 12.3–15.4)
ERYTHROCYTE [DISTWIDTH] IN BLOOD BY AUTOMATED COUNT: 14.7 % (ref 12.3–15.4)
GLOBULIN UR ELPH-MCNC: 2.4 GM/DL
GLUCOSE BLDC GLUCOMTR-MCNC: 108 MG/DL (ref 70–130)
GLUCOSE BLDC GLUCOMTR-MCNC: 120 MG/DL (ref 70–130)
GLUCOSE SERPL-MCNC: 134 MG/DL (ref 65–99)
GLUCOSE SERPL-MCNC: 169 MG/DL (ref 65–99)
HBA1C MFR BLD: 5.8 % (ref 4.8–5.6)
HCT VFR BLD AUTO: 45.8 % (ref 37.5–51)
HCT VFR BLD AUTO: 46 % (ref 37.5–51)
HDLC SERPL-MCNC: 29 MG/DL (ref 40–60)
HGB BLD-MCNC: 14.3 G/DL (ref 13–17.7)
HGB BLD-MCNC: 14.4 G/DL (ref 13–17.7)
IMM GRANULOCYTES # BLD AUTO: 0.05 10*3/MM3 (ref 0–0.05)
IMM GRANULOCYTES # BLD AUTO: 0.09 10*3/MM3 (ref 0–0.05)
IMM GRANULOCYTES NFR BLD AUTO: 0.5 % (ref 0–0.5)
IMM GRANULOCYTES NFR BLD AUTO: 0.5 % (ref 0–0.5)
INR PPP: 0.95 (ref 0.91–1.09)
INR PPP: 0.95 (ref 0.91–1.09)
LDLC SERPL CALC-MCNC: 82 MG/DL (ref 0–100)
LDLC/HDLC SERPL: 2.19 {RATIO}
LYMPHOCYTES # BLD AUTO: 1.03 10*3/MM3 (ref 0.7–3.1)
LYMPHOCYTES # BLD AUTO: 1.97 10*3/MM3 (ref 0.7–3.1)
LYMPHOCYTES NFR BLD AUTO: 21.3 % (ref 19.6–45.3)
LYMPHOCYTES NFR BLD AUTO: 5.9 % (ref 19.6–45.3)
MAGNESIUM SERPL-MCNC: 1.8 MG/DL (ref 1.6–2.6)
MAGNESIUM SERPL-MCNC: 2.1 MG/DL (ref 1.6–2.6)
MCH RBC QN AUTO: 28.7 PG (ref 26.6–33)
MCH RBC QN AUTO: 28.7 PG (ref 26.6–33)
MCHC RBC AUTO-ENTMCNC: 31.1 G/DL (ref 31.5–35.7)
MCHC RBC AUTO-ENTMCNC: 31.4 G/DL (ref 31.5–35.7)
MCV RBC AUTO: 91.4 FL (ref 79–97)
MCV RBC AUTO: 92.4 FL (ref 79–97)
MONOCYTES # BLD AUTO: 0.98 10*3/MM3 (ref 0.1–0.9)
MONOCYTES # BLD AUTO: 1.04 10*3/MM3 (ref 0.1–0.9)
MONOCYTES NFR BLD AUTO: 10.6 % (ref 5–12)
MONOCYTES NFR BLD AUTO: 6 % (ref 5–12)
NEUTROPHILS NFR BLD AUTO: 15.07 10*3/MM3 (ref 1.7–7)
NEUTROPHILS NFR BLD AUTO: 6.1 10*3/MM3 (ref 1.7–7)
NEUTROPHILS NFR BLD AUTO: 65.8 % (ref 42.7–76)
NEUTROPHILS NFR BLD AUTO: 87.1 % (ref 42.7–76)
NRBC BLD AUTO-RTO: 0 /100 WBC (ref 0–0.2)
NRBC BLD AUTO-RTO: 0 /100 WBC (ref 0–0.2)
NT-PROBNP SERPL-MCNC: 1298 PG/ML (ref 0–900)
PLATELET # BLD AUTO: 402 10*3/MM3 (ref 140–450)
PLATELET # BLD AUTO: 421 10*3/MM3 (ref 140–450)
PMV BLD AUTO: 9.8 FL (ref 6–12)
PMV BLD AUTO: 9.8 FL (ref 6–12)
POTASSIUM SERPL-SCNC: 4.1 MMOL/L (ref 3.5–5.2)
POTASSIUM SERPL-SCNC: 4.1 MMOL/L (ref 3.5–5.2)
PROT SERPL-MCNC: 6.1 G/DL (ref 6–8.5)
PROTHROMBIN TIME: 12.8 SECONDS (ref 11.8–14.8)
PROTHROMBIN TIME: 12.8 SECONDS (ref 11.8–14.8)
RBC # BLD AUTO: 4.98 10*6/MM3 (ref 4.14–5.8)
RBC # BLD AUTO: 5.01 10*6/MM3 (ref 4.14–5.8)
SODIUM SERPL-SCNC: 137 MMOL/L (ref 136–145)
SODIUM SERPL-SCNC: 140 MMOL/L (ref 136–145)
T4 FREE SERPL-MCNC: 1.3 NG/DL (ref 0.93–1.7)
TRIGL SERPL-MCNC: 478 MG/DL (ref 0–150)
TROPONIN T SERPL-MCNC: 0.02 NG/ML (ref 0–0.03)
TROPONIN T SERPL-MCNC: 0.03 NG/ML (ref 0–0.03)
TSH SERPL DL<=0.05 MIU/L-ACNC: 1.9 UIU/ML (ref 0.27–4.2)
VLDLC SERPL-MCNC: 77 MG/DL (ref 5–40)
WBC NRBC COR # BLD: 17.32 10*3/MM3 (ref 3.4–10.8)
WBC NRBC COR # BLD: 9.27 10*3/MM3 (ref 3.4–10.8)

## 2022-12-07 PROCEDURE — 93010 ELECTROCARDIOGRAM REPORT: CPT | Performed by: INTERNAL MEDICINE

## 2022-12-07 PROCEDURE — 99223 1ST HOSP IP/OBS HIGH 75: CPT | Performed by: STUDENT IN AN ORGANIZED HEALTH CARE EDUCATION/TRAINING PROGRAM

## 2022-12-07 PROCEDURE — 36415 COLL VENOUS BLD VENIPUNCTURE: CPT | Performed by: NURSE PRACTITIONER

## 2022-12-07 PROCEDURE — 80050 GENERAL HEALTH PANEL: CPT | Performed by: FAMILY MEDICINE

## 2022-12-07 PROCEDURE — 84484 ASSAY OF TROPONIN QUANT: CPT | Performed by: FAMILY MEDICINE

## 2022-12-07 PROCEDURE — 85610 PROTHROMBIN TIME: CPT | Performed by: STUDENT IN AN ORGANIZED HEALTH CARE EDUCATION/TRAINING PROGRAM

## 2022-12-07 PROCEDURE — 71045 X-RAY EXAM CHEST 1 VIEW: CPT

## 2022-12-07 PROCEDURE — 85730 THROMBOPLASTIN TIME PARTIAL: CPT | Performed by: FAMILY MEDICINE

## 2022-12-07 PROCEDURE — 93000 ELECTROCARDIOGRAM COMPLETE: CPT | Performed by: NURSE PRACTITIONER

## 2022-12-07 PROCEDURE — 99223 1ST HOSP IP/OBS HIGH 75: CPT | Performed by: INTERNAL MEDICINE

## 2022-12-07 PROCEDURE — 83735 ASSAY OF MAGNESIUM: CPT | Performed by: INTERNAL MEDICINE

## 2022-12-07 PROCEDURE — 93005 ELECTROCARDIOGRAM TRACING: CPT | Performed by: FAMILY MEDICINE

## 2022-12-07 PROCEDURE — 83036 HEMOGLOBIN GLYCOSYLATED A1C: CPT | Performed by: NURSE PRACTITIONER

## 2022-12-07 PROCEDURE — 02583ZZ DESTRUCTION OF CONDUCTION MECHANISM, PERCUTANEOUS APPROACH: ICD-10-PCS | Performed by: STUDENT IN AN ORGANIZED HEALTH CARE EDUCATION/TRAINING PROGRAM

## 2022-12-07 PROCEDURE — 80048 BASIC METABOLIC PNL TOTAL CA: CPT | Performed by: STUDENT IN AN ORGANIZED HEALTH CARE EDUCATION/TRAINING PROGRAM

## 2022-12-07 PROCEDURE — 80061 LIPID PANEL: CPT | Performed by: NURSE PRACTITIONER

## 2022-12-07 PROCEDURE — 83735 ASSAY OF MAGNESIUM: CPT | Performed by: NURSE PRACTITIONER

## 2022-12-07 PROCEDURE — 85025 COMPLETE CBC W/AUTO DIFF WBC: CPT | Performed by: STUDENT IN AN ORGANIZED HEALTH CARE EDUCATION/TRAINING PROGRAM

## 2022-12-07 PROCEDURE — 5A2204Z RESTORATION OF CARDIAC RHYTHM, SINGLE: ICD-10-PCS | Performed by: FAMILY MEDICINE

## 2022-12-07 PROCEDURE — 82962 GLUCOSE BLOOD TEST: CPT

## 2022-12-07 PROCEDURE — 92960 CARDIOVERSION ELECTRIC EXT: CPT

## 2022-12-07 PROCEDURE — 84439 ASSAY OF FREE THYROXINE: CPT | Performed by: FAMILY MEDICINE

## 2022-12-07 PROCEDURE — 85610 PROTHROMBIN TIME: CPT | Performed by: FAMILY MEDICINE

## 2022-12-07 PROCEDURE — 25010000002 ADENOSINE PER 6 MG: Performed by: FAMILY MEDICINE

## 2022-12-07 PROCEDURE — 99285 EMERGENCY DEPT VISIT HI MDM: CPT

## 2022-12-07 PROCEDURE — 83880 ASSAY OF NATRIURETIC PEPTIDE: CPT | Performed by: FAMILY MEDICINE

## 2022-12-07 RX ORDER — NITROGLYCERIN 0.4 MG/1
0.4 TABLET SUBLINGUAL
Status: DISCONTINUED | OUTPATIENT
Start: 2022-12-07 | End: 2022-12-12 | Stop reason: HOSPADM

## 2022-12-07 RX ORDER — ADENOSINE 3 MG/ML
6 INJECTION, SOLUTION INTRAVENOUS ONCE
Status: COMPLETED | OUTPATIENT
Start: 2022-12-07 | End: 2022-12-07

## 2022-12-07 RX ORDER — SODIUM CHLORIDE 0.9 % (FLUSH) 0.9 %
10 SYRINGE (ML) INJECTION AS NEEDED
Status: DISCONTINUED | OUTPATIENT
Start: 2022-12-07 | End: 2022-12-08 | Stop reason: HOSPADM

## 2022-12-07 RX ORDER — KETAMINE HCL IN NACL, ISO-OSM 100MG/10ML
1 SYRINGE (ML) INJECTION ONCE
Status: COMPLETED | OUTPATIENT
Start: 2022-12-07 | End: 2022-12-07

## 2022-12-07 RX ORDER — ACETAMINOPHEN 325 MG/1
650 TABLET ORAL EVERY 6 HOURS PRN
Status: DISCONTINUED | OUTPATIENT
Start: 2022-12-07 | End: 2022-12-12 | Stop reason: HOSPADM

## 2022-12-07 RX ORDER — HYDROCODONE BITARTRATE AND ACETAMINOPHEN 5; 325 MG/1; MG/1
TABLET ORAL
Status: ON HOLD | COMMUNITY
Start: 2022-10-28 | End: 2022-12-08

## 2022-12-07 RX ORDER — DILTIAZEM HYDROCHLORIDE 120 MG/1
120 CAPSULE, COATED, EXTENDED RELEASE ORAL
Status: DISCONTINUED | OUTPATIENT
Start: 2022-12-07 | End: 2022-12-08

## 2022-12-07 RX ORDER — SODIUM CHLORIDE 0.9 % (FLUSH) 0.9 %
10 SYRINGE (ML) INJECTION EVERY 12 HOURS SCHEDULED
Status: DISCONTINUED | OUTPATIENT
Start: 2022-12-07 | End: 2022-12-07

## 2022-12-07 RX ORDER — ADENOSINE 3 MG/ML
12 INJECTION, SOLUTION INTRAVENOUS ONCE
Status: COMPLETED | OUTPATIENT
Start: 2022-12-07 | End: 2022-12-07

## 2022-12-07 RX ORDER — ISOSORBIDE MONONITRATE 60 MG/1
60 TABLET, EXTENDED RELEASE ORAL DAILY
Status: DISCONTINUED | OUTPATIENT
Start: 2022-12-07 | End: 2022-12-12 | Stop reason: HOSPADM

## 2022-12-07 RX ORDER — SODIUM CHLORIDE 9 MG/ML
40 INJECTION, SOLUTION INTRAVENOUS AS NEEDED
Status: DISCONTINUED | OUTPATIENT
Start: 2022-12-07 | End: 2022-12-12 | Stop reason: HOSPADM

## 2022-12-07 RX ORDER — ASPIRIN 81 MG/1
81 TABLET ORAL DAILY
Status: DISCONTINUED | OUTPATIENT
Start: 2022-12-07 | End: 2022-12-12 | Stop reason: HOSPADM

## 2022-12-07 RX ORDER — SODIUM CHLORIDE 9 MG/ML
40 INJECTION, SOLUTION INTRAVENOUS AS NEEDED
Status: DISCONTINUED | OUTPATIENT
Start: 2022-12-07 | End: 2022-12-08 | Stop reason: HOSPADM

## 2022-12-07 RX ORDER — LOSARTAN POTASSIUM 50 MG/1
50 TABLET ORAL DAILY
Status: DISCONTINUED | OUTPATIENT
Start: 2022-12-07 | End: 2022-12-12 | Stop reason: HOSPADM

## 2022-12-07 RX ORDER — TAMSULOSIN HYDROCHLORIDE 0.4 MG/1
1 CAPSULE ORAL DAILY
COMMUNITY
Start: 2022-11-20 | End: 2023-01-06

## 2022-12-07 RX ORDER — RANOLAZINE 500 MG/1
500 TABLET, EXTENDED RELEASE ORAL EVERY 12 HOURS SCHEDULED
Status: DISCONTINUED | OUTPATIENT
Start: 2022-12-07 | End: 2022-12-12 | Stop reason: HOSPADM

## 2022-12-07 RX ORDER — LANOLIN ALCOHOL/MO/W.PET/CERES
3 CREAM (GRAM) TOPICAL NIGHTLY
Status: DISCONTINUED | OUTPATIENT
Start: 2022-12-07 | End: 2022-12-12 | Stop reason: HOSPADM

## 2022-12-07 RX ORDER — PROCHLORPERAZINE 25 MG/1
SUPPOSITORY RECTAL SEE ADMIN INSTRUCTIONS
Status: ON HOLD | COMMUNITY
Start: 2022-09-23 | End: 2022-12-08

## 2022-12-07 RX ORDER — PANTOPRAZOLE SODIUM 40 MG/1
40 TABLET, DELAYED RELEASE ORAL
Status: DISCONTINUED | OUTPATIENT
Start: 2022-12-08 | End: 2022-12-12 | Stop reason: HOSPADM

## 2022-12-07 RX ORDER — ATORVASTATIN CALCIUM 40 MG/1
40 TABLET, FILM COATED ORAL DAILY
Status: DISCONTINUED | OUTPATIENT
Start: 2022-12-07 | End: 2022-12-09

## 2022-12-07 RX ORDER — INSULIN GLARGINE-YFGN 100 [IU]/ML
16 INJECTION, SOLUTION SUBCUTANEOUS NIGHTLY
COMMUNITY
Start: 2022-10-11

## 2022-12-07 RX ORDER — SODIUM CHLORIDE 0.9 % (FLUSH) 0.9 %
10 SYRINGE (ML) INJECTION AS NEEDED
Status: DISCONTINUED | OUTPATIENT
Start: 2022-12-07 | End: 2022-12-12 | Stop reason: HOSPADM

## 2022-12-07 RX ORDER — PROCHLORPERAZINE 25 MG/1
SUPPOSITORY RECTAL SEE ADMIN INSTRUCTIONS
Status: ON HOLD | COMMUNITY
Start: 2022-11-21 | End: 2022-12-08

## 2022-12-07 RX ORDER — PREDNISONE 10 MG/1
10 TABLET ORAL DAILY
COMMUNITY
Start: 2022-11-01

## 2022-12-07 RX ORDER — SODIUM CHLORIDE 0.9 % (FLUSH) 0.9 %
10 SYRINGE (ML) INJECTION EVERY 12 HOURS SCHEDULED
Status: DISCONTINUED | OUTPATIENT
Start: 2022-12-07 | End: 2022-12-08 | Stop reason: HOSPADM

## 2022-12-07 RX ADMIN — Medication 81.2 MG: at 09:43

## 2022-12-07 RX ADMIN — BUSPIRONE HYDROCHLORIDE 15 MG: 5 TABLET ORAL at 20:52

## 2022-12-07 RX ADMIN — ADENOSINE 6 MG: 3 INJECTION INTRAVENOUS at 09:40

## 2022-12-07 RX ADMIN — ADENOSINE 12 MG: 3 INJECTION INTRAVENOUS at 09:42

## 2022-12-07 RX ADMIN — ACETAMINOPHEN 650 MG: 325 TABLET, FILM COATED ORAL at 17:00

## 2022-12-07 RX ADMIN — ASPIRIN 81 MG: 81 TABLET, COATED ORAL at 22:18

## 2022-12-07 RX ADMIN — TICAGRELOR 90 MG: 90 TABLET ORAL at 20:48

## 2022-12-07 RX ADMIN — Medication 3 MG: at 20:53

## 2022-12-07 RX ADMIN — ATORVASTATIN CALCIUM 40 MG: 40 TABLET, FILM COATED ORAL at 22:17

## 2022-12-07 RX ADMIN — RANOLAZINE 500 MG: 500 TABLET, FILM COATED, EXTENDED RELEASE ORAL at 20:49

## 2022-12-07 RX ADMIN — DILTIAZEM HYDROCHLORIDE 120 MG: 120 CAPSULE, COATED, EXTENDED RELEASE ORAL at 22:17

## 2022-12-07 NOTE — H&P
[facility] HISTORY AND PHYSICAL      Patient Care Team:  Tacho Eng MD as PCP - General  Tacho Eng MD as PCP - Family Medicine    Chief complaint: shortness of breath; dizziness    Subjective     Patient is a 57 y.o. male presents with coronary artery disease, hypertension, hyperlipidemia, type 2 diabetes, and myasthenia gravis. Patient presented to the cardiology office this am for a routine follow up for coronary artery disease. EKG was obtained revealing a wide complex tachycardia. Patient was hypotensive in office. He reported being nauseated over the past several days. He was taken to the ER by JAE Yusuf. He was given adenosine with no response therefore he was cardioverted. EKG post cardioversion revealed NSR with rate of 94. Patient is lying in bed comfortably. Patient is mildly anxious about what all has happened today. He reports that his shortness of breath, dizziness and nausea resolved with cardioversion. He reports that he has a headache now. He had an echo done in 9/12/2022 that showed LVEF 56-60% with no significant valvular disease. LHC 9/11/2022 showed 80-90% stenosis to mid LAD s/p successful PCI 3 x 18 mm PRANVA, 70-80% stenosis in the mid LCx treated with 2.75 x 12 mm PRANAV and 90% in the distal LCx treated with 2.5 x 12 mm PRANAV. He has been participating in cardiac rehab. He reports that he gets fatigued when he goes but denies any chest pain. He denies any decreased stamina or fatigue or the past couple of weeks. He denies any heart racing or palpitations. He denies any leg swelling, orthopnea or PND.       Review of Systems   Review of Systems   Respiratory: Negative for shortness of breath.    Cardiovascular: Negative for chest pain, palpitations and leg swelling.   All other systems reviewed and are negative.         History  Past Medical History:   Diagnosis Date   • Bilateral kidney stones    • Heart attack (HCC)    • Left ureteral stone     • Myasthenia gravis (HCC)    • Peptic ulceration    • Sleep apnea    • Type 2 diabetes mellitus with circulatory disorder, with long-term current use of insulin (HCC) 9/27/2022     Past Surgical History:   Procedure Laterality Date   • CARDIAC CATHETERIZATION Right 9/11/2022    Procedure: Left Heart Cath;  Surgeon: Tal Greenberg DO;  Location:  [facility];  Service: Cardiovascular;  Laterality: Right;   • CORONARY ANGIOPLASTY WITH STENT PLACEMENT     • HERNIA REPAIR Left     Inguinal Hernia   • SHOULDER SURGERY     • TUMOR REMOVAL       Family History   Problem Relation Age of Onset   • Hypertension Father    • No Known Problems Mother    • No Known Problems Sister    • No Known Problems Brother      Social History     Tobacco Use   • Smoking status: Never   • Smokeless tobacco: Never   Vaping Use   • Vaping Use: Never used   Substance Use Topics   • Alcohol use: Yes     Comment: occassionally   • Drug use: Never     (Not in a hospital admission)    Allergies:  Levofloxacin and Moxifloxacin    Objective     Vital Signs  Temp:  [97.4 °F (36.3 °C)] 97.4 °F (36.3 °C)  Heart Rate:  [] 108  Resp:  [12-20] 18  BP: ()/() 98/69    Physical Exam:   Physical Exam  Vitals and nursing note reviewed.   Constitutional:       General: He is not in acute distress.     Appearance: He is well-developed. He is not diaphoretic.   HENT:      Head: Normocephalic.   Eyes:      Pupils: Pupils are equal, round, and reactive to light.   Neck:      Vascular: No carotid bruit.   Cardiovascular:      Rate and Rhythm: Normal rate and regular rhythm.      Heart sounds: Normal heart sounds. No murmur heard.  Pulmonary:      Effort: Pulmonary effort is normal. No respiratory distress.      Breath sounds: Normal breath sounds.   Musculoskeletal:      Cervical back: Neck supple.   Skin:     Capillary Refill: Capillary refill takes 2 to 3 seconds.      Findings: No bruising.   Neurological:       General: No focal deficit present.      Mental Status: He is alert and oriented to person, place, and time.   Psychiatric:         Attention and Perception: Attention normal.         Mood and Affect: Mood normal.         Speech: Speech normal.         Behavior: Behavior normal.         Results Review:    I reviewed the patient's new clinical results.  Discussed with patient and Dr Gonzalez    Assessment & Plan     -SVT vs Ventricular Tachycardia: Patient found to be in Wide Complex Tachycardia in cardiology office. Transferred to the ER. Not responsive to adenosine. Patient was successfully cardioverted to NSR. Echo ordered. Will keep patient NPO after midnight for EP study tomorrow am    -Coronary artery disease: s/p PRANAV to LAD and LCx 9/11/2022 with previous PRANAV to RCA 2014. Patient remains chest pain free. Continue aspirin, and brilinta    -Hypertension: hypotensive today    -Hyperlipidemia: lipid panel in am    -Type 2 DM: A1C in am    -Myasthenia Gravis    Plan:   - admit patient to cardiology service  - EP consult  - NPO with plans for EP study in am  - add magnesium level  - lipid panel and hemoglobin A1C in am  - Echo ordered  - continue aspirin, brilinta, and losartan    I discussed the patients findings and my recommendations with patient and consulting provider.     Electronically signed by JAE Harden, 12/07/22, 2:09 PM CST.    Time: 35 minutes

## 2022-12-07 NOTE — ED PROVIDER NOTES
Subjective   History of Present Illness  This patient is a 57-year-old gentleman with a previous history of heart attack.  He was being seen by Norma in the cardiology clinic today when it was noted that he had a heart rate nearly 200 bpm and an EKG showed V. tach versus SVT with aberrancy.  He had been feeling dizzy since yesterday night.  He denies chest pain per se.  Norma accompanied him to the ER.        Review of Systems   Neurological: Positive for dizziness.   All other systems reviewed and are negative.      Past Medical History:   Diagnosis Date   • Bilateral kidney stones    • Heart attack (HCC)    • Left ureteral stone    • Myasthenia gravis (HCC)    • Peptic ulceration    • Sleep apnea    • Type 2 diabetes mellitus with circulatory disorder, with long-term current use of insulin ([facility]) 9/27/2022       Allergies   Allergen Reactions   • Levofloxacin Other (See Comments)     Causes eye problems  Double vision.     • Moxifloxacin Other (See Comments)     Causes eye problems  Double vision       Past Surgical History:   Procedure Laterality Date   • CARDIAC CATHETERIZATION Right 9/11/2022    Procedure: Left Heart Cath;  Surgeon: Tal Greenberg DO;  Location:  [facility] CATH INVASIVE LOCATION;  Service: Cardiovascular;  Laterality: Right;   • CORONARY ANGIOPLASTY WITH STENT PLACEMENT     • HERNIA REPAIR Left     Inguinal Hernia   • SHOULDER SURGERY     • TUMOR REMOVAL         Family History   Problem Relation Age of Onset   • Hypertension Father    • No Known Problems Mother    • No Known Problems Sister    • No Known Problems Brother        Social History     Socioeconomic History   • Marital status:    Tobacco Use   • Smoking status: Never   • Smokeless tobacco: Never   Vaping Use   • Vaping Use: Never used   Substance and Sexual Activity   • Alcohol use: Yes     Comment: occassionally   • Drug use: Never   • Sexual activity: Defer           Objective   Physical Exam  Vitals and nursing  note reviewed.   Constitutional:       Appearance: He is well-developed.   HENT:      Head: Normocephalic and atraumatic.      Right Ear: External ear normal.      Left Ear: External ear normal.      Nose: Nose normal.   Eyes:      Extraocular Movements: Extraocular movements intact.      Conjunctiva/sclera: Conjunctivae normal.   Cardiovascular:      Rate and Rhythm: Regular rhythm. Tachycardia present.      Pulses: Normal pulses.      Heart sounds: Normal heart sounds.   Pulmonary:      Effort: Pulmonary effort is normal.      Breath sounds: Normal breath sounds.   Abdominal:      General: Bowel sounds are normal.      Palpations: Abdomen is soft.   Musculoskeletal:         General: Normal range of motion.      Cervical back: Normal range of motion and neck supple.   Skin:     General: Skin is warm and dry.      Capillary Refill: Capillary refill takes less than 2 seconds.   Neurological:      General: No focal deficit present.      Mental Status: He is alert and oriented to person, place, and time.   Psychiatric:         Behavior: Behavior normal.         Thought Content: Thought content normal.         Judgment: Judgment normal.         Electrical Cardioversion    Date/Time: 12/7/2022 1:44 PM  Performed by: Ryne Olvera MD  Authorized by: Ryne Olvera MD     Consent:     Consent obtained:  Written    Consent given by:  Patient    Risks discussed:  Induced arrhythmia  Universal protocol:     Procedure explained and questions answered to patient or proxy's satisfaction: yes      Relevant documents present and verified: yes      Patient identity confirmed:  Verbally with patient and arm band  Pre-procedure details:     Cardioversion basis:  Emergent    Rhythm:  Ventricular tachycardia    Electrode placement:  Anterior-posterior  Attempt one:     Cardioversion mode:  Synchronous    Shock (Joules):  200    Shock outcome:  Conversion to normal sinus rhythm  Post-procedure details:     Patient  status:  Awake    Procedure completion:  Tolerated well, no immediate complications  Procedural Sedation    Date/Time: 12/7/2022 1:45 PM  Performed by: Ryne Olvera MD  Authorized by: Ryne Olvera MD     Consent:     Consent obtained:  Written    Consent given by:  Patient    Risks, benefits, and alternatives were discussed: yes      Risks discussed:  Respiratory compromise necessitating ventilatory assistance and intubation  Universal protocol:     Procedure explained and questions answered to patient or proxy's satisfaction: yes      Patient identity confirmed:  Verbally with patient and arm band  Indications:     Procedure performed:  Cardioversion    Procedure necessitating sedation performed by:  Physician performing sedation    Intended level of sedation:  Moderate  Pre-sedation assessment:     Time since last food or drink:  4    ASA classification: class 2 - patient with mild systemic disease      Mouth opening:  3 or more finger widths    Mallampati score:  I - soft palate, uvula, fauces, pillars visible    Neck mobility: normal      Pre-sedation assessments completed and reviewed: airway patency, anesthesia/sedation history, cardiovascular function, hydration status, mental status, nausea/vomiting, pain level, respiratory function and temperature    Immediate pre-procedure details:     Reviewed: vital signs and NPO status      Verified: bag valve mask available, emergency equipment available, intubation equipment available, IV patency confirmed, oxygen available and suction available    Procedure details (see MAR for exact dosages):     Preoxygenation:  Nasal cannula    Sedation:  Ketamine    Intra-procedure monitoring:  Blood pressure monitoring, cardiac monitor, continuous capnometry, continuous pulse oximetry, frequent LOC assessments and frequent vital sign checks    Intra-procedure events: none    Post-procedure details:     Procedure completion:  Tolerated well, no immediate  complications               ED Course                                           MDM  Number of Diagnoses or Management Options     Amount and/or Complexity of Data Reviewed  Clinical lab tests: reviewed and ordered  Tests in the radiology section of CPT®: reviewed and ordered  Tests in the medicine section of CPT®: reviewed and ordered  Decide to obtain previous medical records or to obtain history from someone other than the patient: yes    Patient Progress  Patient progress: stable      Final diagnoses:   Ventricular tachycardia       ED Disposition  ED Disposition     ED Disposition   Decision to Admit    Condition   --    Comment   Level of Care: Telemetry [5]   Diagnosis: Ventricular tachycardia [998020]   Admitting Physician: SARA LOBATO [[id number]]   Attending Physician: SARA LOBATO [[id number]]   Certification: I Certify That Inpatient Hospital Services Are Medically Necessary For Greater Than 2 Midnights               No follow-up provider specified.       Medication List      No changes were made to your prescriptions during this visit.       We attempted to use adenosine to convert him but this did not work.  Of note, the dosing of the adenosine was far from ideal.  It was decided to cardiovert him using electricity which was ultimately successful.  I discussed the case with Dr. Lobato who suggested we admit the patient to his care overnight and he would consult an EP specialist.  The patient is currently stable in the ED and a normal sinus rhythm.     Ryne Olvera MD  12/07/22 [id number]

## 2022-12-07 NOTE — CONSULTS
Chief Complaint  Wide complex tachycardia    Subjective        History of Present Illness    EP History:  1.  Wide complex tachycardia    Cardiology history:  1.  CAD w/ prior NSTEMI  - 9/2022: LAD and LCX PCI  2.  HTN  3.  HLD    Medical History:  1.  Myasthenia gravis  2.  DM2  3.  Kidney stones    Iftikhar Francis is a 57 y.o. male with past medical history as above who presents to the hospital for evaluation of wide-complex tachycardia.  He is not sure when the episode started, he states that he has been having some nausea and feeling poorly for the past several days though he thought that this was related to his diabetes and myasthenia gravis.  He states that he has been partaking in physical therapy following his MI and he thinks that it would have been detected on his ECG when he went for physical therapy on Monday.  He came to the clinic today for a routine clinic visit he was found to be in wide-complex tachycardia with a heart rate of approximately 190 bpm (left superior axis, right bundle, V5 transition).  He was taken to the emergency department where he underwent direct-current cardioversion after 12 mg of IV adenosine were unsuccessful.  He states that he feels back to normal now though denies feeling terrible during the event.  He denies any known triggers for the event.  He denies any ameliorating or exacerbating factors.    Review of Systems   Gastrointestinal: Positive for nausea.   Neurological: Positive for weakness.   All other systems reviewed and are negative.      Family History:  family history includes Hypertension in his father; No Known Problems in his brother, mother, and sister.    Social History:  Social History     Tobacco Use   • Smoking status: Never   • Smokeless tobacco: Never   Vaping Use   • Vaping Use: Never used   Substance Use Topics   • Alcohol use: Yes     Comment: occassionally   • Drug use: Never       Allergies:  Levofloxacin and Moxifloxacin      Objective   Vital  "Signs:  BP 98/69   Pulse 108   Temp 97.4 °F (36.3 °C) (Temporal)   Resp 18   Ht 182.9 cm (72\")   Wt 81.2 kg (179 lb)   SpO2 94%   BMI 24.28 kg/m²   Estimated body mass index is 24.28 kg/m² as calculated from the following:    Height as of this encounter: 182.9 cm (72\").    Weight as of this encounter: 81.2 kg (179 lb).      Physical Exam  Vitals reviewed.   Constitutional:       General: He is not in acute distress.  HENT:      Head: Normocephalic and atraumatic.   Eyes:      Extraocular Movements: Extraocular movements intact.      Conjunctiva/sclera: Conjunctivae normal.   Cardiovascular:      Rate and Rhythm: Normal rate and regular rhythm.   Pulmonary:      Effort: Pulmonary effort is normal.      Breath sounds: Normal breath sounds.   Abdominal:      General: There is no distension.      Tenderness: There is no abdominal tenderness.   Musculoskeletal:         General: No swelling or tenderness.      Cervical back: Normal range of motion and neck supple.   Skin:     General: Skin is warm and dry.   Neurological:      General: No focal deficit present.      Mental Status: He is alert and oriented to person, place, and time.   Psychiatric:         Mood and Affect: Mood normal.         Judgment: Judgment normal.          Result Review :  The following data was reviewed by: Mahin Cunha MD on 12/07/2022:  CMP    CMP 9/11/22 9/12/22 12/7/22   Glucose 218 (A) 225 (A) 169 (A)   BUN 20 20 36 (A)   Creatinine 0.77 0.75 (A) 1.24   Sodium 136 136 140   Potassium 4.1 3.9 4.1   Chloride 98 101 106   Calcium 8.8 8.5 (A) 8.9   Albumin   3.70   Total Bilirubin   0.2   Alkaline Phosphatase   70   AST (SGOT)   17   ALT (SGPT)   22   (A) Abnormal value       Comments are available for some flowsheets but are not being displayed.           CBC    CBC 9/11/22 9/12/22 12/7/22   WBC 8.62 10.07 17.32 (A)   RBC 4.86 4.46 4.98   Hemoglobin 14.7 13.1 14.3   Hematocrit 42.5 40.0 46.0   MCV 87.4 89.7 92.4   MCH 30.2 29.4 28.7   MCHC " 34.6 32.8 31.1 (A)   RDW 14.7 15.1 14.6   Platelets 342 329 421   (A) Abnormal value            TSH    TSH 12/7/22   TSH 1.900           Inpatient telemetry reviewed: Wide-complex tachycardia followed by cardioversion with restoration of normal sinus rhythm.  Occasional PVCs.    ECG independently reviewed and directly visualized: Wide-complex tachycardia, left superior axis, right bundle branch block morphology in V1, transition V5.  No clear A-V dissociation.  Suspicious for papillary muscle ventricular tachycardia.    Chest x-ray from today reviewed: No acute cardiopulmonary process    Echocardiogram from September reviewed: Normal EF             Assessment and Plan   Problems:  1. Wide-complex tachycardia  2. CAD with prior PCI  3. Myasthenia gravis    Iftikhar Francis is a 57 y.o. male with past medical history as above who presents to the hospital for evaluation of wide-complex tachycardia.  Review of his ECG looks most consistent with a posterior medial papillary muscle ventricular tachycardia, though SVT with preexcitation is possible but less likely.  He has a normal EF from September but has had prior PCI.  I suspect that this is likely idiopathic in nature.  I think at this time the best option would be to perform an electrophysiology study with possible ablation.  We can look for evidence of an accessory pathway at that time as well as consider ablation of ventricular tachycardia.  He had occasional PVCs on telemetry seen soon after the event, though these were not recorded on twelve-lead ECG so I am unsure if it is the same morphology as his clinical VT.  Medical therapy will be somewhat limited given his myasthenia gravis.  Once we have more information, we can discuss the risks and benefits of a possible defibrillator at that time.    I have discussed risks, benefits, and alternatives of an electrophysiology study and ablation for wide-complex tachycardia with the patient.  Alternatives discussed  include continued observation and medical management.  An electrophysiology study with ablation is an inherently high risk procedure with possible complications that include but are not limited to vascular access complications, internal bleeding, tamponade requiring pericardiocentesis or cardiac surgery, stroke, MI, embolism, myocardial injury, injury to the normal conduction system requiring a pacemaker, and ultimately death.  We also discussed that given the uncertain nature of the diagnosis, therapeutic efficacy can be variable.  Possibilities of recurrent arrhythmias and the possible need for additional procedures and/or medical therapy was discussed. Questions asked were appropriately answered.  No guarantees were made or implied.   Despite this, they would still like to proceed.    Plan:  -N.p.o. at midnight  -EP study and ablation tomorrow  -No medication changes at this time (avoid beta-blockers given myasthenia gravis)  -Maintain K and mag within normal limits  -Monitor on telemetry overnight  - Will discuss the treatment plan with Dr. Lisa HERRING Risk: High (high risk procedure decision as above)             Part of this note may be an electronic transcription/translation of spoken language to printed text using the Dragon Dictation System.

## 2022-12-07 NOTE — PROGRESS NOTES
Patient presented today for a routine follow up regarding CAD. An EKG was obtained revealing a wide complex tachycardia. Vitals were as follows: BP 92/60, , sat 98%. He noted complaints of nausea over the last several days and is unsure if symptoms were related to presence of kidney stone. He also reported waking up this morning not feeling well with shortness of breath and dizziness. Due to his significantly abnormal EKG an office visit was not completed and he was escorted by myself and our clinic nurse to the ER. A copy of his EKG was given to Dr. Olvera.       ECG 12 Lead    Date/Time: 12/7/2022 9:57 AM  Performed by: Norma Yusuf APRN  Authorized by: Norma Yusuf APRN   Comparison: compared with previous ECG from 9/27/2022  Comparison to previous ECG: Wide complex tachycardia has replaced NSR  Rhythm comments: Wide complex tachycardia   Rate: tachycardic  BPM: 190  QRS axis: left  Other findings: T wave abnormality    Clinical impression: abnormal EKG

## 2022-12-08 ENCOUNTER — ANESTHESIA EVENT (OUTPATIENT)
Dept: CARDIOLOGY | Facility: HOSPITAL | Age: 57
End: 2022-12-08

## 2022-12-08 ENCOUNTER — ANESTHESIA (OUTPATIENT)
Dept: CARDIOLOGY | Facility: HOSPITAL | Age: 57
End: 2022-12-08

## 2022-12-08 ENCOUNTER — APPOINTMENT (OUTPATIENT)
Dept: CARDIOLOGY | Facility: HOSPITAL | Age: 57
End: 2022-12-08

## 2022-12-08 VITALS — DIASTOLIC BLOOD PRESSURE: 84 MMHG | OXYGEN SATURATION: 97 % | HEART RATE: 90 BPM | SYSTOLIC BLOOD PRESSURE: 105 MMHG

## 2022-12-08 LAB
ACT BLD: 257 SECONDS (ref 82–152)
ACT BLD: 323 SECONDS (ref 82–152)
ACT BLD: 329 SECONDS (ref 82–152)
ACT BLD: 329 SECONDS (ref 82–152)
ACT BLD: 366 SECONDS (ref 82–152)
ACT BLD: 371 SECONDS (ref 82–152)
ACT BLD: 468 SECONDS (ref 82–152)
ANION GAP SERPL CALCULATED.3IONS-SCNC: 13 MMOL/L (ref 5–15)
BASOPHILS # BLD AUTO: 0.03 10*3/MM3 (ref 0–0.2)
BASOPHILS NFR BLD AUTO: 0.3 % (ref 0–1.5)
BH CV ECHO MEAS - AO MAX PG: 4.5 MMHG
BH CV ECHO MEAS - AO MEAN PG: 2 MMHG
BH CV ECHO MEAS - AO ROOT DIAM: 3.3 CM
BH CV ECHO MEAS - AO V2 MAX: 106 CM/SEC
BH CV ECHO MEAS - AO V2 VTI: 18.5 CM
BH CV ECHO MEAS - AVA(I,D): 2.07 CM2
BH CV ECHO MEAS - EDV(CUBED): 108.5 ML
BH CV ECHO MEAS - EDV(MOD-SP2): 86.2 ML
BH CV ECHO MEAS - EDV(MOD-SP4): 91 ML
BH CV ECHO MEAS - EF(MOD-SP2): 42.8 %
BH CV ECHO MEAS - EF(MOD-SP4): 37.4 %
BH CV ECHO MEAS - ESV(CUBED): 35 ML
BH CV ECHO MEAS - ESV(MOD-SP2): 49.3 ML
BH CV ECHO MEAS - ESV(MOD-SP4): 57 ML
BH CV ECHO MEAS - FS: 31.4 %
BH CV ECHO MEAS - IVS/LVPW: 1.04 CM
BH CV ECHO MEAS - IVSD: 0.92 CM
BH CV ECHO MEAS - LA DIMENSION: 2.9 CM
BH CV ECHO MEAS - LAT PEAK E' VEL: 8.9 CM/SEC
BH CV ECHO MEAS - LV DIASTOLIC VOL/BSA (35-75): 44.8 CM2
BH CV ECHO MEAS - LV MASS(C)D: 146.3 GRAMS
BH CV ECHO MEAS - LV MAX PG: 2.44 MMHG
BH CV ECHO MEAS - LV MEAN PG: 1 MMHG
BH CV ECHO MEAS - LV SYSTOLIC VOL/BSA (12-30): 28 CM2
BH CV ECHO MEAS - LV V1 MAX: 77.3 CM/SEC
BH CV ECHO MEAS - LV V1 VTI: 12.2 CM
BH CV ECHO MEAS - LVIDD: 4.8 CM
BH CV ECHO MEAS - LVIDS: 3.3 CM
BH CV ECHO MEAS - LVOT AREA: 3.1 CM2
BH CV ECHO MEAS - LVOT DIAM: 2 CM
BH CV ECHO MEAS - LVPWD: 0.88 CM
BH CV ECHO MEAS - MED PEAK E' VEL: 5.4 CM/SEC
BH CV ECHO MEAS - MR MAX PG: 15.1 MMHG
BH CV ECHO MEAS - MR MAX VEL: 194 CM/SEC
BH CV ECHO MEAS - MV A MAX VEL: 81 CM/SEC
BH CV ECHO MEAS - MV DEC SLOPE: 382 CM/SEC2
BH CV ECHO MEAS - MV DEC TIME: 0.14 MSEC
BH CV ECHO MEAS - MV E MAX VEL: 63.9 CM/SEC
BH CV ECHO MEAS - MV E/A: 0.79
BH CV ECHO MEAS - MV P1/2T: 48.9 MSEC
BH CV ECHO MEAS - MVA(P1/2T): 4.5 CM2
BH CV ECHO MEAS - PA V2 MAX: 74 CM/SEC
BH CV ECHO MEAS - PI END-D VEL: 143 CM/SEC
BH CV ECHO MEAS - RAP SYSTOLE: 5 MMHG
BH CV ECHO MEAS - RVSP: 17.1 MMHG
BH CV ECHO MEAS - SI(MOD-SP2): 18.2 ML/M2
BH CV ECHO MEAS - SI(MOD-SP4): 16.7 ML/M2
BH CV ECHO MEAS - SV(LVOT): 38.3 ML
BH CV ECHO MEAS - SV(MOD-SP2): 36.9 ML
BH CV ECHO MEAS - SV(MOD-SP4): 34 ML
BH CV ECHO MEAS - TR MAX PG: 12.1 MMHG
BH CV ECHO MEAS - TR MAX VEL: 174 CM/SEC
BH CV ECHO MEASUREMENTS AVERAGE E/E' RATIO: 8.94
BH CV XLRA - TDI S': 10.7 CM/SEC
BUN SERPL-MCNC: 26 MG/DL (ref 6–20)
BUN/CREAT SERPL: 25.2 (ref 7–25)
CALCIUM SPEC-SCNC: 9.1 MG/DL (ref 8.6–10.5)
CHLORIDE SERPL-SCNC: 104 MMOL/L (ref 98–107)
CO2 SERPL-SCNC: 24 MMOL/L (ref 22–29)
CREAT SERPL-MCNC: 1.03 MG/DL (ref 0.76–1.27)
DEPRECATED RDW RBC AUTO: 49.8 FL (ref 37–54)
EGFRCR SERPLBLD CKD-EPI 2021: 84.7 ML/MIN/1.73
EOSINOPHIL # BLD AUTO: 0.14 10*3/MM3 (ref 0–0.4)
EOSINOPHIL NFR BLD AUTO: 1.6 % (ref 0.3–6.2)
ERYTHROCYTE [DISTWIDTH] IN BLOOD BY AUTOMATED COUNT: 14.7 % (ref 12.3–15.4)
GLUCOSE BLDC GLUCOMTR-MCNC: 106 MG/DL (ref 70–130)
GLUCOSE BLDC GLUCOMTR-MCNC: 127 MG/DL (ref 70–130)
GLUCOSE SERPL-MCNC: 99 MG/DL (ref 65–99)
HCT VFR BLD AUTO: 46.3 % (ref 37.5–51)
HGB BLD-MCNC: 14.4 G/DL (ref 13–17.7)
IMM GRANULOCYTES # BLD AUTO: 0.05 10*3/MM3 (ref 0–0.05)
IMM GRANULOCYTES NFR BLD AUTO: 0.6 % (ref 0–0.5)
LYMPHOCYTES # BLD AUTO: 2.08 10*3/MM3 (ref 0.7–3.1)
LYMPHOCYTES NFR BLD AUTO: 23.5 % (ref 19.6–45.3)
MAXIMAL PREDICTED HEART RATE: 163 BPM
MCH RBC QN AUTO: 28.6 PG (ref 26.6–33)
MCHC RBC AUTO-ENTMCNC: 31.1 G/DL (ref 31.5–35.7)
MCV RBC AUTO: 92 FL (ref 79–97)
MONOCYTES # BLD AUTO: 0.82 10*3/MM3 (ref 0.1–0.9)
MONOCYTES NFR BLD AUTO: 9.3 % (ref 5–12)
NEUTROPHILS NFR BLD AUTO: 5.74 10*3/MM3 (ref 1.7–7)
NEUTROPHILS NFR BLD AUTO: 64.7 % (ref 42.7–76)
NRBC BLD AUTO-RTO: 0 /100 WBC (ref 0–0.2)
PLATELET # BLD AUTO: 405 10*3/MM3 (ref 140–450)
PMV BLD AUTO: 10 FL (ref 6–12)
POTASSIUM SERPL-SCNC: 3.9 MMOL/L (ref 3.5–5.2)
RBC # BLD AUTO: 5.03 10*6/MM3 (ref 4.14–5.8)
SODIUM SERPL-SCNC: 141 MMOL/L (ref 136–145)
STRESS TARGET HR: 139 BPM
WBC NRBC COR # BLD: 8.86 10*3/MM3 (ref 3.4–10.8)

## 2022-12-08 PROCEDURE — 25010000002 PROTAMINE SULFATE PER 10 MG: Performed by: NURSE ANESTHETIST, CERTIFIED REGISTERED

## 2022-12-08 PROCEDURE — 93462 L HRT CATH TRNSPTL PUNCTURE: CPT | Performed by: STUDENT IN AN ORGANIZED HEALTH CARE EDUCATION/TRAINING PROGRAM

## 2022-12-08 PROCEDURE — 93662 INTRACARDIAC ECG (ICE): CPT | Performed by: STUDENT IN AN ORGANIZED HEALTH CARE EDUCATION/TRAINING PROGRAM

## 2022-12-08 PROCEDURE — C1894 INTRO/SHEATH, NON-LASER: HCPCS | Performed by: STUDENT IN AN ORGANIZED HEALTH CARE EDUCATION/TRAINING PROGRAM

## 2022-12-08 PROCEDURE — 93654 COMPRE EP EVAL TX VT: CPT | Performed by: STUDENT IN AN ORGANIZED HEALTH CARE EDUCATION/TRAINING PROGRAM

## 2022-12-08 PROCEDURE — C1732 CATH, EP, DIAG/ABL, 3D/VECT: HCPCS | Performed by: STUDENT IN AN ORGANIZED HEALTH CARE EDUCATION/TRAINING PROGRAM

## 2022-12-08 PROCEDURE — C1766 INTRO/SHEATH,STRBLE,NON-PEEL: HCPCS | Performed by: STUDENT IN AN ORGANIZED HEALTH CARE EDUCATION/TRAINING PROGRAM

## 2022-12-08 PROCEDURE — 25010000002 PROPOFOL 10 MG/ML EMULSION: Performed by: NURSE ANESTHETIST, CERTIFIED REGISTERED

## 2022-12-08 PROCEDURE — 93306 TTE W/DOPPLER COMPLETE: CPT

## 2022-12-08 PROCEDURE — 99232 SBSQ HOSP IP/OBS MODERATE 35: CPT | Performed by: INTERNAL MEDICINE

## 2022-12-08 PROCEDURE — C1760 CLOSURE DEV, VASC: HCPCS | Performed by: STUDENT IN AN ORGANIZED HEALTH CARE EDUCATION/TRAINING PROGRAM

## 2022-12-08 PROCEDURE — C1730 CATH, EP, 19 OR FEW ELECT: HCPCS | Performed by: STUDENT IN AN ORGANIZED HEALTH CARE EDUCATION/TRAINING PROGRAM

## 2022-12-08 PROCEDURE — 93356 MYOCRD STRAIN IMG SPCKL TRCK: CPT

## 2022-12-08 PROCEDURE — 25010000002 FENTANYL CITRATE (PF) 100 MCG/2ML SOLUTION: Performed by: NURSE ANESTHETIST, CERTIFIED REGISTERED

## 2022-12-08 PROCEDURE — 80048 BASIC METABOLIC PNL TOTAL CA: CPT | Performed by: NURSE PRACTITIONER

## 2022-12-08 PROCEDURE — 85025 COMPLETE CBC W/AUTO DIFF WBC: CPT | Performed by: NURSE PRACTITIONER

## 2022-12-08 PROCEDURE — C1759 CATH, INTRA ECHOCARDIOGRAPHY: HCPCS | Performed by: STUDENT IN AN ORGANIZED HEALTH CARE EDUCATION/TRAINING PROGRAM

## 2022-12-08 PROCEDURE — 82962 GLUCOSE BLOOD TEST: CPT

## 2022-12-08 PROCEDURE — 93306 TTE W/DOPPLER COMPLETE: CPT | Performed by: INTERNAL MEDICINE

## 2022-12-08 PROCEDURE — 25010000002 HEPARIN (PORCINE) PER 1000 UNITS: Performed by: NURSE ANESTHETIST, CERTIFIED REGISTERED

## 2022-12-08 PROCEDURE — 85347 COAGULATION TIME ACTIVATED: CPT

## 2022-12-08 RX ORDER — DROPERIDOL 2.5 MG/ML
0.62 INJECTION, SOLUTION INTRAMUSCULAR; INTRAVENOUS ONCE AS NEEDED
Status: DISCONTINUED | OUTPATIENT
Start: 2022-12-08 | End: 2022-12-12 | Stop reason: HOSPADM

## 2022-12-08 RX ORDER — FENTANYL CITRATE 50 UG/ML
25 INJECTION, SOLUTION INTRAMUSCULAR; INTRAVENOUS
Status: DISCONTINUED | OUTPATIENT
Start: 2022-12-08 | End: 2022-12-12 | Stop reason: HOSPADM

## 2022-12-08 RX ORDER — PROTAMINE SULFATE 10 MG/ML
INJECTION, SOLUTION INTRAVENOUS AS NEEDED
Status: DISCONTINUED | OUTPATIENT
Start: 2022-12-08 | End: 2022-12-08 | Stop reason: SURG

## 2022-12-08 RX ORDER — DILTIAZEM HYDROCHLORIDE 120 MG/1
120 CAPSULE, COATED, EXTENDED RELEASE ORAL NIGHTLY
Status: DISCONTINUED | OUTPATIENT
Start: 2022-12-08 | End: 2022-12-09

## 2022-12-08 RX ORDER — NITROGLYCERIN 0.4 MG/1
0.4 TABLET SUBLINGUAL
COMMUNITY

## 2022-12-08 RX ORDER — BUPIVACAINE HCL/0.9 % NACL/PF 0.125 %
PLASTIC BAG, INJECTION (ML) EPIDURAL AS NEEDED
Status: DISCONTINUED | OUTPATIENT
Start: 2022-12-08 | End: 2022-12-08 | Stop reason: SURG

## 2022-12-08 RX ORDER — OXYCODONE AND ACETAMINOPHEN 10; 325 MG/1; MG/1
1 TABLET ORAL ONCE AS NEEDED
Status: COMPLETED | OUTPATIENT
Start: 2022-12-08 | End: 2022-12-08

## 2022-12-08 RX ORDER — ONDANSETRON 2 MG/ML
4 INJECTION INTRAMUSCULAR; INTRAVENOUS ONCE AS NEEDED
Status: DISCONTINUED | OUTPATIENT
Start: 2022-12-08 | End: 2022-12-12 | Stop reason: HOSPADM

## 2022-12-08 RX ORDER — OXYCODONE AND ACETAMINOPHEN 7.5; 325 MG/1; MG/1
2 TABLET ORAL EVERY 4 HOURS PRN
Status: DISCONTINUED | OUTPATIENT
Start: 2022-12-08 | End: 2022-12-12 | Stop reason: HOSPADM

## 2022-12-08 RX ORDER — ISOSORBIDE MONONITRATE 60 MG/1
60 TABLET, EXTENDED RELEASE ORAL DAILY
COMMUNITY
End: 2023-01-06

## 2022-12-08 RX ORDER — PROPOFOL 10 MG/ML
VIAL (ML) INTRAVENOUS AS NEEDED
Status: DISCONTINUED | OUTPATIENT
Start: 2022-12-08 | End: 2022-12-08 | Stop reason: SURG

## 2022-12-08 RX ORDER — FENTANYL CITRATE 50 UG/ML
INJECTION, SOLUTION INTRAMUSCULAR; INTRAVENOUS AS NEEDED
Status: DISCONTINUED | OUTPATIENT
Start: 2022-12-08 | End: 2022-12-08 | Stop reason: SURG

## 2022-12-08 RX ORDER — FLUMAZENIL 0.1 MG/ML
0.2 INJECTION INTRAVENOUS AS NEEDED
Status: DISCONTINUED | OUTPATIENT
Start: 2022-12-08 | End: 2022-12-12 | Stop reason: HOSPADM

## 2022-12-08 RX ORDER — NALOXONE HCL 0.4 MG/ML
0.4 VIAL (ML) INJECTION AS NEEDED
Status: DISCONTINUED | OUTPATIENT
Start: 2022-12-08 | End: 2022-12-12 | Stop reason: HOSPADM

## 2022-12-08 RX ORDER — LIDOCAINE HYDROCHLORIDE 20 MG/ML
INJECTION, SOLUTION INFILTRATION; PERINEURAL
Status: DISCONTINUED | OUTPATIENT
Start: 2022-12-08 | End: 2022-12-09 | Stop reason: HOSPADM

## 2022-12-08 RX ORDER — ATROPINE SULFATE 1 MG/ML
0.5 INJECTION, SOLUTION INTRAMUSCULAR; INTRAVENOUS; SUBCUTANEOUS ONCE AS NEEDED
Status: DISCONTINUED | OUTPATIENT
Start: 2022-12-08 | End: 2022-12-12 | Stop reason: HOSPADM

## 2022-12-08 RX ORDER — LABETALOL HYDROCHLORIDE 5 MG/ML
5 INJECTION, SOLUTION INTRAVENOUS
Status: DISCONTINUED | OUTPATIENT
Start: 2022-12-08 | End: 2022-12-12 | Stop reason: HOSPADM

## 2022-12-08 RX ORDER — AMIODARONE HYDROCHLORIDE 200 MG/1
400 TABLET ORAL EVERY 12 HOURS SCHEDULED
Status: DISCONTINUED | OUTPATIENT
Start: 2022-12-08 | End: 2022-12-12 | Stop reason: HOSPADM

## 2022-12-08 RX ORDER — HEPARIN SODIUM 1000 [USP'U]/ML
INJECTION, SOLUTION INTRAVENOUS; SUBCUTANEOUS AS NEEDED
Status: DISCONTINUED | OUTPATIENT
Start: 2022-12-08 | End: 2022-12-08 | Stop reason: SURG

## 2022-12-08 RX ORDER — RANOLAZINE 500 MG/1
500 TABLET, EXTENDED RELEASE ORAL 2 TIMES DAILY
COMMUNITY

## 2022-12-08 RX ORDER — IBUPROFEN 600 MG/1
600 TABLET ORAL ONCE AS NEEDED
Status: DISCONTINUED | OUTPATIENT
Start: 2022-12-08 | End: 2022-12-12 | Stop reason: HOSPADM

## 2022-12-08 RX ADMIN — RANOLAZINE 500 MG: 500 TABLET, FILM COATED, EXTENDED RELEASE ORAL at 10:19

## 2022-12-08 RX ADMIN — Medication 200 MCG: at 16:29

## 2022-12-08 RX ADMIN — Medication 200 MCG: at 16:08

## 2022-12-08 RX ADMIN — SODIUM CHLORIDE 100 ML/HR: 9 INJECTION, SOLUTION INTRAVENOUS at 14:30

## 2022-12-08 RX ADMIN — Medication 200 MCG: at 14:56

## 2022-12-08 RX ADMIN — HEPARIN SODIUM 6500 UNITS: 1000 INJECTION, SOLUTION INTRAVENOUS; SUBCUTANEOUS at 16:15

## 2022-12-08 RX ADMIN — Medication 10 ML: at 00:03

## 2022-12-08 RX ADMIN — Medication 200 MCG: at 14:46

## 2022-12-08 RX ADMIN — RANOLAZINE 500 MG: 500 TABLET, FILM COATED, EXTENDED RELEASE ORAL at 20:14

## 2022-12-08 RX ADMIN — Medication 10 ML: at 10:11

## 2022-12-08 RX ADMIN — HEPARIN SODIUM 5000 UNITS: 1000 INJECTION, SOLUTION INTRAVENOUS; SUBCUTANEOUS at 17:35

## 2022-12-08 RX ADMIN — Medication 100 MCG: at 16:19

## 2022-12-08 RX ADMIN — HEPARIN SODIUM 5000 UNITS: 1000 INJECTION, SOLUTION INTRAVENOUS; SUBCUTANEOUS at 16:34

## 2022-12-08 RX ADMIN — BUSPIRONE HYDROCHLORIDE 15 MG: 5 TABLET ORAL at 10:19

## 2022-12-08 RX ADMIN — PROTAMINE SULFATE 50 MG: 10 INJECTION, SOLUTION INTRAVENOUS at 18:07

## 2022-12-08 RX ADMIN — Medication 200 MCG: at 16:35

## 2022-12-08 RX ADMIN — HEPARIN SODIUM 10000 UNITS: 1000 INJECTION, SOLUTION INTRAVENOUS; SUBCUTANEOUS at 15:53

## 2022-12-08 RX ADMIN — AMIODARONE HYDROCHLORIDE 400 MG: 200 TABLET ORAL at 20:47

## 2022-12-08 RX ADMIN — Medication 400 MCG: at 15:04

## 2022-12-08 RX ADMIN — PANTOPRAZOLE SODIUM 40 MG: 40 TABLET, DELAYED RELEASE ORAL at 06:49

## 2022-12-08 RX ADMIN — DILTIAZEM HYDROCHLORIDE 120 MG: 120 CAPSULE, COATED, EXTENDED RELEASE ORAL at 20:14

## 2022-12-08 RX ADMIN — Medication 3 MG: at 20:14

## 2022-12-08 RX ADMIN — OXYCODONE AND ACETAMINOPHEN 1 TABLET: 325; 10 TABLET ORAL at 20:14

## 2022-12-08 RX ADMIN — MAGNESIUM GLUCONATE 500 MG ORAL TABLET 400 MG: 500 TABLET ORAL at 10:18

## 2022-12-08 RX ADMIN — HEPARIN SODIUM 15000 UNITS: 1000 INJECTION, SOLUTION INTRAVENOUS; SUBCUTANEOUS at 15:34

## 2022-12-08 RX ADMIN — PROPOFOL 50 MG: 10 INJECTION, EMULSION INTRAVENOUS at 14:31

## 2022-12-08 RX ADMIN — Medication 200 MCG: at 16:15

## 2022-12-08 RX ADMIN — BUSPIRONE HYDROCHLORIDE 15 MG: 5 TABLET ORAL at 20:14

## 2022-12-08 RX ADMIN — Medication 200 MCG: at 14:38

## 2022-12-08 RX ADMIN — LINAGLIPTIN 5 MG: 5 TABLET, FILM COATED ORAL at 10:26

## 2022-12-08 RX ADMIN — ISOSORBIDE MONONITRATE 60 MG: 60 TABLET, EXTENDED RELEASE ORAL at 10:19

## 2022-12-08 RX ADMIN — PROPOFOL 80 MCG/KG/MIN: 10 INJECTION, EMULSION INTRAVENOUS at 14:31

## 2022-12-08 RX ADMIN — TICAGRELOR 90 MG: 90 TABLET ORAL at 10:20

## 2022-12-08 RX ADMIN — TICAGRELOR 90 MG: 90 TABLET ORAL at 20:14

## 2022-12-08 RX ADMIN — OXYCODONE HYDROCHLORIDE AND ACETAMINOPHEN 2 TABLET: 7.5; 325 TABLET ORAL at 23:51

## 2022-12-08 RX ADMIN — LOSARTAN POTASSIUM 50 MG: 50 TABLET, FILM COATED ORAL at 10:19

## 2022-12-08 RX ADMIN — FENTANYL CITRATE 100 MCG: 50 INJECTION, SOLUTION INTRAMUSCULAR; INTRAVENOUS at 14:31

## 2022-12-08 RX ADMIN — HEPARIN SODIUM 5000 UNITS: 1000 INJECTION, SOLUTION INTRAVENOUS; SUBCUTANEOUS at 16:53

## 2022-12-08 NOTE — PROGRESS NOTES
"Chief Complaint  Wide complex tachycardia    Subjective        History of Present Illness  EP History:  1.  Wide complex tachycardia  2.  Frequent PVCs     Cardiology history:  1.  CAD w/ prior NSTEMI  - 9/2022: LAD and LCX PCI  2.  HTN  3.  HLD     Medical History:  1.  Myasthenia gravis  2.  DM2  3.  Kidney stones    Patient ID:  Iftikhar Francis is a 57 y.o. male with past medical history as above who EP is following for wide complex tachycardia.    Interval history:  He did not have any further wide-complex tachycardia overnight.  He feels about the same as his usual baseline status.  Thank you    Objective   Vital Signs:  /76 (BP Location: Right arm, Patient Position: Lying)   Pulse 105   Temp 98.6 °F (37 °C) (Temporal)   Resp 18   Ht 182.9 cm (72\")   Wt 81.2 kg (179 lb)   SpO2 96%   BMI 24.28 kg/m²   Estimated body mass index is 24.28 kg/m² as calculated from the following:    Height as of this encounter: 182.9 cm (72\").    Weight as of this encounter: 81.2 kg (179 lb).      Physical Exam  Vitals reviewed.   Constitutional:       General: He is not in acute distress.  HENT:      Head: Normocephalic and atraumatic.   Eyes:      Extraocular Movements: Extraocular movements intact.      Conjunctiva/sclera: Conjunctivae normal.   Cardiovascular:      Rate and Rhythm: Normal rate. Rhythm irregular.   Pulmonary:      Effort: Pulmonary effort is normal.      Breath sounds: Normal breath sounds.   Abdominal:      General: There is no distension.      Tenderness: There is no abdominal tenderness.   Musculoskeletal:         General: No swelling or tenderness.      Cervical back: Normal range of motion and neck supple.   Skin:     General: Skin is warm and dry.   Neurological:      General: No focal deficit present.      Mental Status: He is alert and oriented to person, place, and time.   Psychiatric:         Mood and Affect: Mood normal.         Judgment: Judgment normal.          Result Review " :  Inpatient telemetry independently reviewed and directly visualized: Sinus rhythm, frequent PVCs with 2 different morphologies, 1 outflow tract, the other possibly related to the VT               Assessment and Plan   Problems:  1. Wide-complex tachycardia  2. CAD with prior PCI  3. Myasthenia gravis    Iftikhar Francis is a 57 y.o. male with past medical history as above who EP is following for wide-complex tachycardia.  We will plan for an EP study to better understand the rhythm disturbance as well as evaluate for VT inducibility.    I have discussed risks, benefits, and alternatives of an electrophysiology study and ablation for wide-complex tachycardia with the patient.  Alternatives discussed include continued observation and medical management.  An electrophysiology study with ablation is an inherently high risk procedure with possible complications that include but are not limited to vascular access complications, internal bleeding, tamponade requiring pericardiocentesis or cardiac surgery, stroke, MI, embolism, myocardial injury, injury to the normal conduction system requiring a pacemaker, and ultimately death.  We also discussed that given the uncertain nature of the diagnosis, therapeutic efficacy can be variable.  Possibilities of recurrent arrhythmias and the possible need for additional procedures and/or medical therapy was discussed. Questions asked were appropriately answered.  No guarantees were made or implied.     Despite this, they would still like to proceed.      Plan:  -Proceed with EP study  -No medication changes at this time             Part of this note may be an electronic transcription/translation of spoken language to printed text using the Dragon Dictation System.

## 2022-12-08 NOTE — PROGRESS NOTES
[facility] HEART GROUP -  Progress Note     LOS: 1 day   Patient Care Team:  Tacho Eng MD as PCP - General  Tacho Eng MD as PCP - Family Medicine    Chief Complaint: Tachycardia    Subjective     Interval History:     Patient Complaints: Patient is lying in bed comfortably this am. He denies any chest pain. He reports that he had some dizziness when he stood to go to the restroom this am. He denies any shortness of breath, or nausea. He denies any heart racing or palpitations. Patient denies any leg swelling, orthopnea or PND.    Review of Systems:     Review of Systems   Respiratory: Negative for shortness of breath.    Cardiovascular: Negative for chest pain, palpitations and leg swelling.   All other systems reviewed and are negative.    Objective     Vital Sign Min/Max for last 24 hours  Temp  Min: 97.9 °F (36.6 °C)  Max: 98.3 °F (36.8 °C)   BP  Min: 83/72  Max: 145/90   Pulse  Min: 85  Max: 113   Resp  Min: 13  Max: 20   SpO2  Min: 93 %  Max: 98 %   No data recorded   No data recorded         12/07/22  0933   Weight: 81.2 kg (179 lb)     No intake or output data in the 24 hours ending 12/08/22 1023/o    Telemetry: sinus rhythm this am with current rate 96; rates  overnight      Physical Exam:    Vitals reviewed.   Constitutional:       General: Not in acute distress.     Appearance: Normal appearance. Well-developed.   Eyes:      Pupils: Pupils are equal, round, and reactive to light.   HENT:      Head: Normocephalic and atraumatic.      Nose: Nose normal.   Neck:      Vascular: No carotid bruit.   Pulmonary:      Effort: Pulmonary effort is normal. No respiratory distress.      Breath sounds: Normal breath sounds. No wheezing. No rales.   Cardiovascular:      Normal rate. Regular rhythm.      Murmurs: There is no murmur.   Edema:     Peripheral edema absent.   Abdominal:      General: There is no distension.      Palpations: Abdomen is soft.   Musculoskeletal:  Normal range of motion.      Cervical back: Normal range of motion and neck supple. Skin:     General: Skin is warm.      Findings: No erythema or rash.   Neurological:      General: No focal deficit present.      Mental Status: Alert and oriented to person, place, and time.   Psychiatric:         Attention and Perception: Attention normal.         Mood and Affect: Mood normal.         Speech: Speech normal.         Behavior: Behavior normal.         Thought Content: Thought content normal.         Judgment: Judgment normal.       Results Review:   Lab Results (last 72 hours)     Procedure Component Value Units Date/Time    POC Glucose Once [[id number]]  (Normal) Collected: 12/08/22 0738    Specimen: Blood Updated: 12/08/22 0749     Glucose 106 mg/dL      Comment: : [id number] Perez SarahMeter ID: [id number]       Basic Metabolic Panel [[id number]]  (Abnormal) Collected: 12/08/22 0253    Specimen: Blood Updated: 12/08/22 0453     Glucose 99 mg/dL      BUN 26 mg/dL      Creatinine 1.03 mg/dL      Sodium 141 mmol/L      Potassium 3.9 mmol/L      Comment: Specimen hemolyzed.  Results may be affected.        Chloride 104 mmol/L      CO2 24.0 mmol/L      Calcium 9.1 mg/dL      BUN/Creatinine Ratio 25.2     Anion Gap 13.0 mmol/L      eGFR 84.7 mL/min/1.73      Comment: National Kidney Foundation and American Society of Nephrology (ASN) Task Force recommended calculation based on the Chronic Kidney Disease Epidemiology Collaboration (CKD-EPI) equation refit without adjustment for race.       Narrative:      GFR Normal >60  Chronic Kidney Disease <60  Kidney Failure <15      CBC Auto Differential [[id number]]  (Abnormal) Collected: 12/08/22 0253    Specimen: Blood Updated: 12/08/22 0430     WBC 8.86 10*3/mm3      RBC 5.03 10*6/mm3      Hemoglobin 14.4 g/dL      Hematocrit 46.3 %      MCV 92.0 fL      MCH 28.6 pg      MCHC 31.1 g/dL      RDW 14.7 %      RDW-SD 49.8 fl      MPV 10.0 fL      Platelets 405 10*3/mm3       Neutrophil % 64.7 %      Lymphocyte % 23.5 %      Monocyte % 9.3 %      Eosinophil % 1.6 %      Basophil % 0.3 %      Immature Grans % 0.6 %      Neutrophils, Absolute 5.74 10*3/mm3      Lymphocytes, Absolute 2.08 10*3/mm3      Monocytes, Absolute 0.82 10*3/mm3      Eosinophils, Absolute 0.14 10*3/mm3      Basophils, Absolute 0.03 10*3/mm3      Immature Grans, Absolute 0.05 10*3/mm3      nRBC 0.0 /100 WBC     POC Glucose Once [[id number]]  (Normal) Collected: 12/07/22 2309    Specimen: Blood Updated: 12/07/22 2321     Glucose 108 mg/dL      Comment: : [id number] Renae Ruby ID: [id number]       Hemoglobin A1c [[id number]]  (Abnormal) Collected: 12/07/22 2240    Specimen: Blood Updated: 12/07/22 2318     Hemoglobin A1C 5.80 %     Narrative:      Hemoglobin A1C Ranges:    Increased Risk for Diabetes  5.7% to 6.4%  Diabetes                     >= 6.5%  Diabetic Goal                < 7.0%    Basic Metabolic Panel [[id number]]  (Abnormal) Collected: 12/07/22 2240    Specimen: Blood Updated: 12/07/22 2318     Glucose 134 mg/dL      BUN 27 mg/dL      Creatinine 1.16 mg/dL      Sodium 137 mmol/L      Potassium 4.1 mmol/L      Comment: Specimen hemolyzed.  Results may be affected.        Chloride 104 mmol/L      CO2 22.0 mmol/L      Calcium 9.1 mg/dL      BUN/Creatinine Ratio 23.3     Anion Gap 11.0 mmol/L      eGFR 73.5 mL/min/1.73      Comment: National Kidney Foundation and American Society of Nephrology (ASN) Task Force recommended calculation based on the Chronic Kidney Disease Epidemiology Collaboration (CKD-EPI) equation refit without adjustment for race.       Narrative:      GFR Normal >60  Chronic Kidney Disease <60  Kidney Failure <15      Lipid Panel [[id number]]  (Abnormal) Collected: 12/07/22 2240    Specimen: Blood Updated: 12/07/22 2316     Total Cholesterol 188 mg/dL      Triglycerides 478 mg/dL      HDL Cholesterol 29 mg/dL      LDL Cholesterol  82 mg/dL      VLDL Cholesterol 77 mg/dL      LDL/HDL  Ratio 2.19    Narrative:      Cholesterol Reference Ranges  (U.S. Department of Health and Human Services ATP III Classifications)    Desirable          <200 mg/dL  Borderline High    200-239 mg/dL  High Risk          >240 mg/dL      Triglyceride Reference Ranges  (U.S. Department of Health and Human Services ATP III Classifications)    Normal           <150 mg/dL  Borderline High  150-199 mg/dL  High             200-499 mg/dL  Very High        >500 mg/dL    HDL Reference Ranges  (U.S. Department of Health and Human Services ATP III Classifications)    Low     <40 mg/dl (major risk factor for CHD)  High    >60 mg/dl ('negative' risk factor for CHD)        LDL Reference Ranges  (U.S. Department of Health and Human Services ATP III Classifications)    Optimal          <100 mg/dL  Near Optimal     100-129 mg/dL  Borderline High  130-159 mg/dL  High             160-189 mg/dL  Very High        >189 mg/dL    Magnesium [[id number]]  (Normal) Collected: 12/07/22 2240    Specimen: Blood Updated: 12/07/22 2308     Magnesium 2.1 mg/dL     Protime-INR [[id number]]  (Normal) Collected: 12/07/22 2240    Specimen: Blood Updated: 12/07/22 2306     Protime 12.8 Seconds      INR 0.95    CBC & Differential [[id number]]  (Abnormal) Collected: 12/07/22 2240    Specimen: Blood Updated: 12/07/22 2257    Narrative:      The following orders were created for panel order CBC & Differential.  Procedure                               Abnormality         Status                     ---------                               -----------         ------                     CBC Auto Differential[[id number]]        Abnormal            Final result                 Please view results for these tests on the individual orders.    CBC Auto Differential [[id number]]  (Abnormal) Collected: 12/07/22 2240    Specimen: Blood Updated: 12/07/22 2257     WBC 9.27 10*3/mm3      RBC 5.01 10*6/mm3      Hemoglobin 14.4 g/dL      Hematocrit 45.8 %      MCV 91.4 fL      MCH  28.7 pg      MCHC 31.4 g/dL      RDW 14.7 %      RDW-SD 49.2 fl      MPV 9.8 fL      Platelets 402 10*3/mm3      Neutrophil % 65.8 %      Lymphocyte % 21.3 %      Monocyte % 10.6 %      Eosinophil % 1.5 %      Basophil % 0.3 %      Immature Grans % 0.5 %      Neutrophils, Absolute 6.10 10*3/mm3      Lymphocytes, Absolute 1.97 10*3/mm3      Monocytes, Absolute 0.98 10*3/mm3      Eosinophils, Absolute 0.14 10*3/mm3      Basophils, Absolute 0.03 10*3/mm3      Immature Grans, Absolute 0.05 10*3/mm3      nRBC 0.0 /100 WBC     POC Glucose Once [[id number]]  (Normal) Collected: 12/07/22 1751    Specimen: Blood Updated: 12/07/22 1802     Glucose 120 mg/dL      Comment: : [id number] Plascencia KellseyMeter ID: [id number]       Magnesium [[id number]]  (Normal) Collected: 12/07/22 1033    Specimen: Blood Updated: 12/07/22 1426     Magnesium 1.8 mg/dL     Troponin [[id number]]  (Normal) Collected: 12/07/22 1251    Specimen: Blood Updated: 12/07/22 1333     Troponin T 0.021 ng/mL     Narrative:      Troponin T Reference Range:  <= 0.03 ng/mL-   Negative for AMI  >0.03 ng/mL-     Abnormal for myocardial necrosis.  Clinicians would have to utilize clinical acumen, EKG, Troponin and serial changes to determine if it is an Acute Myocardial Infarction or myocardial injury due to an underlying chronic condition.       Results may be falsely decreased if patient taking Biotin.      Comprehensive Metabolic Panel [[id number]]  (Abnormal) Collected: 12/07/22 1033    Specimen: Blood Updated: 12/07/22 1112     Glucose 169 mg/dL      BUN 36 mg/dL      Creatinine 1.24 mg/dL      Sodium 140 mmol/L      Potassium 4.1 mmol/L      Comment: Slight hemolysis detected by analyzer. Results may be affected.        Chloride 106 mmol/L      CO2 19.0 mmol/L      Calcium 8.9 mg/dL      Total Protein 6.1 g/dL      Albumin 3.70 g/dL      ALT (SGPT) 22 U/L      AST (SGOT) 17 U/L      Comment: Slight hemolysis detected by analyzer. Results may be affected.         Alkaline Phosphatase 70 U/L      Total Bilirubin 0.2 mg/dL      Globulin 2.4 gm/dL      A/G Ratio 1.5 g/dL      BUN/Creatinine Ratio 29.0     Anion Gap 15.0 mmol/L      eGFR 67.8 mL/min/1.73      Comment: National Kidney Foundation and American Society of Nephrology (ASN) Task Force recommended calculation based on the Chronic Kidney Disease Epidemiology Collaboration (CKD-EPI) equation refit without adjustment for race.       Narrative:      GFR Normal >60  Chronic Kidney Disease <60  Kidney Failure <15      TSH [[id number]]  (Normal) Collected: 12/07/22 1033    Specimen: Blood Updated: 12/07/22 1111     TSH 1.900 uIU/mL     T4, Free [[id number]]  (Normal) Collected: 12/07/22 1033    Specimen: Blood Updated: 12/07/22 1108     Free T4 1.30 ng/dL     Narrative:      Results may be falsely increased if patient taking Biotin.      BNP [[id number]]  (Abnormal) Collected: 12/07/22 1033    Specimen: Blood Updated: 12/07/22 1101     proBNP 1,298.0 pg/mL     Narrative:      Among patients with dyspnea, NT-proBNP is highly sensitive for the detection of acute congestive heart failure. In addition NT-proBNP of <300 pg/ml effectively rules out acute congestive heart failure with 99% negative predictive value.      Troponin [[id number]]  (Normal) Collected: 12/07/22 1033    Specimen: Blood Updated: 12/07/22 1101     Troponin T 0.029 ng/mL     Narrative:      Troponin T Reference Range:  <= 0.03 ng/mL-   Negative for AMI  >0.03 ng/mL-     Abnormal for myocardial necrosis.  Clinicians would have to utilize clinical acumen, EKG, Troponin and serial changes to determine if it is an Acute Myocardial Infarction or myocardial injury due to an underlying chronic condition.       Results may be falsely decreased if patient taking Biotin.      Protime-INR [[id number]]  (Normal) Collected: 12/07/22 1033    Specimen: Blood Updated: 12/07/22 1054     Protime 12.8 Seconds      INR 0.95    aPTT [[id number]]  (Normal) Collected: 12/07/22 1033     Specimen: Blood Updated: 12/07/22 1054     PTT 32.3 seconds     CBC & Differential [[id number]]  (Abnormal) Collected: 12/07/22 1033    Specimen: Blood Updated: 12/07/22 1043    Narrative:      The following orders were created for panel order CBC & Differential.  Procedure                               Abnormality         Status                     ---------                               -----------         ------                     CBC Auto Differential[[id number]]        Abnormal            Final result                 Please view results for these tests on the individual orders.    CBC Auto Differential [[id number]]  (Abnormal) Collected: 12/07/22 1033    Specimen: Blood Updated: 12/07/22 1043     WBC 17.32 10*3/mm3      RBC 4.98 10*6/mm3      Hemoglobin 14.3 g/dL      Hematocrit 46.0 %      MCV 92.4 fL      MCH 28.7 pg      MCHC 31.1 g/dL      RDW 14.6 %      RDW-SD 50.0 fl      MPV 9.8 fL      Platelets 421 10*3/mm3      Neutrophil % 87.1 %      Lymphocyte % 5.9 %      Monocyte % 6.0 %      Eosinophil % 0.3 %      Basophil % 0.2 %      Immature Grans % 0.5 %      Neutrophils, Absolute 15.07 10*3/mm3      Lymphocytes, Absolute 1.03 10*3/mm3      Monocytes, Absolute 1.04 10*3/mm3      Eosinophils, Absolute 0.05 10*3/mm3      Basophils, Absolute 0.04 10*3/mm3      Immature Grans, Absolute 0.09 10*3/mm3      nRBC 0.0 /100 WBC               Echo EF Estimated  No results found for: ECHOEFEST      Cath Ejection Fraction Quantitative  No results found for: CATHEF        Medication Review: yes  Current Facility-Administered Medications   Medication Dose Route Frequency Provider Last Rate Last Admin   • acetaminophen (TYLENOL) tablet 650 mg  650 mg Oral Q6H PRN Arpit Gonzalez MD   650 mg at 12/07/22 1700   • aspirin EC tablet 81 mg  81 mg Oral Daily Howard Hackett APRN   81 mg at 12/07/22 2218   • atorvastatin (LIPITOR) tablet 40 mg  40 mg Oral Daily Howard Hackett APRN   40 mg at 12/07/22 2217   • busPIRone  (BUSPAR) tablet 15 mg  15 mg Oral BID Howard Hackett APRN   15 mg at 12/07/22 2052   • dilTIAZem CD (CARDIZEM CD) 24 hr capsule 120 mg  120 mg Oral Q24H Howard Hackett APRN   120 mg at 12/07/22 2217   • isosorbide mononitrate (IMDUR) 24 hr tablet 60 mg  60 mg Oral Daily Howard Hackett APRN       • linagliptin (TRADJENTA) tablet 5 mg  5 mg Oral Daily Howard Hackett APRN       • losartan (COZAAR) tablet 50 mg  50 mg Oral Daily Howard Hackett APRN       • magnesium oxide (MAG-OX) tablet 400 mg  400 mg Oral Daily Arpit Gonzalez MD       • melatonin tablet 3 mg  3 mg Oral Nightly Howard Hackett APRN   3 mg at 12/07/22 2053   • nitroglycerin (NITROSTAT) SL tablet 0.4 mg  0.4 mg Sublingual Q5 Min PRN Howard Hackett APRN       • pantoprazole (PROTONIX) EC tablet 40 mg  40 mg Oral Q AM Howard Hackett APRN   40 mg at 12/08/22 0649   • ranolazine (RANEXA) 12 hr tablet 500 mg  500 mg Oral Q12H Howard Hackett APRN   500 mg at 12/07/22 2049   • sodium chloride 0.9 % flush 10 mL  10 mL Intravenous Q12H Mahin Cunha MD   10 mL at 12/08/22 1011   • sodium chloride 0.9 % flush 10 mL  10 mL Intravenous PRN Mahin Cunha MD       • sodium chloride 0.9 % flush 10 mL  10 mL Intravenous PRN Howard Hackett APRN       • sodium chloride 0.9 % infusion 40 mL  40 mL Intravenous PRN Mahin Cunha MD       • sodium chloride 0.9 % infusion 40 mL  40 mL Intravenous PRN Howard Hackett APRN       • ticagrelor (BRILINTA) tablet 90 mg  90 mg Oral BID Howard Hackett APRN   90 mg at 12/07/22 2048     Current Outpatient Medications   Medication Sig Dispense Refill   • aspirin 81 MG EC tablet Take 81 mg by mouth daily.     • atorvastatin (LIPITOR) 40 MG tablet Take 1 tablet by mouth Daily. 90 tablet 3   • B-D ULTRAFINE III SHORT PEN 31G X 8 MM misc See Admin Instructions.     • busPIRone (BUSPAR) 15 MG tablet Take 15 mg by mouth 2 (Two) Times a Day.  5   • Continuous Blood Gluc Sensor (Dexcom G6 Sensor) See Admin Instructions.      • Continuous Blood Gluc Transmit (Dexcom G6 Transmitter) misc See Admin Instructions.     • Dapagliflozin Propanediol (FARXIGA PO) Take 5 mg by mouth Daily.     • dilTIAZem (TIAZAC) 120 MG 24 hr capsule      • fenofibrate 160 MG tablet Take 160 mg by mouth Daily.     • HYDROcodone-acetaminophen (NORCO) 5-325 MG per tablet      • icosapent ethyl (VASCEPA) 1 g capsule capsule Take 2 g by mouth 2 (Two) Times a Day With Meals. 360 capsule 3   • Insulin Glargine-yfgn 100 UNIT/ML solution pen-injector Inject 16 Units under the skin into the appropriate area as directed Every Night.     • isosorbide mononitrate (IMDUR) 60 MG 24 hr tablet Take 1 tablet by mouth once daily 90 tablet 3   • Januvia 100 MG tablet Take 100 mg by mouth Daily.     • losartan (COZAAR) 50 MG tablet Take 1 tablet by mouth Daily. 90 tablet 3   • metFORMIN (GLUCOPHAGE) 1000 MG tablet Take 1,000 mg by mouth 2 (Two) Times a Day With Meals.     • nitroglycerin (NITROSTAT) 0.4 MG SL tablet 1 under the tongue as needed for angina, may repeat q5mins for up three doses 25 tablet 1   • NovoLOG FlexPen 100 UNIT/ML solution pen-injector sc pen      • omeprazole (priLOSEC) 20 MG capsule Take 20 mg by mouth Daily.     • ondansetron (ZOFRAN) 4 MG tablet Take 4 mg by mouth Every 8 (Eight) Hours As Needed for Nausea or Vomiting.     • predniSONE (DELTASONE) 10 MG tablet Take 20 mg by mouth Daily.     • predniSONE (DELTASONE) 10 MG tablet TAKE 3 TABLETS BY MOUTH ONCE DAILY WITH GI PRECAUTIONS     • ranolazine (RANEXA) 500 MG 12 hr tablet TAKE 1 TABLET BY MOUTH EVERY 12 HOURS 60 tablet 11   • tamsulosin (FLOMAX) 0.4 MG capsule 24 hr capsule      • ticagrelor (BRILINTA) 90 MG tablet tablet Take 1 tablet by mouth 2 (Two) Times a Day. 60 tablet 11   • vitamin D3 125 MCG (5000 UT) capsule capsule Take 5,000 Units by mouth Daily.           Assessment & Plan     -SVT vs Ventricular Tachycardia: Patient found to be in Wide Complex Tachycardia in cardiology office 12/7/2022.  Transferred to the ER. Not responsive to adenosine. Patient was successfully cardioverted to NSR. EP study planned for today. K 3.9 today. Magnesium 2.1     -Coronary artery disease: s/p PRANAV to LAD and LCx 9/11/2022 with previous PRANAV to RCA 2014. Patient remains chest pain free. Continue aspirin, and brilinta     -Hypertension: Controlled     -Hyperlipidemia: lipid panel showed elevated triglycerides 478. Continue atorvastatin     -Type 2 DM: A1C 5.8     -Myasthenia Gravis     Plan:   - plan for EP study today   - echo ordered  - continue aspirin, brilinta, atorvastatin and losartan  - continue Telemetry   - continue daily BMP and Mag to check potassium and magnesium levels         Electronically signed by JAE Harden, 12/08/22, 10:17 AM CST.

## 2022-12-08 NOTE — ANESTHESIA PREPROCEDURE EVALUATION
Anesthesia Evaluation     Patient summary reviewed   NPO Solid Status: > 8 hours             Airway   Mallampati: III  TM distance: >3 FB  Neck ROM: full  Dental      Pulmonary    (+) sleep apnea (bipap),   (-) COPD, asthma, not a smoker  Cardiovascular   Exercise tolerance: excellent (>7 METS)    (+) hypertension, past MI , CAD, cardiac stents (9/11/22) within the past 12 months dysrhythmias (s/p CV yesterday//) Tachycardia, hyperlipidemia,   (-) pacemaker, angina    ROS comment: Cardiac cath:  Impression:  1.  Coronary artery disease as described above including hemodynamically significant lesions in both the left circumflex and LAD status post successful PCI with continuation of PATI-3 flow and no residual stenosis remaining  2.  Diabetes  3.  Hypertension  4.  Hyperlipidemia  5.  Myasthenia gravis      Neuro/Psych  (+) neuromuscular disease (Myasthenia Gravis),    (-) seizures, TIA, CVA  GI/Hepatic/Renal/Endo    (+)  PUD,  renal disease stones, diabetes mellitus using insulin,   (-) GERD, liver disease    Musculoskeletal     Abdominal    Substance History      OB/GYN          Other   chronic steroid use (low dose)                    Anesthesia Plan    ASA 3     MAC and general     intravenous induction     Anesthetic plan, risks, benefits, and alternatives have been provided, discussed and informed consent has been obtained with: patient.        CODE STATUS:

## 2022-12-09 LAB
GLUCOSE BLDC GLUCOMTR-MCNC: 119 MG/DL (ref 70–130)
GLUCOSE BLDC GLUCOMTR-MCNC: 186 MG/DL (ref 70–130)
GLUCOSE BLDC GLUCOMTR-MCNC: 190 MG/DL (ref 70–130)
GLUCOSE BLDC GLUCOMTR-MCNC: 224 MG/DL (ref 70–130)
QT INTERVAL: 318 MS
QT INTERVAL: 330 MS
QT INTERVAL: 358 MS
QTC INTERVAL: 433 MS
QTC INTERVAL: 447 MS
QTC INTERVAL: 565 MS

## 2022-12-09 PROCEDURE — 63710000001 INSULIN LISPRO (HUMAN) PER 5 UNITS: Performed by: PHYSICIAN ASSISTANT

## 2022-12-09 PROCEDURE — 25010000002 AMIODARONE IN DEXTROSE 5% 150-4.21 MG/100ML-% SOLUTION: Performed by: PHYSICIAN ASSISTANT

## 2022-12-09 PROCEDURE — 93010 ELECTROCARDIOGRAM REPORT: CPT | Performed by: HOSPITALIST

## 2022-12-09 PROCEDURE — 99233 SBSQ HOSP IP/OBS HIGH 50: CPT | Performed by: STUDENT IN AN ORGANIZED HEALTH CARE EDUCATION/TRAINING PROGRAM

## 2022-12-09 PROCEDURE — 82962 GLUCOSE BLOOD TEST: CPT

## 2022-12-09 PROCEDURE — 99232 SBSQ HOSP IP/OBS MODERATE 35: CPT | Performed by: INTERNAL MEDICINE

## 2022-12-09 PROCEDURE — 93005 ELECTROCARDIOGRAM TRACING: CPT | Performed by: STUDENT IN AN ORGANIZED HEALTH CARE EDUCATION/TRAINING PROGRAM

## 2022-12-09 PROCEDURE — 63710000001 INSULIN DETEMIR PER 5 UNITS: Performed by: PHYSICIAN ASSISTANT

## 2022-12-09 RX ORDER — NICOTINE POLACRILEX 4 MG
15 LOZENGE BUCCAL
Status: DISCONTINUED | OUTPATIENT
Start: 2022-12-09 | End: 2022-12-12 | Stop reason: HOSPADM

## 2022-12-09 RX ORDER — ROSUVASTATIN CALCIUM 20 MG/1
20 TABLET, COATED ORAL NIGHTLY
Status: DISCONTINUED | OUTPATIENT
Start: 2022-12-09 | End: 2022-12-12 | Stop reason: HOSPADM

## 2022-12-09 RX ORDER — VERAPAMIL HYDROCHLORIDE 2.5 MG/ML
5 INJECTION, SOLUTION INTRAVENOUS ONCE
Status: COMPLETED | OUTPATIENT
Start: 2022-12-09 | End: 2022-12-09

## 2022-12-09 RX ORDER — DEXTROSE MONOHYDRATE 25 G/50ML
25 INJECTION, SOLUTION INTRAVENOUS
Status: DISCONTINUED | OUTPATIENT
Start: 2022-12-09 | End: 2022-12-12 | Stop reason: HOSPADM

## 2022-12-09 RX ORDER — INSULIN LISPRO 100 [IU]/ML
2-7 INJECTION, SOLUTION INTRAVENOUS; SUBCUTANEOUS
Status: DISCONTINUED | OUTPATIENT
Start: 2022-12-09 | End: 2022-12-12 | Stop reason: HOSPADM

## 2022-12-09 RX ORDER — VERAPAMIL HYDROCHLORIDE 2.5 MG/ML
5 INJECTION, SOLUTION INTRAVENOUS ONCE AS NEEDED
Status: DISCONTINUED | OUTPATIENT
Start: 2022-12-09 | End: 2022-12-12 | Stop reason: HOSPADM

## 2022-12-09 RX ADMIN — PANTOPRAZOLE SODIUM 40 MG: 40 TABLET, DELAYED RELEASE ORAL at 05:52

## 2022-12-09 RX ADMIN — ASPIRIN 81 MG: 81 TABLET, COATED ORAL at 08:28

## 2022-12-09 RX ADMIN — ACETAMINOPHEN 650 MG: 325 TABLET, FILM COATED ORAL at 18:47

## 2022-12-09 RX ADMIN — ATORVASTATIN CALCIUM 40 MG: 40 TABLET, FILM COATED ORAL at 08:28

## 2022-12-09 RX ADMIN — LOSARTAN POTASSIUM 50 MG: 50 TABLET, FILM COATED ORAL at 08:28

## 2022-12-09 RX ADMIN — INSULIN DETEMIR 16 UNITS: 100 INJECTION, SOLUTION SUBCUTANEOUS at 21:48

## 2022-12-09 RX ADMIN — TICAGRELOR 90 MG: 90 TABLET ORAL at 08:28

## 2022-12-09 RX ADMIN — AMIODARONE HYDROCHLORIDE 400 MG: 200 TABLET ORAL at 08:27

## 2022-12-09 RX ADMIN — ROSUVASTATIN CALCIUM 20 MG: 20 TABLET, FILM COATED ORAL at 21:50

## 2022-12-09 RX ADMIN — TICAGRELOR 90 MG: 90 TABLET ORAL at 21:49

## 2022-12-09 RX ADMIN — RANOLAZINE 500 MG: 500 TABLET, FILM COATED, EXTENDED RELEASE ORAL at 21:50

## 2022-12-09 RX ADMIN — MAGNESIUM GLUCONATE 500 MG ORAL TABLET 400 MG: 500 TABLET ORAL at 08:27

## 2022-12-09 RX ADMIN — BUSPIRONE HYDROCHLORIDE 15 MG: 5 TABLET ORAL at 21:49

## 2022-12-09 RX ADMIN — LINAGLIPTIN 5 MG: 5 TABLET, FILM COATED ORAL at 08:28

## 2022-12-09 RX ADMIN — BUSPIRONE HYDROCHLORIDE 15 MG: 5 TABLET ORAL at 08:27

## 2022-12-09 RX ADMIN — AMIODARONE HYDROCHLORIDE 400 MG: 200 TABLET ORAL at 21:49

## 2022-12-09 RX ADMIN — INSULIN LISPRO 2 UNITS: 100 INJECTION, SOLUTION INTRAVENOUS; SUBCUTANEOUS at 17:11

## 2022-12-09 RX ADMIN — VERAPAMIL HYDROCHLORIDE 5 MG: 2.5 INJECTION, SOLUTION INTRAVENOUS at 10:47

## 2022-12-09 RX ADMIN — Medication 3 MG: at 21:50

## 2022-12-09 RX ADMIN — ISOSORBIDE MONONITRATE 60 MG: 60 TABLET, EXTENDED RELEASE ORAL at 08:27

## 2022-12-09 RX ADMIN — AMIODARONE HYDROCHLORIDE 150 MG: 1.5 INJECTION, SOLUTION INTRAVENOUS at 10:10

## 2022-12-09 RX ADMIN — VERAPAMIL HYDROCHLORIDE 180 MG: 180 TABLET, FILM COATED, EXTENDED RELEASE ORAL at 21:50

## 2022-12-09 RX ADMIN — RANOLAZINE 500 MG: 500 TABLET, FILM COATED, EXTENDED RELEASE ORAL at 08:27

## 2022-12-09 NOTE — PROGRESS NOTES
"  [facility] HEART GROUP PROGRESS NOTE     LOS: 2 days   Patient Care Team:  Tacho Eng MD as PCP - General  Tacho Eng MD as PCP - Family Medicine    Chief Complaint:  VT    Subjective     Patient is having wide-complex tachycardia in the 140s-150s.  He is dizzy and diaphoretic.  He qualifies the sweating by saying he is using 12 blankets.  He admits to being anxious.  He denies chest pain or shortness of breath.    Review of Systems:   A 10-point review of systems is obtained and negative except for otherwise mentioned above.    Objective     Vital Sign Min/Max for last 24 hours  Temp  Min: 97.7 °F (36.5 °C)  Max: 98.6 °F (37 °C)   BP  Min: 83/55  Max: 137/93   Pulse  Min: 80  Max: 105   Resp  Min: 0  Max: 18   SpO2  Min: 93 %  Max: 100 %   No data recorded   Weight  Min: 81.2 kg (179 lb)  Max: 81.2 kg (179 lb)     Flowsheet Rows    Flowsheet Row First Filed Value   Admission Height 182.9 cm (72\") Documented at 12/07/2022 0933   Admission Weight 81.2 kg (179 lb) Documented at 12/07/2022 0933          Physical Exam:   General Appearance: Resting in bed. [place].  Lungs: clear  Heart: RRR, tachycardic  Extremities: no edema     Results Review:     I reviewed the patient's new clinical results.    Results from last 7 days   Lab Units 12/08/22  0253   WBC 10*3/mm3 8.86   HEMOGLOBIN g/dL 14.4   HEMATOCRIT % 46.3   PLATELETS 10*3/mm3 405     Results from last 7 days   Lab Units 12/08/22  0253   SODIUM mmol/L 141   POTASSIUM mmol/L 3.9   CHLORIDE mmol/L 104   CO2 mmol/L 24.0   BUN mg/dL 26*   CREATININE mg/dL 1.03   GLUCOSE mg/dL 99   CALCIUM mg/dL 9.1     Results from last 7 days   Lab Units 12/08/22  0253 12/07/22  2240 12/07/22  1033   SODIUM mmol/L 141   < > 140   POTASSIUM mmol/L 3.9   < > 4.1   CHLORIDE mmol/L 104   < > 106   CO2 mmol/L 24.0   < > 19.0*   BUN mg/dL 26*   < > 36*   CREATININE mg/dL 1.03   < > 1.24   CALCIUM mg/dL 9.1   < > 8.9   BILIRUBIN mg/dL  --   --  0.2   ALK " PHOS U/L  --   --  70   ALT (SGPT) U/L  --   --  22   AST (SGOT) U/L  --   --  17   GLUCOSE mg/dL 99   < > 169*    < > = values in this interval not displayed.     Results from last 7 days   Lab Units 12/07/22  1251 12/07/22  1033   TROPONIN T ng/mL 0.021 0.029     Results from last 7 days   Lab Units 12/07/22  1033   TSH uIU/mL 1.900   FREE T4 ng/dL 1.30     Results from last 7 days   Lab Units 12/07/22  2240   CHOLESTEROL mg/dL 188   TRIGLYCERIDES mg/dL 478*   HDL CHOL mg/dL 29*   LDL CHOL mg/dL 82       Medication Review: yes    Assessment & Plan       Ventricular tachycardia, s/p VT ablation  CAD, s/p PCI in 9/22  Myasthenia gravis    I spoke with Dr. Cunha of EP who recommended amiodarone bolus for VT.  Continue to monitor closely on telemetry.  If amiodarone does not resolve this, Dr. Cunha will plan for elective DCCV today unless the patient becomes hemodynamically unstable in which case DCCV would be emergent.  Monitor on telemetry over the weekend.  If patient has recurrent VT, Dr. Cunha plans to take him back to cardiac cath lab for repeat EP study.    Continue ASA, statin, Imdur, Cozaar, Ranexa, and Brilinta.    Jannette Padgett PA-C  12/09/22  09:37 CST

## 2022-12-09 NOTE — PROGRESS NOTES
"Chief Complaint  Ventricular tachycardia    Subjective        History of Present Illness    Patient ID:  Iftikhar Francis is a 57 y.o. male with past medical history as above who EP is following for ventricular tachycardia.    Interval history:  Underwent EP study and ablation last night however had multiple VT morphologies with inducible VT still present at the end of the case.  This morning, spontaneously had onset of ventricular tachycardia suggestive of fascicular VT (different from his presenting VT morphology, however similar to morphology seen during his EP study).  He was diaphoretic at the time though was found to be normotensive.  He was given an IV bolus dose of amiodarone 150 mg with no effect.  He then was given a 5 mg IV bolus of verapamil with termination of the tachycardia 3 minutes later.  Termination on telemetry was seen following a PVC so it is unclear whether verapamil alone would have caused this or whether this was an incidental effect.    Objective   Vital Signs:  /76 (BP Location: Left arm, Patient Position: Lying)   Pulse 111   Temp 98.5 °F (36.9 °C) (Oral)   Resp 16   Ht 182.9 cm (72\")   Wt 81.2 kg (179 lb)   SpO2 96%   BMI 24.28 kg/m²   Estimated body mass index is 24.28 kg/m² as calculated from the following:    Height as of this encounter: 182.9 cm (72\").    Weight as of this encounter: 81.2 kg (179 lb).      Physical Exam  Vitals reviewed.   Constitutional:       General: He is not in acute distress.  HENT:      Head: Normocephalic and atraumatic.   Eyes:      Extraocular Movements: Extraocular movements intact.      Conjunctiva/sclera: Conjunctivae normal.   Cardiovascular:      Rate and Rhythm: Normal rate and regular rhythm.      Comments: Femoral access site is soft without evidence of significant bleeding  Pulmonary:      Effort: Pulmonary effort is normal.      Breath sounds: Normal breath sounds.   Abdominal:      General: There is no distension.      Tenderness: " There is no abdominal tenderness.   Musculoskeletal:         General: No swelling or tenderness.      Cervical back: Normal range of motion and neck supple.   Skin:     General: Skin is warm and dry.   Neurological:      General: No focal deficit present.      Mental Status: He is alert and oriented to person, place, and time.   Psychiatric:         Mood and Affect: Mood normal.         Judgment: Judgment normal.         Result Review :  The following data was reviewed by: Mahin Cunha MD on 12/07/2022:  CMP    CMP 9/12/22 12/7/22 12/7/22 12/8/22     1033 2240    Glucose 225 (A) 169 (A) 134 (A) 99   BUN 20 36 (A) 27 (A) 26 (A)   Creatinine 0.75 (A) 1.24 1.16 1.03   Sodium 136 140 137 141   Potassium 3.9 4.1 4.1 3.9   Chloride 101 106 104 104   Calcium 8.5 (A) 8.9 9.1 9.1   Albumin  3.70     Total Bilirubin  0.2     Alkaline Phosphatase  70     AST (SGOT)  17     ALT (SGPT)  22     (A) Abnormal value       Comments are available for some flowsheets but are not being displayed.           ECG from the time of the event directly visualized and independently reviewed: Wide-complex tachycardia, left inferior axis, left bundle morphology in V1, transition V1 to V2, rate of 143 bpm.  Suggestive of fascicular VT  Telemetry directly visualized and independently reviewed: Sinus rhythm to start a morning, wide-complex tachycardia at approximately 9 AM, termination of tachycardia with a PVC followed by sinus tachycardia with rapid return of normal sinus rhythm.               Assessment and Plan   Problems:  1. Ventricular tachycardia  2. Coronary artery disease  3. Myasthenia gravis    Iftikhar Francis is a 57 y.o. male with past medical history as above who EP is following for ventricular tachycardia.  He has not had further evidence of his presenting ventricular tachycardia, however he is now having spontaneous episodes of what appears to be consistent with fascicular VT.  He was given IV verapamil with termination of this  episode earlier today which is good, however it is unclear whether this was an incidental finding given termination with a PVC.  He has tolerated calcium channel blockers in the past and I think that at this time the best option would be to put him on verapamil for attempted suppression of this VT.  He is also continued on his amiodarone load as scheduled.  There is some concern about use of verapamil with his myasthenia, however I think at this time risk-benefit favors this decision.  If he has worsening myasthenia as a result, we can consider alternative treatment strategies.  Given that this is consistent with fascicular VT, I am less inclined to pursue a cardiac MRI.  Should he have recurrent arrhythmias over the weekend, we can consider a repeat EP study to further target the VT morphology.    Plan:  -Continue amiodarone load 400 mg twice daily  -Stop diltiazem and start verapamil 180 mg nightly  -Continue monitoring on telemetry  -CBC, CMP, magnesium ordered for the morning         I spent 60 minutes caring for Iftikhar on this date of service.     Part of this note may be an electronic transcription/translation of spoken language to printed text using the Dragon Dictation System.

## 2022-12-09 NOTE — PLAN OF CARE
Goal Outcome Evaluation:  Plan of Care Reviewed With: patient        Progress: improving  Outcome Evaluation: moniter s/st  with pvc pt has small amount bleeding from cath site . pain meds given pt has bloody urine pt stated he has a kidney stone . pt c/o back pain from stone  cont to monitor .

## 2022-12-09 NOTE — ANESTHESIA POSTPROCEDURE EVALUATION
"Patient: Iftikhar Francis    Procedure Summary     Date: 12/08/22 Room / Location: [facility]ATH [facility] 4 /  [facility] CATH INVASIVE LOCATION    Anesthesia Start: 1430 Anesthesia Stop: 1827    Procedure: EP/Ablation Diagnosis:       Wide-complex tachycardia      (Wide complex tachycardia)    Providers: Mahin Cunha MD Provider: David Phipps CRNA    Anesthesia Type: MAC, general ASA Status: 3          Anesthesia Type: MAC, general    Vitals  Vitals Value Taken Time   /76 12/09/22 1308   Temp 98.5 °F (36.9 °C) 12/09/22 1308   Pulse 111 12/09/22 1308   Resp 16 12/09/22 1308   SpO2 96 % 12/09/22 1308           Post Anesthesia Care and Evaluation    Patient location during evaluation: PACU  Patient participation: complete - patient participated  Level of consciousness: awake and alert  Pain management: adequate    Airway patency: patent  Anesthetic complications: No anesthetic complications    Cardiovascular status: acceptable  Respiratory status: acceptable  Hydration status: acceptable    Comments: Blood pressure 110/76, pulse 111, temperature 98.5 °F (36.9 °C), temperature source Oral, resp. rate 16, height 182.9 cm (72\"), weight 81.2 kg (179 lb), SpO2 96 %.    Pt discharged from PACU based on jacqueline score >8      "

## 2022-12-09 NOTE — PLAN OF CARE
Goal Outcome Evaluation:  Plan of Care Reviewed With: patient        Progress: no change  Outcome Evaluation: No co pain. HR is am was in the 140's. Amiodarone bolus and Verapamil IV given. HR is now 103. Started on PO verapamil. Right groin site C/D/I and soft. cont to monitor.

## 2022-12-09 NOTE — SIGNIFICANT NOTE
Procedure name:  EPS with ablation  Pre-op diagnosis:  Wide complex tachycardia  Post-op diagnosis:  Ventricular tachycardia, multiple morphologies    Findings:  Multiple inducible VT morphologies with patchy scar in a non-ischemic pattern.  Ablation delivered to basal septum and mid septum for treatment of ventricular tachycardia, however VT remained inducible at the end of the study.    Complications:  None    Plan:  1.  2h bedrest  2.  Start amiodarone 400mg BID for 10 days and then transition to 200mg daily  3.  Lifevest at discharge  4.  Outpatient cardiac MRI for evaluation of NICM  5.  ICD following workup completion    Full note to follow

## 2022-12-10 LAB
ALBUMIN SERPL-MCNC: 4.3 G/DL (ref 3.5–5.2)
ALBUMIN/GLOB SERPL: 1.7 G/DL
ALP SERPL-CCNC: 50 U/L (ref 39–117)
ALT SERPL W P-5'-P-CCNC: 28 U/L (ref 1–41)
ANION GAP SERPL CALCULATED.3IONS-SCNC: 10 MMOL/L (ref 5–15)
AST SERPL-CCNC: 32 U/L (ref 1–40)
BILIRUB SERPL-MCNC: 0.4 MG/DL (ref 0–1.2)
BUN SERPL-MCNC: 18 MG/DL (ref 6–20)
BUN/CREAT SERPL: 18.8 (ref 7–25)
CALCIUM SPEC-SCNC: 8.9 MG/DL (ref 8.6–10.5)
CHLORIDE SERPL-SCNC: 104 MMOL/L (ref 98–107)
CO2 SERPL-SCNC: 25 MMOL/L (ref 22–29)
CREAT SERPL-MCNC: 0.96 MG/DL (ref 0.76–1.27)
DEPRECATED RDW RBC AUTO: 49 FL (ref 37–54)
EGFRCR SERPLBLD CKD-EPI 2021: 92.2 ML/MIN/1.73
ERYTHROCYTE [DISTWIDTH] IN BLOOD BY AUTOMATED COUNT: 14.6 % (ref 12.3–15.4)
GLOBULIN UR ELPH-MCNC: 2.6 GM/DL
GLUCOSE BLDC GLUCOMTR-MCNC: 142 MG/DL (ref 70–130)
GLUCOSE BLDC GLUCOMTR-MCNC: 160 MG/DL (ref 70–130)
GLUCOSE BLDC GLUCOMTR-MCNC: 162 MG/DL (ref 70–130)
GLUCOSE BLDC GLUCOMTR-MCNC: 227 MG/DL (ref 70–130)
GLUCOSE SERPL-MCNC: 132 MG/DL (ref 65–99)
HCT VFR BLD AUTO: 45 % (ref 37.5–51)
HGB BLD-MCNC: 14.1 G/DL (ref 13–17.7)
MAGNESIUM SERPL-MCNC: 2.2 MG/DL (ref 1.6–2.6)
MCH RBC QN AUTO: 28.5 PG (ref 26.6–33)
MCHC RBC AUTO-ENTMCNC: 31.3 G/DL (ref 31.5–35.7)
MCV RBC AUTO: 90.9 FL (ref 79–97)
PLATELET # BLD AUTO: 372 10*3/MM3 (ref 140–450)
PMV BLD AUTO: 9.6 FL (ref 6–12)
POTASSIUM SERPL-SCNC: 3.9 MMOL/L (ref 3.5–5.2)
PROT SERPL-MCNC: 6.9 G/DL (ref 6–8.5)
RBC # BLD AUTO: 4.95 10*6/MM3 (ref 4.14–5.8)
SODIUM SERPL-SCNC: 139 MMOL/L (ref 136–145)
WBC NRBC COR # BLD: 11.05 10*3/MM3 (ref 3.4–10.8)

## 2022-12-10 PROCEDURE — 99232 SBSQ HOSP IP/OBS MODERATE 35: CPT | Performed by: INTERNAL MEDICINE

## 2022-12-10 PROCEDURE — 83735 ASSAY OF MAGNESIUM: CPT | Performed by: INTERNAL MEDICINE

## 2022-12-10 PROCEDURE — 63710000001 INSULIN DETEMIR PER 5 UNITS: Performed by: PHYSICIAN ASSISTANT

## 2022-12-10 PROCEDURE — 80053 COMPREHEN METABOLIC PANEL: CPT | Performed by: STUDENT IN AN ORGANIZED HEALTH CARE EDUCATION/TRAINING PROGRAM

## 2022-12-10 PROCEDURE — 82962 GLUCOSE BLOOD TEST: CPT

## 2022-12-10 PROCEDURE — 63710000001 INSULIN LISPRO (HUMAN) PER 5 UNITS: Performed by: PHYSICIAN ASSISTANT

## 2022-12-10 PROCEDURE — 63710000001 PREDNISONE PER 5 MG: Performed by: INTERNAL MEDICINE

## 2022-12-10 PROCEDURE — 85027 COMPLETE CBC AUTOMATED: CPT | Performed by: STUDENT IN AN ORGANIZED HEALTH CARE EDUCATION/TRAINING PROGRAM

## 2022-12-10 RX ORDER — PREDNISONE 10 MG/1
10 TABLET ORAL
Status: DISCONTINUED | OUTPATIENT
Start: 2022-12-10 | End: 2022-12-12 | Stop reason: HOSPADM

## 2022-12-10 RX ADMIN — LINAGLIPTIN 5 MG: 5 TABLET, FILM COATED ORAL at 09:00

## 2022-12-10 RX ADMIN — BUSPIRONE HYDROCHLORIDE 15 MG: 5 TABLET ORAL at 20:58

## 2022-12-10 RX ADMIN — RANOLAZINE 500 MG: 500 TABLET, FILM COATED, EXTENDED RELEASE ORAL at 20:58

## 2022-12-10 RX ADMIN — ASPIRIN 81 MG: 81 TABLET, COATED ORAL at 09:00

## 2022-12-10 RX ADMIN — ACETAMINOPHEN 650 MG: 325 TABLET, FILM COATED ORAL at 06:32

## 2022-12-10 RX ADMIN — LOSARTAN POTASSIUM 50 MG: 50 TABLET, FILM COATED ORAL at 09:00

## 2022-12-10 RX ADMIN — PANTOPRAZOLE SODIUM 40 MG: 40 TABLET, DELAYED RELEASE ORAL at 06:27

## 2022-12-10 RX ADMIN — PREDNISONE 10 MG: 10 TABLET ORAL at 12:15

## 2022-12-10 RX ADMIN — RANOLAZINE 500 MG: 500 TABLET, FILM COATED, EXTENDED RELEASE ORAL at 09:00

## 2022-12-10 RX ADMIN — INSULIN LISPRO 3 UNITS: 100 INJECTION, SOLUTION INTRAVENOUS; SUBCUTANEOUS at 12:15

## 2022-12-10 RX ADMIN — BUSPIRONE HYDROCHLORIDE 15 MG: 5 TABLET ORAL at 09:00

## 2022-12-10 RX ADMIN — VERAPAMIL HYDROCHLORIDE 180 MG: 180 TABLET, FILM COATED, EXTENDED RELEASE ORAL at 20:58

## 2022-12-10 RX ADMIN — TICAGRELOR 90 MG: 90 TABLET ORAL at 20:58

## 2022-12-10 RX ADMIN — TICAGRELOR 90 MG: 90 TABLET ORAL at 09:00

## 2022-12-10 RX ADMIN — AMIODARONE HYDROCHLORIDE 400 MG: 200 TABLET ORAL at 20:58

## 2022-12-10 RX ADMIN — ISOSORBIDE MONONITRATE 60 MG: 60 TABLET, EXTENDED RELEASE ORAL at 09:00

## 2022-12-10 RX ADMIN — MAGNESIUM GLUCONATE 500 MG ORAL TABLET 400 MG: 500 TABLET ORAL at 09:00

## 2022-12-10 RX ADMIN — Medication 3 MG: at 20:58

## 2022-12-10 RX ADMIN — INSULIN LISPRO 2 UNITS: 100 INJECTION, SOLUTION INTRAVENOUS; SUBCUTANEOUS at 17:37

## 2022-12-10 RX ADMIN — ROSUVASTATIN CALCIUM 20 MG: 20 TABLET, FILM COATED ORAL at 20:58

## 2022-12-10 RX ADMIN — INSULIN DETEMIR 16 UNITS: 100 INJECTION, SOLUTION SUBCUTANEOUS at 20:57

## 2022-12-10 RX ADMIN — AMIODARONE HYDROCHLORIDE 400 MG: 200 TABLET ORAL at 09:00

## 2022-12-10 NOTE — PLAN OF CARE
Goal Outcome Evaluation:              Outcome Evaluation: No c/o pain, but some tenderness of the cath site, which has some dried blood, but is not oozing. VSS. Pt slept most of the night. Sinus/tach . SpO2 94-96 on room air.

## 2022-12-11 LAB
ANION GAP SERPL CALCULATED.3IONS-SCNC: 10 MMOL/L (ref 5–15)
BUN SERPL-MCNC: 18 MG/DL (ref 6–20)
BUN/CREAT SERPL: 16.1 (ref 7–25)
CALCIUM SPEC-SCNC: 9.1 MG/DL (ref 8.6–10.5)
CHLORIDE SERPL-SCNC: 104 MMOL/L (ref 98–107)
CO2 SERPL-SCNC: 27 MMOL/L (ref 22–29)
CREAT SERPL-MCNC: 1.12 MG/DL (ref 0.76–1.27)
EGFRCR SERPLBLD CKD-EPI 2021: 76.6 ML/MIN/1.73
GLUCOSE BLDC GLUCOMTR-MCNC: 122 MG/DL (ref 70–130)
GLUCOSE BLDC GLUCOMTR-MCNC: 151 MG/DL (ref 70–130)
GLUCOSE BLDC GLUCOMTR-MCNC: 194 MG/DL (ref 70–130)
GLUCOSE BLDC GLUCOMTR-MCNC: 228 MG/DL (ref 70–130)
GLUCOSE SERPL-MCNC: 138 MG/DL (ref 65–99)
MAGNESIUM SERPL-MCNC: 2.3 MG/DL (ref 1.6–2.6)
POTASSIUM SERPL-SCNC: 4 MMOL/L (ref 3.5–5.2)
SODIUM SERPL-SCNC: 141 MMOL/L (ref 136–145)

## 2022-12-11 PROCEDURE — 63710000001 INSULIN LISPRO (HUMAN) PER 5 UNITS: Performed by: PHYSICIAN ASSISTANT

## 2022-12-11 PROCEDURE — 83735 ASSAY OF MAGNESIUM: CPT | Performed by: INTERNAL MEDICINE

## 2022-12-11 PROCEDURE — 63710000001 INSULIN DETEMIR PER 5 UNITS: Performed by: PHYSICIAN ASSISTANT

## 2022-12-11 PROCEDURE — 82962 GLUCOSE BLOOD TEST: CPT

## 2022-12-11 PROCEDURE — 80048 BASIC METABOLIC PNL TOTAL CA: CPT | Performed by: INTERNAL MEDICINE

## 2022-12-11 PROCEDURE — 99232 SBSQ HOSP IP/OBS MODERATE 35: CPT | Performed by: INTERNAL MEDICINE

## 2022-12-11 PROCEDURE — 63710000001 PREDNISONE PER 5 MG: Performed by: INTERNAL MEDICINE

## 2022-12-11 RX ADMIN — ACETAMINOPHEN 650 MG: 325 TABLET, FILM COATED ORAL at 15:27

## 2022-12-11 RX ADMIN — LINAGLIPTIN 5 MG: 5 TABLET, FILM COATED ORAL at 08:55

## 2022-12-11 RX ADMIN — VERAPAMIL HYDROCHLORIDE 180 MG: 180 TABLET, FILM COATED, EXTENDED RELEASE ORAL at 20:57

## 2022-12-11 RX ADMIN — AMIODARONE HYDROCHLORIDE 400 MG: 200 TABLET ORAL at 20:57

## 2022-12-11 RX ADMIN — ISOSORBIDE MONONITRATE 60 MG: 60 TABLET, EXTENDED RELEASE ORAL at 08:55

## 2022-12-11 RX ADMIN — LOSARTAN POTASSIUM 50 MG: 50 TABLET, FILM COATED ORAL at 08:55

## 2022-12-11 RX ADMIN — PREDNISONE 10 MG: 10 TABLET ORAL at 08:55

## 2022-12-11 RX ADMIN — RANOLAZINE 500 MG: 500 TABLET, FILM COATED, EXTENDED RELEASE ORAL at 08:55

## 2022-12-11 RX ADMIN — ROSUVASTATIN CALCIUM 20 MG: 20 TABLET, FILM COATED ORAL at 20:57

## 2022-12-11 RX ADMIN — MAGNESIUM GLUCONATE 500 MG ORAL TABLET 400 MG: 500 TABLET ORAL at 08:55

## 2022-12-11 RX ADMIN — AMIODARONE HYDROCHLORIDE 400 MG: 200 TABLET ORAL at 08:55

## 2022-12-11 RX ADMIN — INSULIN LISPRO 2 UNITS: 100 INJECTION, SOLUTION INTRAVENOUS; SUBCUTANEOUS at 12:12

## 2022-12-11 RX ADMIN — TICAGRELOR 90 MG: 90 TABLET ORAL at 08:55

## 2022-12-11 RX ADMIN — ASPIRIN 81 MG: 81 TABLET, COATED ORAL at 08:55

## 2022-12-11 RX ADMIN — INSULIN LISPRO 2 UNITS: 100 INJECTION, SOLUTION INTRAVENOUS; SUBCUTANEOUS at 17:27

## 2022-12-11 RX ADMIN — BUSPIRONE HYDROCHLORIDE 15 MG: 5 TABLET ORAL at 08:55

## 2022-12-11 RX ADMIN — TICAGRELOR 90 MG: 90 TABLET ORAL at 20:57

## 2022-12-11 RX ADMIN — PANTOPRAZOLE SODIUM 40 MG: 40 TABLET, DELAYED RELEASE ORAL at 05:11

## 2022-12-11 RX ADMIN — BUSPIRONE HYDROCHLORIDE 15 MG: 5 TABLET ORAL at 20:57

## 2022-12-11 RX ADMIN — INSULIN DETEMIR 16 UNITS: 100 INJECTION, SOLUTION SUBCUTANEOUS at 20:58

## 2022-12-11 RX ADMIN — RANOLAZINE 500 MG: 500 TABLET, FILM COATED, EXTENDED RELEASE ORAL at 20:57

## 2022-12-11 RX ADMIN — Medication 3 MG: at 20:57

## 2022-12-11 NOTE — PLAN OF CARE
Goal Outcome Evaluation:  Plan of Care Reviewed With: patient        Progress: improving  Outcome Evaluation: VSS, no complaints, cath site soft ppp SUJATA, S-ST

## 2022-12-11 NOTE — PROGRESS NOTES
"  Chief Complaint   Patient presents with   • Dizziness     S: No acute events overnight.  No rhythm changes overnight.  The patient is without any complaints this morning.    Medications: Reviewed    Review of Systems: All pertinent negative and positives as noted above.  Otherwise, all systems reviewed and found to be negative.    Telemetry: Sinus rhythm overnight.  No significant arrhythmias.    O:  /71 (BP Location: Left arm, Patient Position: Lying)   Pulse 75   Temp 98.2 °F (36.8 °C) (Oral)   Resp 16   Ht 182.9 cm (72\")   Wt 78.7 kg (173 lb 6.4 oz)   SpO2 98%   BMI 23.52 kg/m²   Temp:  [98 °F (36.7 °C)-98.7 °F (37.1 °C)] 98.2 °F (36.8 °C)  Heart Rate:  [] 75  Resp:  [16] 16  BP: (101-138)/(61-83) 101/71    General: No acute distress, lying in bed  CV: Regular rate and rhythm, no audible murmurs  Pulmonary: Clear to auscultation bilaterally  GI: Soft, nontender, nondistended, active bowel sounds  Extremities: Warm and well-perfused with no significant edema    Diagnostic Data:    Lab Results   Component Value Date    GLUCOSE 138 (H) 12/11/2022    CALCIUM 9.1 12/11/2022     12/11/2022    K 4.0 12/11/2022    CO2 27.0 12/11/2022     12/11/2022    BUN 18 12/11/2022    CREATININE 1.12 12/11/2022    EGFRIFNONA >60 01/20/2020    BCR 16.1 12/11/2022    ANIONGAP 10.0 12/11/2022     Magnesium: 2.3    ASSESSMENT/PLAN:    1.  Ventricular tachycardia  2.  Coronary artery disease in native coronary artery  3.  Essential hypertension  4.  Mixed hyperlipidemia  5.  Type II diabetes mellitus, insulin requiring, no obvious diabetic complications  6.  Myasthenia gravis    -Electrically stable overnight.  Continue current medications including amiodarone at 400 mg twice daily, verapamil 180 mg daily.  -Continue other cardiac medications for coronary artery disease including dual antiplatelet therapy, high intensity statin therapy, Ranexa, isosorbide mononitrate.  The patient is not on a beta-blocker " due to myasthenia gravis.  -Repeat BMP tomorrow morning.  -Likely stable for discharge home tomorrow after further evaluation by .

## 2022-12-12 ENCOUNTER — READMISSION MANAGEMENT (OUTPATIENT)
Dept: CALL CENTER | Facility: HOSPITAL | Age: 57
End: 2022-12-12

## 2022-12-12 ENCOUNTER — PREP FOR SURGERY (OUTPATIENT)
Dept: OTHER | Facility: HOSPITAL | Age: 57
End: 2022-12-12

## 2022-12-12 VITALS
RESPIRATION RATE: 16 BRPM | SYSTOLIC BLOOD PRESSURE: 96 MMHG | BODY MASS INDEX: 23.32 KG/M2 | DIASTOLIC BLOOD PRESSURE: 58 MMHG | OXYGEN SATURATION: 95 % | HEIGHT: 72 IN | HEART RATE: 76 BPM | TEMPERATURE: 98.4 F | WEIGHT: 172.19 LBS

## 2022-12-12 DIAGNOSIS — I47.20 VENTRICULAR TACHYCARDIA: Primary | ICD-10-CM

## 2022-12-12 LAB
ANION GAP SERPL CALCULATED.3IONS-SCNC: 10 MMOL/L (ref 5–15)
BUN SERPL-MCNC: 23 MG/DL (ref 6–20)
BUN/CREAT SERPL: 19.8 (ref 7–25)
CALCIUM SPEC-SCNC: 9.3 MG/DL (ref 8.6–10.5)
CHLORIDE SERPL-SCNC: 106 MMOL/L (ref 98–107)
CO2 SERPL-SCNC: 27 MMOL/L (ref 22–29)
CREAT SERPL-MCNC: 1.16 MG/DL (ref 0.76–1.27)
EGFRCR SERPLBLD CKD-EPI 2021: 73.5 ML/MIN/1.73
GLUCOSE BLDC GLUCOMTR-MCNC: 126 MG/DL (ref 70–130)
GLUCOSE BLDC GLUCOMTR-MCNC: 192 MG/DL (ref 70–130)
GLUCOSE SERPL-MCNC: 214 MG/DL (ref 65–99)
POTASSIUM SERPL-SCNC: 4.3 MMOL/L (ref 3.5–5.2)
SODIUM SERPL-SCNC: 143 MMOL/L (ref 136–145)

## 2022-12-12 PROCEDURE — 99232 SBSQ HOSP IP/OBS MODERATE 35: CPT | Performed by: STUDENT IN AN ORGANIZED HEALTH CARE EDUCATION/TRAINING PROGRAM

## 2022-12-12 PROCEDURE — 63710000001 INSULIN LISPRO (HUMAN) PER 5 UNITS: Performed by: PHYSICIAN ASSISTANT

## 2022-12-12 PROCEDURE — 63710000001 PREDNISONE PER 5 MG: Performed by: INTERNAL MEDICINE

## 2022-12-12 PROCEDURE — 82962 GLUCOSE BLOOD TEST: CPT

## 2022-12-12 PROCEDURE — 99239 HOSP IP/OBS DSCHRG MGMT >30: CPT | Performed by: INTERNAL MEDICINE

## 2022-12-12 PROCEDURE — 80048 BASIC METABOLIC PNL TOTAL CA: CPT | Performed by: INTERNAL MEDICINE

## 2022-12-12 RX ORDER — SODIUM CHLORIDE 0.9 % (FLUSH) 0.9 %
10 SYRINGE (ML) INJECTION EVERY 12 HOURS SCHEDULED
Status: CANCELLED | OUTPATIENT
Start: 2022-12-12

## 2022-12-12 RX ORDER — SODIUM CHLORIDE 9 MG/ML
40 INJECTION, SOLUTION INTRAVENOUS AS NEEDED
Status: CANCELLED | OUTPATIENT
Start: 2022-12-12

## 2022-12-12 RX ORDER — SODIUM CHLORIDE 0.9 % (FLUSH) 0.9 %
10 SYRINGE (ML) INJECTION AS NEEDED
Status: CANCELLED | OUTPATIENT
Start: 2022-12-12

## 2022-12-12 RX ORDER — AMIODARONE HYDROCHLORIDE 400 MG/1
400 TABLET ORAL EVERY 12 HOURS SCHEDULED
Qty: 30 TABLET | Refills: 0 | Status: ON HOLD | OUTPATIENT
Start: 2022-12-12 | End: 2022-12-19 | Stop reason: SDUPTHER

## 2022-12-12 RX ADMIN — MAGNESIUM GLUCONATE 500 MG ORAL TABLET 400 MG: 500 TABLET ORAL at 08:21

## 2022-12-12 RX ADMIN — ISOSORBIDE MONONITRATE 60 MG: 60 TABLET, EXTENDED RELEASE ORAL at 08:21

## 2022-12-12 RX ADMIN — LOSARTAN POTASSIUM 50 MG: 50 TABLET, FILM COATED ORAL at 08:21

## 2022-12-12 RX ADMIN — BUSPIRONE HYDROCHLORIDE 15 MG: 5 TABLET ORAL at 08:21

## 2022-12-12 RX ADMIN — RANOLAZINE 500 MG: 500 TABLET, FILM COATED, EXTENDED RELEASE ORAL at 08:21

## 2022-12-12 RX ADMIN — INSULIN LISPRO 2 UNITS: 100 INJECTION, SOLUTION INTRAVENOUS; SUBCUTANEOUS at 12:24

## 2022-12-12 RX ADMIN — ASPIRIN 81 MG: 81 TABLET, COATED ORAL at 08:21

## 2022-12-12 RX ADMIN — LINAGLIPTIN 5 MG: 5 TABLET, FILM COATED ORAL at 08:21

## 2022-12-12 RX ADMIN — PREDNISONE 10 MG: 10 TABLET ORAL at 08:21

## 2022-12-12 RX ADMIN — PANTOPRAZOLE SODIUM 40 MG: 40 TABLET, DELAYED RELEASE ORAL at 05:50

## 2022-12-12 RX ADMIN — TICAGRELOR 90 MG: 90 TABLET ORAL at 08:21

## 2022-12-12 RX ADMIN — AMIODARONE HYDROCHLORIDE 400 MG: 200 TABLET ORAL at 08:21

## 2022-12-12 NOTE — PLAN OF CARE
Goal Outcome Evaluation:              Outcome Evaluation: VSS. no c/o pain this shift. plan to DC home today NSR 77-92 on tele. safety amintianed.

## 2022-12-12 NOTE — DISCHARGE SUMMARY
LOS: 5 days   Patient Care Team:  Tacho Eng MD as PCP - General  Tacho Eng MD as PCP - Family Medicine    Chief Complaint:   Ventricular tachycardia    Shortness of breath    Subjective  Overnight feels well  No chest pain  No palpitation  No presyncope  No syncope  No orthopnea  No paroxysmal nocturnal dyspnea  Hemodynamically stable  Labs reviewed  No new issues or events  Tolerating current medications well  Satisfactory bowel and bladder activity  Satisfactory oral intake  Resting well  Family member by bedside    Interval History: Improved overall    Denies chest pain currently. Denies excessive shortness of breath. Denies abdominal pain, nausea vomiting or diarrhoea.    Telemetry: no malignant arrhythmia. No significant pauses.    Review of Systems   Constitutional: No chills   No fatigue   No fever.   HENT: Negative.    Eyes: Negative.    Respiratory: Negative for cough,   No chest wall soreness,   Mild shortness of breath,   no wheezing, no stridor.    Cardiovascular: Negative for chest pain,   No palpitations   No significant  leg swelling.   Gastrointestinal: Negative for abdominal distention,  No abdominal pain,   No blood in stool, constipation, diarrhea, nausea or vomiting.   Endocrine: Negative.    Genitourinary: Negative for difficulty urinating, dysuria, flank pain and hematuria.   Musculoskeletal: Negative.    Skin: Negative for rash and wound.   Allergic/Immunologic: Negative.    Neurological: Negative for dizziness, syncope, weakness,   No light-headedness or headaches.   Hematological: Does not bruise/bleed easily.   Psychiatric/Behavioral: Negative for agitation, behavioral problems, confusion, the patient does not appear to be nervous/anxious.       History:   Past Medical History:   Diagnosis Date   • Bilateral kidney stones    • Heart attack (HCC)    • Left ureteral stone    • Myasthenia gravis (HCC)    • Peptic ulceration    • Sleep apnea    • Type 2 diabetes  mellitus with circulatory disorder, with long-term current use of insulin (HCC) 9/27/2022     Past Surgical History:   Procedure Laterality Date   • CARDIAC CATHETERIZATION Right 9/11/2022    Procedure: Left Heart Cath;  Surgeon: Tal Greenberg DO;  Location:  [facility];  Service: Cardiovascular;  Laterality: Right;   • CORONARY ANGIOPLASTY WITH STENT PLACEMENT     • HERNIA REPAIR Left     Inguinal Hernia   • SHOULDER SURGERY     • TUMOR REMOVAL       Social History     Socioeconomic History   • Marital status:    Tobacco Use   • Smoking status: Never   • Smokeless tobacco: Never   Vaping Use   • Vaping Use: Never used   Substance and Sexual Activity   • Alcohol use: Yes     Comment: occassionally   • Drug use: Never   • Sexual activity: Defer     Family History   Problem Relation Age of Onset   • Hypertension Father    • No Known Problems Mother    • No Known Problems Sister    • No Known Problems Brother        Labs:  WBC WBC   Date Value Ref Range Status   12/10/2022 11.05 (H) 3.40 - 10.80 10*3/mm3 Final      HGB Hemoglobin   Date Value Ref Range Status   12/10/2022 14.1 13.0 - 17.7 g/dL Final      HCT Hematocrit   Date Value Ref Range Status   12/10/2022 45.0 37.5 - 51.0 % Final      Platlets Platelets   Date Value Ref Range Status   12/10/2022 372 140 - 450 10*3/mm3 Final      MCV MCV   Date Value Ref Range Status   12/10/2022 90.9 79.0 - 97.0 fL Final        Results from last 7 days   Lab Units 12/12/22  0522 12/11/22  0434 12/10/22  0452 12/07/22  2240 12/07/22  1033   SODIUM mmol/L 143 141 139   < > 140   POTASSIUM mmol/L 4.3 4.0 3.9   < > 4.1   CHLORIDE mmol/L 106 104 104   < > 106   CO2 mmol/L 27.0 27.0 25.0   < > 19.0*   BUN mg/dL 23* 18 18   < > 36*   CREATININE mg/dL 1.16 1.12 0.96   < > 1.24   CALCIUM mg/dL 9.3 9.1 8.9   < > 8.9   BILIRUBIN mg/dL  --   --  0.4  --  0.2   ALK PHOS U/L  --   --  50  --  70   ALT (SGPT) U/L  --   --  28  --  22   AST (SGOT) U/L  --    --  32  --  17   GLUCOSE mg/dL 214* 138* 132*   < > 169*    < > = values in this interval not displayed.     Lab Results   Component Value Date    CKTOTAL 152 06/10/2019    CKMB 111.00 (H) 06/09/2014    CKMBINDEX 11.9 (H) 06/09/2014    TROPONINI 44.300 (C) 06/09/2014    TROPONINT 0.021 12/07/2022     PT/INR:  No results found for: PROTIME/No results found for: INR    Imaging Results (Last 72 Hours)     ** No results found for the last 72 hours. **          Objective     Allergies   Allergen Reactions   • Levofloxacin Other (See Comments)     Causes eye problems  Double vision.     • Moxifloxacin Other (See Comments)     Causes eye problems  Double vision       Medication Review: Performed  Current Facility-Administered Medications   Medication Dose Route Frequency Provider Last Rate Last Admin   • acetaminophen (TYLENOL) tablet 650 mg  650 mg Oral Q6H PRN Mahin Cunha MD   650 mg at 12/11/22 1527   • amiodarone (PACERONE) tablet 400 mg  400 mg Oral Q12H Mahin Cunha MD   400 mg at 12/11/22 2057   • aspirin EC tablet 81 mg  81 mg Oral Daily Mahin Cunha MD   81 mg at 12/11/22 0855   • atropine injection 0.5 mg  0.5 mg Intravenous Once PRN Stefan Stoll MD       • busPIRone (BUSPAR) tablet 15 mg  15 mg Oral BID Mahin Cunha MD   15 mg at 12/11/22 2057   • dextrose (D50W) (25 g/50 mL) IV injection 25 g  25 g Intravenous Q15 Min PRN Jannette Padgett PA-C       • dextrose (GLUTOSE) oral gel 15 g  15 g Oral Q15 Min PRN Jannette Padgett PA-C       • droperidol (INAPSINE) injection 0.625 mg  0.625 mg Intravenous Once PRN Stefan Stoll MD        Or   • droperidol (INAPSINE) injection 0.625 mg  0.625 mg Intramuscular Once PRN Stefan Stoll MD       • fentaNYL citrate (PF) (SUBLIMAZE) injection 25 mcg  25 mcg Intravenous Q5 Min PRN Stefan Stoll, MD       • flumazenil (ROMAZICON) injection 0.2 mg  0.2 mg Intravenous PRN Stefan Stoll MD       •  glucagon (human recombinant) (GLUCAGEN DIAGNOSTIC) injection 1 mg  1 mg Intramuscular Q15 Min PRN Jannette Padgett PA-C       • ibuprofen (ADVIL,MOTRIN) tablet 600 mg  600 mg Oral Once PRN Stefan Stoll MD       • insulin detemir (LEVEMIR) injection 16 Units  16 Units Subcutaneous Nightly Jannette Padgett PA-C   16 Units at 12/11/22 2058   • Insulin Lispro (humaLOG) injection 2-7 Units  2-7 Units Subcutaneous TID AC Jannette Padgett PA-C   2 Units at 12/11/22 1727   • isosorbide mononitrate (IMDUR) 24 hr tablet 60 mg  60 mg Oral Daily Mahin Cunha MD   60 mg at 12/11/22 0855   • labetalol (NORMODYNE,TRANDATE) injection 5 mg  5 mg Intravenous Q5 Min PRN Stefan Stoll MD       • linagliptin (TRADJENTA) tablet 5 mg  5 mg Oral Daily Mahin Cunha MD   5 mg at 12/11/22 0855   • losartan (COZAAR) tablet 50 mg  50 mg Oral Daily Mahin Cunha MD   50 mg at 12/11/22 0855   • magnesium oxide (MAG-OX) tablet 400 mg  400 mg Oral Daily Mahin Cunha MD   400 mg at 12/11/22 0855   • melatonin tablet 3 mg  3 mg Oral Nightly Mahin Cunha MD   3 mg at 12/11/22 2057   • naloxone (NARCAN) injection 0.4 mg  0.4 mg Intravenous PRN Stefan Stoll MD       • nitroglycerin (NITROSTAT) SL tablet 0.4 mg  0.4 mg Sublingual Q5 Min PRN Mahin Cunha MD       • ondansetron (ZOFRAN) injection 4 mg  4 mg Intravenous Once PRN Stefan Stoll MD       • oxyCODONE-acetaminophen (PERCOCET) 7.5-325 MG per tablet 2 tablet  2 tablet Oral Q4H PRN Stefan Stoll MD   2 tablet at 12/08/22 8391   • pantoprazole (PROTONIX) EC tablet 40 mg  40 mg Oral Q AM Mahin Cunha MD   40 mg at 12/12/22 0550   • predniSONE (DELTASONE) tablet 10 mg  10 mg Oral Daily With Breakfast Alton Ayala MD   10 mg at 12/11/22 0855   • ranolazine (RANEXA) 12 hr tablet 500 mg  500 mg Oral Q12H Mahin Cunha MD   500 mg at 12/11/22 2057   • rosuvastatin (CRESTOR) tablet 20 mg  20 mg Oral  "Nightly Mahin Cunha MD   20 mg at 12/11/22 2057   • sodium chloride 0.9 % flush 10 mL  10 mL Intravenous PRN Mahin Cunha MD       • sodium chloride 0.9 % infusion 40 mL  40 mL Intravenous PRN Mahin Cunha MD   100 mL/hr at 12/08/22 1430   • ticagrelor (BRILINTA) tablet 90 mg  90 mg Oral BID Mahin Cunha MD   90 mg at 12/11/22 2057   • verapamil (ISOPTIN) injection 5 mg  5 mg Intravenous Once PRN Mahin Cunha MD       • verapamil SR (CALAN-SR) CR tablet 180 mg  180 mg Oral Nightly Mahin Cunha MD   180 mg at 12/11/22 2057       Vital Sign Min/Max for last 24 hours  Temp  Min: 98.1 °F (36.7 °C)  Max: 98.7 °F (37.1 °C)   BP  Min: 101/71  Max: 124/65   Pulse  Min: 75  Max: 95   Resp  Min: 16  Max: 16   SpO2  Min: 95 %  Max: 98 %   No data recorded   Weight  Min: 78.1 kg (172 lb 3 oz)  Max: 78.1 kg (172 lb 3 oz)     Flowsheet Rows    Flowsheet Row First Filed Value   Admission Height 182.9 cm (72\") Documented at 12/07/2022 0933   Admission Weight 81.2 kg (179 lb) Documented at 12/07/2022 0933          Results for orders placed during the hospital encounter of 12/07/22    Adult Transthoracic Echo Complete w/ Color, Spectral and Contrast if Necessary Per Protocol    Interpretation Summary  •  Left ventricular systolic function is low normal. Left ventricular ejection fraction appears to be 51 - 55%.  •  Normal right ventricular cavity size and systolic function noted.  •  No hemodynamically significant valvular abnormalities identified on this study.        Consults:   Consults     Date and Time Order Name Status Description    12/7/2022  2:56 PM Inpatient Cardiac Electrophysiology Consult      12/7/2022  1:31 PM Inpatient Cardiac Electrophysiologist Consult Completed           Procedures Performed  Procedure(s):  EP/Ablation       Physical Exam:  General Appearance: Awake, alert, in no acute distress  Eyes: Pupils equal and reactive    Ears: Appear intact with no abnormalities noted  Nose: Nares normal, no " drainage  Neck: supple, trachea midline, no carotid bruit and no JVD  Back: no kyphosis present,    Lungs: respirations regular, respirations even and respirations unlabored  Heart: normal S1, S2,  2/6 pansystolic murmur in the left sternal border,  no rub and no click  Abdomen: normal bowel sounds, no masses, no hepatomegaly, no splenomegaly, guarding and no rebound tenderness   Skin: no bleeding, bruising or rash  Extremities: no cyanosis  Psychiatric/Behavioral: Negative for agitation, behavioral problems, confusion, the patient does not appear to be nervous/anxious.          Results Review:   I reviewed the patient's new clinical results.  I reviewed the patient's new imaging results and agree with the interpretation.  I reviewed the patient's other test results and agree with the interpretation  I personally viewed and interpreted the patient's EKG/Telemetry data  Discussed with patient,        Discharge diagnosis with prior    Ventricular tachycardia  Coronary artery disease  Essential hypertension  Mixed hyperlipidemia  Myasthenia gravis  Type 2 diabetes mellitus    Hospital Course  Patient is a 57 y.o. male who was admitted  Recently underwent VT ablation  Had presented with wide-complex tachycardia  Drug-eluting stent placement to left anterior descending coronary artery and left circumflex coronary artery on 9/11/2022  Has history of hypertension with variable blood pressure recordings  Has hyperlipidemia  Electrophysiology service was consulted  Currently electrically stable  Overall doing well  Family member by bedside  Endorsed by Dr. Ayala yesterday  Plans to discharge home  Had VT ablation on 12/8/2022      Condition on Discharge: Stable and Improved  ______________________________________________________________________________________________________________________________________________________________________________________________________________________________       Plan on  discharge  ______________________________________________________________________________________________________________________________________________________________________________________________________________________________       OK to discharge  Low salt cardiac diet.   Discharge to home and or self care with improvement or resolution of presenting symptoms or complaints    Follow up with primary provider and primary cardiologist as scheduled   Overall improved and deemed fit for discharge  Will be provided with written discharge instructions including diet and medications including prescriptions as required and labs as applicable    Questions were encouraged, asked and answered to the patient's  understanding and satisfaction.  Follow-up with JAE Loera within 2 weeks  Follow-up with electrophysiology service Dr. Cunha per his discretion    Go to nearest emergency room if recurrence of primary complaints or any suspected disabling or life threatening symptoms or complaints such as chest pain, increasing shortness of breath, profound dizziness, weakness, significant palpitations, passing out  35 minutes spent in the discharge process.  Discussed with patient current admission diagnosis, prognosis, tests performed and their meaning, monitoring for any signs of complications  What to watch for in the short medium and long-term post discharge course  Counseled regarding diet, fluid, sodium restriction, caffeine restriction, stimulant intake and avoidance of nonsteroidal anti-inflammatory drugs  Counseled regarding strict compliance with diet, medications, regular testing and follow-up  _______________________________________________________________________________________________________________________________________________________________________________________________________________________________________    Discharge Disposition: To home and self care  Home or Self Care    Discharge  Medications     Discharge Medications      New Medications      Instructions Start Date   amiodarone 400 MG tablet  Commonly known as: PACERONE   400 mg, Oral, Every 12 Hours Scheduled      verapamil  MG CR tablet  Commonly known as: CALAN-SR   180 mg, Oral, Nightly         Continue These Medications      Instructions Start Date   aspirin 81 MG EC tablet   81 mg, Oral, Daily      atorvastatin 40 MG tablet  Commonly known as: LIPITOR   40 mg, Oral, Daily      busPIRone 15 MG tablet  Commonly known as: BUSPAR   15 mg, Oral, 2 Times Daily      dilTIAZem 120 MG 24 hr capsule  Commonly known as: TIAZAC   120 mg, Daily      FARXIGA PO   5 mg, Oral, Daily      fenofibrate 160 MG tablet   160 mg, Oral, Daily      icosapent ethyl 1 g capsule capsule  Commonly known as: VASCEPA   2 g, Oral, 2 Times Daily With Meals      Insulin Glargine-yfgn 100 UNIT/ML solution pen-injector   16 Units, Subcutaneous, Nightly      isosorbide mononitrate 60 MG 24 hr tablet  Commonly known as: IMDUR   60 mg, Oral, Daily      Januvia 100 MG tablet  Generic drug: SITagliptin   100 mg, Oral, Daily      losartan 50 MG tablet  Commonly known as: COZAAR   50 mg, Oral, Daily      metFORMIN 1000 MG tablet  Commonly known as: GLUCOPHAGE   1,000 mg, Oral, 2 Times Daily With Meals      nitroglycerin 0.4 MG SL tablet  Commonly known as: NITROSTAT   0.4 mg, Sublingual, Every 5 Minutes PRN, Take no more than 3 doses in 15 minutes.      NovoLOG FlexPen 100 UNIT/ML solution pen-injector sc pen  Generic drug: insulin aspart   1-10 Units, 3 Times Daily With Meals, Sliding Scale      omeprazole 20 MG capsule  Commonly known as: priLOSEC   20 mg, Oral, Daily      ondansetron 4 MG tablet  Commonly known as: ZOFRAN   4 mg, Oral, Every 8 Hours PRN      predniSONE 10 MG tablet  Commonly known as: DELTASONE   10 mg, Oral, Daily      ranolazine 500 MG 12 hr tablet  Commonly known as: RANEXA   500 mg, Oral, 2 Times Daily      tamsulosin 0.4 MG capsule 24 hr  capsule  Commonly known as: FLOMAX   1 capsule, Daily      ticagrelor 90 MG tablet tablet  Commonly known as: BRILINTA   90 mg, Oral, 2 Times Daily      vitamin D3 125 MCG (5000 UT) capsule capsule   5,000 Units, Oral, Daily             Discharge Diet:  Low salt heart healthy cardiac diet    Activity at Discharge:  As tolerated    Follow-up Appointments  Future Appointments   Date Time Provider Department Center   3/10/2023  1:00 PM Khadar Sainz MD [facility]         Test Results Pending at Discharge: None       Jose Colon MD  12/12/22  08:15 CST

## 2022-12-12 NOTE — PROGRESS NOTES
"Chief Complaint  Ventricular tachycardia    Subjective        History of Present Illness    Patient ID:  Iftikhar Francis is a 57 y.o. male with past medical history as above who EP is following for ventricular tachycardia.    Interval history:  No further episodes of VT over the weekend.  Tolerating verapamil well without evidence of bulbar symptoms or weakness.    Objective   Vital Signs:  /64 (BP Location: Left arm, Patient Position: Lying)   Pulse 76   Temp 98.1 °F (36.7 °C) (Oral)   Resp 16   Ht 182.9 cm (72\")   Wt 78.1 kg (172 lb 3 oz)   SpO2 95%   BMI 23.35 kg/m²   Estimated body mass index is 23.35 kg/m² as calculated from the following:    Height as of this encounter: 182.9 cm (72\").    Weight as of this encounter: 78.1 kg (172 lb 3 oz).      Physical Exam  Vitals reviewed.   Constitutional:       General: He is not in acute distress.  HENT:      Head: Normocephalic and atraumatic.   Eyes:      Extraocular Movements: Extraocular movements intact.      Conjunctiva/sclera: Conjunctivae normal.   Cardiovascular:      Rate and Rhythm: Normal rate and regular rhythm.      Comments: Femoral access site is soft without evidence of significant bleeding  Pulmonary:      Effort: Pulmonary effort is normal.      Breath sounds: Normal breath sounds.   Abdominal:      General: There is no distension.      Tenderness: There is no abdominal tenderness.   Musculoskeletal:         General: No swelling or tenderness.      Cervical back: Normal range of motion and neck supple.   Skin:     General: Skin is warm and dry.   Neurological:      General: No focal deficit present.      Mental Status: He is alert and oriented to person, place, and time.   Psychiatric:         Mood and Affect: Mood normal.         Judgment: Judgment normal.         Result Review :  The following data was reviewed by: Mahin Cunha MD on 12/07/2022:  CMP    CMP 12/10/22 12/11/22 12/12/22   Glucose 132 (A) 138 (A) 214 (A)   BUN 18 18 23 " (A)   Creatinine 0.96 1.12 1.16   Sodium 139 141 143   Potassium 3.9 4.0 4.3   Chloride 104 104 106   Calcium 8.9 9.1 9.3   Albumin 4.30     Total Bilirubin 0.4     Alkaline Phosphatase 50     AST (SGOT) 32     ALT (SGPT) 28     (A) Abnormal value       Comments are available for some flowsheets but are not being displayed.           Inpatient telemetry directly visualized and independently reviewed: Sinus rhythm without further evidence of ventricular tachycardia.  BMP today: Creatinine 1.16, otherwise unremarkable  Magnesium yesterday: 2.3             Assessment and Plan   Problems:  1. Sustained ventricular tachycardia  2. Coronary artery disease  3. Myasthenia gravis    Iftikhar Francis is a 57 y.o. male with past medical history as above who EP is following for ventricular tachycardia.  He has not had any further episodes of ventricular tachycardia documented over the weekend.  At this time, the plan will be medical management with verapamil and amiodarone.  He should stay on amiodarone to complete his 10-day load at 400 mg twice daily and then transition to 200 mg daily.  We will plan for ICD implant as an outpatient on December 27.    Plan:  -Continue amiodarone load 400 mg twice daily for an additional 6 days  -Continue verapamil 180 mg nightly  -LifeVest at discharge for sustained VT  -ICD implant on December 27  -Follow-up in clinic as scheduled           Part of this note may be an electronic transcription/translation of spoken language to printed text using the Dragon Dictation System.

## 2022-12-12 NOTE — PAYOR COMM NOTE
"12/12/22 [facility] [phone]    [phone]    FAXING UPDATE CLINICAL        Iftikhar Francis (57 y.o. Male)     Date of Birth   1965    Social Security Number       Address   [address] [facility] [address]    Home Phone   [phone]    MRN   [id number]       Hindu   Sikh    Marital Status                               Admission Date   12/7/22    Admission Type   Emergency    Admitting Provider   Arpit Gonzalez MD    Attending Provider   Arpit Gonzalez MD    Department, Room/Bed   [address], 403/1       Discharge Date       Discharge Disposition   Home or Self Care    Discharge Destination                               Attending Provider: Arpit Gonzalez MD    Allergies: Levofloxacin, Moxifloxacin    Isolation: None   Infection: None   Code Status: CPR    Ht: 182.9 cm (72\")   Wt: 78.1 kg (172 lb 3 oz)    Admission Cmt: None   Principal Problem: Ventricular tachycardia [I47.20]                 Active Insurance as of 12/7/2022     Primary Coverage     Payor Plan Insurance Group Employer/Plan Group    [employer] BLUE [employer]OSS [employer] BLUE [employer]OSS BLUE [employer]IELD PPO [id number]     Payor Plan Address Payor Plan Phone Number Payor Plan Fax Number Effective Dates    [address] [phone]  1/1/2011 - None Entered    [place] [address]       Subscriber Name Subscriber Birth Date Member ID       DEBORAH FRANCIS 5/15/1967 [id number]                 Emergency Contacts      (Rel.) Home Phone Work Phone Mobile Phone    Deborah Francis (Spouse) -- -- [phone]             [facility] Encounter Date/Time: 12/7/2022 0931   Hospital Account: [id number]    MRN: [id number]   Patient:  Iftikhar Francis   Contact Serial #: [id number]   SSN:          ENCOUNTER             Patient Class: Inpatient   Unit: [address] Service: Cardiology     Bed: 403/1   Admitting Provider: Arpit Gonzalez MD   Referring " Physician: Provider, No Known   Attending Provider: Arpit Gonzalez MD   Adm Diagnosis: Ventricular tachycardia *               PATIENT          Name: Iftikhar Francis : 1965 (57 yrs)   Address: [address] Sex: Male   City: [place] [address]   County: [facility]   Marital Status:  Ethnicity: NOT                                                                              Race: WHITE   Primary Care Provider: Tacho Eng,* Patients Phone: Home Phone: [phone]     Mobile Phone: [phone]   EMERGENCY CONTACT   Contact Name Legal Guardian? Relationship to Patient Home Phone Work Phone   1. PennyDeborah  2. *No Contact Specified*      Spouse                   GUARANTOR            Guarantor: Iftikhar Francis     : 1965   Address: [address] Sex: [place]     [place] [address]     Relation to Patient: Self       Home Phone: [phone]   Guarantor ID: [id number]       Work Phone:     GUARANTOR EMPLOYER   Employer:           Status: DISABLED   COVERAGE          PRIMARY INSURANCE   Payor: [employer] Plan: [employer] BLUE CROSS BLUE SHIELD PPO   Group Number: [id number] Insurance Type: INDEMNITY   Subscriber Name: DEBORAH FRANCIS Subscriber : 5/15/1967   Subscriber ID: [id number] Coverage Address: [place] [address]   Pat. Rel. to Subscriber: Spouse Coverage Phone: [phone]   SECONDARY INSURANCE   Payor: N/A Plan: N/A   Group Number:   Insurance Type:     Subscriber Name:   Subscriber :     Subscriber ID:   Coverage Address:     Pat. Rel. to Subscriber:   Coverage Phone:        Contact Serial # ([id number])       2022    Chart ID [id number])            Mahin Cunha MD   Physician  Cardiac EP  Progress Notes     Addendum  Date of Service:  22  Creation Time:  22     Expand AllCollapse All    Chief Complaint  Ventricular tachycardia        Subjective          History of Present Illness     Patient  "ID:  Iftikhar Francis is a 57 y.o. male with past medical history as above who EP is following for ventricular tachycardia.     Interval history:  No further episodes of VT over the weekend.  Tolerating verapamil well without evidence of bulbar symptoms or weakness.           Objective      Vital Signs:  /64 (BP Location: Left arm, Patient Position: Lying)   Pulse 76   Temp 98.1 °F (36.7 °C) (Oral)   Resp 16   Ht 182.9 cm (72\")   Wt 78.1 kg (172 lb 3 oz)   SpO2 95%   BMI 23.35 kg/m²   Estimated body mass index is 23.35 kg/m² as calculated from the following:    Height as of this encounter: 182.9 cm (72\").    Weight as of this encounter: 78.1 kg (172 lb 3 oz).        Physical Exam  Vitals reviewed.   Constitutional:       General: He is not in acute distress.  HENT:      Head: Normocephalic and atraumatic.   Eyes:      Extraocular Movements: Extraocular movements intact.      Conjunctiva/sclera: Conjunctivae normal.   Cardiovascular:      Rate and Rhythm: Normal rate and regular rhythm.      Comments: Femoral access site is soft without evidence of significant bleeding  Pulmonary:      Effort: Pulmonary effort is normal.      Breath sounds: Normal breath sounds.   Abdominal:      General: There is no distension.      Tenderness: There is no abdominal tenderness.   Musculoskeletal:         General: No swelling or tenderness.      Cervical back: Normal range of motion and neck supple.   Skin:     General: Skin is warm and dry.   Neurological:      General: No focal deficit present.      Mental Status: He is alert and oriented to person, place, and time.   Psychiatric:         Mood and Affect: Mood normal.         Judgment: Judgment normal.                  Result Review    :  The following data was reviewed by: Mahin Cunha MD on 12/07/2022:  CMP Results:         CMP    CMP 12/10/22 12/11/22 12/12/22   Glucose 132 (A) 138 (A) 214 (A)   BUN 18 18 23 (A)   Creatinine 0.96 1.12 1.16   Sodium 139 141 143 "   Potassium 3.9 4.0 4.3   Chloride 104 104 106   Calcium 8.9 9.1 9.3   Albumin 4.30       Total Bilirubin 0.4       Alkaline Phosphatase 50       AST (SGOT) 32       ALT (SGPT) 28       (A) Abnormal value        Comments are available for some flowsheets but are not being displayed.                 Inpatient telemetry directly visualized and independently reviewed: Sinus rhythm without further evidence of ventricular tachycardia.  BMP today: Creatinine 1.16, otherwise unremarkable  Magnesium yesterday: 2.3                  Assessment      Assessment and Plan   Problems:  1. Ventricular tachycardia  2. Coronary artery disease  3. Myasthenia gravis     Iftikhar Francis is a 57 y.o. male with past medical history as above who EP is following for ventricular tachycardia.  He has not had any further episodes of ventricular tachycardia documented over the weekend.  At this time,        Result Review    :  The following data was reviewed by: Mahin Cunha MD on 12/07/2022:  CMP Results:         CMP    CMP 12/10/22 12/11/22 12/12/22   Glucose 132 (A) 138 (A) 214 (A)   BUN 18 18 23 (A)   Creatinine 0.96 1.12 1.16   Sodium 139 141 143   Potassium 3.9 4.0 4.3   Chloride 104 104 106   Calcium 8.9 9.1 9.3   Albumin 4.30       Total Bilirubin 0.4       Alkaline Phosphatase 50       AST (SGOT) 32       ALT (SGPT) 28       (A) Abnormal value        Comments are available for some flowsheets but are not being displayed.                 Inpatient telemetry directly visualized and independently reviewed: Sinus rhythm without further evidence of ventricular tachycardia.  BMP today: Creatinine 1.16, otherwise unremarkable  Magnesium yesterday: 2.3                  Assessment      Assessment and Plan   Problems:  1. Ventricular tachycardia  2. Coronary artery disease  3. Myasthenia gravis     Iftikhar Francis is a 57 y.o. male with past medical history as above who EP is following for ventricular tachycardia.  He has not had any  further episodes of ventricular tachycardia documented over the weekend.  At this time, the plan will be medical management with verapamil and amiodarone.  He should stay on amiodarone to complete his 10-day load at 400 mg twice daily and then transition to 200 mg daily.  We will plan for ICD implant as an outpatient on December 27.     Plan:  -Continue amiodarone load 400 mg twice daily for an additional 6 days  -Continue verapamil 180 mg nightly  -LifeVest at discharge  -ICD implant on December 27  -Follow-up in clinic as scheduled          Part of this note may be an electronic transcription/translation of spoken language to printed text using the Dragon Dictation System.                     Current Facility-Administered Medications   Medication Dose Route Frequency Provider Last Rate Last Admin   • acetaminophen (TYLENOL) tablet 650 mg  650 mg Oral Q6H PRN Mahin Cunha MD   650 mg at 12/11/22 1527   • amiodarone (PACERONE) tablet 400 mg  400 mg Oral Q12H Mahin Cunha MD   400 mg at 12/12/22 0821   • aspirin EC tablet 81 mg  81 mg Oral Daily Mahin Cunha MD   81 mg at 12/12/22 0821   • atropine injection 0.5 mg  0.5 mg Intravenous Once PRN Stefan Stoll MD       • busPIRone (BUSPAR) tablet 15 mg  15 mg Oral BID Mahin Cunha MD   15 mg at 12/12/22 0821   • dextrose (D50W) (25 g/50 mL) IV injection 25 g  25 g Intravenous Q15 Min PRN Jannette Padgett PA-C       • dextrose (GLUTOSE) oral gel 15 g  15 g Oral Q15 Min PRN Jannette Padgett PA-C       • droperidol (INAPSINE) injection 0.625 mg  0.625 mg Intravenous Once PRN Stefan Stoll MD        Or   • droperidol (INAPSINE) injection 0.625 mg  0.625 mg Intramuscular Once PRN Stefan Stoll MD       • fentaNYL citrate (PF) (SUBLIMAZE) injection 25 mcg  25 mcg Intravenous Q5 Min PRN Stefan Stoll MD       • flumazenil (ROMAZICON) injection 0.2 mg  0.2 mg Intravenous PRN Dodie, Stefan Kilpatrick MD        • glucagon (human recombinant) (GLUCAGEN DIAGNOSTIC) injection 1 mg  1 mg Intramuscular Q15 Min PRN Jannette Padgett PA-C       • ibuprofen (ADVIL,MOTRIN) tablet 600 mg  600 mg Oral Once PRN Stefan Stoll MD       • insulin detemir (LEVEMIR) injection 16 Units  16 Units Subcutaneous Nightly Jannette Padgett PA-C   16 Units at 12/11/22 2058   • Insulin Lispro (humaLOG) injection 2-7 Units  2-7 Units Subcutaneous TID AC Jannette Padgett PA-C   2 Units at 12/11/22 1727   • isosorbide mononitrate (IMDUR) 24 hr tablet 60 mg  60 mg Oral Daily Mahin Cunha MD   60 mg at 12/12/22 0821   • labetalol (NORMODYNE,TRANDATE) injection 5 mg  5 mg Intravenous Q5 Min PRN Stefan Stoll MD       • linagliptin (TRADJENTA) tablet 5 mg  5 mg Oral Daily Mahin Cunha MD   5 mg at 12/12/22 0821   • losartan (COZAAR) tablet 50 mg  50 mg Oral Daily Mahin Cunha MD   50 mg at 12/12/22 0821   • magnesium oxide (MAG-OX) tablet 400 mg  400 mg Oral Daily Mahin Cunha MD   400 mg at 12/12/22 0821   • melatonin tablet 3 mg  3 mg Oral Nightly Mahin Cunha MD   3 mg at 12/11/22 2057   • naloxone (NARCAN) injection 0.4 mg  0.4 mg Intravenous PRN Stefan Stoll MD       • nitroglycerin (NITROSTAT) SL tablet 0.4 mg  0.4 mg Sublingual Q5 Min PRN Mahin Cunha MD       • ondansetron (ZOFRAN) injection 4 mg  4 mg Intravenous Once PRN Stefan Stoll MD       • oxyCODONE-acetaminophen (PERCOCET) 7.5-325 MG per tablet 2 tablet  2 tablet Oral Q4H PRN Stefan Stoll MD   2 tablet at 12/08/22 2351   • pantoprazole (PROTONIX) EC tablet 40 mg  40 mg Oral Q AM Mahin Cunha MD   40 mg at 12/12/22 0550   • predniSONE (DELTASONE) tablet 10 mg  10 mg Oral Daily With Breakfast Alton Ayala MD   10 mg at 12/12/22 0821   • ranolazine (RANEXA) 12 hr tablet 500 mg  500 mg Oral Q12H Mahin Cunha MD   500 mg at 12/12/22 0821   • rosuvastatin (CRESTOR) tablet 20 mg  20 mg  Oral Nightly Mahin Cunha MD   20 mg at 12/11/22 2057   • sodium chloride 0.9 % flush 10 mL  10 mL Intravenous PRN Mahin Cunha MD       • sodium chloride 0.9 % infusion 40 mL  40 mL Intravenous PRN Mahin Cunha MD   100 mL/hr at 12/08/22 1430   • ticagrelor (BRILINTA) tablet 90 mg  90 mg Oral BID Mahin Cunha MD   90 mg at 12/12/22 0821   • verapamil (ISOPTIN) injection 5 mg  5 mg Intravenous Once PRN Mahin Cunha MD       • verapamil SR (CALAN-SR) CR tablet 180 mg  180 mg Oral Nightly Mahin Cunha MD   180 mg at 12/11/22 2057

## 2022-12-12 NOTE — PLAN OF CARE
Goal Outcome Evaluation:  Plan of Care Reviewed With: patient        Progress: improving  Outcome Evaluation: VSS. S 80-94 on tele. No c/o pain. Hopefully d/c home tomorrow. Continue to monitor.

## 2022-12-13 NOTE — PAYOR COMM NOTE
"REF:  [id number]    [facility]  [place],   [phone]  OR  FAX   [phone]      Iftikhar Francis Evan (57 y.o. Male)     Date of Birth   1965    Social Security Number       Address   [address] [facility] [address]    Home Phone   [phone]    MRN   [id number]       [place]   [place]    Marital Status                               Admission Date   12/7/22    Admission Type   Emergency    Admitting Provider   Arpit Gonzalez MD    Attending Provider       Department, Room/Bed   [facility] 4B, 403/1       Discharge Date   12/12/2022    Discharge Disposition   Home or Self Care    Discharge Destination   Home                            Attending Provider: (none)   Allergies: Levofloxacin, Moxifloxacin    Isolation: None   Infection: None   Code Status: Prior    Ht: 182.9 cm (72\")   Wt: 78.1 kg (172 lb 3 oz)    Admission Cmt: None   Principal Problem: Ventricular tachycardia [I47.20]                 Active Insurance as of 12/7/2022     Primary Coverage     Payor Plan Insurance Group Employer/Plan Group    [id number] P[employer] [id number]     Payor Plan Address Payor Plan Phone Number Payor Plan Fax Number Effective Dates    [address] [phone]  1/1/2011 - None Entered    [place] [address]       Subscriber Name Subscriber Birth Date Member ID       DEBORAH FRANCIS 5/15/1967 [id number]                 Emergency Contacts      (Rel.) Home Phone Work Phone Mobile Phone    Deborah Francis (Spouse) -- -- [phone]               Discharge Summary      Jose Colon MD at 12/12/22 0807               LOS: 5 days   Patient Care Team:  Tacho Eng MD as PCP - General  Tacho Eng MD as PCP - Family Medicine    Chief Complaint:   Ventricular tachycardia    Shortness of breath    Subjective  Overnight feels well  No chest pain  No palpitation  No presyncope  No syncope  No orthopnea  No paroxysmal " nocturnal dyspnea  Hemodynamically stable  Labs reviewed  No new issues or events  Tolerating current medications well  Satisfactory bowel and bladder activity  Satisfactory oral intake  Resting well  Family member by bedside    Interval History: Improved overall    Denies chest pain currently. Denies excessive shortness of breath. Denies abdominal pain, nausea vomiting or diarrhoea.    Telemetry: no malignant arrhythmia. No significant pauses.    Review of Systems   Constitutional: No chills   No fatigue   No fever.   HENT: Negative.    Eyes: Negative.    Respiratory: Negative for cough,   No chest wall soreness,   Mild shortness of breath,   no wheezing, no stridor.    Cardiovascular: Negative for chest pain,   No palpitations   No significant  leg swelling.   Gastrointestinal: Negative for abdominal distention,  No abdominal pain,   No blood in stool, constipation, diarrhea, nausea or vomiting.   Endocrine: Negative.    Genitourinary: Negative for difficulty urinating, dysuria, flank pain and hematuria.   Musculoskeletal: Negative.    Skin: Negative for rash and wound.   Allergic/Immunologic: Negative.    Neurological: Negative for dizziness, syncope, weakness,   No light-headedness or headaches.   Hematological: Does not bruise/bleed easily.   Psychiatric/Behavioral: Negative for agitation, behavioral problems, confusion, the patient does not appear to be nervous/anxious.       History:   Past Medical History:   Diagnosis Date   • Bilateral kidney stones    • Heart attack (HCC)    • Left ureteral stone    • Myasthenia gravis (HCC)    • Peptic ulceration    • Sleep apnea    • Type 2 diabetes mellitus with circulatory disorder, with long-term current use of insulin (HCC) 9/27/2022     Past Surgical History:   Procedure Laterality Date   • CARDIAC CATHETERIZATION Right 9/11/2022    Procedure: Left Heart Cath;  Surgeon: Tal Greenberg DO;  Location:  [facility]ATH [facility];  Service:  Cardiovascular;  Laterality: Right;   • CORONARY ANGIOPLASTY WITH STENT PLACEMENT     • HERNIA REPAIR Left     Inguinal Hernia   • SHOULDER SURGERY     • TUMOR REMOVAL       Social History     Socioeconomic History   • Marital status:    Tobacco Use   • Smoking status: Never   • Smokeless tobacco: Never   Vaping Use   • Vaping Use: Never used   Substance and Sexual Activity   • Alcohol use: Yes     Comment: occassionally   • Drug use: Never   • Sexual activity: Defer     Family History   Problem Relation Age of Onset   • Hypertension Father    • No Known Problems Mother    • No Known Problems Sister    • No Known Problems Brother        Labs:  WBC WBC   Date Value Ref Range Status   12/10/2022 11.05 (H) 3.40 - 10.80 10*3/mm3 Final      HGB Hemoglobin   Date Value Ref Range Status   12/10/2022 14.1 13.0 - 17.7 g/dL Final      HCT Hematocrit   Date Value Ref Range Status   12/10/2022 45.0 37.5 - 51.0 % Final      Platlets Platelets   Date Value Ref Range Status   12/10/2022 372 140 - 450 10*3/mm3 Final      MCV MCV   Date Value Ref Range Status   12/10/2022 90.9 79.0 - 97.0 fL Final        Results from last 7 days   Lab Units 12/12/22  0522 12/11/22  0434 12/10/22  0452 12/07/22  2240 12/07/22  1033   SODIUM mmol/L 143 141 139   < > 140   POTASSIUM mmol/L 4.3 4.0 3.9   < > 4.1   CHLORIDE mmol/L 106 104 104   < > 106   CO2 mmol/L 27.0 27.0 25.0   < > 19.0*   BUN mg/dL 23* 18 18   < > 36*   CREATININE mg/dL 1.16 1.12 0.96   < > 1.24   CALCIUM mg/dL 9.3 9.1 8.9   < > 8.9   BILIRUBIN mg/dL  --   --  0.4  --  0.2   ALK PHOS U/L  --   --  50  --  70   ALT (SGPT) U/L  --   --  28  --  22   AST (SGOT) U/L  --   --  32  --  17   GLUCOSE mg/dL 214* 138* 132*   < > 169*    < > = values in this interval not displayed.     Lab Results   Component Value Date    CKTOTAL 152 06/10/2019    CKMB 111.00 (H) 06/09/2014    CKMBINDEX 11.9 (H) 06/09/2014    TROPONINI 44.300 (C) 06/09/2014    TROPONINT 0.021 12/07/2022     PT/INR:  No  results found for: PROTIME/No results found for: INR    Imaging Results (Last 72 Hours)     ** No results found for the last 72 hours. **          Objective      Allergies   Allergen Reactions   • Levofloxacin Other (See Comments)     Causes eye problems  Double vision.     • Moxifloxacin Other (See Comments)     Causes eye problems  Double vision       Medication Review: Performed  Current Facility-Administered Medications   Medication Dose Route Frequency Provider Last Rate Last Admin   • acetaminophen (TYLENOL) tablet 650 mg  650 mg Oral Q6H PRN Mahin Cunha MD   650 mg at 12/11/22 1527   • amiodarone (PACERONE) tablet 400 mg  400 mg Oral Q12H Mahin Cunha MD   400 mg at 12/11/22 2057   • aspirin EC tablet 81 mg  81 mg Oral Daily Mahin Cunha MD   81 mg at 12/11/22 0855   • atropine injection 0.5 mg  0.5 mg Intravenous Once PRN Stefan Stoll MD       • busPIRone (BUSPAR) tablet 15 mg  15 mg Oral BID Mahin Cunha MD   15 mg at 12/11/22 2057   • dextrose (D50W) (25 g/50 mL) IV injection 25 g  25 g Intravenous Q15 Min PRN Jannette Padgett PA-C       • dextrose (GLUTOSE) oral gel 15 g  15 g Oral Q15 Min PRN Jannette Padgett PA-C       • droperidol (INAPSINE) injection 0.625 mg  0.625 mg Intravenous Once PRN Stefan Stoll MD        Or   • droperidol (INAPSINE) injection 0.625 mg  0.625 mg Intramuscular Once PRN Stefan Stoll MD       • fentaNYL citrate (PF) (SUBLIMAZE) injection 25 mcg  25 mcg Intravenous Q5 Min PRN Stefan Stoll MD       • flumazenil (ROMAZICON) injection 0.2 mg  0.2 mg Intravenous PRN Stefan Stoll MD       • glucagon (human recombinant) (GLUCAGEN DIAGNOSTIC) injection 1 mg  1 mg Intramuscular Q15 Min PRN Jannette Padgett PA-C       • ibuprofen (ADVIL,MOTRIN) tablet 600 mg  600 mg Oral Once PRN Stefan Stoll MD       • insulin detemir (LEVEMIR) injection 16 Units  16 Units Subcutaneous Nightly  Jannette Padgett PA-C   16 Units at 12/11/22 2058   • Insulin Lispro (humaLOG) injection 2-7 Units  2-7 Units Subcutaneous TID AC Jannette Padgett PA-C   2 Units at 12/11/22 1727   • isosorbide mononitrate (IMDUR) 24 hr tablet 60 mg  60 mg Oral Daily Mahin Cunha MD   60 mg at 12/11/22 0855   • labetalol (NORMODYNE,TRANDATE) injection 5 mg  5 mg Intravenous Q5 Min PRN Stefan Stoll MD       • linagliptin (TRADJENTA) tablet 5 mg  5 mg Oral Daily Mahin Cunha MD   5 mg at 12/11/22 0855   • losartan (COZAAR) tablet 50 mg  50 mg Oral Daily Mahin Cunha MD   50 mg at 12/11/22 0855   • magnesium oxide (MAG-OX) tablet 400 mg  400 mg Oral Daily Mahin Cunha MD   400 mg at 12/11/22 0855   • melatonin tablet 3 mg  3 mg Oral Nightly Mahin Cnuha MD   3 mg at 12/11/22 2057   • naloxone (NARCAN) injection 0.4 mg  0.4 mg Intravenous PRN Stefan Stoll MD       • nitroglycerin (NITROSTAT) SL tablet 0.4 mg  0.4 mg Sublingual Q5 Min PRN Mahin Cunha MD       • ondansetron (ZOFRAN) injection 4 mg  4 mg Intravenous Once PRN Stefan Stoll MD       • oxyCODONE-acetaminophen (PERCOCET) 7.5-325 MG per tablet 2 tablet  2 tablet Oral Q4H PRN Stefan Stoll MD   2 tablet at 12/08/22 2351   • pantoprazole (PROTONIX) EC tablet 40 mg  40 mg Oral Q AM Mahin Cunha MD   40 mg at 12/12/22 0550   • predniSONE (DELTASONE) tablet 10 mg  10 mg Oral Daily With Breakfast Alton Ayala MD   10 mg at 12/11/22 0855   • ranolazine (RANEXA) 12 hr tablet 500 mg  500 mg Oral Q12H Mahin Cunha MD   500 mg at 12/11/22 2057   • rosuvastatin (CRESTOR) tablet 20 mg  20 mg Oral Nightly Mahin Cunha MD   20 mg at 12/11/22 2057   • sodium chloride 0.9 % flush 10 mL  10 mL Intravenous PRN Mahin Cunha MD       • sodium chloride 0.9 % infusion 40 mL  40 mL Intravenous PRN Mahin Cunha MD   100 mL/hr at 12/08/22 1430   • ticagrelor (BRILINTA) tablet 90 mg  90 mg Oral BID Mahin Cunha  "MD   90 mg at 12/11/22 2057   • verapamil (ISOPTIN) injection 5 mg  5 mg Intravenous Once PRN Mahin Cunha MD       • verapamil SR (CALAN-SR) CR tablet 180 mg  180 mg Oral Nightly Mahin Cunha MD   180 mg at 12/11/22 2057       Vital Sign Min/Max for last 24 hours  Temp  Min: 98.1 °F (36.7 °C)  Max: 98.7 °F (37.1 °C)   BP  Min: 101/71  Max: 124/65   Pulse  Min: 75  Max: 95   Resp  Min: 16  Max: 16   SpO2  Min: 95 %  Max: 98 %   No data recorded   Weight  Min: 78.1 kg (172 lb 3 oz)  Max: 78.1 kg (172 lb 3 oz)     Flowsheet Rows    Flowsheet Row First Filed Value   Admission Height 182.9 cm (72\") Documented at 12/07/2022 0933   Admission Weight 81.2 kg (179 lb) Documented at 12/07/2022 0933          Results for orders placed during the hospital encounter of 12/07/22    Adult Transthoracic Echo Complete w/ Color, Spectral and Contrast if Necessary Per Protocol    Interpretation Summary  •  Left ventricular systolic function is low normal. Left ventricular ejection fraction appears to be 51 - 55%.  •  Normal right ventricular cavity size and systolic function noted.  •  No hemodynamically significant valvular abnormalities identified on this study.        Consults:   Consults     Date and Time Order Name Status Description    12/7/2022  2:56 PM Inpatient Cardiac Electrophysiology Consult      12/7/2022  1:31 PM Inpatient Cardiac Electrophysiologist Consult Completed           Procedures Performed  Procedure(s):  EP/Ablation       Physical Exam:  General Appearance: Awake, alert, in no acute distress  Eyes: Pupils equal and reactive    Ears: Appear intact with no abnormalities noted  Nose: Nares normal, no drainage  Neck: supple, trachea midline, no carotid bruit and no JVD  Back: no kyphosis present,    Lungs: respirations regular, respirations even and respirations unlabored  Heart: normal S1, S2,  2/6 pansystolic murmur in the left sternal border,  no rub and no click  Abdomen: normal bowel sounds, no masses, no " hepatomegaly, no splenomegaly, guarding and no rebound tenderness   Skin: no bleeding, bruising or rash  Extremities: no cyanosis  Psychiatric/Behavioral: Negative for agitation, behavioral problems, confusion, the patient does not appear to be nervous/anxious.          Results Review:   I reviewed the patient's new clinical results.  I reviewed the patient's new imaging results and agree with the interpretation.  I reviewed the patient's other test results and agree with the interpretation  I personally viewed and interpreted the patient's EKG/Telemetry data  Discussed with patient,        Discharge diagnosis with prior    Ventricular tachycardia  Coronary artery disease  Essential hypertension  Mixed hyperlipidemia  Myasthenia gravis  Type 2 diabetes mellitus    Hospital Course  Patient is a 57 y.o. male who was admitted  Recently underwent VT ablation  Had presented with wide-complex tachycardia  Drug-eluting stent placement to left anterior descending coronary artery and left circumflex coronary artery on 9/11/2022  Has history of hypertension with variable blood pressure recordings  Has hyperlipidemia  Electrophysiology service was consulted  Currently electrically stable  Overall doing well  Family member by bedside  Endorsed by Dr. Ayala yesterday  Plans to discharge home  Had VT ablation on 12/8/2022      Condition on Discharge: Stable and Improved  ______________________________________________________________________________________________________________________________________________________________________________________________________________________________       Plan on discharge  ______________________________________________________________________________________________________________________________________________________________________________________________________________________________       OK to discharge  Low salt cardiac diet.   Discharge to home and or self care with  improvement or resolution of presenting symptoms or complaints    Follow up with primary provider and primary cardiologist as scheduled   Overall improved and deemed fit for discharge  Will be provided with written discharge instructions including diet and medications including prescriptions as required and labs as applicable    Questions were encouraged, asked and answered to the patient's  understanding and satisfaction.  Follow-up with JAE Loera within 2 weeks  Follow-up with electrophysiology service Dr. Cunha per his discretion    Go to nearest emergency room if recurrence of primary complaints or any suspected disabling or life threatening symptoms or complaints such as chest pain, increasing shortness of breath, profound dizziness, weakness, significant palpitations, passing out  35 minutes spent in the discharge process.  Discussed with patient current admission diagnosis, prognosis, tests performed and their meaning, monitoring for any signs of complications  What to watch for in the short medium and long-term post discharge course  Counseled regarding diet, fluid, sodium restriction, caffeine restriction, stimulant intake and avoidance of nonsteroidal anti-inflammatory drugs  Counseled regarding strict compliance with diet, medications, regular testing and follow-up  _______________________________________________________________________________________________________________________________________________________________________________________________________________________________________    Discharge Disposition: To home and self care  Home or Self Care    Discharge Medications     Discharge Medications      New Medications      Instructions Start Date   amiodarone 400 MG tablet  Commonly known as: PACERONE   400 mg, Oral, Every 12 Hours Scheduled      verapamil  MG CR tablet  Commonly known as: CALAN-SR   180 mg, Oral, Nightly         Continue These Medications      Instructions Start  Date   aspirin 81 MG EC tablet   81 mg, Oral, Daily      atorvastatin 40 MG tablet  Commonly known as: LIPITOR   40 mg, Oral, Daily      busPIRone 15 MG tablet  Commonly known as: BUSPAR   15 mg, Oral, 2 Times Daily      dilTIAZem 120 MG 24 hr capsule  Commonly known as: TIAZAC   120 mg, Daily      FARXIGA PO   5 mg, Oral, Daily      fenofibrate 160 MG tablet   160 mg, Oral, Daily      icosapent ethyl 1 g capsule capsule  Commonly known as: VASCEPA   2 g, Oral, 2 Times Daily With Meals      Insulin Glargine-yfgn 100 UNIT/ML solution pen-injector   16 Units, Subcutaneous, Nightly      isosorbide mononitrate 60 MG 24 hr tablet  Commonly known as: IMDUR   60 mg, Oral, Daily      Januvia 100 MG tablet  Generic drug: SITagliptin   100 mg, Oral, Daily      losartan 50 MG tablet  Commonly known as: COZAAR   50 mg, Oral, Daily      metFORMIN 1000 MG tablet  Commonly known as: GLUCOPHAGE   1,000 mg, Oral, 2 Times Daily With Meals      nitroglycerin 0.4 MG SL tablet  Commonly known as: NITROSTAT   0.4 mg, Sublingual, Every 5 Minutes PRN, Take no more than 3 doses in 15 minutes.      NovoLOG FlexPen 100 UNIT/ML solution pen-injector sc pen  Generic drug: insulin aspart   1-10 Units, 3 Times Daily With Meals, Sliding Scale      omeprazole 20 MG capsule  Commonly known as: priLOSEC   20 mg, Oral, Daily      ondansetron 4 MG tablet  Commonly known as: ZOFRAN   4 mg, Oral, Every 8 Hours PRN      predniSONE 10 MG tablet  Commonly known as: DELTASONE   10 mg, Oral, Daily      ranolazine 500 MG 12 hr tablet  Commonly known as: RANEXA   500 mg, Oral, 2 Times Daily      tamsulosin 0.4 MG capsule 24 hr capsule  Commonly known as: FLOMAX   1 capsule, Daily      ticagrelor 90 MG tablet tablet  Commonly known as: BRILINTA   90 mg, Oral, 2 Times Daily      vitamin D3 125 MCG (5000 UT) capsule capsule   5,000 Units, Oral, Daily             Discharge Diet:  Low salt heart healthy cardiac diet    Activity at Discharge:  As  tolerated    Follow-up Appointments  Future Appointments   Date Time Provider Department Center   3/10/2023  1:00 PM Khadar Sainz MD [facility]         Test Results Pending at Discharge: None       Fawad Pearce MD  12/12/22  08:15 CST        Electronically signed by Fawad Pearce MD at 12/12/22 0815       Discharge Order (From admission, onward)     Start     Ordered    12/12/22 0813  Discharge patient  Once        Expected Discharge Date: 12/12/22    Expected Discharge Time: Morning    Discharge Disposition: Home or Self Care    Physician of Record for Attribution - Please select from Treatment Team: FAWAD PEARCE [[id number]]    Review needed by CMO to determine Physician of Record: No       Question Answer Comment   Physician of Record for Attribution - Please select from Treatment Team FAWAD PEARCE    Review needed by CMO to determine Physician of Record No        12/12/22 0814

## 2022-12-13 NOTE — OUTREACH NOTE
Prep Survey    Flowsheet Row Responses   [facility] facility patient discharged from? [facility]   Is LACE score < 7 ? No   Emergency Room discharge w/ pulse ox? No   Eligibility Readm Mgmt   Discharge diagnosis tachycardiaWide-complex tachycardia: VT versus SVT   Does the patient have one of the following disease processes/diagnoses(primary or secondary)? Other   Does the patient have Home health ordered? No   Is there a DME ordered? No   Prep survey completed? Yes          SHERLEY HU - Registered Nurse

## 2022-12-15 LAB
QT INTERVAL: 330 MS
QTC INTERVAL: 509 MS

## 2022-12-15 RX ORDER — HEPARIN SODIUM 1000 [USP'U]/ML
INJECTION, SOLUTION INTRAVENOUS; SUBCUTANEOUS AS NEEDED
Status: DISCONTINUED | OUTPATIENT
Start: 2022-12-08 | End: 2022-12-15 | Stop reason: SURG

## 2022-12-16 ENCOUNTER — TELEPHONE (OUTPATIENT)
Dept: CARDIOLOGY | Facility: CLINIC | Age: 57
End: 2022-12-16

## 2022-12-16 NOTE — TELEPHONE ENCOUNTER
Received call from spouse stating patient is having a procedure on Monday 12/19/22 with Dr Cunha. She tells me that patient is not taking diltiazem as listed in current medications. Taking amiodarone and verapamil. BP 12/15/2022 @ noon 86/74,@1300 103/69, @1600 123/83.  BP 12/16/2022 @0900 126/90, @noon 76/56(reports dizziness) retake 96/56,  @1600 108/75. Patient reports one episode of dizziness. Continue to monitor and to call [facility] 24 hr nurse should anything change over the weekend.V/U

## 2022-12-19 ENCOUNTER — HOSPITAL ENCOUNTER (OUTPATIENT)
Facility: HOSPITAL | Age: 57
Setting detail: HOSPITAL OUTPATIENT SURGERY
Discharge: HOME | End: 2022-12-19
Attending: STUDENT IN AN ORGANIZED HEALTH CARE EDUCATION/TRAINING PROGRAM | Admitting: STUDENT IN AN ORGANIZED HEALTH CARE EDUCATION/TRAINING PROGRAM

## 2022-12-19 ENCOUNTER — ANESTHESIA EVENT (OUTPATIENT)
Dept: CARDIOLOGY | Facility: HOSPITAL | Age: 57
End: 2022-12-19

## 2022-12-19 ENCOUNTER — TELEPHONE (OUTPATIENT)
Dept: ENDOCRINOLOGY | Facility: CLINIC | Age: 57
End: 2022-12-19

## 2022-12-19 ENCOUNTER — APPOINTMENT (OUTPATIENT)
Dept: GENERAL RADIOLOGY | Facility: HOSPITAL | Age: 57
End: 2022-12-19

## 2022-12-19 ENCOUNTER — ANESTHESIA (OUTPATIENT)
Dept: CARDIOLOGY | Facility: HOSPITAL | Age: 57
End: 2022-12-19

## 2022-12-19 VITALS
HEART RATE: 92 BPM | WEIGHT: 172.2 LBS | SYSTOLIC BLOOD PRESSURE: 105 MMHG | TEMPERATURE: 96.8 F | RESPIRATION RATE: 16 BRPM | HEIGHT: 72 IN | BODY MASS INDEX: 23.32 KG/M2 | DIASTOLIC BLOOD PRESSURE: 87 MMHG | OXYGEN SATURATION: 97 %

## 2022-12-19 DIAGNOSIS — I47.20 VENTRICULAR TACHYCARDIA: ICD-10-CM

## 2022-12-19 LAB
ANION GAP SERPL CALCULATED.3IONS-SCNC: 11 MMOL/L (ref 5–15)
BUN SERPL-MCNC: 24 MG/DL (ref 6–20)
BUN/CREAT SERPL: 19.8 (ref 7–25)
CALCIUM SPEC-SCNC: 9.8 MG/DL (ref 8.6–10.5)
CHLORIDE SERPL-SCNC: 103 MMOL/L (ref 98–107)
CO2 SERPL-SCNC: 26 MMOL/L (ref 22–29)
CREAT SERPL-MCNC: 1.21 MG/DL (ref 0.76–1.27)
DEPRECATED RDW RBC AUTO: 48 FL (ref 37–54)
EGFRCR SERPLBLD CKD-EPI 2021: 69.8 ML/MIN/1.73
ERYTHROCYTE [DISTWIDTH] IN BLOOD BY AUTOMATED COUNT: 14.4 % (ref 12.3–15.4)
GLUCOSE SERPL-MCNC: 132 MG/DL (ref 65–99)
HCT VFR BLD AUTO: 50.6 % (ref 37.5–51)
HGB BLD-MCNC: 15.8 G/DL (ref 13–17.7)
INR PPP: 0.93 (ref 0.91–1.09)
MCH RBC QN AUTO: 28.5 PG (ref 26.6–33)
MCHC RBC AUTO-ENTMCNC: 31.2 G/DL (ref 31.5–35.7)
MCV RBC AUTO: 91.2 FL (ref 79–97)
PLATELET # BLD AUTO: 558 10*3/MM3 (ref 140–450)
PMV BLD AUTO: 9 FL (ref 6–12)
POTASSIUM SERPL-SCNC: 4 MMOL/L (ref 3.5–5.2)
PROTHROMBIN TIME: 12.6 SECONDS (ref 11.8–14.8)
RBC # BLD AUTO: 5.55 10*6/MM3 (ref 4.14–5.8)
SODIUM SERPL-SCNC: 140 MMOL/L (ref 136–145)
WBC NRBC COR # BLD: 11.45 10*3/MM3 (ref 3.4–10.8)

## 2022-12-19 PROCEDURE — 93005 ELECTROCARDIOGRAM TRACING: CPT | Performed by: STUDENT IN AN ORGANIZED HEALTH CARE EDUCATION/TRAINING PROGRAM

## 2022-12-19 PROCEDURE — 80048 BASIC METABOLIC PNL TOTAL CA: CPT | Performed by: STUDENT IN AN ORGANIZED HEALTH CARE EDUCATION/TRAINING PROGRAM

## 2022-12-19 PROCEDURE — C1777 LEAD, AICD, ENDO SINGLE COIL: HCPCS | Performed by: STUDENT IN AN ORGANIZED HEALTH CARE EDUCATION/TRAINING PROGRAM

## 2022-12-19 PROCEDURE — 85027 COMPLETE CBC AUTOMATED: CPT | Performed by: STUDENT IN AN ORGANIZED HEALTH CARE EDUCATION/TRAINING PROGRAM

## 2022-12-19 PROCEDURE — 71045 X-RAY EXAM CHEST 1 VIEW: CPT

## 2022-12-19 PROCEDURE — 25010000002 CEFAZOLIN PER 500 MG: Performed by: STUDENT IN AN ORGANIZED HEALTH CARE EDUCATION/TRAINING PROGRAM

## 2022-12-19 PROCEDURE — 33249 INSJ/RPLCMT DEFIB W/LEAD(S): CPT | Performed by: STUDENT IN AN ORGANIZED HEALTH CARE EDUCATION/TRAINING PROGRAM

## 2022-12-19 PROCEDURE — C1892 INTRO/SHEATH,FIXED,PEEL-AWAY: HCPCS | Performed by: STUDENT IN AN ORGANIZED HEALTH CARE EDUCATION/TRAINING PROGRAM

## 2022-12-19 PROCEDURE — 85610 PROTHROMBIN TIME: CPT | Performed by: STUDENT IN AN ORGANIZED HEALTH CARE EDUCATION/TRAINING PROGRAM

## 2022-12-19 PROCEDURE — C1722 AICD, SINGLE CHAMBER: HCPCS | Performed by: STUDENT IN AN ORGANIZED HEALTH CARE EDUCATION/TRAINING PROGRAM

## 2022-12-19 PROCEDURE — S0260 H&P FOR SURGERY: HCPCS | Performed by: STUDENT IN AN ORGANIZED HEALTH CARE EDUCATION/TRAINING PROGRAM

## 2022-12-19 PROCEDURE — 25010000002 PROPOFOL 1000 MG/100ML EMULSION

## 2022-12-19 PROCEDURE — C1894 INTRO/SHEATH, NON-LASER: HCPCS | Performed by: STUDENT IN AN ORGANIZED HEALTH CARE EDUCATION/TRAINING PROGRAM

## 2022-12-19 PROCEDURE — 25010000002 VANCOMYCIN 10 G RECONSTITUTED SOLUTION: Performed by: STUDENT IN AN ORGANIZED HEALTH CARE EDUCATION/TRAINING PROGRAM

## 2022-12-19 PROCEDURE — 93010 ELECTROCARDIOGRAM REPORT: CPT | Performed by: INTERNAL MEDICINE

## 2022-12-19 DEVICE — LD DEFIB ENDOTAK RELIANCE DF4 N/G SGL COIL ACT 59CM: Type: IMPLANTABLE DEVICE | Site: HEART | Status: FUNCTIONAL

## 2022-12-19 DEVICE — IMPLANTABLE CARDIOVERTER DEFIBRILLATOR VR
Type: IMPLANTABLE DEVICE | Site: CHEST WALL | Status: FUNCTIONAL
Brand: VIGILANT™ EL ICD VR

## 2022-12-19 RX ORDER — AMIODARONE HYDROCHLORIDE 200 MG/1
200 TABLET ORAL DAILY
Qty: 90 TABLET | Refills: 3 | Status: SHIPPED | OUTPATIENT
Start: 2022-12-19 | End: 2023-01-06

## 2022-12-19 RX ORDER — LIDOCAINE HYDROCHLORIDE 20 MG/ML
INJECTION, SOLUTION INFILTRATION; PERINEURAL
Status: DISCONTINUED | OUTPATIENT
Start: 2022-12-19 | End: 2022-12-19 | Stop reason: HOSPADM

## 2022-12-19 RX ORDER — PROPOFOL 10 MG/ML
INJECTION, EMULSION INTRAVENOUS CONTINUOUS PRN
Status: DISCONTINUED | OUTPATIENT
Start: 2022-12-19 | End: 2022-12-19 | Stop reason: SURG

## 2022-12-19 RX ORDER — BUPIVACAINE HCL/0.9 % NACL/PF 0.1 %
2 PLASTIC BAG, INJECTION (ML) EPIDURAL ONCE
Status: COMPLETED | OUTPATIENT
Start: 2022-12-19 | End: 2022-12-19

## 2022-12-19 RX ORDER — SODIUM CHLORIDE 0.9 % (FLUSH) 0.9 %
10 SYRINGE (ML) INJECTION AS NEEDED
Status: DISCONTINUED | OUTPATIENT
Start: 2022-12-19 | End: 2022-12-19 | Stop reason: HOSPADM

## 2022-12-19 RX ORDER — SODIUM CHLORIDE 0.9 % (FLUSH) 0.9 %
10 SYRINGE (ML) INJECTION EVERY 12 HOURS SCHEDULED
Status: DISCONTINUED | OUTPATIENT
Start: 2022-12-19 | End: 2022-12-19 | Stop reason: HOSPADM

## 2022-12-19 RX ORDER — SODIUM CHLORIDE 9 MG/ML
40 INJECTION, SOLUTION INTRAVENOUS AS NEEDED
Status: DISCONTINUED | OUTPATIENT
Start: 2022-12-19 | End: 2022-12-19 | Stop reason: HOSPADM

## 2022-12-19 RX ORDER — SODIUM CHLORIDE, SODIUM LACTATE, POTASSIUM CHLORIDE, CALCIUM CHLORIDE 600; 310; 30; 20 MG/100ML; MG/100ML; MG/100ML; MG/100ML
INJECTION, SOLUTION INTRAVENOUS CONTINUOUS PRN
Status: DISCONTINUED | OUTPATIENT
Start: 2022-12-19 | End: 2022-12-19 | Stop reason: SURG

## 2022-12-19 RX ORDER — AMIODARONE HYDROCHLORIDE 400 MG/1
200 TABLET ORAL DAILY
Qty: 90 TABLET | Refills: 3 | Status: SHIPPED | OUTPATIENT
Start: 2022-12-19 | End: 2022-12-19 | Stop reason: SDUPTHER

## 2022-12-19 RX ORDER — LOSARTAN POTASSIUM 50 MG/1
50 TABLET ORAL DAILY
COMMUNITY
End: 2023-01-06

## 2022-12-19 RX ADMIN — PROPOFOL 50 MCG/KG/MIN: 10 INJECTION, EMULSION INTRAVENOUS at 10:26

## 2022-12-19 RX ADMIN — Medication 1250 MG: at 09:02

## 2022-12-19 RX ADMIN — Medication 2 G: at 10:23

## 2022-12-19 RX ADMIN — GLYCOPYRROLATE 0.2 MG: 0.2 INJECTION INTRAMUSCULAR; INTRAVENOUS at 10:44

## 2022-12-19 RX ADMIN — SODIUM CHLORIDE, POTASSIUM CHLORIDE, SODIUM LACTATE AND CALCIUM CHLORIDE: 600; 310; 30; 20 INJECTION, SOLUTION INTRAVENOUS at 10:34

## 2022-12-19 NOTE — H&P
"Chief Complaint  Ventricular tachycardia    Subjective        History of Present Illness  EP History:  1.  Ventricular tachycardia  - 12/8/22:  VT ablation, multiple VT morphologies including fascicular VT      Cardiology history:  1.  CAD w/ prior NSTEMI  - 9/2022: LAD and LCX PCI  2.  HTN  3.  HLD     Medical History:  1.  Myasthenia gravis  2.  DM2  3.  Kidney stones     Iftikhar Francis is a 57 y.o. male with past medical history as above who presents to the hospital for outpatient ICD implant for treatment of ventricular tachycardia.  He presented to the hospital on 12/7/2022 with wide-complex tachycardia.  He underwent direct-current cardioversion at that time followed by an electrophysiology study which revealed evidence of VT with the same morphology as well as multiple additional inducible VT morphologies.  Given inducible VT at the end of the procedure, he was discharged home on a LifeVest.  He has not had any further episodes of VT that he is aware of.  We discussed the risks and benefits of ICD implant given his history of coronary disease and spontaneous VT.  He agreed to proceed.     Since the time of the last visit, denies significant change in symptoms.  Denies missing any doses of his medications.  No new ER visits or hospitalizations.    Review of Systems   All other systems reviewed and are negative.      Family History:  family history includes Hypertension in his father; No Known Problems in his brother, mother, and sister.    Social History:  Social History     Tobacco Use   • Smoking status: Never   • Smokeless tobacco: Never   Vaping Use   • Vaping Use: Never used   Substance Use Topics   • Alcohol use: Yes     Comment: occassionally   • Drug use: Never       Allergies:  Levofloxacin and Moxifloxacin      Objective   Vital Signs:  /86 (BP Location: Right arm, Patient Position: Lying)   Pulse 80   Temp 96.8 °F (36 °C) (Temporal)   Resp 21   Ht 182.9 cm (72\")   Wt 78.1 kg (172 lb " "3.2 oz)   SpO2 98%   BMI 23.35 kg/m²   Estimated body mass index is 23.35 kg/m² as calculated from the following:    Height as of this encounter: 182.9 cm (72\").    Weight as of this encounter: 78.1 kg (172 lb 3.2 oz).      Physical Exam  Vitals reviewed.   Constitutional:       General: He is not in acute distress.  HENT:      Head: Normocephalic and atraumatic.   Eyes:      Extraocular Movements: Extraocular movements intact.      Conjunctiva/sclera: Conjunctivae normal.   Cardiovascular:      Rate and Rhythm: Normal rate and regular rhythm.   Pulmonary:      Effort: Pulmonary effort is normal.      Breath sounds: Normal breath sounds.   Abdominal:      General: There is no distension.      Tenderness: There is no abdominal tenderness.   Musculoskeletal:         General: No swelling or tenderness.      Cervical back: Normal range of motion and neck supple.   Skin:     General: Skin is warm and dry.   Neurological:      General: No focal deficit present.      Mental Status: He is alert and oriented to person, place, and time.   Psychiatric:         Mood and Affect: Mood normal.         Judgment: Judgment normal.                Result Review :  Labs were reviewed with relevant findings as follows: Mild leukocytosis, otherwise normal               Assessment and Plan   Assessment/plan:  Iftikhar Francis is a 57 y.o. male who presents to the hospital for outpatient ICD implant for treatment of ventricular tachycardia.  There are no apparent contraindications to proceeding and he is medically appropriate for outpatient management with this procedure.  The device is for secondary prophylaxis of VT.  He has NYHA class II symptoms.  The [place] Patient Decision Aid shared decision making tool was used.       I have discussed risks, benefits, and alternatives of an implantable cardiac defibrillator implant with the patient.  Alternatives discussed include continued observation and medical management.  An implantable " cardiac defibrillator implant is an inherently high risk procedure with possible complications that include but are not limited to acute or chronic pain at the implant site, bleeding and hematoma, air embolism, pneumothorax, hemothorax, pericardial effusion requiring pericardiocentesis or cardiac surgery, lead dislodgement, lead or device malfunction, infection, inappropriate shocks, contrast induced nephropathy, and ultimately death.  We discussed that while defibrillators reduce mortality, they are not perfect devices and people can still pass away with from arrhythmias despite the presence of a defibrillator.  The possible need for additional procedures and/or medical therapy was additionally discussed. Questions asked were appropriately answered.  No guarantees were made or implied.     Despite this, they would still like to proceed.    Plan:   - Proceed with ICD implant for treatment of ventricular tachycardia           Part of this note may be an electronic transcription/translation of spoken language to printed text using the Dragon Dictation System.

## 2022-12-19 NOTE — ANESTHESIA POSTPROCEDURE EVALUATION
Patient: Iftikhar Francis    Procedure Summary     Date: 12/19/22 Room / Location: [facility]  /  [facility] CATH INVASIVE LOCATION    Anesthesia Start: 1022 Anesthesia Stop: 1201    Procedure: Device Implant, [employer], single chamber (Left) Diagnosis:       Ventricular tachycardia      (Ventricular tachycardia, secondary prophylaxis)    Providers: Mahin Cunha MD Provider: Ryne Christiansen CRNA    Anesthesia Type: MAC, general ASA Status: 3          Anesthesia Type: MAC, general    Vitals  No vitals data found for the desired time range.          Post Anesthesia Care and Evaluation    Patient location during evaluation: PHASE II  Patient participation: complete - patient participated  Level of consciousness: awake and alert  Pain management: adequate    Airway patency: patent  Anesthetic complications: No anesthetic complications  PONV Status: none  Cardiovascular status: acceptable and blood pressure returned to baseline  Respiratory status: acceptable  Hydration status: acceptable    Comments: Patient discharged from PACU based on Macario score >8  No anesthesia care post op

## 2022-12-19 NOTE — NURSING NOTE
Discharged at this time. Pt and wife verbalized understanding of site care, restrictions, follow up and change with medication. Dr. Cunha to call/change Amiodarone to 200 mg daily x 90 days.

## 2022-12-19 NOTE — DISCHARGE INSTRUCTIONS
Post-Pacemaker/Defibrillator Discharge Instructions     INCISION CARE   Keep your incision dry for 10 days. Take only sponge baths during this time. Do not put salves, ointments or lotions on the incision. Avoid touching the incision or pocket.   Do not use a dressing. Leave the pieces of tape on the incision alone. These will come off by themselves when you begin washing the site.   Call us immediately if you develop a fever of 101 or greater, or if you have any pain, redness, swelling or drainage at the pacemaker site.     REASONS TO GO TO THE EMERGENCY ROOM FOR EVALUATION:   Severe chest pain  Significant shortness of breath at rest  Near passing out or passing out episodes soon after your device implant  Symptoms of chest pain or shortness of breath associated with low blood pressure (reading less than 90 for the top number / systolic blood pressure)  Any concerns that an emergent or life threatening complication is occurring    ACTIVITY   Avoid driving, operating machinery, or alcohol consumption for 24 hours after receiving sedation. In addition, avoid signing legal documents or participating in legal proceedings.   You may use your device arm, but DO NOT raise it higher than your shoulder or reach behind your back for the first two weeks. This is to protect the device lead placement. However, you should use your arm so that the shoulder doesn't get stiff. Also, the use of a sling IS NOT ENCOURAGED due to the potential for the shoulder to become stiff.     Lifting: Do not lift more than 10 pounds for the first 2 weeks and then 20 pounds for the following 2 weeks.     Sports: If you play tennis or golf, avoid full range of motion for your affected arm for 1 month. (A note to hunters: Never fire a rifle or a shotgun on the side of your device).     Driving: To protect your new pacemaker/defibrillator lead(s), it is preferable for you not to drive for one week.     Resuming activities & returning to work: It is  important to resume your normal activities as soon as you feel like it, as long as you do so gradually. Discuss with your doctor a plan for returning to work.     GENERAL REMINDERS     Identification card: Carry your pacemaker/defibrillator I.D. card at all times. A permanent card will be sent to you within 2 months. Call your doctor's office if you do not receive your card or if you should lose it.     Activities to avoid: Arc welding, ham radios and tanning booths can reprogram or interfere with pacemaker/defibrillator function. Strong magnets can cause interference with your device as well.  Do not carry your cell phone in the pocket of your shirt next to your device.    Medical Care: All health care providers should know that you have a pacemaker/defibrillator. Always show your device card to them. Most medical tests and procedures are safe to have (including mammograms, chest x-rays, arteriograms) if you require an MRI please notify your MD.     Anti-theft systems: Also called electronic article surveillance (EAS) systems. These are used in a variety of settings including supermarkets, shopping malls and libraries. They usually consist of one or two columns placed opposite each other near entrances and exits. Walk through the area at a normal pace. Do not stay near the EAS system longer than necessary and do not lean against the system.     Travel and Medical Detectors: It is safe to travel with your pacemaker/defibrillator. Always show your identification card. You may walk through the metal detector if asked to do so, but do not allow the hand held magnetic wand near your device. The  should physically search you instead.     Appliances: Most household appliances cannot harm your device including microwaves. Warnings do not apply to you.     FOLLOW UP   You will follow up with our clinic in approximately 2 weeks after your procedure.  If you do not receive an appointment or you have questions  regarding your appointment, please call your doctor's office.

## 2022-12-19 NOTE — ANESTHESIA PREPROCEDURE EVALUATION
Anesthesia Evaluation     Patient summary reviewed and Nursing notes reviewed   NPO Solid Status: > 8 hours  NPO Liquid Status: > 6 hours           Airway   Mallampati: III  TM distance: >3 FB  Neck ROM: full  No difficulty expected  Dental - normal exam     Pulmonary     breath sounds clear to auscultation  (+) sleep apnea (bipap),   (-) COPD, asthma, not a smoker  Cardiovascular   Exercise tolerance: excellent (>7 METS)    NYHA Classification: I  ECG reviewed  Rhythm: regular    (+) hypertension, past MI , CAD, cardiac stents (9/11/22) within the past 12 months dysrhythmias (s/p CV yesterday//) Tachycardia, hyperlipidemia,   (-) pacemaker, angina    ROS comment: Cardiac cath:  Impression:  1.  Coronary artery disease as described above including hemodynamically significant lesions in both the left circumflex and LAD status post successful PCI with continuation of PATI-3 flow and no residual stenosis remaining  2.  Diabetes  3.  Hypertension  4.  Hyperlipidemia  5.  Myasthenia gravis      Neuro/Psych  (+) neuromuscular disease (Myasthenia Gravis), numbness,    (-) seizures, TIA, CVA  GI/Hepatic/Renal/Endo    (+)  PUD,  renal disease stones, diabetes mellitus well controlled using insulin,   (-) GERD, liver disease    Musculoskeletal     Abdominal    Substance History      OB/GYN          Other   chronic steroid use (low dose)                      Anesthesia Plan    ASA 3     MAC and general     intravenous induction     Anesthetic plan, risks, benefits, and alternatives have been provided, discussed and informed consent has been obtained with: patient.        CODE STATUS:

## 2022-12-20 LAB
QT INTERVAL: 462 MS
QTC INTERVAL: 529 MS

## 2022-12-22 ENCOUNTER — READMISSION MANAGEMENT (OUTPATIENT)
Dept: CALL CENTER | Facility: HOSPITAL | Age: 57
End: 2022-12-22

## 2022-12-22 NOTE — OUTREACH NOTE
Medical Week 2 Survey    Flowsheet Row Responses   [facility], [place], operated by [facility] patient discharged from? [place]   Does the patient have one of the following disease processes/diagnoses(primary or secondary)? Other   Week 2 attempt successful? Yes   Call start time 1251   Discharge diagnosis tachycardiaWide-complex tachycardia: VT versus SVT   Call end time 1253   Person spoke with today (if not patient) and relationship Wife   Meds reviewed with patient/caregiver? Yes   Is the patient taking all medications as directed (includes completed medication regime)? Yes   Does the patient have a primary care provider?  Yes   Does the patient have an appointment with their PCP within 7 days of discharge? Greater than 7 days   Nursing Interventions Educated patient on importance of making appointment, Advised patient to make appointment   Has the patient kept scheduled appointments due by today? N/A   Has home health visited the patient within 72 hours of discharge? N/A   Psychosocial issues? No   Did the patient receive a copy of their discharge instructions? Yes   Nursing interventions Reviewed instructions with patient   What is the patient's perception of their health status since discharge? Improving   Is the patient/caregiver able to teach back signs and symptoms related to disease process for when to call PCP? Yes   Is the patient/caregiver able to teach back signs and symptoms related to disease process for when to call 911? Yes   Is the patient/caregiver able to teach back the hierarchy of who to call/visit for symptoms/problems? PCP, Specialist, Home health nurse, Urgent Care, ED, 911 Yes   Week 2 Call Completed? Yes   Graduated Yes   Wrap up additional comments Wife reports Pt is doing well. Site looks good.           BASHIR JONES - Registered Nurse

## 2023-01-06 ENCOUNTER — OFFICE VISIT (OUTPATIENT)
Dept: CARDIOLOGY | Facility: CLINIC | Age: 58
End: 2023-01-06
Payer: COMMERCIAL

## 2023-01-06 VITALS
RESPIRATION RATE: 18 BRPM | BODY MASS INDEX: 22.75 KG/M2 | OXYGEN SATURATION: 99 % | WEIGHT: 168 LBS | HEART RATE: 76 BPM | HEIGHT: 72 IN | SYSTOLIC BLOOD PRESSURE: 130 MMHG | DIASTOLIC BLOOD PRESSURE: 92 MMHG

## 2023-01-06 DIAGNOSIS — I45.81 ACQUIRED LONG QT SYNDROME: ICD-10-CM

## 2023-01-06 DIAGNOSIS — I95.2 HYPOTENSION DUE TO DRUGS: ICD-10-CM

## 2023-01-06 DIAGNOSIS — I47.20 VENTRICULAR TACHYCARDIA: Primary | ICD-10-CM

## 2023-01-06 PROCEDURE — 93000 ELECTROCARDIOGRAM COMPLETE: CPT | Performed by: STUDENT IN AN ORGANIZED HEALTH CARE EDUCATION/TRAINING PROGRAM

## 2023-01-06 PROCEDURE — 99024 POSTOP FOLLOW-UP VISIT: CPT | Performed by: STUDENT IN AN ORGANIZED HEALTH CARE EDUCATION/TRAINING PROGRAM

## 2023-01-06 NOTE — PROGRESS NOTES
Chief Complaint  Ventricular Tachycardia (3 wk - s/p ICD implant )    Subjective        History of Present Illness    EP History:  1.  Ventricular tachycardia  - 12/8/22:  VT ablation, multiple VT morphologies including fascicular VT  2.  Presence of an ICD  -12/19/2022: DOI, single-chamber [employer], [place]     Cardiology history:  1.  CAD w/ prior NSTEMI  - 9/2022: LAD and LCX PCI  2.  HTN  3.  HLD     Medical History:  1.  Myasthenia gravis  2.  DM2  3.  Kidney stones      Iftikhar Francis is a 57 y.o. male with problem list as above who presents to the clinic for follow up of of his ICD implant.  He has done well since time of the procedure and denies any swelling, redness, drainage, fevers.  He does endorse intermittent lightheadedness which is quite bothersome.  He thinks it is related to some of his morning medications as it seems to improve throughout the day.  He did not take any of his morning medications besides the verapamil this morning and has not had any dizziness or lightheadedness.  He denies any significant chest pain.  Denies any shocks from his defibrillator.    Objective   Vital Signs:  /92   Pulse 76   Resp 18   Ht 182.9 cm (72\")   Wt 76.2 kg (168 lb)   SpO2 99%   BMI 22.78 kg/m²   Estimated body mass index is 22.78 kg/m² as calculated from the following:    Height as of this encounter: 182.9 cm (72\").    Weight as of this encounter: 76.2 kg (168 lb).      Physical Exam  Vitals reviewed.   Constitutional:       Appearance: Normal appearance.   HENT:      Head: Normocephalic and atraumatic.   Eyes:      Extraocular Movements: Extraocular movements intact.      Conjunctiva/sclera: Conjunctivae normal.   Cardiovascular:      Rate and Rhythm: Normal rate and regular rhythm.      Pulses: Normal pulses.      Heart sounds: Normal heart sounds.      Comments: Pulse generator site is clean, dry, intact and appears to be well-healing without any erythema, swelling,  drainage  Pulmonary:      Effort: Pulmonary effort is normal.      Breath sounds: Normal breath sounds.   Musculoskeletal:         General: No swelling.   Neurological:      General: No focal deficit present.      Mental Status: He is alert and oriented to person, place, and time.   Psychiatric:         Mood and Affect: Mood normal.         Judgment: Judgment normal.        Result Review :  The following data was reviewed by: Mahin Cunha MD on 01/06/2023:        ECG 12 Lead    Date/Time: 1/6/2023 5:42 PM  Performed by: Mahin Cunha MD  Authorized by: Mahin Cunha MD   Comparison: compared with previous ECG from 12/19/2022  Comparison to previous ECG: QTC duration is improved  Rhythm: sinus rhythm  Rate: normal  Conduction: conduction normal  QRS axis: Borderline right axis deviation.  Other findings: non-specific ST-T wave changes and prolonged QTc interval    Clinical impression: abnormal EKG                Assessment and Plan   Diagnoses and all orders for this visit:    1. Ventricular tachycardia (Primary)    2. Hypotension due to drugs    3. Acquired long QT syndrome    Other orders  -     ECG 12 Lead        Iftikahr Francis is a 57 y.o. male with problem list as above who presents to the clinic for follow up of ventricular tachycardia status post ICD implant, orthostatic hypotension secondary to medications.  He appears to be doing well following his device implant and is healing appropriately without evidence of acute complication.  His device interrogation today shows appropriate device function.  His QTC has shortened today compared to the previous ECG.  Given his ICD now in place and no evidence of recurrent ventricular arrhythmias, I do think that we can stop his amiodarone.  Additionally, given his orthostatic hypotension which appears to be worse in the morning, I suspect that this is triggered by his isosorbide.  As he is not having any chest pains at this time, we will plan to discontinue this  medication.    Plan:  -No device changes at this time  -Stop isosorbide mononitrate  -Stop amiodarone  -No additional medication changes for now         Follow Up   Return in about 6 months (around 7/6/2023).  Patient was given instructions and counseling regarding his condition or for health maintenance advice. Please see specific information pulled into the AVS if appropriate.     Part of this note may be an electronic transcription/translation of spoken language to printed text using the Dragon Dictation System.

## 2023-01-06 NOTE — PATIENT INSTRUCTIONS
Call me if you feel like you are having more rhythm issues  Stop amiodarone and isosorbide mononitrate  Follow up in clinic in 6 months

## 2023-01-10 RX ORDER — DAPAGLIFLOZIN 5 MG/1
5 TABLET, FILM COATED ORAL DAILY
Qty: 30 TABLET | Refills: 11 | Status: SHIPPED | OUTPATIENT
Start: 2023-01-10

## 2023-03-30 NOTE — H&P (VIEW-ONLY)
Child's Well Visit, 5 Years: Care Instructions  Your Care Instructions     Your child may like to play with friends more than doing things with you. He sofiya may like to tell stories and is interested in relationships between people. Most 11year-olds know the names of things in the house, such as appliances, and what they are used for. Your child may dress himself or herself without help and probably likes to play make-believe. Your child can now learn his or her address and phone number. He or she is likely to copy shapes like triangles andsquares and count on fingers. Follow-up care is a key part of your child's treatment and safety. Be sure to make and go to all appointments, and call your doctor if your child is having problems. It's also a good idea to know your child's test results andkeep a list of the medicines your child takes. How can you care for your child at home? Eating and a healthy weight  Encourage healthy eating habits. Most children do well with three meals and two or three snacks a day. Offer fruits and vegetables at meals and snacks. Let your child decide how much to eat. Give children foods they like but also give new foods to try. If your child is not hungry at one meal, it is okay for your child to wait until the next meal or snack to eat. Check in with your child's school or day care to make sure that healthy meals and snacks are given. Limit fast food. Help your child with healthier food choices when you eat out. Offer water when your child is thirsty. Do not give your child more than 4 to 6 oz. of fruit juice per day. Juice does not have the valuable fiber that whole fruit has. Do not give your child soda pop. Make meals a family time. Have nice conversations at mealtime and turn the TV off. Do not use food as a reward or punishment for your child's behavior. Do not make your children \"clean their plates. \"  Let all your children know that you love them whatever their size. Subjective    Mr. Francis is 57 y.o. male    Chief Complaint: right flank pain    History of Present Illness     57-year-old male new patient referred for right flank/lower back pain.  Patient reports onset this past November accompanied with intermittent episodes of blood in urine.  Patient with a history of recent MI ultimately undergoing pacemaker defibrillator placement December of this past year therefore has remained on aspirin and Brilinta since.  Was seen by PCP where a CT of the abdomen and pelvis was obtained revealing a 10 mm right extrarenal pelvic stone.  No hydronephrosis visualized.  No ureteral stones seen.  Patient appears physically in pain as he is favoring the right side and grimacing during office visit.  Patient denies any fever, chills, nausea vomiting.    Patient with a prior history of kidney stones last followed by Dr. Pearce in our office back in 2017.  Treated with ESWL 2016 Dr. Pearce.      The following portions of the patient's history were reviewed and updated as appropriate: allergies, current medications, past family history, past medical history, past social history, past surgical history and problem list.    Review of Systems   Constitutional: Negative for chills and fever.   Gastrointestinal: Negative for abdominal pain, anal bleeding, blood in stool, nausea and vomiting.   Genitourinary: Positive for flank pain and hematuria (Since November). Negative for decreased urine volume, difficulty urinating, dysuria and urgency.   Musculoskeletal: Positive for back pain.         Current Outpatient Medications:   •  aspirin 81 MG EC tablet, Take 1 tablet by mouth Daily., Disp: , Rfl:   •  atorvastatin (LIPITOR) 40 MG tablet, Take 1 tablet by mouth Daily., Disp: 90 tablet, Rfl: 3  •  busPIRone (BUSPAR) 15 MG tablet, Take 1 tablet by mouth 2 (Two) Times a Day., Disp: , Rfl: 5  •  dapagliflozin (Farxiga) 5 MG tablet tablet, Take 1 tablet by mouth Daily., Disp: 30 tablet, Rfl: 11  •   Help your children feel good about their bodies. Remind your child that people come in different shapes and sizes. Do not tease or nag children about weight, and do not say your child is skinny, fat, or chubby. Limit TV or video time to 1 hour or less per day. Research shows that the more TV children watch, the higher the chance that they will be overweight. Do not put a TV in your child's bedroom, and do not use TV and videos as a . Healthy habits  Have your child play actively for at least 30 to 60 minutes every day. Plan family activities, such as trips to the park, walks, bike rides, swimming, and gardening. Help children brush their teeth 2 times a day and floss one time a day. Take your child to the dentist 2 times a year. Limit TV and video time to 1 hour or less per day. Check for TV programs that are good for 11year olds. Put a broad-spectrum sunscreen (SPF 30 or higher) on your child before going outside. Use a broad-brimmed hat to shade your child's ears, nose, and lips. Do not smoke or allow others to smoke around your child. Smoking around your child increases the child's risk for ear infections, asthma, colds, and pneumonia. If you need help quitting, talk to your doctor about stop-smoking programs and medicines. These can increase your chances of quitting for good. Put your children to bed at a regular time so they get enough sleep. Safety  Use a belt-positioning booster seat in the car if your child weighs more than 40 pounds. Be sure the car's lap and shoulder belt are positioned across the child in the back seat. Know your state's laws for child safety seats. Make sure your child wears a helmet that fits properly when riding a bike or scooter. Keep cleaning products and medicines in locked cabinets out of your child's reach. Keep the number for Poison Control (0-271.828.2669) in or near your phone. Put locks or guards on all windows above the first floor.  Watch your child at fenofibrate 160 MG tablet, Take 1 tablet by mouth Daily., Disp: , Rfl:   •  icosapent ethyl (VASCEPA) 1 g capsule capsule, Take 2 g by mouth 2 (Two) Times a Day With Meals., Disp: 360 capsule, Rfl: 3  •  Insulin Glargine-yfgn 100 UNIT/ML solution pen-injector, Inject 16 Units under the skin into the appropriate area as directed Every Night., Disp: , Rfl:   •  Januvia 100 MG tablet, Take 1 tablet by mouth Daily., Disp: , Rfl:   •  metFORMIN (GLUCOPHAGE) 1000 MG tablet, Take 1 tablet by mouth 2 (Two) Times a Day With Meals., Disp: , Rfl:   •  nitroglycerin (NITROSTAT) 0.4 MG SL tablet, Place 1 tablet under the tongue Every 5 (Five) Minutes As Needed for Chest Pain. Take no more than 3 doses in 15 minutes., Disp: , Rfl:   •  NovoLOG FlexPen 100 UNIT/ML solution pen-injector sc pen, 1-10 Units 3 (Three) Times a Day With Meals. Sliding Scale, Disp: , Rfl:   •  omeprazole (priLOSEC) 20 MG capsule, Take 1 capsule by mouth Daily., Disp: , Rfl:   •  ondansetron (ZOFRAN) 4 MG tablet, Take 1 tablet by mouth Every 8 (Eight) Hours As Needed for Nausea or Vomiting., Disp: , Rfl:   •  predniSONE (DELTASONE) 10 MG tablet, Take 1 tablet by mouth Daily., Disp: , Rfl:   •  ranolazine (RANEXA) 500 MG 12 hr tablet, Take 1 tablet by mouth 2 (Two) Times a Day., Disp: , Rfl:   •  ticagrelor (BRILINTA) 90 MG tablet tablet, Take 1 tablet by mouth 2 (Two) Times a Day., Disp: 60 tablet, Rfl: 11  •  verapamil SR (CALAN-SR) 180 MG CR tablet, TAKE 1 TABLET BY MOUTH ONCE DAILY AT NIGHT, Disp: 90 tablet, Rfl: 3  •  vitamin D3 125 MCG (5000 UT) capsule capsule, Take 1 capsule by mouth Daily., Disp: , Rfl:   •  ketorolac (TORADOL) 10 MG tablet, Take 1 tablet by mouth Every 6 (Six) Hours As Needed for Moderate Pain., Disp: 20 tablet, Rfl: 0    Past Medical History:   Diagnosis Date   • Bilateral kidney stones    • Heart attack    • Left ureteral stone    • Myasthenia gravis    • Peptic ulceration    • Sleep apnea    • 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, with long-term current use of insulin 9/27/2022       Past Surgical History:   Procedure Laterality Date   • CARDIAC CATHETERIZATION Right 9/11/2022    Procedure: Left Heart Cath;  Surgeon: Tal Greenberg DO;  Location:  [facility];  Service: Cardiovascular;  Laterality: Right;   • CARDIAC ELECTROPHYSIOLOGY PROCEDURE N/A 12/8/2022    Procedure: EP/Ablation;  Surgeon: Mahin Cunha MD;  Location:  [facility];  Service: Cardiology;  Laterality: N/A;   • CARDIAC ELECTROPHYSIOLOGY PROCEDURE Left 12/19/2022    Procedure: Device Implant, [employer], single chamber;  Surgeon: Mahin Cunha MD;  Location:  [facility];  Service: Cardiology;  Laterality: Left;   • CORONARY ANGIOPLASTY WITH STENT PLACEMENT     • HERNIA REPAIR Left     Inguinal Hernia   • SHOULDER SURGERY     • TUMOR REMOVAL         Social History     Socioeconomic History   • Marital status:    Tobacco Use   • Smoking status: Never   • Smokeless tobacco: Never   Vaping Use   • Vaping Use: Never used   Substance and Sexual Activity   • Alcohol use: Yes     Comment: occassionally   • Drug use: Never   • Sexual activity: Defer       Family History   Problem Relation Age of Onset   • Hypertension Father    • No Known Problems Mother    • No Known Problems Sister    • No Known Problems Brother        Objective    Temp 97.8 °F (36.6 °C)   Ht 182.9 cm (72\")   Wt 76.2 kg (168 lb)   BMI 22.78 kg/m²     Physical Exam  Constitutional:       Appearance: Normal appearance.   Abdominal:      Tenderness: There is no abdominal tenderness. There is right CVA tenderness. There is no left CVA tenderness.   Skin:     General: Skin is warm and dry.   Neurological:      Mental Status: He is alert and oriented to person, place, and time.   Psychiatric:         Mood and Affect: Mood normal.         Behavior: Behavior normal.             Results for orders placed or performed in " all times near play equipment and stairs. Watch your child at all times when your child is near water, including pools, hot tubs, and bathtubs. Knowing how to swim does not make your child safe from drowning. Do not let your child play in or near the street. Children younger than age 6 should not cross the street alone. Immunizations  Flu immunization is recommended once a year for all children ages 7 months and older. Ask your doctor if your child needs any other last doses of vaccines,such as MMR and chickenpox. Parenting  Read stories to your child every day. One way children learn to read is by hearing the same story over and over. Play games, talk, and sing to your child every day. Give your child love and attention. Give your child simple chores to do. Children usually like to help. Teach your child your home address, phone number, and how to call 911. Teach your children not to let anyone touch their private parts. Teach your child not to take anything from strangers and not to go with strangers. Praise good behavior. Do not yell or spank. Use time-out instead. Be fair with your rules and use them in the same way every time. Your child learns from watching and listening to you. Getting ready for   Most children start  between 3 and 10years old. It can be hard to know when your child is ready for school. Your local elementary school or  can help. Most children are ready for  if they can dothese things: Your child can keep hands away from other children while in line; sit and pay attention for at least 5 minutes; sit quietly while listening to a story; help with clean-up activities, such as putting away toys; use words for frustration rather than acting out; work and play with other children in small groups; do what the teacher asks; get dressed; and use the bathroom without help.   Your child can stand and hop on one foot; throw and catch balls; hold a pencil correctly; cut with scissors; and copy or trace a line and [place]. Your child can spell and write their first name; do two-step directions, like \"do this and then do that\"; talk with other children and adults; sing songs with a group; count from 1 to 5; see the difference between two objects, such as one is large and one is small; and understand what \"first\" and \"last\" mean. When should you call for help? Watch closely for changes in your child's health, and be sure to contact your doctor if:    You are concerned that your child is not growing or developing normally.     You are worried about your child's behavior.     You need more information about how to care for your child, or you have questions or concerns. Where can you learn more? Go to [email or web address]://[email or web address].com. org and sign in to your [employer] account. Enter [id number] in the Results Scorecard box to learn more about \"Child's Well Visit, 5 Years: Care Instructions. \"     If you do not have an account, please click on the \"Sign Up Now\" link. Current as of: September 20, 2021               Content Version: 13.3  © 2006-2022 [employer]. Care instructions adapted under license by [facility] ([facility]). If you have questions about a medical condition or this instruction, always ask your healthcare professional. [place] [address] any warranty or liability for your use of this information. visit on 04/04/23   POC Urinalysis Dipstick, Multipro    Specimen: Urine   Result Value Ref Range    Color Yellow Yellow, Straw, Dark Yellow, Vandana    Clarity, UA Clear Clear    Glucose, UA Negative Negative mg/dL    Bilirubin Negative Negative    Ketones, UA Negative Negative    Specific Gravity  1.030 1.005 - 1.030    Blood, UA Small (A) Negative    pH, Urine 5.5 5.0 - 8.0    Protein,  mg/dL (A) Negative mg/dL    Urobilinogen, UA 1 E.U./dL (A) Normal, 0.2 E.U./dL    Nitrite, UA Negative Negative    Leukocytes Negative Negative     IPSS Questionnaire (AUA-7):  Incomplete emptying  Over the past month, how often have you had a sensation of not emptying your bladder completely after you finish?: Less than 1 time in 5 (04/04/23 1132)  Frequency  Over the past month, how often have you had to urinate again less than two hours after you finishing urinating ?: Less than 1 time in 5 (04/04/23 1132)  Intermittency  Over the past month, how often have you found you stopped and started again several time when you urinated ?: Less thank 1 time in 5 (04/04/23 1132)  Urgency  Over the last month, how difficult  have you found it to postpone urination ?: Less than 1 time in 5 (04/04/23 1132)  Weak Stream  Over the past month, how often have you had a weak urinary stream ?: Less than 1 time in 5 (04/04/23 1132)  Straining  Over the past month, how often have you had to push or strain to begin urination ?: Less than 1 time 5 (04/04/23 1132)  Nocturia  Over the past month, how many times did you most typically get up to urinate from the time you went to bed until the time you got up in the morning ?: Less than 1 time in 5 (04/04/23 1132)  Quality of life due to urinary symptoms  If you were to spend the rest of your life with your urinary condition the way it is now, how would feel about that?: Terrible (04/04/23 1132)    Scores  Total IPSS Score: 7 (04/04/23 1132)  Total Score = Symtomatic Level: Mildly symptomatic: 0-7  (04/04/23 1132)      KUB independent review    A KUB is available for me to review today.  The image is inspected for a bowel gas pattern and the general bone structure of the spine and pelvis. The kidneys are then inspected closely.  Renal outline is noted if identifiable. The kidney, collecting system, and anticipated path of the ureter are examined for calcifications including those in the true pelvis.  This film reveals:    On the right there is a single renal stone measuring 10 mm.    On the left there are no calcificaitons seen in the kidney or the expected course of the ureter. .    Independent review of a CT scan of abdomen and pelvis without contrast  The CT scan of the abdomen/pelvis done without contrast is available for me to review.  Treatment recommendations require an independent review.  First I scanned the liver, spleen, and bowel pattern.  The retroperitoneum including the major vessels and lymphatic packages are briefly reviewed.  This film has been reviewed by the radiologist to determine any nonurologic abnormalities that are present.  The kidneys are closely inspected for size, symmetry, contour, parenchymal thickness, perinephric reaction, presence of calcifications, and intrarenal dilation of the collecting system.  The ureters are inspected for their course, caliber, and any calcifications.  The bladder is inspected for its thickness, size, and presence of any calcifications.  This scan shows 10 mm right extrarenal pelvic stone visualized however no hydronephrosis seen.  Patient does appear to have a small punctate size right lower pole renal stone however no ureteral stones visualized.  No left-sided renal or ureteral stones seen.         Assessment and Plan    Diagnoses and all orders for this visit:    1. Kidney stone (Primary)  -     POC Urinalysis Dipstick, Multipro  -     XR abdomen kub; Future  -     ketorolac (TORADOL) 10 MG tablet; Take 1 tablet by mouth Every 6 (Six) Hours As  Needed for Moderate Pain.  Dispense: 20 tablet; Refill: 0      57-year-old male new patient referred for right flank/lower back pain.     Urinalysis does appear positive for small blood in the absence of infection    Patient feeling for sure that the current pain is coming from the renal pelvic stone    10 mm right renal pelvic stone is visualized on KUB    Patient would like surgical intervention to treat the renal pelvic stone to see if pain and blood in urine will improve however patient has a new pacemaker defibrillator in place and is currently on 2 different blood thinning medications for which we are needing to have patient get in contact with the [employer] rep to see if patient is able to undergo ESWL stone treatment along with clearance from patient's cardiologist as he will need to withhold all blood thinning medications for 5 to 7 days prior to procedure.  Patient to contact our office once getting clearance.    Will go ahead and send patient in as needed ketorolac for the time being

## 2023-03-30 NOTE — PROGRESS NOTES
Subjective    Mr. Francis is 57 y.o. male    Chief Complaint: right flank pain    History of Present Illness     57-year-old male new patient referred for right flank/lower back pain.  Patient reports onset this past November accompanied with intermittent episodes of blood in urine.  Patient with a history of recent MI ultimately undergoing pacemaker defibrillator placement December of this past year therefore has remained on aspirin and Brilinta since.  Was seen by PCP where a CT of the abdomen and pelvis was obtained revealing a 10 mm right extrarenal pelvic stone.  No hydronephrosis visualized.  No ureteral stones seen.  Patient appears physically in pain as he is favoring the right side and grimacing during office visit.  Patient denies any fever, chills, nausea vomiting.    Patient with a prior history of kidney stones last followed by Dr. Pearce in our office back in 2017.  Treated with ESWL 2016 Dr. Pearce.      The following portions of the patient's history were reviewed and updated as appropriate: allergies, current medications, past family history, past medical history, past social history, past surgical history and problem list.    Review of Systems   Constitutional: Negative for chills and fever.   Gastrointestinal: Negative for abdominal pain, anal bleeding, blood in stool, nausea and vomiting.   Genitourinary: Positive for flank pain and hematuria (Since November). Negative for decreased urine volume, difficulty urinating, dysuria and urgency.   Musculoskeletal: Positive for back pain.         Current Outpatient Medications:   •  aspirin 81 MG EC tablet, Take 1 tablet by mouth Daily., Disp: , Rfl:   •  atorvastatin (LIPITOR) 40 MG tablet, Take 1 tablet by mouth Daily., Disp: 90 tablet, Rfl: 3  •  busPIRone (BUSPAR) 15 MG tablet, Take 1 tablet by mouth 2 (Two) Times a Day., Disp: , Rfl: 5  •  dapagliflozin (Farxiga) 5 MG tablet tablet, Take 1 tablet by mouth Daily., Disp: 30 tablet, Rfl: 11  •   fenofibrate 160 MG tablet, Take 1 tablet by mouth Daily., Disp: , Rfl:   •  icosapent ethyl (VASCEPA) 1 g capsule capsule, Take 2 g by mouth 2 (Two) Times a Day With Meals., Disp: 360 capsule, Rfl: 3  •  Insulin Glargine-yfgn 100 UNIT/ML solution pen-injector, Inject 16 Units under the skin into the appropriate area as directed Every Night., Disp: , Rfl:   •  Januvia 100 MG tablet, Take 1 tablet by mouth Daily., Disp: , Rfl:   •  metFORMIN (GLUCOPHAGE) 1000 MG tablet, Take 1 tablet by mouth 2 (Two) Times a Day With Meals., Disp: , Rfl:   •  nitroglycerin (NITROSTAT) 0.4 MG SL tablet, Place 1 tablet under the tongue Every 5 (Five) Minutes As Needed for Chest Pain. Take no more than 3 doses in 15 minutes., Disp: , Rfl:   •  NovoLOG FlexPen 100 UNIT/ML solution pen-injector sc pen, 1-10 Units 3 (Three) Times a Day With Meals. Sliding Scale, Disp: , Rfl:   •  omeprazole (priLOSEC) 20 MG capsule, Take 1 capsule by mouth Daily., Disp: , Rfl:   •  ondansetron (ZOFRAN) 4 MG tablet, Take 1 tablet by mouth Every 8 (Eight) Hours As Needed for Nausea or Vomiting., Disp: , Rfl:   •  predniSONE (DELTASONE) 10 MG tablet, Take 1 tablet by mouth Daily., Disp: , Rfl:   •  ranolazine (RANEXA) 500 MG 12 hr tablet, Take 1 tablet by mouth 2 (Two) Times a Day., Disp: , Rfl:   •  ticagrelor (BRILINTA) 90 MG tablet tablet, Take 1 tablet by mouth 2 (Two) Times a Day., Disp: 60 tablet, Rfl: 11  •  verapamil SR (CALAN-SR) 180 MG CR tablet, TAKE 1 TABLET BY MOUTH ONCE DAILY AT NIGHT, Disp: 90 tablet, Rfl: 3  •  vitamin D3 125 MCG (5000 UT) capsule capsule, Take 1 capsule by mouth Daily., Disp: , Rfl:   •  ketorolac (TORADOL) 10 MG tablet, Take 1 tablet by mouth Every 6 (Six) Hours As Needed for Moderate Pain., Disp: 20 tablet, Rfl: 0    Past Medical History:   Diagnosis Date   • Bilateral kidney stones    • Heart attack    • Left ureteral stone    • Myasthenia gravis    • Peptic ulceration    • Sleep apnea    • 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, with long-term current use of insulin 9/27/2022       Past Surgical History:   Procedure Laterality Date   • CARDIAC CATHETERIZATION Right 9/11/2022    Procedure: Left Heart Cath;  Surgeon: Tal Greenberg DO;  Location:  [facility];  Service: Cardiovascular;  Laterality: Right;   • CARDIAC ELECTROPHYSIOLOGY PROCEDURE N/A 12/8/2022    Procedure: EP/Ablation;  Surgeon: Mahin Cunha MD;  Location:  [facility];  Service: Cardiology;  Laterality: N/A;   • CARDIAC ELECTROPHYSIOLOGY PROCEDURE Left 12/19/2022    Procedure: Device Implant, [employer], single chamber;  Surgeon: Mahin Cunha MD;  Location:  [facility];  Service: Cardiology;  Laterality: Left;   • CORONARY ANGIOPLASTY WITH STENT PLACEMENT     • HERNIA REPAIR Left     Inguinal Hernia   • SHOULDER SURGERY     • TUMOR REMOVAL         Social History     Socioeconomic History   • Marital status:    Tobacco Use   • Smoking status: Never   • Smokeless tobacco: Never   Vaping Use   • Vaping Use: Never used   Substance and Sexual Activity   • Alcohol use: Yes     Comment: occassionally   • Drug use: Never   • Sexual activity: Defer       Family History   Problem Relation Age of Onset   • Hypertension Father    • No Known Problems Mother    • No Known Problems Sister    • No Known Problems Brother        Objective    Temp 97.8 °F (36.6 °C)   Ht 182.9 cm (72\")   Wt 76.2 kg (168 lb)   BMI 22.78 kg/m²     Physical Exam  Constitutional:       Appearance: Normal appearance.   Abdominal:      Tenderness: There is no abdominal tenderness. There is right CVA tenderness. There is no left CVA tenderness.   Skin:     General: Skin is warm and dry.   Neurological:      Mental Status: He is alert and oriented to person, place, and time.   Psychiatric:         Mood and Affect: Mood normal.         Behavior: Behavior normal.             Results for orders placed or performed in " visit on 04/04/23   POC Urinalysis Dipstick, Multipro    Specimen: Urine   Result Value Ref Range    Color Yellow Yellow, Straw, Dark Yellow, Vandana    Clarity, UA Clear Clear    Glucose, UA Negative Negative mg/dL    Bilirubin Negative Negative    Ketones, UA Negative Negative    Specific Gravity  1.030 1.005 - 1.030    Blood, UA Small (A) Negative    pH, Urine 5.5 5.0 - 8.0    Protein,  mg/dL (A) Negative mg/dL    Urobilinogen, UA 1 E.U./dL (A) Normal, 0.2 E.U./dL    Nitrite, UA Negative Negative    Leukocytes Negative Negative     IPSS Questionnaire (AUA-7):  Incomplete emptying  Over the past month, how often have you had a sensation of not emptying your bladder completely after you finish?: Less than 1 time in 5 (04/04/23 1132)  Frequency  Over the past month, how often have you had to urinate again less than two hours after you finishing urinating ?: Less than 1 time in 5 (04/04/23 1132)  Intermittency  Over the past month, how often have you found you stopped and started again several time when you urinated ?: Less thank 1 time in 5 (04/04/23 1132)  Urgency  Over the last month, how difficult  have you found it to postpone urination ?: Less than 1 time in 5 (04/04/23 1132)  Weak Stream  Over the past month, how often have you had a weak urinary stream ?: Less than 1 time in 5 (04/04/23 1132)  Straining  Over the past month, how often have you had to push or strain to begin urination ?: Less than 1 time 5 (04/04/23 1132)  Nocturia  Over the past month, how many times did you most typically get up to urinate from the time you went to bed until the time you got up in the morning ?: Less than 1 time in 5 (04/04/23 1132)  Quality of life due to urinary symptoms  If you were to spend the rest of your life with your urinary condition the way it is now, how would feel about that?: Terrible (04/04/23 1132)    Scores  Total IPSS Score: 7 (04/04/23 1132)  Total Score = Symtomatic Level: Mildly symptomatic: 0-7  (04/04/23 1132)      KUB independent review    A KUB is available for me to review today.  The image is inspected for a bowel gas pattern and the general bone structure of the spine and pelvis. The kidneys are then inspected closely.  Renal outline is noted if identifiable. The kidney, collecting system, and anticipated path of the ureter are examined for calcifications including those in the true pelvis.  This film reveals:    On the right there is a single renal stone measuring 10 mm.    On the left there are no calcificaitons seen in the kidney or the expected course of the ureter. .    Independent review of a CT scan of abdomen and pelvis without contrast  The CT scan of the abdomen/pelvis done without contrast is available for me to review.  Treatment recommendations require an independent review.  First I scanned the liver, spleen, and bowel pattern.  The retroperitoneum including the major vessels and lymphatic packages are briefly reviewed.  This film has been reviewed by the radiologist to determine any nonurologic abnormalities that are present.  The kidneys are closely inspected for size, symmetry, contour, parenchymal thickness, perinephric reaction, presence of calcifications, and intrarenal dilation of the collecting system.  The ureters are inspected for their course, caliber, and any calcifications.  The bladder is inspected for its thickness, size, and presence of any calcifications.  This scan shows 10 mm right extrarenal pelvic stone visualized however no hydronephrosis seen.  Patient does appear to have a small punctate size right lower pole renal stone however no ureteral stones visualized.  No left-sided renal or ureteral stones seen.         Assessment and Plan    Diagnoses and all orders for this visit:    1. Kidney stone (Primary)  -     POC Urinalysis Dipstick, Multipro  -     XR abdomen kub; Future  -     ketorolac (TORADOL) 10 MG tablet; Take 1 tablet by mouth Every 6 (Six) Hours As  Needed for Moderate Pain.  Dispense: 20 tablet; Refill: 0      57-year-old male new patient referred for right flank/lower back pain.     Urinalysis does appear positive for small blood in the absence of infection    Patient feeling for sure that the current pain is coming from the renal pelvic stone    10 mm right renal pelvic stone is visualized on KUB    Patient would like surgical intervention to treat the renal pelvic stone to see if pain and blood in urine will improve however patient has a new pacemaker defibrillator in place and is currently on 2 different blood thinning medications for which we are needing to have patient get in contact with the [employer] rep to see if patient is able to undergo ESWL stone treatment along with clearance from patient's cardiologist as he will need to withhold all blood thinning medications for 5 to 7 days prior to procedure.  Patient to contact our office once getting clearance.    Will go ahead and send patient in as needed ketorolac for the time being

## 2023-04-04 ENCOUNTER — OFFICE VISIT (OUTPATIENT)
Dept: UROLOGY | Facility: CLINIC | Age: 58
End: 2023-04-04
Payer: MEDICARE

## 2023-04-04 ENCOUNTER — HOSPITAL ENCOUNTER (OUTPATIENT)
Dept: GENERAL RADIOLOGY | Facility: HOSPITAL | Age: 58
Discharge: HOME | End: 2023-04-04
Payer: MEDICARE

## 2023-04-04 ENCOUNTER — TELEPHONE (OUTPATIENT)
Dept: CARDIOLOGY | Facility: CLINIC | Age: 58
End: 2023-04-04
Payer: MEDICARE

## 2023-04-04 VITALS — HEIGHT: 72 IN | BODY MASS INDEX: 22.75 KG/M2 | WEIGHT: 168 LBS | TEMPERATURE: 97.8 F

## 2023-04-04 DIAGNOSIS — N20.0 KIDNEY STONE: ICD-10-CM

## 2023-04-04 DIAGNOSIS — N20.0 KIDNEY STONE: Primary | ICD-10-CM

## 2023-04-04 LAB
BILIRUB BLD-MCNC: NEGATIVE MG/DL
CLARITY, POC: CLEAR
COLOR UR: YELLOW
GLUCOSE UR STRIP-MCNC: NEGATIVE MG/DL
KETONES UR QL: NEGATIVE
LEUKOCYTE EST, POC: NEGATIVE
NITRITE UR-MCNC: NEGATIVE MG/ML
PH UR: 5.5 [PH] (ref 5–8)
PROT UR STRIP-MCNC: ABNORMAL MG/DL
RBC # UR STRIP: ABNORMAL /UL
SP GR UR: 1.03 (ref 1–1.03)
UROBILINOGEN UR QL: ABNORMAL

## 2023-04-04 PROCEDURE — 99214 OFFICE O/P EST MOD 30 MIN: CPT

## 2023-04-04 PROCEDURE — 1159F MED LIST DOCD IN RCRD: CPT

## 2023-04-04 PROCEDURE — 81001 URINALYSIS AUTO W/SCOPE: CPT

## 2023-04-04 PROCEDURE — 74018 RADEX ABDOMEN 1 VIEW: CPT

## 2023-04-04 PROCEDURE — 1160F RVW MEDS BY RX/DR IN RCRD: CPT

## 2023-04-04 RX ORDER — KETOROLAC TROMETHAMINE 10 MG/1
10 TABLET, FILM COATED ORAL EVERY 6 HOURS PRN
Qty: 20 TABLET | Refills: 0 | Status: SHIPPED | OUTPATIENT
Start: 2023-04-04 | End: 2023-04-20

## 2023-04-04 NOTE — TELEPHONE ENCOUNTER
Patient has a [employer]INK360 Single Chamber AICD.  Patient presented to clinic to notify RN that he will be needing to have ESWL shockwave lithotripsy performed for a kidney stone.  There is another alternative if he cannot have the lithotripsy s/t having an AICD.  He was seen today by [facility] provider JAE Iniguez.   He is needing cardiac clearance for that procedure in regard to guidance for management of his AICD, in addition to guidance on holding ASA/Brilinta before the procedure.  RN will forward to Dr. Cunha for his recommendations re: AICD and anticoagulation.  Patient's wife requests a call explaining the recommendations and that they be faxed to [facility].

## 2023-04-06 ENCOUNTER — TELEPHONE (OUTPATIENT)
Dept: UROLOGY | Facility: CLINIC | Age: 58
End: 2023-04-06
Payer: MEDICARE

## 2023-04-06 NOTE — TELEPHONE ENCOUNTER
Patient's wife called in and said the alternative procedure that could be performed is a ureteroscopy.  Patient would not have to hold his DAPT for that procedure.  She is concerned about the risk associated with holding his medications for the lithotripsy.  RN will notify JAE and MD for their recommendations.

## 2023-04-06 NOTE — TELEPHONE ENCOUNTER
Patient's wife called and they have decided to go with ureteroscopy- I informed her I would talk to Lary and get back with her.

## 2023-04-17 ENCOUNTER — TELEPHONE (OUTPATIENT)
Dept: UROLOGY | Facility: CLINIC | Age: 58
End: 2023-04-17
Payer: MEDICARE

## 2023-04-17 NOTE — TELEPHONE ENCOUNTER
Called patient's wife with information.    ----- Message from JAE Alberto sent at 4/17/2023  2:46 PM CDT -----  Regarding: RE:  Will need to get him scheduled with one of the docs for ureteroscopy then and I’ll need to speak with the doc. I’ll have to do this tomorrow when I return  ----- Message -----  From: Colleen Mantilla MA  Sent: 4/17/2023   2:04 PM CDT  To: JAE Alberto    Patient's wife called- we did receive cardiac clearance, it is in media. How do you want me to proceed with scheduling surgery?

## 2023-04-19 ENCOUNTER — TELEPHONE (OUTPATIENT)
Dept: UROLOGY | Facility: CLINIC | Age: 58
End: 2023-04-19
Payer: MEDICARE

## 2023-04-19 PROBLEM — N20.0 KIDNEY STONE: Status: ACTIVE | Noted: 2023-04-19

## 2023-04-19 RX ORDER — SODIUM CHLORIDE, SODIUM LACTATE, POTASSIUM CHLORIDE, CALCIUM CHLORIDE 600; 310; 30; 20 MG/100ML; MG/100ML; MG/100ML; MG/100ML
100 INJECTION, SOLUTION INTRAVENOUS CONTINUOUS
Status: CANCELLED | OUTPATIENT
Start: 2023-04-19

## 2023-04-19 NOTE — TELEPHONE ENCOUNTER
Called and spoke with patient's wife.  Patient is unable to hold anticoagulant to be able to have the ESWL procedure however patient would like to proceed with right ureteroscopy laser lithotripsy as patient is experiencing intermittent pain.  We will go ahead and get patient scheduled with Dr. Cedillo this Friday for right ureteroscopy laser lithotripsy with a stent.  Patient was educated on the risks and benefits of the procedure as well as potential stent side effects.  Patient and spouse voiced understanding and is in agreement to proceed.

## 2023-04-20 ENCOUNTER — PRE-ADMISSION TESTING (OUTPATIENT)
Dept: PREADMISSION TESTING | Facility: HOSPITAL | Age: 58
End: 2023-04-20
Payer: MEDICARE

## 2023-04-20 VITALS
DIASTOLIC BLOOD PRESSURE: 71 MMHG | WEIGHT: 168.43 LBS | BODY MASS INDEX: 23.58 KG/M2 | HEART RATE: 97 BPM | OXYGEN SATURATION: 100 % | HEIGHT: 71 IN | SYSTOLIC BLOOD PRESSURE: 110 MMHG | RESPIRATION RATE: 16 BRPM

## 2023-04-20 DIAGNOSIS — N20.0 KIDNEY STONE: ICD-10-CM

## 2023-04-20 LAB
ANION GAP SERPL CALCULATED.3IONS-SCNC: 13 MMOL/L (ref 5–15)
BACTERIA UR QL AUTO: ABNORMAL /HPF
BILIRUB UR QL STRIP: NEGATIVE
BUN SERPL-MCNC: 28 MG/DL (ref 6–20)
BUN/CREAT SERPL: 25.5 (ref 7–25)
CALCIUM SPEC-SCNC: 10.1 MG/DL (ref 8.6–10.5)
CHLORIDE SERPL-SCNC: 99 MMOL/L (ref 98–107)
CLARITY UR: ABNORMAL
CO2 SERPL-SCNC: 24 MMOL/L (ref 22–29)
COLOR UR: YELLOW
CREAT SERPL-MCNC: 1.1 MG/DL (ref 0.76–1.27)
DEPRECATED RDW RBC AUTO: 54.1 FL (ref 37–54)
EGFRCR SERPLBLD CKD-EPI 2021: 78.3 ML/MIN/1.73
ERYTHROCYTE [DISTWIDTH] IN BLOOD BY AUTOMATED COUNT: 16.4 % (ref 12.3–15.4)
GLUCOSE SERPL-MCNC: 130 MG/DL (ref 65–99)
GLUCOSE UR STRIP-MCNC: ABNORMAL MG/DL
HCT VFR BLD AUTO: 50.6 % (ref 37.5–51)
HGB BLD-MCNC: 15.5 G/DL (ref 13–17.7)
HGB UR QL STRIP.AUTO: ABNORMAL
HYALINE CASTS UR QL AUTO: ABNORMAL /LPF
KETONES UR QL STRIP: NEGATIVE
LEUKOCYTE ESTERASE UR QL STRIP.AUTO: ABNORMAL
MCH RBC QN AUTO: 27.5 PG (ref 26.6–33)
MCHC RBC AUTO-ENTMCNC: 30.6 G/DL (ref 31.5–35.7)
MCV RBC AUTO: 89.9 FL (ref 79–97)
NITRITE UR QL STRIP: NEGATIVE
PH UR STRIP.AUTO: 5.5 [PH] (ref 5–8)
PLATELET # BLD AUTO: 446 10*3/MM3 (ref 140–450)
PMV BLD AUTO: 9.5 FL (ref 6–12)
POTASSIUM SERPL-SCNC: 5.2 MMOL/L (ref 3.5–5.2)
PROT UR QL STRIP: ABNORMAL
RBC # BLD AUTO: 5.63 10*6/MM3 (ref 4.14–5.8)
RBC # UR STRIP: ABNORMAL /HPF
REF LAB TEST METHOD: ABNORMAL
SODIUM SERPL-SCNC: 136 MMOL/L (ref 136–145)
SP GR UR STRIP: >1.03 (ref 1–1.03)
SQUAMOUS #/AREA URNS HPF: ABNORMAL /HPF
UROBILINOGEN UR QL STRIP: ABNORMAL
WBC # UR STRIP: ABNORMAL /HPF
WBC NRBC COR # BLD: 10.62 10*3/MM3 (ref 3.4–10.8)

## 2023-04-20 PROCEDURE — 80048 BASIC METABOLIC PNL TOTAL CA: CPT

## 2023-04-20 PROCEDURE — 87086 URINE CULTURE/COLONY COUNT: CPT

## 2023-04-20 PROCEDURE — 81001 URINALYSIS AUTO W/SCOPE: CPT

## 2023-04-20 PROCEDURE — 36415 COLL VENOUS BLD VENIPUNCTURE: CPT

## 2023-04-20 PROCEDURE — 85027 COMPLETE CBC AUTOMATED: CPT

## 2023-04-20 NOTE — DISCHARGE INSTRUCTIONS
Before you come to the hospital        Arrival time: AS DIRECTED BY OFFICE     YOU MAY TAKE THE FOLLOWING MEDICATION(S) THE MORNING OF SURGERY WITH A SIP OF WATER: BUSPAR             ALL OTHER HOME MEDICATION CHECK WITH YOUR PHYSICIAN (especially if   you are taking diabetes medicines or blood thinners)    Do not take any Erectile Dysfunction medications (EX: CIALIS, VIAGRA) 24 hours prior to surgery.      If you were given and instructed to use a germ- killing soap, use as directed the night before surgery and again the morning of surgery or as directed by your surgeon. (Use one-half of the bottle with each shower.)   See attached information for How to Use Chlorhexidine for Bathing if applicable.            Eating and drinking restrictions prior to scheduled arrival time    2 Hours before arrival time STOP   Drinking Clear liquids (water, apple juice-no pulp)     6 Hours before arrival time STOP   Milk or drinks that contain milk, full liquids    6 Hours before arrival time STOP   Light meals or foods, such as toast or cereal    8 Hours before arrival time STOP   Heavy foods, such as meat, fried foods, or fatty foods    (It is extremely important that you follow these guidelines to prevent delay or cancelation of your procedure)     Clear Liquids  Water and flavored water                                                                      Clear Fruit juices, such as cranberry juice and apple juice.  Black coffee (NO cream of any kind, including powdered).  Plain tea  Clear bouillon or broth.  Flavored gelatin.  Soda.  Gatorade or Powerade.  Full liquid examples  Juices that have pulp.  Frozen ice pops that contain fruit pieces.  Coffee with creamer  Milk.  Yogurt.                MANAGING PAIN AFTER SURGERY    We know you are probably wondering what your pain will be like after surgery.  Following surgery it is unrealistic to expect you will not have pain.   Pain is how our bodies let us know that something is  JM BROWNRYAN  : 1985  ACCOUNT:  [id number]  2018       Test review: Lab results from 2016 were available blood sugar is 87 renal function was normal potassium was 4.7 cholesterol was 223 with an HDL 35 and an LDL of 160.    CBC was unremarkable.    Given the date of these tests repeating her basic metabolic panel and urinalysis would be appropriate     wrong or cautions us to be careful.  That said, our goal is to make your pain tolerable.    Methods we may use to treat your pain include (oral or IV medications, PCAs, epidurals, nerve blocks, etc.)   While some procedures require IV pain medications for a short time after surgery, transitioning to pain medications by mouth allows for better management of pain.   Your nurse will encourage you to take oral pain medications whenever possible.  IV medications work almost immediately, but only last a short while.  Taking medications by mouth allows for a more constant level of medication in your blood stream for a longer period of time.      Once your pain is out of control it is harder to get back under control.  It is important you are aware when your next dose of pain medication is due.  If you are admitted, your nurse may write the time of your next dose on the white board in your room to help you remember.      We are interested in your pain and encourage you to inform us about aggravating factors during your visit.   Many times a simple repositioning every few hours can make a big difference.    If your physician says it is okay, do not let your pain prevent you from getting out of bed. Be sure to call your nurse for assistance prior to getting up so you do not fall.      Before surgery, please decide your tolerable pain goal.  These faces help describe the pain ratings we use on a 0-10 scale.   Be prepared to tell us your goal and whether or not you take pain or anxiety medications at home.          Preparing for Surgery  Preparing for surgery is an important part of your care. It can make things go more smoothly and help you avoid complications. The steps leading up to surgery may vary among hospitals. Follow all instructions given to you by your health care providers. Ask questions if you do not understand something. Talk about any concerns that you have.  Here are some questions to consider asking before your  surgery:  If my surgery is not an emergency (is elective), when would be the best time to have the surgery?  What arrangements do I need to make for work, home, or school?  What will my recovery be like? How long will it be before I can return to normal activities?  Will I need to prepare my home? Will I need to arrange care for me or my children?  Should I expect to have pain after surgery? What are my pain management options? Are there nonmedical options that I can try for pain?  Tell a health care provider about:  Any allergies you have.  All medicines you are taking, including vitamins, herbs, eye drops, creams, and over-the-counter medicines.  Any problems you or family members have had with anesthetic medicines.  Any blood disorders you have.  Any surgeries you have had.  Any medical conditions you have.  Whether you are pregnant or may be pregnant.  What are the risks?  The risks and complications of surgery depend on the specific procedure that you have. Discuss all the risks with your health care providers before your surgery. Ask about common surgical complications, which may include:  Infection.  Bleeding or a need for blood replacement (transfusion).  Allergic reactions to medicines.  Damage to surrounding nerves, tissues, or structures.  A blood clot.  Scarring.  Failure of the surgery to correct the problem.  Follow these instructions before the procedure:  Several days or weeks before your procedure  You may have a physical exam by your primary health care provider to make sure it is safe for you to have surgery.  You may have testing. This may include a chest X-ray, blood and urine tests, electrocardiogram (ECG), or other testing.  Ask your health care provider about:  Changing or stopping your regular medicines. This is especially important if you are taking diabetes medicines or blood thinners.  Taking medicines such as aspirin and ibuprofen. These medicines can thin your blood. Do not take these  medicines unless your health care provider tells you to take them.  Taking over-the-counter medicines, vitamins, herbs, and supplements.  Do not use any products that contain nicotine or tobacco, such as cigarettes and e-cigarettes. If you need help quitting, ask your health care provider.  Avoid alcohol.  Ask your health care provider if there are exercises you can do to prepare for surgery.  Eat a healthy diet.   Plan to have someone 18 years of age or older to take you home from the hospital. We will need to verify your ride on the morning of surgery if you are being discharged home on the same day. Tell your ride to be expecting a call from the hospital prior to your procedure.   Plan to have a responsible adult care for you for at least 24 hours after you leave the hospital or clinic. This is important.  The day before your procedure  You may be given antibiotic medicine to take by mouth to help prevent infection. Take it as told by your health care provider.  You may be asked to shower with a germ-killing soap.  Follow instructions from your health care provider about eating and drinking restrictions. This includes gum, mints and hard candy.  Pack comfortable clothes according to your procedure.   The day of your procedure  You may need to take another shower with a germ-killing soap before you leave home in the morning.  With a small sip of water, take only the medicines that you are told to take.  Remove all jewelry including rings.   Leave anything you consider valuable at home except hearing aids if needed.  You do not need to bring your home medications into the hospital.   Do not wear any makeup, nail polish, powder, deodorant, lotion, hair accessories, or anything on your skin or body except your clothes.  If you will be staying in the hospital, bring a case to hold your glasses, contacts, or dentures. You may also want to bring your robe and non-skid footwear.       (Do not use denture adhesives since  you will be asked to remove them during  surgery).   If you wear oxygen at home, bring it with you the day of surgery.  If instructed by your health care provider, bring your sleep apnea device with you on the day of your surgery (if this applies to you).  You may want to leave your suitcase and sleep apnea device in the car until after surgery.   Arrive at the hospital as scheduled.  Bring a friend or family member with you who can help to answer questions and be present while you meet with your health care provider.  At the hospital  When you arrive at the hospital:  Go to registration located at the main entrance of the hospital. You will be registered and given a beeper and a sticker sheet. Take the stickers to the Outpatient nurses desk and place in the black tray. This is to notify staff that you have arrived. Then return to the lobby to wait.   When your beeper lights up and vibrates proceed through the double doors, under the stairs, and a member of the Outpatient Surgery staff will escort you to your preoperative room.  You may have to wear compression sleeves. These help to prevent blood clots and reduce swelling in your legs.  An IV may be inserted into one of your veins.              In the operating room, you may be given one or more of the following:        A medicine to help you relax (sedative).        A medicine to numb the area (local anesthetic).        A medicine to make you fall asleep (general anesthetic).        A medicine that is injected into an area of your body to numb everything below the                      injection site (regional anesthetic).  You may be given an antibiotic through your IV to help prevent infection.  Your surgical site will be marked or identified.    Contact a health care provider if you:  Develop a fever of more than 100.4°F (38°C) or other feelings of illness during the 48 hours before your surgery.  Have symptoms that get worse.  Have questions or concerns about  your surgery.  Summary  Preparing for surgery can make the procedure go more smoothly and lower your risk of complications.  Before surgery, make a list of questions and concerns to discuss with your surgeon. Ask about the risks and possible complications.  In the days or weeks before your surgery, follow all instructions from your health care provider. You may need to stop smoking, avoid alcohol, follow eating restrictions, and change or stop your regular medicines.  Contact your surgeon if you develop a fever or other signs of illness during the few days before your surgery.  This information is not intended to replace advice given to you by your health care provider. Make sure you discuss any questions you have with your health care provider.  Document Revised: 12/21/2018 Document Reviewed: 10/23/2018  Elsevier Patient Education © 2021 [employer]

## 2023-04-21 ENCOUNTER — HOSPITAL ENCOUNTER (OUTPATIENT)
Facility: HOSPITAL | Age: 58
Setting detail: HOSPITAL OUTPATIENT SURGERY
Discharge: HOME | End: 2023-04-21
Attending: UROLOGY | Admitting: UROLOGY
Payer: MEDICARE

## 2023-04-21 ENCOUNTER — APPOINTMENT (OUTPATIENT)
Dept: GENERAL RADIOLOGY | Facility: HOSPITAL | Age: 58
End: 2023-04-21
Payer: MEDICARE

## 2023-04-21 ENCOUNTER — ANESTHESIA (OUTPATIENT)
Dept: PERIOP | Facility: HOSPITAL | Age: 58
End: 2023-04-21
Payer: MEDICARE

## 2023-04-21 ENCOUNTER — ANESTHESIA EVENT (OUTPATIENT)
Dept: PERIOP | Facility: HOSPITAL | Age: 58
End: 2023-04-21
Payer: MEDICARE

## 2023-04-21 VITALS
OXYGEN SATURATION: 95 % | HEART RATE: 94 BPM | RESPIRATION RATE: 16 BRPM | DIASTOLIC BLOOD PRESSURE: 103 MMHG | TEMPERATURE: 97.8 F | SYSTOLIC BLOOD PRESSURE: 142 MMHG

## 2023-04-21 DIAGNOSIS — N20.0 KIDNEY STONE: ICD-10-CM

## 2023-04-21 LAB
BACTERIA SPEC AEROBE CULT: NO GROWTH
GLUCOSE BLDC GLUCOMTR-MCNC: 121 MG/DL (ref 70–130)
GLUCOSE BLDC GLUCOMTR-MCNC: 151 MG/DL (ref 70–130)

## 2023-04-21 PROCEDURE — 82360 CALCULUS ASSAY QUANT: CPT | Performed by: UROLOGY

## 2023-04-21 PROCEDURE — C1769 GUIDE WIRE: HCPCS | Performed by: UROLOGY

## 2023-04-21 PROCEDURE — 88300 SURGICAL PATH GROSS: CPT | Performed by: UROLOGY

## 2023-04-21 PROCEDURE — 52356 CYSTO/URETERO W/LITHOTRIPSY: CPT | Performed by: UROLOGY

## 2023-04-21 PROCEDURE — C1758 CATHETER, URETERAL: HCPCS | Performed by: UROLOGY

## 2023-04-21 PROCEDURE — 74420 UROGRAPHY RTRGR +-KUB: CPT

## 2023-04-21 PROCEDURE — 25010000002 CEFAZOLIN PER 500 MG

## 2023-04-21 PROCEDURE — 25010000002 DROPERIDOL PER 5 MG: Performed by: NURSE ANESTHETIST, CERTIFIED REGISTERED

## 2023-04-21 PROCEDURE — 25010000002 ONDANSETRON PER 1 MG: Performed by: NURSE ANESTHETIST, CERTIFIED REGISTERED

## 2023-04-21 PROCEDURE — 25010000002 PROPOFOL 10 MG/ML EMULSION: Performed by: NURSE ANESTHETIST, CERTIFIED REGISTERED

## 2023-04-21 PROCEDURE — 25510000001 IOPAMIDOL 61 % SOLUTION: Performed by: UROLOGY

## 2023-04-21 PROCEDURE — 74018 RADEX ABDOMEN 1 VIEW: CPT

## 2023-04-21 PROCEDURE — 25010000002 FENTANYL CITRATE (PF) 50 MCG/ML SOLUTION: Performed by: ANESTHESIOLOGY

## 2023-04-21 PROCEDURE — 25010000002 DEXAMETHASONE PER 1 MG: Performed by: ANESTHESIOLOGY

## 2023-04-21 PROCEDURE — C1747: HCPCS | Performed by: UROLOGY

## 2023-04-21 PROCEDURE — 74420 UROGRAPHY RTRGR +-KUB: CPT | Performed by: UROLOGY

## 2023-04-21 PROCEDURE — 25010000002 FENTANYL CITRATE (PF) 100 MCG/2ML SOLUTION: Performed by: NURSE ANESTHETIST, CERTIFIED REGISTERED

## 2023-04-21 PROCEDURE — 82962 GLUCOSE BLOOD TEST: CPT

## 2023-04-21 PROCEDURE — C2617 STENT, NON-COR, TEM W/O DEL: HCPCS | Performed by: UROLOGY

## 2023-04-21 PROCEDURE — C1894 INTRO/SHEATH, NON-LASER: HCPCS | Performed by: UROLOGY

## 2023-04-21 PROCEDURE — 25010000002 KETOROLAC TROMETHAMINE PER 15 MG: Performed by: NURSE ANESTHETIST, CERTIFIED REGISTERED

## 2023-04-21 DEVICE — URETERAL STENT
Type: IMPLANTABLE DEVICE | Site: URETER | Status: FUNCTIONAL
Brand: PERCUFLEX™ PLUS

## 2023-04-21 RX ORDER — ONDANSETRON 4 MG/1
4 TABLET, ORALLY DISINTEGRATING ORAL EVERY 6 HOURS PRN
Qty: 6 TABLET | Refills: 1 | Status: ON HOLD | OUTPATIENT
Start: 2023-04-21

## 2023-04-21 RX ORDER — OXYBUTYNIN CHLORIDE 5 MG/1
5 TABLET ORAL EVERY 8 HOURS PRN
Qty: 30 TABLET | Refills: 1 | Status: ON HOLD | OUTPATIENT
Start: 2023-04-21

## 2023-04-21 RX ORDER — FLUMAZENIL 0.1 MG/ML
0.2 INJECTION INTRAVENOUS AS NEEDED
Status: DISCONTINUED | OUTPATIENT
Start: 2023-04-21 | End: 2023-04-21 | Stop reason: HOSPADM

## 2023-04-21 RX ORDER — DEXAMETHASONE SODIUM PHOSPHATE 4 MG/ML
4 INJECTION, SOLUTION INTRA-ARTICULAR; INTRALESIONAL; INTRAMUSCULAR; INTRAVENOUS; SOFT TISSUE ONCE AS NEEDED
Status: COMPLETED | OUTPATIENT
Start: 2023-04-21 | End: 2023-04-21

## 2023-04-21 RX ORDER — DROPERIDOL 2.5 MG/ML
INJECTION, SOLUTION INTRAMUSCULAR; INTRAVENOUS AS NEEDED
Status: DISCONTINUED | OUTPATIENT
Start: 2023-04-21 | End: 2023-04-21 | Stop reason: SURG

## 2023-04-21 RX ORDER — SODIUM CHLORIDE 0.9 % (FLUSH) 0.9 %
3 SYRINGE (ML) INJECTION AS NEEDED
Status: DISCONTINUED | OUTPATIENT
Start: 2023-04-21 | End: 2023-04-21 | Stop reason: HOSPADM

## 2023-04-21 RX ORDER — LIDOCAINE HYDROCHLORIDE 10 MG/ML
0.5 INJECTION, SOLUTION EPIDURAL; INFILTRATION; INTRACAUDAL; PERINEURAL ONCE AS NEEDED
Status: DISCONTINUED | OUTPATIENT
Start: 2023-04-21 | End: 2023-04-21 | Stop reason: HOSPADM

## 2023-04-21 RX ORDER — FENTANYL CITRATE 50 UG/ML
25 INJECTION, SOLUTION INTRAMUSCULAR; INTRAVENOUS
Status: DISCONTINUED | OUTPATIENT
Start: 2023-04-21 | End: 2023-04-21 | Stop reason: HOSPADM

## 2023-04-21 RX ORDER — SODIUM CHLORIDE, SODIUM LACTATE, POTASSIUM CHLORIDE, CALCIUM CHLORIDE 600; 310; 30; 20 MG/100ML; MG/100ML; MG/100ML; MG/100ML
9 INJECTION, SOLUTION INTRAVENOUS CONTINUOUS
Status: DISCONTINUED | OUTPATIENT
Start: 2023-04-21 | End: 2023-04-21 | Stop reason: HOSPADM

## 2023-04-21 RX ORDER — LIDOCAINE HYDROCHLORIDE 20 MG/ML
INJECTION, SOLUTION EPIDURAL; INFILTRATION; INTRACAUDAL; PERINEURAL AS NEEDED
Status: DISCONTINUED | OUTPATIENT
Start: 2023-04-21 | End: 2023-04-21 | Stop reason: SURG

## 2023-04-21 RX ORDER — ATRACURIUM BESYLATE 10 MG/ML
INJECTION, SOLUTION INTRAVENOUS AS NEEDED
Status: DISCONTINUED | OUTPATIENT
Start: 2023-04-21 | End: 2023-04-21 | Stop reason: SURG

## 2023-04-21 RX ORDER — ONDANSETRON 2 MG/ML
INJECTION INTRAMUSCULAR; INTRAVENOUS AS NEEDED
Status: DISCONTINUED | OUTPATIENT
Start: 2023-04-21 | End: 2023-04-21 | Stop reason: SURG

## 2023-04-21 RX ORDER — HYDROCODONE BITARTRATE AND ACETAMINOPHEN 7.5; 325 MG/1; MG/1
1 TABLET ORAL ONCE AS NEEDED
Status: DISCONTINUED | OUTPATIENT
Start: 2023-04-21 | End: 2023-04-21 | Stop reason: HOSPADM

## 2023-04-21 RX ORDER — DROPERIDOL 2.5 MG/ML
0.62 INJECTION, SOLUTION INTRAMUSCULAR; INTRAVENOUS ONCE AS NEEDED
Status: DISCONTINUED | OUTPATIENT
Start: 2023-04-21 | End: 2023-04-21 | Stop reason: HOSPADM

## 2023-04-21 RX ORDER — DOCUSATE SODIUM 100 MG/1
100 CAPSULE, LIQUID FILLED ORAL 2 TIMES DAILY
Qty: 60 CAPSULE | Refills: 1 | Status: ON HOLD | OUTPATIENT
Start: 2023-04-21

## 2023-04-21 RX ORDER — SODIUM CHLORIDE, SODIUM LACTATE, POTASSIUM CHLORIDE, CALCIUM CHLORIDE 600; 310; 30; 20 MG/100ML; MG/100ML; MG/100ML; MG/100ML
100 INJECTION, SOLUTION INTRAVENOUS CONTINUOUS
Status: DISCONTINUED | OUTPATIENT
Start: 2023-04-21 | End: 2023-04-21 | Stop reason: HOSPADM

## 2023-04-21 RX ORDER — PHENAZOPYRIDINE HYDROCHLORIDE 100 MG/1
100 TABLET, FILM COATED ORAL 3 TIMES DAILY PRN
Qty: 20 TABLET | Refills: 1 | Status: ON HOLD | OUTPATIENT
Start: 2023-04-21

## 2023-04-21 RX ORDER — KETOROLAC TROMETHAMINE 30 MG/ML
INJECTION, SOLUTION INTRAMUSCULAR; INTRAVENOUS AS NEEDED
Status: DISCONTINUED | OUTPATIENT
Start: 2023-04-21 | End: 2023-04-21 | Stop reason: SURG

## 2023-04-21 RX ORDER — OXYCODONE AND ACETAMINOPHEN 10; 325 MG/1; MG/1
1 TABLET ORAL ONCE AS NEEDED
Status: COMPLETED | OUTPATIENT
Start: 2023-04-21 | End: 2023-04-21

## 2023-04-21 RX ORDER — SODIUM CHLORIDE, SODIUM LACTATE, POTASSIUM CHLORIDE, CALCIUM CHLORIDE 600; 310; 30; 20 MG/100ML; MG/100ML; MG/100ML; MG/100ML
1000 INJECTION, SOLUTION INTRAVENOUS CONTINUOUS
Status: DISCONTINUED | OUTPATIENT
Start: 2023-04-21 | End: 2023-04-21 | Stop reason: HOSPADM

## 2023-04-21 RX ORDER — PROPOFOL 10 MG/ML
VIAL (ML) INTRAVENOUS AS NEEDED
Status: DISCONTINUED | OUTPATIENT
Start: 2023-04-21 | End: 2023-04-21 | Stop reason: SURG

## 2023-04-21 RX ORDER — ONDANSETRON 2 MG/ML
4 INJECTION INTRAMUSCULAR; INTRAVENOUS ONCE AS NEEDED
Status: DISCONTINUED | OUTPATIENT
Start: 2023-04-21 | End: 2023-04-21 | Stop reason: HOSPADM

## 2023-04-21 RX ORDER — SODIUM CHLORIDE 0.9 % (FLUSH) 0.9 %
10 SYRINGE (ML) INJECTION AS NEEDED
Status: DISCONTINUED | OUTPATIENT
Start: 2023-04-21 | End: 2023-04-21 | Stop reason: HOSPADM

## 2023-04-21 RX ORDER — MAGNESIUM HYDROXIDE 1200 MG/15ML
LIQUID ORAL AS NEEDED
Status: DISCONTINUED | OUTPATIENT
Start: 2023-04-21 | End: 2023-04-21 | Stop reason: HOSPADM

## 2023-04-21 RX ORDER — TAMSULOSIN HYDROCHLORIDE 0.4 MG/1
1 CAPSULE ORAL DAILY
Qty: 30 CAPSULE | Refills: 0 | Status: ON HOLD | OUTPATIENT
Start: 2023-04-21

## 2023-04-21 RX ORDER — OXYCODONE AND ACETAMINOPHEN 7.5; 325 MG/1; MG/1
2 TABLET ORAL EVERY 4 HOURS PRN
Status: DISCONTINUED | OUTPATIENT
Start: 2023-04-21 | End: 2023-04-21 | Stop reason: HOSPADM

## 2023-04-21 RX ORDER — LABETALOL HYDROCHLORIDE 5 MG/ML
5 INJECTION, SOLUTION INTRAVENOUS
Status: DISCONTINUED | OUTPATIENT
Start: 2023-04-21 | End: 2023-04-21 | Stop reason: HOSPADM

## 2023-04-21 RX ORDER — DEXTROSE MONOHYDRATE 25 G/50ML
12.5 INJECTION, SOLUTION INTRAVENOUS AS NEEDED
Status: DISCONTINUED | OUTPATIENT
Start: 2023-04-21 | End: 2023-04-21 | Stop reason: HOSPADM

## 2023-04-21 RX ORDER — NALOXONE HCL 0.4 MG/ML
0.4 VIAL (ML) INJECTION AS NEEDED
Status: DISCONTINUED | OUTPATIENT
Start: 2023-04-21 | End: 2023-04-21 | Stop reason: HOSPADM

## 2023-04-21 RX ORDER — IBUPROFEN 600 MG/1
600 TABLET ORAL ONCE AS NEEDED
Status: DISCONTINUED | OUTPATIENT
Start: 2023-04-21 | End: 2023-04-21 | Stop reason: HOSPADM

## 2023-04-21 RX ORDER — FENTANYL CITRATE 50 UG/ML
50 INJECTION, SOLUTION INTRAMUSCULAR; INTRAVENOUS ONCE
Status: DISCONTINUED | OUTPATIENT
Start: 2023-04-21 | End: 2023-04-21 | Stop reason: HOSPADM

## 2023-04-21 RX ORDER — FENTANYL CITRATE 50 UG/ML
INJECTION, SOLUTION INTRAMUSCULAR; INTRAVENOUS AS NEEDED
Status: DISCONTINUED | OUTPATIENT
Start: 2023-04-21 | End: 2023-04-21 | Stop reason: SURG

## 2023-04-21 RX ORDER — HYDROCODONE BITARTRATE AND ACETAMINOPHEN 7.5; 325 MG/1; MG/1
1 TABLET ORAL EVERY 6 HOURS PRN
Qty: 15 TABLET | Refills: 0 | Status: ON HOLD | OUTPATIENT
Start: 2023-04-21

## 2023-04-21 RX ORDER — SODIUM CHLORIDE 0.9 % (FLUSH) 0.9 %
10 SYRINGE (ML) INJECTION EVERY 12 HOURS SCHEDULED
Status: DISCONTINUED | OUTPATIENT
Start: 2023-04-21 | End: 2023-04-21 | Stop reason: HOSPADM

## 2023-04-21 RX ADMIN — ONDANSETRON 4 MG: 2 INJECTION INTRAMUSCULAR; INTRAVENOUS at 10:43

## 2023-04-21 RX ADMIN — KETOROLAC TROMETHAMINE 15 MG: 30 INJECTION, SOLUTION INTRAMUSCULAR; INTRAVENOUS at 11:54

## 2023-04-21 RX ADMIN — FENTANYL CITRATE 25 MCG: 50 INJECTION, SOLUTION INTRAMUSCULAR; INTRAVENOUS at 10:23

## 2023-04-21 RX ADMIN — FENTANYL CITRATE 100 MCG: 50 INJECTION, SOLUTION INTRAMUSCULAR; INTRAVENOUS at 10:43

## 2023-04-21 RX ADMIN — LIDOCAINE HYDROCHLORIDE 200 MG: 20 INJECTION, SOLUTION EPIDURAL; INFILTRATION; INTRACAUDAL; PERINEURAL at 10:43

## 2023-04-21 RX ADMIN — PROPOFOL 30 MG: 10 INJECTION, EMULSION INTRAVENOUS at 11:59

## 2023-04-21 RX ADMIN — PROPOFOL 200 MG: 10 INJECTION, EMULSION INTRAVENOUS at 10:43

## 2023-04-21 RX ADMIN — SODIUM CHLORIDE, POTASSIUM CHLORIDE, SODIUM LACTATE AND CALCIUM CHLORIDE 100 ML/HR: 600; 310; 30; 20 INJECTION, SOLUTION INTRAVENOUS at 08:43

## 2023-04-21 RX ADMIN — CEFAZOLIN 2 G: 2 INJECTION, POWDER, FOR SOLUTION INTRAMUSCULAR; INTRAVENOUS at 10:40

## 2023-04-21 RX ADMIN — DROPERIDOL 1.25 MG: 2.5 INJECTION, SOLUTION INTRAMUSCULAR; INTRAVENOUS at 10:43

## 2023-04-21 RX ADMIN — ATRACURIUM BESYLATE 10 MG: 10 INJECTION, SOLUTION INTRAVENOUS at 10:43

## 2023-04-21 RX ADMIN — OXYCODONE HYDROCHLORIDE AND ACETAMINOPHEN 1 TABLET: 10; 325 TABLET ORAL at 12:46

## 2023-04-21 RX ADMIN — FENTANYL CITRATE 25 MCG: 50 INJECTION, SOLUTION INTRAMUSCULAR; INTRAVENOUS at 10:27

## 2023-04-21 RX ADMIN — DEXAMETHASONE SODIUM PHOSPHATE 4 MG: 4 INJECTION, SOLUTION INTRA-ARTICULAR; INTRALESIONAL; INTRAMUSCULAR; INTRAVENOUS; SOFT TISSUE at 09:47

## 2023-04-21 NOTE — ANESTHESIA PREPROCEDURE EVALUATION
Anesthesia Evaluation     Patient summary reviewed   no history of anesthetic complications:  NPO Solid Status: > 8 hours  NPO Liquid Status: > 2 hours           Airway   Mallampati: II  TM distance: >3 FB  Neck ROM: full  Dental      Pulmonary    (+) sleep apnea (BiPAP),   (-) COPD, asthma, not a smoker  Cardiovascular   Exercise tolerance: excellent (>7 METS)    (+) pacemaker ([place] [employer]) ICD interrogated <1 month ago, hypertension, CAD, cardiac stents (9/2022. Remains on DAPT) within the past 12 months hyperlipidemia,   (-) past MI, angina, CHF      Neuro/Psych  (+) neuromuscular disease (Myasthenia gravis on chronic steroids),    (-) seizures, TIA, CVA  GI/Hepatic/Renal/Endo    (+)  GERD,  renal disease stones, diabetes mellitus using insulin,   (-) liver disease    Musculoskeletal     Abdominal    Substance History      OB/GYN          Other                        Anesthesia Plan    ASA 3     general     intravenous induction     Anesthetic plan, risks, benefits, and alternatives have been provided, discussed and informed consent has been obtained with: patient.        CODE STATUS:

## 2023-04-21 NOTE — INTERVAL H&P NOTE
H&P reviewed.  The patient was examined and there are no changes to the H&P.  Patient has 1.2 cm right kidney stone is unable stop his anticoagulation.  He would like to proceed today as scheduled with right ureteroscopy laser lithotripsy basket stone extraction for definitive stone management.  We discussed risks of the procedure include but not limited to infection, bleeding, need for additional procedures, inability to remove  any/all stone, injury to the ureter and/or kidney, need for postoperative ureteral stent, complications of anesthesia.  He voices understanding and provided informed assent to proceed today with right ureteroscopy

## 2023-04-21 NOTE — ANESTHESIA POSTPROCEDURE EVALUATION
Patient: Iftikhar Francis    Procedure Summary     Date: 04/21/23 Room / Location:  [facility] OR  /  [facility]    Anesthesia Start: 1040 Anesthesia Stop: 1211    Procedure: URETEROSCOPY LASER LITHOTRIPSY WITH STENT INSERTION-right (Right: Ureter) Diagnosis:       Kidney stone      (Kidney stone [N20.0])    Surgeons: Raghavendra Cedillo MD Provider: Enzo Chiu CRNA    Anesthesia Type: general ASA Status: 3          Anesthesia Type: general    Vitals  Vitals Value Taken Time   /81 04/21/23 1210   Temp     Pulse 98 04/21/23 1211   Resp     SpO2 96 % 04/21/23 1211   Vitals shown include unvalidated device data.        Post Anesthesia Care and Evaluation    Patient location during evaluation: PACU  Patient participation: complete - patient participated  Level of consciousness: awake and alert  Pain management: adequate    Airway patency: patent  Anesthetic complications: No anesthetic complications    Cardiovascular status: acceptable  Respiratory status: acceptable  Hydration status: acceptable    Comments: Blood pressure 129/87, pulse 91, temperature 96.8 °F (36 °C), temperature source Temporal, resp. rate 18, SpO2 97 %.    Pt discharged from PACU based on jacqueline score >8

## 2023-04-21 NOTE — ANESTHESIA PROCEDURE NOTES
Airway  Urgency: elective    Date/Time: 4/21/2023 10:43 AM  Airway not difficult    General Information and Staff    Patient location during procedure: OR  CRNA/CAA: Enzo Chiu CRNA    Indications and Patient Condition  Indications for airway management: airway protection    Preoxygenated: yes  MILS maintained throughout  Mask difficulty assessment: 1 - vent by mask    Final Airway Details  Final airway type: endotracheal airway      Successful airway: ETT  Cuffed: yes   Successful intubation technique: direct laryngoscopy  Endotracheal tube insertion site: oral  Blade: Lambert  Blade size: 2  ETT size (mm): 7.5  Cormack-Lehane Classification: grade I - full view of glottis  Placement verified by: chest auscultation and capnometry   Cuff volume (mL): 5  Measured from: lips  ETT/EBT  to lips (cm): 20  Number of attempts at approach: 1  Assessment: lips, teeth, and gum same as pre-op and atraumatic intubation

## 2023-04-21 NOTE — BRIEF OP NOTE
URETEROSCOPY LASER LITHOTRIPSY WITH STENT INSERTION  Progress Note    Iftikhar Francis  4/21/2023    Pre-op Diagnosis:   Kidney stone [N20.0]       Post-Op Diagnosis Codes:     * Kidney stone [N20.0]    Procedure/CPT® Codes:        Procedure(s):  URETEROSCOPY LASER LITHOTRIPSY WITH STENT INSERTION-right        Surgeon(s):  Raghavendra Cedillo MD    Anesthesia: General    Staff:   Circulator: Laurence Lagos RN; Adriana Morales RN; Alka Arita RN  Scrub Person: Tal Estevez; Maria Elena Kidd         Estimated Blood Loss: minimal    Urine Voided: * No values recorded between 4/21/2023 10:39 AM and 4/21/2023 12:07 PM *    Specimens:                Specimens     ID Source Type Tests Collected By Collected At Frozen?    A Kidney, Right Tissue · TISSUE PATHOLOGY EXAM   Raghavendra Cedillo MD 4/21/23 1157     Description: right kidny stone                Drains: Right 6Fr x 26cm JJ stent with string   Ureteral Drain/Stent Right ureter  (Active)   Site Assessment Clean;Intact;Other (Comment) 04/21/23 1238   Dressing Status Other (Comment) 04/21/23 1208   Dressing Type Open to air 04/21/23 1238       Findings: 1.2cm right renal pelvis stone completely removed        Complications: None          Raghavendra Cedillo MD     Date: 4/21/2023  Time: 12:43 CDT

## 2023-04-21 NOTE — OP NOTE
URETEROSCOPY LASER LITHOTRIPSY WITH STENT INSERTION  Procedure Note    Iftikhar Francis  Date of Procedure: 4/21/2023    Pre-op Diagnosis:   Kidney stone [N20.0]    Post-op Diagnosis:     Post-Op Diagnosis Codes:     * Kidney stone [N20.0]    Procedure/CPT® Codes:      Procedure(s):  RIGHT URETEROSCOPY LASER LITHOTRIPSY WITH STENT INSERTION    Surgeon(s):  Raghavendra Cedillo MD    Anesthesia: General    Staff:   Circulator: Laurence Lagos RN; Adriana Morales RN; Alka Arita RN  Scrub Person: Tal Estevez; Maria Elena Kidd    Indications for procedure:  57-year-old male unable to stop his anticoagulation for recent coronary stenting found to have 1.2 cm symptomatic right renal pelvis kidney stone with gross hematuria presenting for right ureteroscopy and laser lithotripsy for definitive stone management.    Findings:   Fluoroscopy with 1.2 cm right kidney stone.  Right retrograde pyelogram with stone lodged in bifid renal pelvis.  Lateralizing hematuria from the right ureteral orifice consistent with stone    Procedure details:  The patient is identified in the preoperative holding room.  The rationale including the benefits alternatives and risks of this procedure were already discussed and informed consent is been obtained.  The patient demonstrates good understanding of this procedure.  Also explained was that a stent is not a permanent structure but will need to be changed periodically or removed to prevent encrustation which could lead to kidney damage.    The patient is taken to the operating room where appropriate anesthesia is given.  Surgical prophylaxis with IV antibiotics included cefazolin 2 grams. Appropriate timeout protocol was observed.  The usual prep and drape with Betadine was done.    A 23 [place] cystoscope sheath with a 30° lens is inserted. The urethra and bladder neck show no evidence of mass or obstruction., The bladder showed no significant mucosal lesions, glomerulations or  masses. and The ureteral orifices are orthotopic in position.  There was lateralizing hematuria from the right ureteral orifice.  I placed a 5 [place] open-ended ureteral catheter into the right ureteral orifice performing right retrograde pyelogram showing filling defect at bifid renal pelvis consistent with a stone.  I placed 2.035 sensor guidewire into the right kidney and removed the ureteral catheter.  I placed a second 0.035 guidewire using a dual-lumen ureteral catheter.  I then placed a ureteral access sheath.  I then selected the [employer] disposable digital ureteroscope and placed this through the access sheath up into the right kidney under fluoroscopy.  The 1.2 cm stone was visualized in the renal pelvis.  I then used the 200 µm holmium laser fiber to completely dust and fragment the stone.  I then used a tipless nitinol stone basket to remove all visible significant stone fragments.  Repeat renoscopy demonstrated only dust remaining with no large fragments.  I then removed the ureteroscope and access sheath under direct vision.  I then backloaded the safety wire into the cystoscope over which I placed a 6 [place] by 28 cm Tria double-J ureteral stent in the standard fashion.  After curl was obtained to the right kidney, the wire was completely removed and a curl was obtained the bladder.  The string was left on the stent and taped to the penis to facilitate removal in the office next week.  The patient was awakened from anesthesia and taken to the recovery in stable condition.      Estimated Blood Loss: <30 mls    Specimens:                Specimens     ID Source Type Tests Collected By Collected At Frozen?    A Kidney, Right Tissue · TISSUE PATHOLOGY EXAM   Raghavendra Cedillo MD 4/21/23 1157 No    Description: right kidney stone            Drains: Right 6 [place] by 28 cm double-J ureteral stent with string  Ureteral Drain/Stent Right ureter  (Active)   Site Assessment Clean;Intact;Other (Comment) 04/21/23  1350   Dressing Status Other (Comment) 04/21/23 1208   Dressing Type Open to air 04/21/23 1350       Complications: none    Raghavendra Cedillo MD     Date: 4/21/2023  Time: 16:54 CDT

## 2023-04-24 LAB
LAB AP CASE REPORT: NORMAL
Lab: NORMAL
PATH REPORT.FINAL DX SPEC: NORMAL
PATH REPORT.GROSS SPEC: NORMAL

## 2023-04-27 ENCOUNTER — PROCEDURE VISIT (OUTPATIENT)
Dept: UROLOGY | Facility: CLINIC | Age: 58
End: 2023-04-27
Payer: MEDICARE

## 2023-04-27 DIAGNOSIS — N20.0 KIDNEY STONE: Primary | ICD-10-CM

## 2023-04-27 NOTE — PROGRESS NOTES
Patient of Dr. Cedillo states he is here today to get his stent removed SP Ureteroscopy Laser Litho with stent placement on 4-21-23. Patient denies any fever, chills, N&V or hematuria. The tape was removed and using the string stent was pulled with no complications. The patient was advised to continue any medications prescribed in the hospital and to call if he has any questions or concerns. The patient verbalized understanding. Follow up with Dr. Cedillo in 4-6 weeks with Renal US prior. JAE Iniguez was in the office for this procedure. Adriana S, CANDELARIA    I have reviewed and agree with medical assistance documentation above

## 2023-04-30 ENCOUNTER — HOSPITAL ENCOUNTER (INPATIENT)
Facility: HOSPITAL | Age: 58
LOS: 3 days | Discharge: HOME | End: 2023-05-03
Attending: EMERGENCY MEDICINE | Admitting: INTERNAL MEDICINE
Payer: MEDICARE

## 2023-04-30 ENCOUNTER — APPOINTMENT (OUTPATIENT)
Dept: GENERAL RADIOLOGY | Facility: HOSPITAL | Age: 58
End: 2023-04-30
Payer: MEDICARE

## 2023-04-30 ENCOUNTER — APPOINTMENT (OUTPATIENT)
Dept: CT IMAGING | Facility: HOSPITAL | Age: 58
End: 2023-04-30
Payer: MEDICARE

## 2023-04-30 DIAGNOSIS — N12 PYELONEPHRITIS: Primary | ICD-10-CM

## 2023-04-30 LAB
ACETONE BLD QL: ABNORMAL
ALBUMIN SERPL-MCNC: 4.7 G/DL (ref 3.5–5.2)
ALBUMIN/GLOB SERPL: 1.3 G/DL
ALP SERPL-CCNC: 49 U/L (ref 39–117)
ALT SERPL W P-5'-P-CCNC: 11 U/L (ref 1–41)
ANION GAP SERPL CALCULATED.3IONS-SCNC: 20 MMOL/L (ref 5–15)
ARTERIAL PATENCY WRIST A: POSITIVE
AST SERPL-CCNC: 15 U/L (ref 1–40)
ATMOSPHERIC PRESS: 743 MMHG
BACTERIA UR QL AUTO: ABNORMAL /HPF
BASE EXCESS BLDA CALC-SCNC: -1.2 MMOL/L (ref 0–2)
BASOPHILS # BLD AUTO: 0.04 10*3/MM3 (ref 0–0.2)
BASOPHILS NFR BLD AUTO: 0.2 % (ref 0–1.5)
BDY SITE: ABNORMAL
BILIRUB SERPL-MCNC: 0.8 MG/DL (ref 0–1.2)
BILIRUB UR QL STRIP: ABNORMAL
BODY TEMPERATURE: 37 C
BUN SERPL-MCNC: 22 MG/DL (ref 6–20)
BUN/CREAT SERPL: 17.9 (ref 7–25)
CALCIUM SPEC-SCNC: 10.1 MG/DL (ref 8.6–10.5)
CHLORIDE SERPL-SCNC: 89 MMOL/L (ref 98–107)
CLARITY UR: ABNORMAL
CO2 SERPL-SCNC: 21 MMOL/L (ref 22–29)
COLOR UR: ABNORMAL
CREAT SERPL-MCNC: 1.23 MG/DL (ref 0.76–1.27)
D-LACTATE SERPL-SCNC: 1 MMOL/L (ref 0.5–2)
D-LACTATE SERPL-SCNC: 2.5 MMOL/L (ref 0.5–2)
DEPRECATED RDW RBC AUTO: 51.3 FL (ref 37–54)
EGFRCR SERPLBLD CKD-EPI 2021: 68.5 ML/MIN/1.73
EOSINOPHIL # BLD AUTO: 0.01 10*3/MM3 (ref 0–0.4)
EOSINOPHIL NFR BLD AUTO: 0 % (ref 0.3–6.2)
ERYTHROCYTE [DISTWIDTH] IN BLOOD BY AUTOMATED COUNT: 15.7 % (ref 12.3–15.4)
GAS FLOW AIRWAY: 0 LPM
GLOBULIN UR ELPH-MCNC: 3.7 GM/DL
GLUCOSE SERPL-MCNC: 124 MG/DL (ref 65–99)
GLUCOSE UR STRIP-MCNC: ABNORMAL MG/DL
HCO3 BLDA-SCNC: 22.7 MMOL/L (ref 20–26)
HCT VFR BLD AUTO: 46.3 % (ref 37.5–51)
HGB BLD-MCNC: 14.4 G/DL (ref 13–17.7)
HGB UR QL STRIP.AUTO: ABNORMAL
HOLD SPECIMEN: NORMAL
HOLD SPECIMEN: NORMAL
IMM GRANULOCYTES # BLD AUTO: 0.16 10*3/MM3 (ref 0–0.05)
IMM GRANULOCYTES NFR BLD AUTO: 0.8 % (ref 0–0.5)
INHALED O2 CONCENTRATION: 21 %
INR PPP: 0.98 (ref 0.91–1.09)
KETONES UR QL STRIP: ABNORMAL
LEUKOCYTE ESTERASE UR QL STRIP.AUTO: ABNORMAL
LIPASE SERPL-CCNC: 17 U/L (ref 13–60)
LYMPHOCYTES # BLD AUTO: 0.96 10*3/MM3 (ref 0.7–3.1)
LYMPHOCYTES NFR BLD AUTO: 4.8 % (ref 19.6–45.3)
Lab: ABNORMAL
Lab: ABNORMAL
MCH RBC QN AUTO: 27.4 PG (ref 26.6–33)
MCHC RBC AUTO-ENTMCNC: 31.1 G/DL (ref 31.5–35.7)
MCV RBC AUTO: 88.2 FL (ref 79–97)
MODALITY: ABNORMAL
MONOCYTES # BLD AUTO: 2.03 10*3/MM3 (ref 0.1–0.9)
MONOCYTES NFR BLD AUTO: 10.1 % (ref 5–12)
NEUTROPHILS NFR BLD AUTO: 16.84 10*3/MM3 (ref 1.7–7)
NEUTROPHILS NFR BLD AUTO: 84.1 % (ref 42.7–76)
NITRITE UR QL STRIP: NEGATIVE
NOTIFIED BY: ABNORMAL
NOTIFIED WHO: ABNORMAL
NRBC BLD AUTO-RTO: 0 /100 WBC (ref 0–0.2)
PCO2 BLDA: 34.9 MM HG (ref 35–45)
PCO2 TEMP ADJ BLD: 34.9 MM HG (ref 35–45)
PH BLDA: 7.42 PH UNITS (ref 7.35–7.45)
PH UR STRIP.AUTO: 5.5 [PH] (ref 5–8)
PH, TEMP CORRECTED: 7.42 PH UNITS (ref 7.35–7.45)
PLATELET # BLD AUTO: 415 10*3/MM3 (ref 140–450)
PMV BLD AUTO: 9.6 FL (ref 6–12)
PO2 BLDA: 40.2 MM HG (ref 83–108)
PO2 TEMP ADJ BLD: 40.2 MM HG (ref 83–108)
POTASSIUM SERPL-SCNC: 4.2 MMOL/L (ref 3.5–5.2)
PROT SERPL-MCNC: 8.4 G/DL (ref 6–8.5)
PROT UR QL STRIP: ABNORMAL
PROTHROMBIN TIME: 13.1 SECONDS (ref 11.8–14.8)
RBC # BLD AUTO: 5.25 10*6/MM3 (ref 4.14–5.8)
RBC # UR STRIP: ABNORMAL /HPF
REF LAB TEST METHOD: ABNORMAL
SAO2 % BLDCOA: 67.8 % (ref 94–99)
SODIUM SERPL-SCNC: 130 MMOL/L (ref 136–145)
SP GR UR STRIP: 1.03 (ref 1–1.03)
SQUAMOUS #/AREA URNS HPF: ABNORMAL /HPF
UROBILINOGEN UR QL STRIP: ABNORMAL
VENTILATOR MODE: ABNORMAL
WBC # UR STRIP: ABNORMAL /HPF
WBC NRBC COR # BLD: 20.04 10*3/MM3 (ref 3.4–10.8)
WHOLE BLOOD HOLD COAG: NORMAL
WHOLE BLOOD HOLD SPECIMEN: NORMAL

## 2023-04-30 PROCEDURE — 81001 URINALYSIS AUTO W/SCOPE: CPT | Performed by: FAMILY MEDICINE

## 2023-04-30 PROCEDURE — 87086 URINE CULTURE/COLONY COUNT: CPT | Performed by: FAMILY MEDICINE

## 2023-04-30 PROCEDURE — 82803 BLOOD GASES ANY COMBINATION: CPT

## 2023-04-30 PROCEDURE — 71045 X-RAY EXAM CHEST 1 VIEW: CPT

## 2023-04-30 PROCEDURE — 25010000002 PROCHLORPERAZINE 10 MG/2ML SOLUTION: Performed by: EMERGENCY MEDICINE

## 2023-04-30 PROCEDURE — 93005 ELECTROCARDIOGRAM TRACING: CPT | Performed by: EMERGENCY MEDICINE

## 2023-04-30 PROCEDURE — 83605 ASSAY OF LACTIC ACID: CPT | Performed by: FAMILY MEDICINE

## 2023-04-30 PROCEDURE — 99285 EMERGENCY DEPT VISIT HI MDM: CPT

## 2023-04-30 PROCEDURE — 87186 SC STD MICRODIL/AGAR DIL: CPT | Performed by: EMERGENCY MEDICINE

## 2023-04-30 PROCEDURE — 80053 COMPREHEN METABOLIC PANEL: CPT | Performed by: FAMILY MEDICINE

## 2023-04-30 PROCEDURE — 83690 ASSAY OF LIPASE: CPT | Performed by: FAMILY MEDICINE

## 2023-04-30 PROCEDURE — 93005 ELECTROCARDIOGRAM TRACING: CPT | Performed by: FAMILY MEDICINE

## 2023-04-30 PROCEDURE — 87147 CULTURE TYPE IMMUNOLOGIC: CPT | Performed by: EMERGENCY MEDICINE

## 2023-04-30 PROCEDURE — 25010000002 CEFTRIAXONE PER 250 MG: Performed by: EMERGENCY MEDICINE

## 2023-04-30 PROCEDURE — 85610 PROTHROMBIN TIME: CPT | Performed by: FAMILY MEDICINE

## 2023-04-30 PROCEDURE — 25010000002 ONDANSETRON PER 1 MG: Performed by: EMERGENCY MEDICINE

## 2023-04-30 PROCEDURE — 87040 BLOOD CULTURE FOR BACTERIA: CPT | Performed by: EMERGENCY MEDICINE

## 2023-04-30 PROCEDURE — 36600 WITHDRAWAL OF ARTERIAL BLOOD: CPT

## 2023-04-30 PROCEDURE — 87077 CULTURE AEROBIC IDENTIFY: CPT | Performed by: EMERGENCY MEDICINE

## 2023-04-30 PROCEDURE — 87150 DNA/RNA AMPLIFIED PROBE: CPT | Performed by: EMERGENCY MEDICINE

## 2023-04-30 PROCEDURE — 74176 CT ABD & PELVIS W/O CONTRAST: CPT

## 2023-04-30 PROCEDURE — 82009 KETONE BODYS QUAL: CPT | Performed by: EMERGENCY MEDICINE

## 2023-04-30 PROCEDURE — 85025 COMPLETE CBC W/AUTO DIFF WBC: CPT | Performed by: FAMILY MEDICINE

## 2023-04-30 PROCEDURE — 93010 ELECTROCARDIOGRAM REPORT: CPT | Performed by: EMERGENCY MEDICINE

## 2023-04-30 RX ORDER — ACETAMINOPHEN 500 MG
500 TABLET ORAL ONCE
Status: COMPLETED | OUTPATIENT
Start: 2023-04-30 | End: 2023-04-30

## 2023-04-30 RX ORDER — SODIUM CHLORIDE 0.9 % (FLUSH) 0.9 %
10 SYRINGE (ML) INJECTION AS NEEDED
Status: DISCONTINUED | OUTPATIENT
Start: 2023-04-30 | End: 2023-05-03 | Stop reason: HOSPADM

## 2023-04-30 RX ORDER — PROCHLORPERAZINE EDISYLATE 5 MG/ML
5 INJECTION INTRAMUSCULAR; INTRAVENOUS ONCE
Status: COMPLETED | OUTPATIENT
Start: 2023-04-30 | End: 2023-04-30

## 2023-04-30 RX ORDER — ONDANSETRON HCL IN 0.9 % NACL 8 MG/50 ML
8 INTRAVENOUS SOLUTION, PIGGYBACK (ML) INTRAVENOUS ONCE
Status: COMPLETED | OUTPATIENT
Start: 2023-04-30 | End: 2023-04-30

## 2023-04-30 RX ORDER — ONDANSETRON 2 MG/ML
4 INJECTION INTRAMUSCULAR; INTRAVENOUS ONCE
Status: COMPLETED | OUTPATIENT
Start: 2023-04-30 | End: 2023-04-30

## 2023-04-30 RX ADMIN — ACETAMINOPHEN 500 MG: 500 TABLET, FILM COATED ORAL at 21:23

## 2023-04-30 RX ADMIN — SODIUM CHLORIDE 1000 ML: 9 INJECTION, SOLUTION INTRAVENOUS at 20:48

## 2023-04-30 RX ADMIN — SODIUM CHLORIDE 1000 ML: 9 INJECTION, SOLUTION INTRAVENOUS at 19:10

## 2023-04-30 RX ADMIN — CEFTRIAXONE 1 G: 1 INJECTION, POWDER, FOR SOLUTION INTRAMUSCULAR; INTRAVENOUS at 21:24

## 2023-04-30 RX ADMIN — PROCHLORPERAZINE EDISYLATE 5 MG: 5 INJECTION INTRAMUSCULAR; INTRAVENOUS at 21:32

## 2023-04-30 RX ADMIN — ONDANSETRON 4 MG: 2 INJECTION INTRAMUSCULAR; INTRAVENOUS at 21:23

## 2023-04-30 RX ADMIN — ONDANSETRON HYDROCHLORIDE 8 MG: 2 INJECTION INTRAMUSCULAR; INTRAVENOUS at 19:11

## 2023-05-01 PROBLEM — A41.9 SEPSIS: Status: ACTIVE | Noted: 2023-05-01

## 2023-05-01 LAB
ALBUMIN SERPL-MCNC: 3.7 G/DL (ref 3.5–5.2)
ALBUMIN/GLOB SERPL: 1.2 G/DL
ALP SERPL-CCNC: 38 U/L (ref 39–117)
ALT SERPL W P-5'-P-CCNC: 11 U/L (ref 1–41)
ANION GAP SERPL CALCULATED.3IONS-SCNC: 18 MMOL/L (ref 5–15)
AST SERPL-CCNC: 14 U/L (ref 1–40)
BACTERIA BLD CULT: ABNORMAL
BACTERIA SPEC AEROBE CULT: NO GROWTH
BASOPHILS # BLD AUTO: 0.02 10*3/MM3 (ref 0–0.2)
BASOPHILS NFR BLD AUTO: 0.1 % (ref 0–1.5)
BILIRUB SERPL-MCNC: 0.4 MG/DL (ref 0–1.2)
BOTTLE TYPE: ABNORMAL
BUN SERPL-MCNC: 19 MG/DL (ref 6–20)
BUN/CREAT SERPL: 18.4 (ref 7–25)
CALCIUM SPEC-SCNC: 8.6 MG/DL (ref 8.6–10.5)
CHLORIDE SERPL-SCNC: 96 MMOL/L (ref 98–107)
CO2 SERPL-SCNC: 19 MMOL/L (ref 22–29)
CREAT SERPL-MCNC: 1.03 MG/DL (ref 0.76–1.27)
DEPRECATED RDW RBC AUTO: 49.7 FL (ref 37–54)
EGFRCR SERPLBLD CKD-EPI 2021: 84.7 ML/MIN/1.73
EOSINOPHIL # BLD AUTO: 0.01 10*3/MM3 (ref 0–0.4)
EOSINOPHIL NFR BLD AUTO: 0.1 % (ref 0.3–6.2)
ERYTHROCYTE [DISTWIDTH] IN BLOOD BY AUTOMATED COUNT: 15.6 % (ref 12.3–15.4)
GLOBULIN UR ELPH-MCNC: 3 GM/DL
GLUCOSE BLDC GLUCOMTR-MCNC: 101 MG/DL (ref 70–130)
GLUCOSE BLDC GLUCOMTR-MCNC: 102 MG/DL (ref 70–130)
GLUCOSE BLDC GLUCOMTR-MCNC: 113 MG/DL (ref 70–130)
GLUCOSE SERPL-MCNC: 102 MG/DL (ref 65–99)
HCT VFR BLD AUTO: 40.1 % (ref 37.5–51)
HGB BLD-MCNC: 13 G/DL (ref 13–17.7)
IMM GRANULOCYTES # BLD AUTO: 0.09 10*3/MM3 (ref 0–0.05)
IMM GRANULOCYTES NFR BLD AUTO: 0.6 % (ref 0–0.5)
LYMPHOCYTES # BLD AUTO: 0.59 10*3/MM3 (ref 0.7–3.1)
LYMPHOCYTES NFR BLD AUTO: 3.9 % (ref 19.6–45.3)
MCH RBC QN AUTO: 28.2 PG (ref 26.6–33)
MCHC RBC AUTO-ENTMCNC: 32.4 G/DL (ref 31.5–35.7)
MCV RBC AUTO: 87 FL (ref 79–97)
MONOCYTES # BLD AUTO: 1.61 10*3/MM3 (ref 0.1–0.9)
MONOCYTES NFR BLD AUTO: 10.7 % (ref 5–12)
NEUTROPHILS NFR BLD AUTO: 12.75 10*3/MM3 (ref 1.7–7)
NEUTROPHILS NFR BLD AUTO: 84.6 % (ref 42.7–76)
NRBC BLD AUTO-RTO: 0 /100 WBC (ref 0–0.2)
PLATELET # BLD AUTO: 312 10*3/MM3 (ref 140–450)
PMV BLD AUTO: 9.6 FL (ref 6–12)
POTASSIUM SERPL-SCNC: 4.1 MMOL/L (ref 3.5–5.2)
PROT SERPL-MCNC: 6.7 G/DL (ref 6–8.5)
QT INTERVAL: 322 MS
QTC INTERVAL: 480 MS
RBC # BLD AUTO: 4.61 10*6/MM3 (ref 4.14–5.8)
SODIUM SERPL-SCNC: 133 MMOL/L (ref 136–145)
WBC NRBC COR # BLD: 15.07 10*3/MM3 (ref 3.4–10.8)

## 2023-05-01 PROCEDURE — 99221 1ST HOSP IP/OBS SF/LOW 40: CPT | Performed by: STUDENT IN AN ORGANIZED HEALTH CARE EDUCATION/TRAINING PROGRAM

## 2023-05-01 PROCEDURE — 82948 REAGENT STRIP/BLOOD GLUCOSE: CPT

## 2023-05-01 PROCEDURE — 25010000002 ONDANSETRON PER 1 MG: Performed by: INTERNAL MEDICINE

## 2023-05-01 PROCEDURE — 85025 COMPLETE CBC W/AUTO DIFF WBC: CPT | Performed by: INTERNAL MEDICINE

## 2023-05-01 PROCEDURE — 80053 COMPREHEN METABOLIC PANEL: CPT | Performed by: INTERNAL MEDICINE

## 2023-05-01 PROCEDURE — 25010000002 CEFTRIAXONE PER 250 MG: Performed by: INTERNAL MEDICINE

## 2023-05-01 PROCEDURE — 99222 1ST HOSP IP/OBS MODERATE 55: CPT | Performed by: UROLOGY

## 2023-05-01 RX ORDER — SODIUM CHLORIDE 0.9 % (FLUSH) 0.9 %
10 SYRINGE (ML) INJECTION AS NEEDED
Status: DISCONTINUED | OUTPATIENT
Start: 2023-04-30 | End: 2023-05-03 | Stop reason: HOSPADM

## 2023-05-01 RX ORDER — DEXTROSE MONOHYDRATE 25 G/50ML
25 INJECTION, SOLUTION INTRAVENOUS
Status: DISCONTINUED | OUTPATIENT
Start: 2023-04-30 | End: 2023-05-03 | Stop reason: HOSPADM

## 2023-05-01 RX ORDER — HYDROCODONE BITARTRATE AND ACETAMINOPHEN 7.5; 325 MG/1; MG/1
1 TABLET ORAL EVERY 6 HOURS PRN
Status: DISCONTINUED | OUTPATIENT
Start: 2023-04-30 | End: 2023-05-03 | Stop reason: HOSPADM

## 2023-05-01 RX ORDER — ONDANSETRON 2 MG/ML
4 INJECTION INTRAMUSCULAR; INTRAVENOUS EVERY 6 HOURS PRN
Status: DISCONTINUED | OUTPATIENT
Start: 2023-05-01 | End: 2023-05-03 | Stop reason: HOSPADM

## 2023-05-01 RX ORDER — RANOLAZINE 500 MG/1
500 TABLET, EXTENDED RELEASE ORAL 2 TIMES DAILY
Status: DISCONTINUED | OUTPATIENT
Start: 2023-05-01 | End: 2023-05-03 | Stop reason: HOSPADM

## 2023-05-01 RX ORDER — NICOTINE POLACRILEX 4 MG
15 LOZENGE BUCCAL
Status: DISCONTINUED | OUTPATIENT
Start: 2023-04-30 | End: 2023-05-03 | Stop reason: HOSPADM

## 2023-05-01 RX ORDER — PHENAZOPYRIDINE HYDROCHLORIDE 100 MG/1
100 TABLET, FILM COATED ORAL 3 TIMES DAILY PRN
Status: DISCONTINUED | OUTPATIENT
Start: 2023-04-30 | End: 2023-05-03 | Stop reason: HOSPADM

## 2023-05-01 RX ORDER — NITROGLYCERIN 0.4 MG/1
0.4 TABLET SUBLINGUAL
Status: DISCONTINUED | OUTPATIENT
Start: 2023-04-30 | End: 2023-05-03 | Stop reason: HOSPADM

## 2023-05-01 RX ORDER — VALACYCLOVIR HYDROCHLORIDE 1 G/1
2000 TABLET, FILM COATED ORAL 2 TIMES DAILY PRN
COMMUNITY

## 2023-05-01 RX ORDER — SODIUM CHLORIDE 0.9 % (FLUSH) 0.9 %
10 SYRINGE (ML) INJECTION EVERY 12 HOURS SCHEDULED
Status: DISCONTINUED | OUTPATIENT
Start: 2023-05-01 | End: 2023-05-03 | Stop reason: HOSPADM

## 2023-05-01 RX ORDER — ATORVASTATIN CALCIUM 40 MG/1
40 TABLET, FILM COATED ORAL DAILY
Status: DISCONTINUED | OUTPATIENT
Start: 2023-05-01 | End: 2023-05-01

## 2023-05-01 RX ORDER — PANTOPRAZOLE SODIUM 40 MG/1
40 TABLET, DELAYED RELEASE ORAL
Status: DISCONTINUED | OUTPATIENT
Start: 2023-05-01 | End: 2023-05-03 | Stop reason: HOSPADM

## 2023-05-01 RX ORDER — SODIUM CHLORIDE, SODIUM LACTATE, POTASSIUM CHLORIDE, CALCIUM CHLORIDE 600; 310; 30; 20 MG/100ML; MG/100ML; MG/100ML; MG/100ML
75 INJECTION, SOLUTION INTRAVENOUS CONTINUOUS
Status: DISPENSED | OUTPATIENT
Start: 2023-05-01 | End: 2023-05-02

## 2023-05-01 RX ORDER — ASPIRIN 81 MG/1
81 TABLET ORAL DAILY
Status: DISCONTINUED | OUTPATIENT
Start: 2023-05-01 | End: 2023-05-03 | Stop reason: HOSPADM

## 2023-05-01 RX ORDER — ONDANSETRON 4 MG/1
4 TABLET, ORALLY DISINTEGRATING ORAL EVERY 6 HOURS PRN
Status: DISCONTINUED | OUTPATIENT
Start: 2023-04-30 | End: 2023-05-03 | Stop reason: HOSPADM

## 2023-05-01 RX ORDER — SODIUM CHLORIDE 9 MG/ML
40 INJECTION, SOLUTION INTRAVENOUS AS NEEDED
Status: DISCONTINUED | OUTPATIENT
Start: 2023-04-30 | End: 2023-05-03 | Stop reason: HOSPADM

## 2023-05-01 RX ORDER — ATORVASTATIN CALCIUM 40 MG/1
40 TABLET, FILM COATED ORAL NIGHTLY
Status: DISCONTINUED | OUTPATIENT
Start: 2023-05-01 | End: 2023-05-03 | Stop reason: HOSPADM

## 2023-05-01 RX ORDER — TAMSULOSIN HYDROCHLORIDE 0.4 MG/1
0.4 CAPSULE ORAL DAILY
Status: DISCONTINUED | OUTPATIENT
Start: 2023-05-01 | End: 2023-05-03 | Stop reason: HOSPADM

## 2023-05-01 RX ORDER — OXYBUTYNIN CHLORIDE 5 MG/1
5 TABLET ORAL EVERY 8 HOURS PRN
Status: DISCONTINUED | OUTPATIENT
Start: 2023-04-30 | End: 2023-05-03 | Stop reason: HOSPADM

## 2023-05-01 RX ADMIN — EMPAGLIFLOZIN 10 MG: 10 TABLET, FILM COATED ORAL at 12:43

## 2023-05-01 RX ADMIN — TICAGRELOR 90 MG: 90 TABLET ORAL at 09:58

## 2023-05-01 RX ADMIN — BUSPIRONE HYDROCHLORIDE 15 MG: 5 TABLET ORAL at 00:50

## 2023-05-01 RX ADMIN — VERAPAMIL HYDROCHLORIDE 180 MG: 180 TABLET, FILM COATED, EXTENDED RELEASE ORAL at 20:55

## 2023-05-01 RX ADMIN — BUSPIRONE HYDROCHLORIDE 15 MG: 5 TABLET ORAL at 09:57

## 2023-05-01 RX ADMIN — BUSPIRONE HYDROCHLORIDE 15 MG: 5 TABLET ORAL at 20:55

## 2023-05-01 RX ADMIN — Medication 10 ML: at 00:50

## 2023-05-01 RX ADMIN — ATORVASTATIN CALCIUM 40 MG: 40 TABLET, FILM COATED ORAL at 20:55

## 2023-05-01 RX ADMIN — SODIUM CHLORIDE, POTASSIUM CHLORIDE, SODIUM LACTATE AND CALCIUM CHLORIDE 75 ML/HR: 600; 310; 30; 20 INJECTION, SOLUTION INTRAVENOUS at 00:16

## 2023-05-01 RX ADMIN — RANOLAZINE 500 MG: 500 TABLET, FILM COATED, EXTENDED RELEASE ORAL at 09:57

## 2023-05-01 RX ADMIN — ONDANSETRON 4 MG: 2 INJECTION INTRAMUSCULAR; INTRAVENOUS at 04:23

## 2023-05-01 RX ADMIN — TICAGRELOR 90 MG: 90 TABLET ORAL at 20:55

## 2023-05-01 RX ADMIN — PANTOPRAZOLE SODIUM 40 MG: 40 TABLET, DELAYED RELEASE ORAL at 06:49

## 2023-05-01 RX ADMIN — RANOLAZINE 500 MG: 500 TABLET, FILM COATED, EXTENDED RELEASE ORAL at 20:55

## 2023-05-01 RX ADMIN — SODIUM CHLORIDE, POTASSIUM CHLORIDE, SODIUM LACTATE AND CALCIUM CHLORIDE 75 ML/HR: 600; 310; 30; 20 INJECTION, SOLUTION INTRAVENOUS at 13:35

## 2023-05-01 RX ADMIN — HYDROCODONE BITARTRATE AND ACETAMINOPHEN 1 TABLET: 7.5; 325 TABLET ORAL at 00:16

## 2023-05-01 RX ADMIN — HYDROCODONE BITARTRATE AND ACETAMINOPHEN 1 TABLET: 7.5; 325 TABLET ORAL at 06:30

## 2023-05-01 RX ADMIN — TAMSULOSIN HYDROCHLORIDE 0.4 MG: 0.4 CAPSULE ORAL at 09:57

## 2023-05-01 RX ADMIN — VERAPAMIL HYDROCHLORIDE 180 MG: 180 TABLET, FILM COATED, EXTENDED RELEASE ORAL at 00:50

## 2023-05-01 RX ADMIN — ASPIRIN 81 MG: 81 TABLET, COATED ORAL at 17:40

## 2023-05-01 RX ADMIN — RANOLAZINE 500 MG: 500 TABLET, FILM COATED, EXTENDED RELEASE ORAL at 00:50

## 2023-05-01 RX ADMIN — HYDROCODONE BITARTRATE AND ACETAMINOPHEN 1 TABLET: 7.5; 325 TABLET ORAL at 20:55

## 2023-05-01 RX ADMIN — CEFTRIAXONE SODIUM 2 G: 2 INJECTION, POWDER, FOR SOLUTION INTRAMUSCULAR; INTRAVENOUS at 00:17

## 2023-05-01 RX ADMIN — HYDROCODONE BITARTRATE AND ACETAMINOPHEN 1 TABLET: 7.5; 325 TABLET ORAL at 12:43

## 2023-05-01 RX ADMIN — TICAGRELOR 90 MG: 90 TABLET ORAL at 00:50

## 2023-05-01 NOTE — PLAN OF CARE
Goal Outcome Evaluation:  Plan of Care Reviewed With: patient, spouse           Outcome Evaluation: Patient c/o headache. PO pain meds given with good relief. IVF, SCDs, voiding - urine yellow. Urology consult complete. Accuchecks. Safety maintained

## 2023-05-01 NOTE — H&P
[facility] Medicine Services  HISTORY AND PHYSICAL    Date of Admission: 4/30/2023  Primary Care Physician: Tacho Eng MD    Subjective   Primary Historian: Patient    Chief Complaint: Nausea and vomiting    History of Present Illness  57-year-old male presents to the emergency department for multiple complaints.  He states he had multiple episodes of dry heaves, but no actual emesis.  He did not experience nausea.  He has not had anything to eat since Friday, because he has not been feeling well.  He recently had a renal stent removed.  He states the hematuria resolved, but has started again.  He had a fever when he arrived to the emergency department.  He did have chills at home.  He notes the chills been going on the last few days.  The patient developed shortness of breath, but his wife relates this to his anxiety over the situation.  The patient is complaining of back pain.  It is not specific to one side.  He states it is his whole lower back and points in between his SI joints.  He has no abdominal pain.  He has no lower extremity edema.        Review of Systems   Constitutional: Positive for chills and fever.   HENT: Negative for congestion and rhinorrhea.    Respiratory: Positive for shortness of breath. Negative for cough.    Cardiovascular: Negative for chest pain and leg swelling.   Gastrointestinal: Positive for nausea and vomiting. Negative for constipation and diarrhea.   Genitourinary: Positive for hematuria. Negative for dysuria.   Musculoskeletal: Positive for arthralgias and back pain.   Psychiatric/Behavioral: The patient is nervous/anxious.       Otherwise complete ROS reviewed and negative except as mentioned in the HPI.    Past Medical History:   Past Medical History:   Diagnosis Date   • Anxiety    • Bilateral kidney stones    • Bronchitis    • Hearing loss    • Heart attack    • Left ureteral stone    • Myasthenia gravis    • Peptic  ulceration    • Pneumonia    • Sleep apnea     bi-pap at times   • Type 2 diabetes mellitus with circulatory disorder, with long-term current use of insulin 09/27/2022     Past Surgical History:  Past Surgical History:   Procedure Laterality Date   • CARDIAC CATHETERIZATION Right 09/11/2022    Procedure: Left Heart Cath;  Surgeon: Tal Greenberg DO;  Location:  [facility] CATH INVASIVE LOCATION;  Service: Cardiovascular;  Laterality: Right;   • CARDIAC ELECTROPHYSIOLOGY PROCEDURE N/A 12/08/2022    Procedure: EP/Ablation;  Surgeon: Mahin Cunha MD;  Location:  [facility] CATH INVASIVE LOCATION;  Service: Cardiology;  Laterality: N/A;   • CARDIAC ELECTROPHYSIOLOGY PROCEDURE Left 12/19/2022    Procedure: Device Implant, [employer], single chamber;  Surgeon: Mahin Cunha MD;  Location:  [facility] CATH INVASIVE LOCATION;  Service: Cardiology;  Laterality: Left;   • COLONOSCOPY     • CORONARY ANGIOPLASTY WITH STENT PLACEMENT     • HERNIA REPAIR Left     Inguinal Hernia   • SHOULDER SURGERY Left    • TUMOR REMOVAL     • URETEROSCOPY LASER LITHOTRIPSY WITH STENT INSERTION Right 4/21/2023    Procedure: URETEROSCOPY LASER LITHOTRIPSY WITH STENT INSERTION-right;  Surgeon: Raghavendra Cedillo MD;  Location: [facility] OR;  Service: Urology;  Laterality: Right;   • VASECTOMY       Social History:  reports that he has never smoked. He has never used smokeless tobacco. He reports current alcohol use. He reports that he does not use drugs.    Family History: family history includes Hypertension in his father; No Known Problems in his brother, mother, and sister.      Allergies:  Allergies   Allergen Reactions   • Levofloxacin Other (See Comments)     Causes eye problems  Double vision.     • Moxifloxacin Other (See Comments)     Causes eye problems  Double vision       Medications:  Prior to Admission medications    Medication Sig Start Date End Date Taking? Authorizing Provider   aspirin 81 MG EC tablet Take 1 tablet by mouth  Daily.    Rodger Schmitz MD   atorvastatin (LIPITOR) 40 MG tablet Take 1 tablet by mouth Daily. 9/27/22   Norma Yusuf APRN   busPIRone (BUSPAR) 15 MG tablet Take 1 tablet by mouth 2 (Two) Times a Day. 10/25/19   Rodger Schmitz MD   dapagliflozin (Farxiga) 5 MG tablet tablet Take 1 tablet by mouth Daily. 1/10/23   Mahin Cunha MD   docusate sodium (Colace) 100 MG capsule Take 1 capsule by mouth 2 (Two) Times a Day. 4/21/23   Raghavendra Cedillo MD   fenofibrate 160 MG tablet Take 1 tablet by mouth Daily.    Rodger Schmitz MD   HYDROcodone-acetaminophen (NORCO) 7.5-325 MG per tablet Take 1 tablet by mouth Every 6 (Six) Hours As Needed for Severe Pain (postop pain). 4/21/23   Raghavendra Cedillo MD   HYDROCODONE-ACETAMINOPHEN PO Take  by mouth As Needed.    Rodger Schmitz MD   icosapent ethyl (VASCEPA) 1 g capsule capsule Take 2 g by mouth 2 (Two) Times a Day With Meals. 1/6/22   Bakari Perkins MD   Insulin Glargine-yfgn 100 UNIT/ML solution pen-injector Inject 16 Units under the skin into the appropriate area as directed Every Night. 10/11/22   Rodger Schmitz MD   Januvia 100 MG tablet Take 1 tablet by mouth Daily. 9/21/21   Rodger Schmitz MD   metFORMIN (GLUCOPHAGE) 1000 MG tablet Take 1 tablet by mouth 2 (Two) Times a Day With Meals.    Rodger Schmitz MD   nitroglycerin (NITROSTAT) 0.4 MG SL tablet Place 1 tablet under the tongue Every 5 (Five) Minutes As Needed for Chest Pain. Take no more than 3 doses in 15 minutes.    Rodger Schmitz MD   NovoLOG FlexPen 100 UNIT/ML solution pen-injector sc pen 1-10 Units 3 (Three) Times a Day With Meals. Sliding Scale 9/21/22   Rodger Schmitz MD   omeprazole (priLOSEC) 20 MG capsule Take 1 capsule by mouth Daily.    Rodger Schmitz MD   ondansetron (ZOFRAN) 4 MG tablet Take 1 tablet by mouth Every 8 (Eight) Hours As Needed for Nausea or Vomiting.    Rodger Schmitz MD   ondansetron ODT (ZOFRAN-ODT) 4  "MG disintegrating tablet Place 1 tablet on the tongue Every 6 (Six) Hours As Needed for Nausea. 4/21/23   Raghavendra Cedillo MD   oxybutynin (DITROPAN) 5 MG tablet Take 1 tablet by mouth Every 8 (Eight) Hours As Needed (bladder spasms). 4/21/23   Raghavendra Cedillo MD   phenazopyridine (PYRIDIUM) 100 MG tablet Take 1 tablet by mouth 3 (Three) Times a Day As Needed (urinary burning). 4/21/23   Raghavendra Cedillo MD   predniSONE (DELTASONE) 10 MG tablet Take 1 tablet by mouth Daily. 11/1/22   Rodger Schmitz MD   ranolazine (RANEXA) 500 MG 12 hr tablet Take 1 tablet by mouth 2 (Two) Times a Day.    Rodger Schmitz MD   tamsulosin (FLOMAX) 0.4 MG capsule 24 hr capsule Take 1 capsule by mouth Daily. 4/21/23   Raghavendra Cedillo MD   ticagrelor (BRILINTA) 90 MG tablet tablet Take 1 tablet by mouth 2 (Two) Times a Day. 9/13/22   Jose Colon MD   verapamil SR (CALAN-SR) 180 MG CR tablet TAKE 1 TABLET BY MOUTH ONCE DAILY AT NIGHT 2/2/23   Norma Yusuf APRN   vitamin D3 125 MCG (5000 UT) capsule capsule Take 1 capsule by mouth Daily.    Rodger Schmitz MD     I have utilized all available immediate resources to obtain, update, or review the patient's current medications (including all prescriptions, over-the-counter products, herbals, cannabis/cannabidiol products, and vitamin/mineral/dietary (nutritional) supplements).    Objective     Vital Signs: /84 (BP Location: Left arm, Patient Position: Lying)   Pulse 108   Temp 98.8 °F (37.1 °C) (Oral)   Resp 20   Ht 182.9 cm (72\")   Wt 77.2 kg (170 lb 3.2 oz)   SpO2 98%   BMI 23.08 kg/m²   Physical Exam  Vitals reviewed.   Constitutional:       Appearance: Normal appearance.   HENT:      Head: Normocephalic and atraumatic.      Right Ear: External ear normal.      Left Ear: External ear normal.      Ears:      Comments: Patient is hard of hearing     Nose: Nose normal.   Eyes:      General: No scleral icterus.     Conjunctiva/sclera: " Conjunctivae normal.   Cardiovascular:      Rate and Rhythm: Regular rhythm. Tachycardia present.      Heart sounds: Normal heart sounds.   Pulmonary:      Effort: Pulmonary effort is normal.      Breath sounds: Normal breath sounds.   Abdominal:      General: There is no distension.      Palpations: Abdomen is soft.      Tenderness: There is no abdominal tenderness.   Musculoskeletal:         General: No swelling or tenderness.      Cervical back: Normal range of motion and neck supple.   Skin:     General: Skin is warm and dry.   Neurological:      Mental Status: He is alert and oriented to person, place, and time.      Cranial Nerves: No cranial nerve deficit.   Psychiatric:         Mood and Affect: Mood normal.         Behavior: Behavior normal.       Results Reviewed:  Lab Results (last 24 hours)     Procedure Component Value Units Date/Time    STAT Lactic Acid, Reflex [[id number]]  (Normal) Collected: 04/30/23 2303    Specimen: Blood Updated: 04/30/23 2325     Lactate 1.0 mmol/L     Urinalysis With Culture If Indicated - Urine, Clean Catch [[id number]]  (Abnormal) Collected: 04/30/23 2054    Specimen: Urine, Clean Catch Updated: 04/30/23 2128     Color, UA Dark Yellow     Appearance, UA Cloudy     pH, UA 5.5     Specific Gravity, UA 1.027     Glucose, UA >=1000 mg/dL (3+)     Ketones, UA 15 mg/dL (1+)     Bilirubin, UA Small (1+)     Blood, UA Large (3+)     Protein,  mg/dL (2+)     Leuk Esterase, UA Small (1+)     Nitrite, UA Negative     Urobilinogen, UA 1.0 E.U./dL    Narrative:      In absence of clinical symptoms, the presence of pyuria, bacteria, and/or nitrites on the urinalysis result does not correlate with infection.    Urinalysis, Microscopic Only - Urine, Clean Catch [[id number]]  (Abnormal) Collected: 04/30/23 2054    Specimen: Urine, Clean Catch Updated: 04/30/23 2128     RBC, UA 13-20 /HPF      WBC, UA 13-20 /HPF      Bacteria, UA None Seen /HPF      Squamous Epithelial Cells, UA None Seen  /HPF      Methodology Manual Light Microscopy    Urine Culture - Urine, Urine, Clean Catch [[id number]] Collected: 04/30/23 2054    Specimen: Urine, Clean Catch Updated: 04/30/23 2128    Blood Gas, Arterial - [[id number]]  (Abnormal) Collected: 04/30/23 2043    Specimen: Arterial Blood Updated: 04/30/23 2047     Site Right Radial     Juancho's Test Positive     pH, Arterial 7.421 pH units      pCO2, Arterial 34.9 mm Hg      Comment: 84 Value below reference range        pO2, Arterial 40.2 mm Hg      Comment: 85 Value below critical limit        HCO3, Arterial 22.7 mmol/L      Base Excess, Arterial -1.2 mmol/L      Comment: 84 Value below reference range        O2 Saturation, Arterial 67.8 %      Comment: 84 Value below reference range        Temperature 37.0 C      Barometric Pressure for Blood Gas 743 mmHg      Modality Room Air     FIO2 21 %      Flow Rate 0.0 lpm      Ventilator Mode NA     Notified Who DR. JORDAN     Notified By SUNITHA     Notified Time 04/30/2023 20:49     Collected by SUNITHA     Comment: Meter: [id number]     :  SUNITHA        pCO2, Temperature Corrected 34.9 mm Hg      pH, Temp Corrected 7.421 pH Units      pO2, Temperature Corrected 40.2 mm Hg     [place] Draw [[id number]] Collected: 04/30/23 1852    Specimen: Blood Updated: 04/30/23 2000    Narrative:      The following orders were created for panel order [place] Draw.  Procedure                               Abnormality         Status                     ---------                               -----------         ------                     Green Top (Gel)[[id number]]                                  Final result               Lavender Top[[id number]]                                     Final result               Red Top[[id number]]                                          Final result               Light Blue Top[[id number]]                                   Final result                 Please view results for these tests on the  individual orders.    Lavender Top [[id number]] Collected: 04/30/23 1852    Specimen: Blood Updated: 04/30/23 2000     Extra Tube hold for add-on     Comment: Auto resulted       Red Top [[id number]] Collected: 04/30/23 1852    Specimen: Blood Updated: 04/30/23 2000     Extra Tube Hold for add-ons.     Comment: Auto resulted.       Light Blue Top [[id number]] Collected: 04/30/23 1852    Specimen: Blood Updated: 04/30/23 2000     Extra Tube Hold for add-ons.     Comment: Auto resulted       Green Top (Gel) [[id number]] Collected: 04/30/23 1852    Specimen: Blood Updated: 04/30/23 2000     Extra Tube Hold for add-ons.     Comment: Auto resulted.       Blood Culture - Blood, Arm, Left [[id number]] Collected: 04/30/23 1953    Specimen: Blood from Arm, Left Updated: 04/30/23 1940    Blood Culture - Blood, Arm, Right [[id number]] Collected: 04/30/23 1853    Specimen: Blood from Arm, Right Updated: 04/30/23 1936    Acetone [[id number]]  (Abnormal) Collected: 04/30/23 1852    Specimen: Blood Updated: 04/30/23 1929     Acetone Small    Comprehensive Metabolic Panel [[id number]]  (Abnormal) Collected: 04/30/23 1852    Specimen: Blood Updated: 04/30/23 1921     Glucose 124 mg/dL      BUN 22 mg/dL      Creatinine 1.23 mg/dL      Sodium 130 mmol/L      Potassium 4.2 mmol/L      Comment: Slight hemolysis detected by analyzer. Results may be affected.        Chloride 89 mmol/L      CO2 21.0 mmol/L      Calcium 10.1 mg/dL      Total Protein 8.4 g/dL      Albumin 4.7 g/dL      ALT (SGPT) 11 U/L      AST (SGOT) 15 U/L      Alkaline Phosphatase 49 U/L      Total Bilirubin 0.8 mg/dL      Globulin 3.7 gm/dL      A/G Ratio 1.3 g/dL      BUN/Creatinine Ratio 17.9     Anion Gap 20.0 mmol/L      eGFR 68.5 mL/min/1.73     Narrative:      GFR Normal >60  Chronic Kidney Disease <60  Kidney Failure <15      Lactic Acid, Plasma [[id number]]  (Abnormal) Collected: 04/30/23 1852    Specimen: Blood Updated: 04/30/23 1919     Lactate 2.5 mmol/L     Lipase  [[id number]]  (Normal) Collected: 04/30/23 1852    Specimen: Blood Updated: 04/30/23 1916     Lipase 17 U/L     Protime-INR [[id number]]  (Normal) Collected: 04/30/23 1852    Specimen: Blood Updated: 04/30/23 1912     Protime 13.1 Seconds      INR 0.98    CBC & Differential [[id number]]  (Abnormal) Collected: 04/30/23 1852    Specimen: Blood Updated: 04/30/23 1903    Narrative:      The following orders were created for panel order CBC & Differential.  Procedure                               Abnormality         Status                     ---------                               -----------         ------                     CBC Auto Differential[[id number]]        Abnormal            Final result                 Please view results for these tests on the individual orders.    CBC Auto Differential [[id number]]  (Abnormal) Collected: 04/30/23 1852    Specimen: Blood Updated: 04/30/23 1903     WBC 20.04 10*3/mm3      RBC 5.25 10*6/mm3      Hemoglobin 14.4 g/dL      Hematocrit 46.3 %      MCV 88.2 fL      MCH 27.4 pg      MCHC 31.1 g/dL      RDW 15.7 %      RDW-SD 51.3 fl      MPV 9.6 fL      Platelets 415 10*3/mm3      Neutrophil % 84.1 %      Lymphocyte % 4.8 %      Monocyte % 10.1 %      Eosinophil % 0.0 %      Basophil % 0.2 %      Immature Grans % 0.8 %      Neutrophils, Absolute 16.84 10*3/mm3      Lymphocytes, Absolute 0.96 10*3/mm3      Monocytes, Absolute 2.03 10*3/mm3      Eosinophils, Absolute 0.01 10*3/mm3      Basophils, Absolute 0.04 10*3/mm3      Immature Grans, Absolute 0.16 10*3/mm3      nRBC 0.0 /100 WBC         Imaging Results (Last 24 Hours)     Procedure Component Value Units Date/Time    CT Abdomen Pelvis Without Contrast [[id number]] Collected: 04/30/23 2035     Updated: 04/30/23 2044    Narrative:      CT ABDOMEN PELVIS WO CONTRAST- 4/30/2023 8:18 PM CDT     HISTORY: Flank pain, kidney stone suspected      COMPARISON: CT scan dated 01/02/2019      DOSE LENGTH PRODUCT: 400 mGy cm. Automated exposure  control was also  utilized to decrease patient radiation dose.     TECHNIQUE: Noncontrast enhanced images of the abdomen and pelvis  obtained without oral contrast. Multiplanar reformatted images were  provided for review.      FINDINGS:   LOWER CHEST: Left lower lobe bronchiectasis. No consolidation. Coronary  atheromatous calcification.      LIVER: Grossly normal without contrast.      BILIARY SYSTEM: Cholelithiasis. Gallbladder is nondistended. Biliary  tree is nondilated.     PANCREAS: Grossly normal without contrast.      SPLEEN: Unremarkable.      KIDNEYS: Left kidney is unremarkable. Nonobstructing right inferior pole  renal stones. There is a mild right hydroureteronephrosis as well as a  periureteral and perinephric fat stranding.      ADRENALS: Unremarkable.     RETROPERITONEUM: No adenopathy or hemorrhage.      GI TRACT: Stomach and small bowel are unremarkable. Moderate colonic  stool. Normal appendix.     OTHER: There is no mesenteric mass, lymphadenopathy or fluid collection.  Bones are diffusely osteopenic. Slight degenerative retrolisthesis at  L5-S1.     PELVIS: No mass lesion, fluid collection or significant lymphadenopathy  is seen in the pelvis. The urinary bladder is normal in appearance.       Impression:      1. There is a mild right-sided hydroureteronephrosis as well as a  periureteral and perinephric inflammatory change/stranding. I do not see  any obstructing distal ureteral stone. Differential would include  recently passed stone versus upper urinary tract  infection/pyelonephritis.  2. There are 2 right inferior pole nonobstructing stones.  3. No acute and/or inflammatory change identified along the bowel.  4. Left lower lobe bronchiectasis.  This report was finalized on 04/30/2023 20:41 by Dr Alvino Wilson, .    XR Chest 1 View [[id number]] Collected: 04/30/23 1942     Updated: 04/30/23 1948    Narrative:      Frontal upright radiograph of the chest 4/30/2023 7:19 PM CDT     HISTORY:  Fever     COMPARISON: Chest exam dated 12/19/2022.     FINDINGS:     The lungs are clear. The cardiomediastinal silhouette and pulmonary  vascularity are within normal limits. Left chest wall AICD.     The osseous structures and surrounding soft tissues demonstrate no acute  abnormality.       Impression:      1. Stable chest exam without acute process. No visualized pulmonary  infiltrate.        This report was finalized on 04/30/2023 19:44 by Dr Alvino Wilson, .        I have personally reviewed and interpreted the radiology studies and ECG obtained at time of admission.     Assessment / Plan   Assessment:   Active Hospital Problems    Diagnosis    • **Pyelonephritis      Impression:  1.  Pyelonephritis  2.  UTI with hematuria  3.  Insulin-dependent diabetes  4.  Hyponatremia  5.  Nausea and vomiting    Treatment Plan  1.  Admit to the hospital  2.  Rocephin 2 g IV daily  3.  Urology consult  4.  I/O  5.  IV fluids  6.  Sliding scale insulin with Accu-Cheks, hold Glucophage  7.  Follow-up labs in the morning  8.  As needed Zofran    The patient will be admitted to my service here at [facility].  Primary team to take over in the morning    Medical Decision Making  Number and Complexity of problems: 4, complex  Differential Diagnosis: Gastroenteritis    Conditions and Status        Condition is unchanged.     [facility] Data  External documents reviewed: None  Cardiac tracing (EKG, telemetry) interpretation: None  Radiology interpretation: None  Labs reviewed: Reviewed  Any tests that were considered but not ordered: None     Decision rules/scores evaluated (example [id number], [place], etc): None     Discussed with: Patient and wife at bedside     Care Planning  Shared decision making: Patient, wife at bedside, and ED staff  Code status and discussions: Full    Disposition  Social Determinants of Health that impact treatment or disposition: None  Estimated length of stay is 2 to 3 days.     I confirmed that  the patient's advanced care plan is present, code status is documented, and a surrogate decision maker is listed in the patient's medical record.     The patient's surrogate decision maker is wife.     The patient was seen and examined by me on 4/30/2023 at seen before midnight.    Electronically signed by Noemy Mosher DO, 05/01/23, 00:01 CDT.

## 2023-05-01 NOTE — PAYOR COMM NOTE
"Iftikhar Francis (57 y.o. Male) [id number]  Admit 4/30  [facility] phone    Fax        Date of Birth   1965    Social Security Number       Address   [address] [facility] [address]    Home Phone   [phone]    MRN   [id number]       [employer]   Rastafari    Marital Status                               Admission Date   4/30/23    Admission Type   Emergency    Admitting Provider   Alton Burton DO    Attending Provider   Alton Burton DO    Department, Room/Bed   [facility] 3C, 360/1       Discharge Date       Discharge Disposition       Discharge Destination                               Attending Provider: Alton Burton DO    Allergies: Levofloxacin, Moxifloxacin    Isolation: None   Infection: None   Code Status: CPR    Ht: 182.9 cm (72\")   Wt: 77.2 kg (170 lb 3.2 oz)    Admission Cmt: None   Principal Problem: Pyelonephritis [N12]                 Active Insurance as of 4/30/2023     Primary Coverage     Payor Plan Insurance Group Employer/Plan Group    HUMANA MEDICARE REPLACEMENT HUMANA MEDICARE REPLACEMENT [id number]     Payor Plan Address Payor Plan Phone Number Payor Plan Fax Number Effective Dates    [address] [address] [phone]  4/1/2023 - None Entered    [facility] [id number]       Subscriber Name Subscriber Birth Date Member ID       IFTIKHAR FRANCIS 1965 [id number]                 Emergency Contacts      (Rel.) Home Phone Work Phone Mobile Phone    Deborah Francis (Spouse) -- -- [phone]               History & Physical      Noemy Mosher DO at 05/01/23 [address] [place] [address] Medicine Services  HISTORY AND PHYSICAL    Date of Admission: 4/30/2023  Primary Care Physician: Tacho Eng MD    Subjective   Primary Historian: Patient    Chief Complaint: Nausea and vomiting    History of Present Illness  57-year-old male presents to the " emergency department for multiple complaints.  He states he had multiple episodes of dry heaves, but no actual emesis.  He did not experience nausea.  He has not had anything to eat since Friday, because he has not been feeling well.  He recently had a renal stent removed.  He states the hematuria resolved, but has started again.  He had a fever when he arrived to the emergency department.  He did have chills at home.  He notes the chills been going on the last few days.  The patient developed shortness of breath, but his wife relates this to his anxiety over the situation.  The patient is complaining of back pain.  It is not specific to one side.  He states it is his whole lower back and points in between his SI joints.  He has no abdominal pain.  He has no lower extremity edema.        Review of Systems   Constitutional: Positive for chills and fever.   HENT: Negative for congestion and rhinorrhea.    Respiratory: Positive for shortness of breath. Negative for cough.    Cardiovascular: Negative for chest pain and leg swelling.   Gastrointestinal: Positive for nausea and vomiting. Negative for constipation and diarrhea.   Genitourinary: Positive for hematuria. Negative for dysuria.   Musculoskeletal: Positive for arthralgias and back pain.   Psychiatric/Behavioral: The patient is nervous/anxious.       Otherwise complete ROS reviewed and negative except as mentioned in the HPI.    Past Medical History:   Past Medical History:   Diagnosis Date   • Anxiety    • Bilateral kidney stones    • Bronchitis    • Hearing loss    • Heart attack    • Left ureteral stone    • Myasthenia gravis    • Peptic ulceration    • Pneumonia    • Sleep apnea     bi-pap at times   • Type 2 diabetes mellitus with circulatory disorder, with long-term current use of insulin 09/27/2022     Past Surgical History:  Past Surgical History:   Procedure Laterality Date   • CARDIAC CATHETERIZATION Right 09/11/2022    Procedure: Left Heart Cath;   Surgeon: Tal Greenberg DO;  Location:  [facility] CATH INVASIVE LOCATION;  Service: Cardiovascular;  Laterality: Right;   • CARDIAC ELECTROPHYSIOLOGY PROCEDURE N/A 12/08/2022    Procedure: EP/Ablation;  Surgeon: Mahin Cunha MD;  Location:  [facility] CATH INVASIVE LOCATION;  Service: Cardiology;  Laterality: N/A;   • CARDIAC ELECTROPHYSIOLOGY PROCEDURE Left 12/19/2022    Procedure: Device Implant, [employer], single chamber;  Surgeon: Mahin Cunha MD;  Location:  [facility] CATH INVASIVE LOCATION;  Service: Cardiology;  Laterality: Left;   • COLONOSCOPY     • CORONARY ANGIOPLASTY WITH STENT PLACEMENT     • HERNIA REPAIR Left     Inguinal Hernia   • SHOULDER SURGERY Left    • TUMOR REMOVAL     • URETEROSCOPY LASER LITHOTRIPSY WITH STENT INSERTION Right 4/21/2023    Procedure: URETEROSCOPY LASER LITHOTRIPSY WITH STENT INSERTION-right;  Surgeon: Raghavendra Cedillo MD;  Location:  [facility] OR;  Service: Urology;  Laterality: Right;   • VASECTOMY       Social History:  reports that he has never smoked. He has never used smokeless tobacco. He reports current alcohol use. He reports that he does not use drugs.    Family History: family history includes Hypertension in his father; No Known Problems in his brother, mother, and sister.      Allergies:  Allergies   Allergen Reactions   • Levofloxacin Other (See Comments)     Causes eye problems  Double vision.     • Moxifloxacin Other (See Comments)     Causes eye problems  Double vision       Medications:  Prior to Admission medications    Medication Sig Start Date End Date Taking? Authorizing Provider   aspirin 81 MG EC tablet Take 1 tablet by mouth Daily.    Provider, MD Rodger   atorvastatin (LIPITOR) 40 MG tablet Take 1 tablet by mouth Daily. 9/27/22   Norma Yusuf APRN   busPIRone (BUSPAR) 15 MG tablet Take 1 tablet by mouth 2 (Two) Times a Day. 10/25/19   ProviderRodger MD   dapagliflozin (Farxiga) 5 MG tablet tablet Take 1 tablet by mouth Daily.  1/10/23   Mahin Cunha MD   docusate sodium (Colace) 100 MG capsule Take 1 capsule by mouth 2 (Two) Times a Day. 4/21/23   Raghavendra Cedillo MD   fenofibrate 160 MG tablet Take 1 tablet by mouth Daily.    Rodger Schmitz MD   HYDROcodone-acetaminophen (NORCO) 7.5-325 MG per tablet Take 1 tablet by mouth Every 6 (Six) Hours As Needed for Severe Pain (postop pain). 4/21/23   Raghavendra Cedillo MD   HYDROCODONE-ACETAMINOPHEN PO Take  by mouth As Needed.    Rodger Schmitz MD   icosapent ethyl (VASCEPA) 1 g capsule capsule Take 2 g by mouth 2 (Two) Times a Day With Meals. 1/6/22   Bakari Perkins MD   Insulin Glargine-yfgn 100 UNIT/ML solution pen-injector Inject 16 Units under the skin into the appropriate area as directed Every Night. 10/11/22   Rodger Schmitz MD   Januvia 100 MG tablet Take 1 tablet by mouth Daily. 9/21/21   Rodger Schmitz MD   metFORMIN (GLUCOPHAGE) 1000 MG tablet Take 1 tablet by mouth 2 (Two) Times a Day With Meals.    Rodger Schmitz MD   nitroglycerin (NITROSTAT) 0.4 MG SL tablet Place 1 tablet under the tongue Every 5 (Five) Minutes As Needed for Chest Pain. Take no more than 3 doses in 15 minutes.    Rodger Schmitz MD   NovoLOG FlexPen 100 UNIT/ML solution pen-injector sc pen 1-10 Units 3 (Three) Times a Day With Meals. Sliding Scale 9/21/22   Rodger Schmitz MD   omeprazole (priLOSEC) 20 MG capsule Take 1 capsule by mouth Daily.    Rodger Schmitz MD   ondansetron (ZOFRAN) 4 MG tablet Take 1 tablet by mouth Every 8 (Eight) Hours As Needed for Nausea or Vomiting.    Rodger Schmitz MD   ondansetron ODT (ZOFRAN-ODT) 4 MG disintegrating tablet Place 1 tablet on the tongue Every 6 (Six) Hours As Needed for Nausea. 4/21/23   Raghavendra Cedillo MD   oxybutynin (DITROPAN) 5 MG tablet Take 1 tablet by mouth Every 8 (Eight) Hours As Needed (bladder spasms). 4/21/23   Raghavendra Cedillo MD   phenazopyridine (PYRIDIUM) 100 MG tablet Take 1 tablet  "by mouth 3 (Three) Times a Day As Needed (urinary burning). 4/21/23   Raghavendra Cedillo MD   predniSONE (DELTASONE) 10 MG tablet Take 1 tablet by mouth Daily. 11/1/22   Rodger Schmitz MD   ranolazine (RANEXA) 500 MG 12 hr tablet Take 1 tablet by mouth 2 (Two) Times a Day.    Rodger Schmitz MD   tamsulosin (FLOMAX) 0.4 MG capsule 24 hr capsule Take 1 capsule by mouth Daily. 4/21/23   Raghavendra Cedillo MD   ticagrelor (BRILINTA) 90 MG tablet tablet Take 1 tablet by mouth 2 (Two) Times a Day. 9/13/22   Jose Colon MD   verapamil SR (CALAN-SR) 180 MG CR tablet TAKE 1 TABLET BY MOUTH ONCE DAILY AT NIGHT 2/2/23   Norma Yusuf APRN   vitamin D3 125 MCG (5000 UT) capsule capsule Take 1 capsule by mouth Daily.    ProviderRodger MD     I have utilized all available immediate resources to obtain, update, or review the patient's current medications (including all prescriptions, over-the-counter products, herbals, cannabis/cannabidiol products, and vitamin/mineral/dietary (nutritional) supplements).    Objective     Vital Signs: /84 (BP Location: Left arm, Patient Position: Lying)   Pulse 108   Temp 98.8 °F (37.1 °C) (Oral)   Resp 20   Ht 182.9 cm (72\")   Wt 77.2 kg (170 lb 3.2 oz)   SpO2 98%   BMI 23.08 kg/m²   Physical Exam  Vitals reviewed.   Constitutional:       Appearance: Normal appearance.   HENT:      Head: Normocephalic and atraumatic.      Right Ear: External ear normal.      Left Ear: External ear normal.      Ears:      Comments: Patient is hard of hearing     Nose: Nose normal.   Eyes:      General: No scleral icterus.     Conjunctiva/sclera: Conjunctivae normal.   Cardiovascular:      Rate and Rhythm: Regular rhythm. Tachycardia present.      Heart sounds: Normal heart sounds.   Pulmonary:      Effort: Pulmonary effort is normal.      Breath sounds: Normal breath sounds.   Abdominal:      General: There is no distension.      Palpations: Abdomen is soft.      Tenderness: " There is no abdominal tenderness.   Musculoskeletal:         General: No swelling or tenderness.      Cervical back: Normal range of motion and neck supple.   Skin:     General: Skin is warm and dry.   Neurological:      Mental Status: He is alert and oriented to person, place, and time.      Cranial Nerves: No cranial nerve deficit.   Psychiatric:         Mood and Affect: Mood normal.         Behavior: Behavior normal.       Results Reviewed:  Lab Results (last 24 hours)     Procedure Component Value Units Date/Time    STAT Lactic Acid, Reflex [[id number]]  (Normal) Collected: 04/30/23 2303    Specimen: Blood Updated: 04/30/23 2325     Lactate 1.0 mmol/L     Urinalysis With Culture If Indicated - Urine, Clean Catch [[id number]]  (Abnormal) Collected: 04/30/23 2054    Specimen: Urine, Clean Catch Updated: 04/30/23 2128     Color, UA Dark Yellow     Appearance, UA Cloudy     pH, UA 5.5     Specific Gravity, UA 1.027     Glucose, UA >=1000 mg/dL (3+)     Ketones, UA 15 mg/dL (1+)     Bilirubin, UA Small (1+)     Blood, UA Large (3+)     Protein,  mg/dL (2+)     Leuk Esterase, UA Small (1+)     Nitrite, UA Negative     Urobilinogen, UA 1.0 E.U./dL    Narrative:      In absence of clinical symptoms, the presence of pyuria, bacteria, and/or nitrites on the urinalysis result does not correlate with infection.    Urinalysis, Microscopic Only - Urine, Clean Catch [[id number]]  (Abnormal) Collected: 04/30/23 2054    Specimen: Urine, Clean Catch Updated: 04/30/23 2128     RBC, UA 13-20 /HPF      WBC, UA 13-20 /HPF      Bacteria, UA None Seen /HPF      Squamous Epithelial Cells, UA None Seen /HPF      Methodology Manual Light Microscopy    Urine Culture - Urine, Urine, Clean Catch [[id number]] Collected: 04/30/23 2054    Specimen: Urine, Clean Catch Updated: 04/30/23 2128    Blood Gas, Arterial - [[id number]]  (Abnormal) Collected: 04/30/23 2043    Specimen: Arterial Blood Updated: 04/30/23 2047     Site Right Radial      Juancho's Test Positive     pH, Arterial 7.421 pH units      pCO2, Arterial 34.9 mm Hg      Comment: 84 Value below reference range        pO2, Arterial 40.2 mm Hg      Comment: 85 Value below critical limit        HCO3, Arterial 22.7 mmol/L      Base Excess, Arterial -1.2 mmol/L      Comment: 84 Value below reference range        O2 Saturation, Arterial 67.8 %      Comment: 84 Value below reference range        Temperature 37.0 C      Barometric Pressure for Blood Gas 743 mmHg      Modality Room Air     FIO2 21 %      Flow Rate 0.0 lpm      Ventilator Mode NA     Notified Who DR. JORDAN     Notified By SUNITHA     Notified Time 04/30/2023 20:49     Collected by SUNITHA     Comment: Meter: [id number]     :  SUNITHA        pCO2, Temperature Corrected 34.9 mm Hg      pH, Temp Corrected 7.421 pH Units      pO2, Temperature Corrected 40.2 mm Hg     [place] Draw [[id number]] Collected: 04/30/23 1852    Specimen: Blood Updated: 04/30/23 2000    Narrative:      The following orders were created for panel order [place] Draw.  Procedure                               Abnormality         Status                     ---------                               -----------         ------                     Green Top (Gel)[[id number]]                                  Final result               Lavender Top[[id number]]                                     Final result               Red Top[[id number]]                                          Final result               Light Blue Top[[id number]]                                   Final result                 Please view results for these tests on the individual orders.    Lavender Top [[id number]] Collected: 04/30/23 1852    Specimen: Blood Updated: 04/30/23 2000     Extra Tube hold for add-on     Comment: Auto resulted       Red Top [[id number]] Collected: 04/30/23 1852    Specimen: Blood Updated: 04/30/23 2000     Extra Tube Hold for add-ons.     Comment: Auto resulted.       Light  Blue Top [[id number]] Collected: 04/30/23 1852    Specimen: Blood Updated: 04/30/23 2000     Extra Tube Hold for add-ons.     Comment: Auto resulted       Green Top (Gel) [[id number]] Collected: 04/30/23 1852    Specimen: Blood Updated: 04/30/23 2000     Extra Tube Hold for add-ons.     Comment: Auto resulted.       Blood Culture - Blood, Arm, Left [[id number]] Collected: 04/30/23 1953    Specimen: Blood from Arm, Left Updated: 04/30/23 1940    Blood Culture - Blood, Arm, Right [[id number]] Collected: 04/30/23 1853    Specimen: Blood from Arm, Right Updated: 04/30/23 1936    Acetone [[id number]]  (Abnormal) Collected: 04/30/23 1852    Specimen: Blood Updated: 04/30/23 1929     Acetone Small    Comprehensive Metabolic Panel [[id number]]  (Abnormal) Collected: 04/30/23 1852    Specimen: Blood Updated: 04/30/23 1921     Glucose 124 mg/dL      BUN 22 mg/dL      Creatinine 1.23 mg/dL      Sodium 130 mmol/L      Potassium 4.2 mmol/L      Comment: Slight hemolysis detected by analyzer. Results may be affected.        Chloride 89 mmol/L      CO2 21.0 mmol/L      Calcium 10.1 mg/dL      Total Protein 8.4 g/dL      Albumin 4.7 g/dL      ALT (SGPT) 11 U/L      AST (SGOT) 15 U/L      Alkaline Phosphatase 49 U/L      Total Bilirubin 0.8 mg/dL      Globulin 3.7 gm/dL      A/G Ratio 1.3 g/dL      BUN/Creatinine Ratio 17.9     Anion Gap 20.0 mmol/L      eGFR 68.5 mL/min/1.73     Narrative:      GFR Normal >60  Chronic Kidney Disease <60  Kidney Failure <15      Lactic Acid, Plasma [[id number]]  (Abnormal) Collected: 04/30/23 1852    Specimen: Blood Updated: 04/30/23 1919     Lactate 2.5 mmol/L     Lipase [[id number]]  (Normal) Collected: 04/30/23 1852    Specimen: Blood Updated: 04/30/23 1916     Lipase 17 U/L     Protime-INR [[id number]]  (Normal) Collected: 04/30/23 1852    Specimen: Blood Updated: 04/30/23 1912     Protime 13.1 Seconds      INR 0.98    CBC & Differential [[id number]]  (Abnormal) Collected: 04/30/23 1852    Specimen: Blood  Updated: 04/30/23 1903    Narrative:      The following orders were created for panel order CBC & Differential.  Procedure                               Abnormality         Status                     ---------                               -----------         ------                     CBC Auto Differential[[id number]]        Abnormal            Final result                 Please view results for these tests on the individual orders.    CBC Auto Differential [[id number]]  (Abnormal) Collected: 04/30/23 1852    Specimen: Blood Updated: 04/30/23 1903     WBC 20.04 10*3/mm3      RBC 5.25 10*6/mm3      Hemoglobin 14.4 g/dL      Hematocrit 46.3 %      MCV 88.2 fL      MCH 27.4 pg      MCHC 31.1 g/dL      RDW 15.7 %      RDW-SD 51.3 fl      MPV 9.6 fL      Platelets 415 10*3/mm3      Neutrophil % 84.1 %      Lymphocyte % 4.8 %      Monocyte % 10.1 %      Eosinophil % 0.0 %      Basophil % 0.2 %      Immature Grans % 0.8 %      Neutrophils, Absolute 16.84 10*3/mm3      Lymphocytes, Absolute 0.96 10*3/mm3      Monocytes, Absolute 2.03 10*3/mm3      Eosinophils, Absolute 0.01 10*3/mm3      Basophils, Absolute 0.04 10*3/mm3      Immature Grans, Absolute 0.16 10*3/mm3      nRBC 0.0 /100 WBC         Imaging Results (Last 24 Hours)     Procedure Component Value Units Date/Time    CT Abdomen Pelvis Without Contrast [[id number]] Collected: 04/30/23 2035     Updated: 04/30/23 2044    Narrative:      CT ABDOMEN PELVIS WO CONTRAST- 4/30/2023 8:18 PM CDT     HISTORY: Flank pain, kidney stone suspected      COMPARISON: CT scan dated 01/02/2019      DOSE LENGTH PRODUCT: 400 mGy cm. Automated exposure control was also  utilized to decrease patient radiation dose.     TECHNIQUE: Noncontrast enhanced images of the abdomen and pelvis  obtained without oral contrast. Multiplanar reformatted images were  provided for review.      FINDINGS:   LOWER CHEST: Left lower lobe bronchiectasis. No consolidation. Coronary  atheromatous calcification.       LIVER: Grossly normal without contrast.      BILIARY SYSTEM: Cholelithiasis. Gallbladder is nondistended. Biliary  tree is nondilated.     PANCREAS: Grossly normal without contrast.      SPLEEN: Unremarkable.      KIDNEYS: Left kidney is unremarkable. Nonobstructing right inferior pole  renal stones. There is a mild right hydroureteronephrosis as well as a  periureteral and perinephric fat stranding.      ADRENALS: Unremarkable.     RETROPERITONEUM: No adenopathy or hemorrhage.      GI TRACT: Stomach and small bowel are unremarkable. Moderate colonic  stool. Normal appendix.     OTHER: There is no mesenteric mass, lymphadenopathy or fluid collection.  Bones are diffusely osteopenic. Slight degenerative retrolisthesis at  L5-S1.     PELVIS: No mass lesion, fluid collection or significant lymphadenopathy  is seen in the pelvis. The urinary bladder is normal in appearance.       Impression:      1. There is a mild right-sided hydroureteronephrosis as well as a  periureteral and perinephric inflammatory change/stranding. I do not see  any obstructing distal ureteral stone. Differential would include  recently passed stone versus upper urinary tract  infection/pyelonephritis.  2. There are 2 right inferior pole nonobstructing stones.  3. No acute and/or inflammatory change identified along the bowel.  4. Left lower lobe bronchiectasis.  This report was finalized on 04/30/2023 20:41 by Dr Alvino Wilson, .    XR Chest 1 View [[id number]] Collected: 04/30/23 1942     Updated: 04/30/23 1948    Narrative:      Frontal upright radiograph of the chest 4/30/2023 7:19 PM CDT     HISTORY: Fever     COMPARISON: Chest exam dated 12/19/2022.     FINDINGS:     The lungs are clear. The cardiomediastinal silhouette and pulmonary  vascularity are within normal limits. Left chest wall AICD.     The osseous structures and surrounding soft tissues demonstrate no acute  abnormality.       Impression:      1. Stable chest exam without acute  process. No visualized pulmonary  infiltrate.        This report was finalized on 04/30/2023 19:44 by Dr Alvino Wilson, .        I have personally reviewed and interpreted the radiology studies and ECG obtained at time of admission.     Assessment / Plan   Assessment:   Active Hospital Problems    Diagnosis    • **Pyelonephritis      Impression:  1.  Pyelonephritis  2.  UTI with hematuria  3.  Insulin-dependent diabetes  4.  Hyponatremia  5.  Nausea and vomiting    Treatment Plan  1.  Admit to the hospital  2.  Rocephin 2 g IV daily  3.  Urology consult  4.  I/O  5.  IV fluids  6.  Sliding scale insulin with Accu-Cheks, hold Glucophage  7.  Follow-up labs in the morning  8.  As needed Zofran    The patient will be admitted to my service here at [facility].  Primary team to take over in the morning    Medical Decision Making  Number and Complexity of problems: 4, complex  Differential Diagnosis: Gastroenteritis    Conditions and Status        Condition is unchanged.     MDM Data  External documents reviewed: None  Cardiac tracing (EKG, telemetry) interpretation: None  Radiology interpretation: None  Labs reviewed: Reviewed  Any tests that were considered but not ordered: None     Decision rules/scores evaluated (example [id number], Wells, etc): None     Discussed with: Patient and wife at bedside     Care Planning  Shared decision making: Patient, wife at bedside, and ED staff  Code status and discussions: Full    Disposition  Social Determinants of Health that impact treatment or disposition: None  Estimated length of stay is 2 to 3 days.     I confirmed that the patient's advanced care plan is present, code status is documented, and a surrogate decision maker is listed in the patient's medical record.     The patient's surrogate decision maker is wife.     The patient was seen and examined by me on 4/30/2023 at seen before midnight.    Electronically signed by Noemy Mosher DO, 05/01/23, 00:01  CDT.                Electronically signed by Noemy Mosher DO at 05/01/23 0007          Emergency Department Notes      Cristal Mnatilla RN at 04/30/23 2154        Informed Dr Crowe, pt had a brown solid stool with red streak of blood.     Electronically signed by Cristal Mantilla RN at 04/30/23 2155     Vandana Crowe MD at 04/30/23 5714          Subjective   History of Present Illness  57-year-old male presents with reports of 48 hours of body aches nausea and vomiting.  States that he is unable to tolerate even liquids and solids.  Denies any diarrhea but has not had a bowel movement.  He states he recently had stent removal for ureteral stones last week and had been doing well immediately afterwards.  He denies any flank pain no abdominal pain no hematuria.  Unknown of any ill contacts.  Emesis has been nonbloody and nonbilious      Review of Systems   Constitutional: Positive for fever.   Gastrointestinal: Positive for nausea and vomiting.   All other systems reviewed and are negative.      Past Medical History:   Diagnosis Date   • Anxiety    • Bilateral kidney stones    • Bronchitis    • Hearing loss    • Heart attack    • Left ureteral stone    • Myasthenia gravis    • Peptic ulceration    • Pneumonia    • Sleep apnea     bi-pap at times   • Type 2 diabetes mellitus with circulatory disorder, with long-term current use of insulin 09/27/2022       Allergies   Allergen Reactions   • Levofloxacin Other (See Comments)     Causes eye problems  Double vision.     • Moxifloxacin Other (See Comments)     Causes eye problems  Double vision       Past Surgical History:   Procedure Laterality Date   • CARDIAC CATHETERIZATION Right 09/11/2022    Procedure: Left Heart Cath;  Surgeon: Tal Greenberg DO;  Location: [facility] CATH INVASIVE LOCATION;  Service: Cardiovascular;  Laterality: Right;   • CARDIAC ELECTROPHYSIOLOGY PROCEDURE N/A 12/08/2022    Procedure: EP/Ablation;  Surgeon: Mahin Cunha MD;  Location:   [facility] CATH INVASIVE LOCATION;  Service: Cardiology;  Laterality: N/A;   • CARDIAC ELECTROPHYSIOLOGY PROCEDURE Left 12/19/2022    Procedure: Device Implant, [employer], single chamber;  Surgeon: Mahin Cunha MD;  Location:  [facility] CATH INVASIVE LOCATION;  Service: Cardiology;  Laterality: Left;   • COLONOSCOPY     • CORONARY ANGIOPLASTY WITH STENT PLACEMENT     • HERNIA REPAIR Left     Inguinal Hernia   • SHOULDER SURGERY Left    • TUMOR REMOVAL     • URETEROSCOPY LASER LITHOTRIPSY WITH STENT INSERTION Right 4/21/2023    Procedure: URETEROSCOPY LASER LITHOTRIPSY WITH STENT INSERTION-right;  Surgeon: Raghavendra Cedillo MD;  Location:  [facility] OR;  Service: Urology;  Laterality: Right;   • VASECTOMY         Family History   Problem Relation Age of Onset   • Hypertension Father    • No Known Problems Mother    • No Known Problems Sister    • No Known Problems Brother        Social History     Socioeconomic History   • Marital status:    Tobacco Use   • Smoking status: Never   • Smokeless tobacco: Never   Vaping Use   • Vaping Use: Never used   Substance and Sexual Activity   • Alcohol use: Yes     Comment: occassionally   • Drug use: Never   • Sexual activity: Defer           Objective   Physical Exam  Vitals and nursing note reviewed.   Constitutional:       Appearance: Normal appearance.   HENT:      Head: Normocephalic and atraumatic.      Right Ear: Tympanic membrane normal.      Left Ear: Tympanic membrane normal.      Mouth/Throat:      Mouth: Mucous membranes are dry.   Eyes:      General: No scleral icterus.     Extraocular Movements: Extraocular movements intact.   Cardiovascular:      Rate and Rhythm: Regular rhythm. Tachycardia present.      Heart sounds: Normal heart sounds.   Pulmonary:      Effort: Pulmonary effort is normal.      Breath sounds: Normal breath sounds.   Abdominal:      General: Abdomen is flat. Bowel sounds are normal.      Palpations: Abdomen is soft.      Tenderness: There is  no right CVA tenderness or left CVA tenderness.   Musculoskeletal:         General: Normal range of motion.      Cervical back: Normal range of motion and neck supple. No rigidity or tenderness.   Skin:     General: Skin is warm.      Capillary Refill: Capillary refill takes less than 2 seconds.   Neurological:      General: No focal deficit present.      Mental Status: He is alert and oriented to person, place, and time.   Psychiatric:         Mood and Affect: Mood normal.         Procedures          ED Course  ED Course as of 04/30/23 2258   Sun Apr 30, 2023 1955 ECG 12 Lead Tachycardia [AP]      ED Course User Index  [AP] Vandana Crowe MD                                           Medical Decision Making  Amount and/or Complexity of Data Reviewed  External Data Reviewed: labs and notes.  Labs: ordered. Decision-making details documented in ED Course.  Radiology: ordered. Decision-making details documented in ED Course.  ECG/medicine tests: independent interpretation performed.     Details: Sinus tachycardia at 134 no acute ischemic changes.          Final diagnoses:   None   Gastritis  Pyelonephritis  Mild  metabolic acidosis    ED Disposition  ED Disposition     ED Disposition   Intended Admit    Condition   --    Comment   --             No follow-up provider specified.       Medication List      No changes were made to your prescriptions during this visit.         Electronically signed by Vandana Crowe MD at 05/01/23 0448       Vital Signs (last day)     Date/Time Temp Temp src Pulse Resp BP Patient Position SpO2    05/01/23 1107 98 (36.7) Oral 91 18 108/71 Lying 97    05/01/23 0721 98.8 (37.1) Oral 102 20 111/65 Lying 95    05/01/23 0630 100.4 (38) Oral 104 -- -- -- --    05/01/23 0420 100.6 (38.1) Oral 116 20 132/74 Lying 96    04/30/23 2355 98.8 (37.1) Oral 108 20 137/84 Lying 98    04/30/23 2333 -- -- 109 -- 110/77 -- 96    04/30/23 2301 -- -- 112 -- 123/81 -- 95    04/30/23 2246 -- -- 109  -- 137/92 -- 95    04/30/23 2231 -- -- 105 -- 136/91 -- 96    04/30/23 2216 -- -- 105 -- 140/93 -- 97    04/30/23 2201 -- -- 114 -- 141/91 -- 95    04/30/23 2159 -- -- 116 -- 141/102 -- 96    04/30/23 2131 -- -- 116 -- 124/97 -- 95    04/30/23 2016 -- -- 125 -- 127/93 -- 95    04/30/23 1848 100.3 (37.9) Oral 130 20 131/94 Sitting 95            Current Facility-Administered Medications   Medication Dose Route Frequency Provider Last Rate Last Admin   • atorvastatin (LIPITOR) tablet 40 mg  40 mg Oral Nightly Alton Burton, DO       • busPIRone (BUSPAR) tablet 15 mg  15 mg Oral BID Noemy Mosher DO   15 mg at 05/01/23 0957   • cefTRIAXone (ROCEPHIN) 2 g in sodium chloride 0.9 % 100 mL IVPB  2 g Intravenous Q24H Noemy Mosher  mL/hr at 05/01/23 0017 2 g at 05/01/23 0017   • dextrose (D50W) (25 g/50 mL) IV injection 25 g  25 g Intravenous Q15 Min PRN Noemy Mosher DO       • dextrose (GLUTOSE) oral gel 15 g  15 g Oral Q15 Min PRN Noemy Mosher DO       • empagliflozin (JARDIANCE) tablet 10 mg  10 mg Oral Daily Noemy Mosher DO   10 mg at 05/01/23 1243   • glucagon (human recombinant) (GLUCAGEN DIAGNOSTIC) injection 1 mg  1 mg Intramuscular Q15 Min PRN Noemy Mosher DO       • HYDROcodone-acetaminophen (NORCO) 7.5-325 MG per tablet 1 tablet  1 tablet Oral Q6H PRN Noemy Mosher DO   1 tablet at 05/01/23 1243   • insulin detemir (LEVEMIR) injection 15 Units  15 Units Subcutaneous Nightly Noemy Mosher DO       • insulin regular (humuLIN R,novoLIN R) injection 2-7 Units  2-7 Units Subcutaneous Q6H Noemy Mosher, DO       • lactated ringers infusion  75 mL/hr Intravenous Continuous Noemy Mosher,  75 mL/hr at 05/01/23 0016 75 mL/hr at 05/01/23 0016   • nitroglycerin (NITROSTAT) SL tablet 0.4 mg  0.4 mg Sublingual Q5 Min PRN Noemy Mosher, DO       • ondansetron (ZOFRAN) injection 4 mg  4 mg Intravenous Q6H PRN Noemy Mosher,    4 mg at 05/01/23  0423   • ondansetron ODT (ZOFRAN-ODT) disintegrating tablet 4 mg  4 mg Oral Q6H PRN Noemy Mosher DO       • oxybutynin (DITROPAN) tablet 5 mg  5 mg Oral Q8H PRN Noemy Mosher DO       • pantoprazole (PROTONIX) EC tablet 40 mg  40 mg Oral Q AM Noemy Mosher DO   40 mg at 05/01/23 0649   • phenazopyridine (PYRIDIUM) tablet 100 mg  100 mg Oral TID PRN Noemy Mosher DO       • ranolazine (RANEXA) 12 hr tablet 500 mg  500 mg Oral BID Noemy Mosher DO   500 mg at 05/01/23 0957   • sodium chloride 0.9 % flush 10 mL  10 mL Intravenous PRN Rigo Henson MD       • sodium chloride 0.9 % flush 10 mL  10 mL Intravenous PRN Vandana Crowe MD       • sodium chloride 0.9 % flush 10 mL  10 mL Intravenous Q12H Noemy Mosher DO   10 mL at 05/01/23 0050   • sodium chloride 0.9 % flush 10 mL  10 mL Intravenous PRN Noemy Mosher DO       • sodium chloride 0.9 % infusion 40 mL  40 mL Intravenous PRN Noemy Mosher DO       • tamsulosin (FLOMAX) 24 hr capsule 0.4 mg  0.4 mg Oral Daily Noemy Mosher DO   0.4 mg at 05/01/23 0957   • ticagrelor (BRILINTA) tablet 90 mg  90 mg Oral BID Noemy Mosher DO   90 mg at 05/01/23 0958   • verapamil SR (CALAN-SR) CR tablet 180 mg  180 mg Oral Nightly Noemy Mosher DO   180 mg at 05/01/23 0050        Consult Notes (last 24 hours)      Doroteo Long MD at 05/01/23 1117      Consult Orders    1. Inpatient Urology Consult [[id number]] ordered by Noemy Mosher DO at 05/01/23 0001               Referring Provider: Nomi  Reason for Consultation: pyelonephritis    Chief complaint: [emesis, subjective fever, decreased appetite]    Subjective     History of present illness:    Mr. Francis underwent stent removal 5 days ago with Dr. Cedillo. The next day started developing emesis and decreased appetite. Also reports subjective fever at home but did not measure his temperature. Admitted to hospitalist overnight. Urology consulted  "this morning.    On examination, patient reports his is currently \"starving\" since he's been NPO - which he reports is an improvement.    Review of Systems  Pertinent items are noted in HPI, all other systems reviewed and negative     History  Past Medical History:   Diagnosis Date   • Anxiety    • Bilateral kidney stones    • Bronchitis    • Hearing loss    • Heart attack    • Left ureteral stone    • Myasthenia gravis    • Peptic ulceration    • Pneumonia    • Sleep apnea     bi-pap at times   • Type 2 diabetes mellitus with circulatory disorder, with long-term current use of insulin 09/27/2022       Past Surgical History:   Procedure Laterality Date   • CARDIAC CATHETERIZATION Right 09/11/2022    Procedure: Left Heart Cath;  Surgeon: Tal Greenberg DO;  Location:  [facility];  Service: Cardiovascular;  Laterality: Right;   • CARDIAC ELECTROPHYSIOLOGY PROCEDURE N/A 12/08/2022    Procedure: EP/Ablation;  Surgeon: Mahin Cunha MD;  Location:  [facility];  Service: Cardiology;  Laterality: N/A;   • CARDIAC ELECTROPHYSIOLOGY PROCEDURE Left 12/19/2022    Procedure: Device Implant, [employer], single chamber;  Surgeon: Mahin Cunha MD;  Location:  [facility];  Service: Cardiology;  Laterality: Left;   • COLONOSCOPY     • CORONARY ANGIOPLASTY WITH STENT PLACEMENT     • HERNIA REPAIR Left     Inguinal Hernia   • SHOULDER SURGERY Left    • TUMOR REMOVAL     • URETEROSCOPY LASER LITHOTRIPSY WITH STENT INSERTION Right 4/21/2023    Procedure: URETEROSCOPY LASER LITHOTRIPSY WITH STENT INSERTION-right;  Surgeon: Raghavendra Cedillo MD;  Location: [facility];  Service: Urology;  Laterality: Right;   • VASECTOMY         Family History   Problem Relation Age of Onset   • Hypertension Father    • No Known Problems Mother    • No Known Problems Sister    • No Known Problems Brother        Social History     Socioeconomic History   • Marital status:  "   Tobacco Use   • Smoking status: Never   • Smokeless tobacco: Never   Vaping Use   • Vaping Use: Never used   Substance and Sexual Activity   • Alcohol use: Yes     Comment: occassionally   • Drug use: Never   • Sexual activity: Defer       Current Facility-Administered Medications   Medication Dose Route Frequency Provider Last Rate Last Admin   • atorvastatin (LIPITOR) tablet 40 mg  40 mg Oral Daily Noemy Mosher DO       • busPIRone (BUSPAR) tablet 15 mg  15 mg Oral BID Noemy Mosher DO   15 mg at 05/01/23 0957   • cefTRIAXone (ROCEPHIN) 2 g in sodium chloride 0.9 % 100 mL IVPB  2 g Intravenous Q24H Noemy Mosher  mL/hr at 05/01/23 0017 2 g at 05/01/23 0017   • dextrose (D50W) (25 g/50 mL) IV injection 25 g  25 g Intravenous Q15 Min PRN Noemy Mosher DO       • dextrose (GLUTOSE) oral gel 15 g  15 g Oral Q15 Min PRN Noemy Mosher DO       • empagliflozin (JARDIANCE) tablet 10 mg  10 mg Oral Daily Noemy Mosher DO       • glucagon (human recombinant) (GLUCAGEN DIAGNOSTIC) injection 1 mg  1 mg Intramuscular Q15 Min PRN Noemy Mosher DO       • HYDROcodone-acetaminophen (NORCO) 7.5-325 MG per tablet 1 tablet  1 tablet Oral Q6H PRN Noemy Mosher DO   1 tablet at 05/01/23 0630   • insulin detemir (LEVEMIR) injection 15 Units  15 Units Subcutaneous Nightly Noemy Mosher DO       • insulin regular (humuLIN R,novoLIN R) injection 2-7 Units  2-7 Units Subcutaneous Q6H Noemy Mosher DO       • lactated ringers infusion  75 mL/hr Intravenous Continuous Noemy Mosher DO 75 mL/hr at 05/01/23 0016 75 mL/hr at 05/01/23 0016   • nitroglycerin (NITROSTAT) SL tablet 0.4 mg  0.4 mg Sublingual Q5 Min PRN Noemy Mosher DO       • ondansetron (ZOFRAN) injection 4 mg  4 mg Intravenous Q6H PRN Noemy Mosher    4 mg at 05/01/23 0423   • ondansetron ODT (ZOFRAN-ODT) disintegrating tablet 4 mg  4 mg Oral Q6H PRNoemy Mon,        • oxybutynin  (DITROPAN) tablet 5 mg  5 mg Oral Q8H PRN Noemy Mosher DO       • pantoprazole (PROTONIX) EC tablet 40 mg  40 mg Oral Q AM Noemy Mosher DO   40 mg at 05/01/23 0649   • phenazopyridine (PYRIDIUM) tablet 100 mg  100 mg Oral TID PRN Noemy Mosher DO       • ranolazine (RANEXA) 12 hr tablet 500 mg  500 mg Oral BID Noemy Mosher DO   500 mg at 05/01/23 0957   • sodium chloride 0.9 % flush 10 mL  10 mL Intravenous PRN Rigo Henson MD       • sodium chloride 0.9 % flush 10 mL  10 mL Intravenous PRN Vandana Crowe MD       • sodium chloride 0.9 % flush 10 mL  10 mL Intravenous Q12H Noemy Mosher DO   10 mL at 05/01/23 0050   • sodium chloride 0.9 % flush 10 mL  10 mL Intravenous PRN Noemy Mosher DO       • sodium chloride 0.9 % infusion 40 mL  40 mL Intravenous PRN Noemy Mosher DO       • tamsulosin (FLOMAX) 24 hr capsule 0.4 mg  0.4 mg Oral Daily Noemy Mosher DO   0.4 mg at 05/01/23 0957   • ticagrelor (BRILINTA) tablet 90 mg  90 mg Oral BID Noemy Mosher DO   90 mg at 05/01/23 0958   • verapamil SR (CALAN-SR) CR tablet 180 mg  180 mg Oral Nightly Noemy Mosher DO   180 mg at 05/01/23 0050        Allergies   Allergen Reactions   • Levofloxacin Other (See Comments)     Causes eye problems  Double vision.     • Moxifloxacin Other (See Comments)     Causes eye problems  Double vision       Objective     Vital Signs   Temp:  [98 °F (36.7 °C)-100.6 °F (38.1 °C)] 98 °F (36.7 °C)  Heart Rate:  [] 91  Resp:  [18-20] 18  BP: (108-141)/() 108/71    Intake/Output Summary (Last 24 hours) at 5/1/2023 1117  Last data filed at 5/1/2023 0900  Gross per 24 hour   Intake 2150 ml   Output 1300 ml   Net 850 ml       Physical Exam:    General: Alert, cooperative, nontoxic, comfortable  Head:  Normocephalic, without obvious abnormality, atraumatic  Eyes:   Lids and lashes normal, no icterus, no pallor  Ears:  Ears appear intact with no abnormalities  noted  Neck:  Supple  Back:  No costovertebral angle tenderness  Lungs: Unlabored respirations  Heart:  Regular rhythm and normal rate  Abdomen: Soft, nontender, nondistended  :  Deferred  Extremities: Moves all extremities well  Skin:  Warm and dry  Neurologic: Cranial nerves 2 - 12 grossly intact    Data Review:    Urinalysis With Culture If Indicated - Urine, Clean Catch (04/30/2023 20:54)   Urinalysis, Microscopic Only - Urine, Clean Catch (04/30/2023 20:54)   Lactic Acid, Plasma (04/30/2023 18:52)   Comprehensive Metabolic Panel (04/30/2023 18:52)   CBC & Differential (04/30/2023 18:52)   CT Abdomen Pelvis Without Contrast (04/30/2023 20:26)       Assessment and Plan:    Pyelonephritis: Continue broad spectrum antibiotics. Follow up cultures. Will need 14 days of culture specific antibiotics for complicated UTI. Continue tamsulosin. Patient reports mild dizziness that may be related to poor PO fluid intake over last few days and acute illness. No urologic intervention at this time. Patient may resume a diet if appropriate.    UROLOGICAL DISPOSITION: I will continue to follow this patient.    I discussed the patient's findings and my recommendations with patient and family    (Please note that portions of this note were completed with a voice recognition program.)    Doroteo Long MD  05/01/23  11:17 CDT    Time: Time spent: 45 minutes spent performing evaluation and management, chart review, and discussion with patient, > 50% of time spent in face-to-face encounter      Electronically signed by Doroteo Long MD at 05/01/23 8338

## 2023-05-01 NOTE — PROGRESS NOTES
[facility] Medicine Services  INPATIENT PROGRESS NOTE    Patient Name: Iftikhar Francis  Date of Admission: 4/30/2023  Today's Date: 05/01/23  Length of Stay: 1  Primary Care Physician: Tacho Eng MD    Subjective   Chief Complaint: Nausea, decreased appetite, lower back pain, fever, chills  HPI   Patient examined sitting up in bed on room air.  Wife at bedside.  He does report a moderate amount of lower back pain and a headache.  He denies chest pain or shortness of breath.  He denies dysuria.  We discussed his overall plan of care including IV antibiotics, monitoring blood cultures, urological evaluation.  Patient and wife are agreeable to his plan of care.    Review of Systems   All pertinent negatives and positives are as above. All other systems have been reviewed and are negative unless otherwise stated.     Objective    Temp:  [98 °F (36.7 °C)-100.6 °F (38.1 °C)] 98.8 °F (37.1 °C)  Heart Rate:  [] 91  Resp:  [18-20] 18  BP: (108-141)/() 108/71  Physical Exam  Vitals reviewed.   Constitutional:       Appearance: Normal appearance.   HENT:      Head: Normocephalic and atraumatic.      Right Ear: External ear normal.      Left Ear: External ear normal.      Ears:      Comments: Patient is hard of hearing     Nose: Nose normal.   Eyes:      General: No scleral icterus.     Conjunctiva/sclera: Conjunctivae normal.   Cardiovascular:      Rate and Rhythm: Regular rhythm. Tachycardia present.      Heart sounds: Normal heart sounds.   Pulmonary:      Effort: Pulmonary effort is normal.      Breath sounds: Normal breath sounds.   Abdominal:      General: There is no distension.      Palpations: Abdomen is soft.      Tenderness: There is no abdominal tenderness.   Musculoskeletal:         General: No swelling or tenderness.      Cervical back: Normal range of motion and neck supple.  Lower back pain.  Skin:     General: Skin is warm and dry.    Neurological:      Mental Status: He is alert and oriented to person, place, and time.      Cranial Nerves: No cranial nerve deficit.   Psychiatric:         Mood and Affect: Mood normal.         Behavior: Behavior normal.          Results Review:  I have reviewed the labs, radiology results, and diagnostic studies.    Laboratory Data:   Results from last 7 days   Lab Units 05/01/23  0417 04/30/23  1852   WBC 10*3/mm3 15.07* 20.04*   HEMOGLOBIN g/dL 13.0 14.4   HEMATOCRIT % 40.1 46.3   PLATELETS 10*3/mm3 312 415        Results from last 7 days   Lab Units 05/01/23  0417 04/30/23  1852   SODIUM mmol/L 133* 130*   POTASSIUM mmol/L 4.1 4.2   CHLORIDE mmol/L 96* 89*   CO2 mmol/L 19.0* 21.0*   BUN mg/dL 19 22*   CREATININE mg/dL 1.03 1.23   CALCIUM mg/dL 8.6 10.1   BILIRUBIN mg/dL 0.4 0.8   ALK PHOS U/L 38* 49   ALT (SGPT) U/L 11 11   AST (SGOT) U/L 14 15   GLUCOSE mg/dL 102* 124*       Culture Data:   Blood Culture   Date Value Ref Range Status   04/30/2023 Abnormal Stain (C)  Preliminary       Radiology Data:   Imaging Results (Last 24 Hours)     Procedure Component Value Units Date/Time    CT Abdomen Pelvis Without Contrast [[id number]] Collected: 04/30/23 2035     Updated: 04/30/23 2044    Narrative:      CT ABDOMEN PELVIS WO CONTRAST- 4/30/2023 8:18 PM CDT     HISTORY: Flank pain, kidney stone suspected      COMPARISON: CT scan dated 01/02/2019      DOSE LENGTH PRODUCT: 400 mGy cm. Automated exposure control was also  utilized to decrease patient radiation dose.     TECHNIQUE: Noncontrast enhanced images of the abdomen and pelvis  obtained without oral contrast. Multiplanar reformatted images were  provided for review.      FINDINGS:   LOWER CHEST: Left lower lobe bronchiectasis. No consolidation. Coronary  atheromatous calcification.      LIVER: Grossly normal without contrast.      BILIARY SYSTEM: Cholelithiasis. Gallbladder is nondistended. Biliary  tree is nondilated.     PANCREAS: Grossly normal without  contrast.      SPLEEN: Unremarkable.      KIDNEYS: Left kidney is unremarkable. Nonobstructing right inferior pole  renal stones. There is a mild right hydroureteronephrosis as well as a  periureteral and perinephric fat stranding.      ADRENALS: Unremarkable.     RETROPERITONEUM: No adenopathy or hemorrhage.      GI TRACT: Stomach and small bowel are unremarkable. Moderate colonic  stool. Normal appendix.     OTHER: There is no mesenteric mass, lymphadenopathy or fluid collection.  Bones are diffusely osteopenic. Slight degenerative retrolisthesis at  L5-S1.     PELVIS: No mass lesion, fluid collection or significant lymphadenopathy  is seen in the pelvis. The urinary bladder is normal in appearance.       Impression:      1. There is a mild right-sided hydroureteronephrosis as well as a  periureteral and perinephric inflammatory change/stranding. I do not see  any obstructing distal ureteral stone. Differential would include  recently passed stone versus upper urinary tract  infection/pyelonephritis.  2. There are 2 right inferior pole nonobstructing stones.  3. No acute and/or inflammatory change identified along the bowel.  4. Left lower lobe bronchiectasis.  This report was finalized on 04/30/2023 20:41 by Dr Alvino Wilson, .    XR Chest 1 View [[id number]] Collected: 04/30/23 1942     Updated: 04/30/23 1948    Narrative:      Frontal upright radiograph of the chest 4/30/2023 7:19 PM CDT     HISTORY: Fever     COMPARISON: Chest exam dated 12/19/2022.     FINDINGS:     The lungs are clear. The cardiomediastinal silhouette and pulmonary  vascularity are within normal limits. Left chest wall AICD.     The osseous structures and surrounding soft tissues demonstrate no acute  abnormality.       Impression:      1. Stable chest exam without acute process. No visualized pulmonary  infiltrate.        This report was finalized on 04/30/2023 19:44 by Dr Alvino Wilson, .          I have reviewed the patient's current  medications.     Assessment/Plan   Assessment  Active Hospital Problems    Diagnosis    • **Pyelonephritis    • Sepsis    • Type 2 diabetes mellitus with circulatory disorder, with long-term current use of insulin    • Essential hypertension    • Mixed hyperlipidemia        Treatment Plan  Pyelonephritis meeting sepsis criteria-  Recent kidney stone removal.  Urinalysis shows hematuria and for concerns of UTI.  Urine culture pending.  Lactate 2.5 originally, normalized thereafter.  WBC 20 decreased to 15.  Blood cultures x2 pending.  Continue 2 g IV Rocephin daily.  Will need a total of 14-day appropriate antibiotic treatment.  Urology consulted and following.    Type 2 diabetes-  Jardiance, Levemir, sliding scale.    CAD-  Continue Ranexa, Brilinta, Lipitor, aspirin.    Anxiety-  Continue Bumex    Medical Decision Making  Number and Complexity of problems: 2 acute problems of high complexity and pyelonephritis with sepsis.  3 chronic problems of moderate complexity and type 2 diabetes, CAD, anxiety  Differential Diagnosis: None    Conditions and Status      Status improving     MDM Data  External documents reviewed: Prior urology note  Cardiac tracing (EKG, telemetry) interpretation: Telemetry reviewed  Radiology interpretation: Chest x-ray, CT abdomen pelvis reviewed per radiologist interpretation  Labs reviewed: Urinalysis, CBC, BMP  Any tests that were considered but not ordered: None     Decision rules/scores evaluated (example [id number], Wells, etc): None     Discussed with: Patient     Care Planning  Shared decision making: Plan of care discussed with patient and Dr. Burton, patient is agreeable.  Code status and discussions: Full    Disposition  Social Determinants of Health that impact treatment or disposition: None  I expect the patient to be discharged to home depending on blood and urine culture results and need for IV versus oral antibiotics.    Electronically signed by JAE Humphries, 05/01/23,  14:42 CDT.

## 2023-05-01 NOTE — ED PROVIDER NOTES
Subjective   History of Present Illness  57-year-old male presents with reports of 48 hours of body aches nausea and vomiting.  States that he is unable to tolerate even liquids and solids.  Denies any diarrhea but has not had a bowel movement.  He states he recently had stent removal for ureteral stones last week and had been doing well immediately afterwards.  He denies any flank pain no abdominal pain no hematuria.  Unknown of any ill contacts.  Emesis has been nonbloody and nonbilious      Review of Systems   Constitutional: Positive for fever.   Gastrointestinal: Positive for nausea and vomiting.   All other systems reviewed and are negative.      Past Medical History:   Diagnosis Date   • Anxiety    • Bilateral kidney stones    • Bronchitis    • Hearing loss    • Heart attack    • Left ureteral stone    • Myasthenia gravis    • Peptic ulceration    • Pneumonia    • Sleep apnea     bi-pap at times   • Type 2 diabetes mellitus with circulatory disorder, with long-term current use of insulin 09/27/2022       Allergies   Allergen Reactions   • Levofloxacin Other (See Comments)     Causes eye problems  Double vision.     • Moxifloxacin Other (See Comments)     Causes eye problems  Double vision       Past Surgical History:   Procedure Laterality Date   • CARDIAC CATHETERIZATION Right 09/11/2022    Procedure: Left Heart Cath;  Surgeon: Tal Greenberg DO;  Location:  [facility];  Service: Cardiovascular;  Laterality: Right;   • CARDIAC ELECTROPHYSIOLOGY PROCEDURE N/A 12/08/2022    Procedure: EP/Ablation;  Surgeon: Mahin Cunha MD;  Location:  [facility];  Service: Cardiology;  Laterality: N/A;   • CARDIAC ELECTROPHYSIOLOGY PROCEDURE Left 12/19/2022    Procedure: Device Implant, [employer], single chamber;  Surgeon: Mahin Cunha MD;  Location:  [facility];  Service: Cardiology;  Laterality: Left;   • COLONOSCOPY     • CORONARY ANGIOPLASTY WITH  STENT PLACEMENT     • HERNIA REPAIR Left     Inguinal Hernia   • SHOULDER SURGERY Left    • TUMOR REMOVAL     • URETEROSCOPY LASER LITHOTRIPSY WITH STENT INSERTION Right 4/21/2023    Procedure: URETEROSCOPY LASER LITHOTRIPSY WITH STENT INSERTION-right;  Surgeon: Raghavendra Cedillo MD;  Location: [facility];  Service: Urology;  Laterality: Right;   • VASECTOMY         Family History   Problem Relation Age of Onset   • Hypertension Father    • No Known Problems Mother    • No Known Problems Sister    • No Known Problems Brother        Social History     Socioeconomic History   • Marital status:    Tobacco Use   • Smoking status: Never   • Smokeless tobacco: Never   Vaping Use   • Vaping Use: Never used   Substance and Sexual Activity   • Alcohol use: Yes     Comment: occassionally   • Drug use: Never   • Sexual activity: Defer           Objective   Physical Exam  Vitals and nursing note reviewed.   Constitutional:       Appearance: Normal appearance.   HENT:      Head: Normocephalic and atraumatic.      Right Ear: Tympanic membrane normal.      Left Ear: Tympanic membrane normal.      Mouth/Throat:      Mouth: Mucous membranes are dry.   Eyes:      General: No scleral icterus.     Extraocular Movements: Extraocular movements intact.   Cardiovascular:      Rate and Rhythm: Regular rhythm. Tachycardia present.      Heart sounds: Normal heart sounds.   Pulmonary:      Effort: Pulmonary effort is normal.      Breath sounds: Normal breath sounds.   Abdominal:      General: Abdomen is flat. Bowel sounds are normal.      Palpations: Abdomen is soft.      Tenderness: There is no right CVA tenderness or left CVA tenderness.   Musculoskeletal:         General: Normal range of motion.      Cervical back: Normal range of motion and neck supple. No rigidity or tenderness.   Skin:     General: Skin is warm.      Capillary Refill: Capillary refill takes less than 2 seconds.   Neurological:      General: No focal deficit  present.      Mental Status: He is alert and oriented to person, place, and time.   Psychiatric:         Mood and Affect: Mood normal.         Procedures           ED Course  ED Course as of 04/30/23 2258   [place] Apr 30, 2023 1955 ECG 12 Lead Tachycardia [AP]      ED Course User Index  [AP] Vandana Crowe MD                                           Medical Decision Making  Amount and/or Complexity of Data Reviewed  External Data Reviewed: labs and notes.  Labs: ordered. Decision-making details documented in ED Course.  Radiology: ordered. Decision-making details documented in ED Course.  ECG/medicine tests: independent interpretation performed.     Details: Sinus tachycardia at 134 no acute ischemic changes.          Final diagnoses:   None   Gastritis  Pyelonephritis  Mild  metabolic acidosis    ED Disposition  ED Disposition     ED Disposition   Intended Admit    Condition   --    Comment   --             No follow-up provider specified.       Medication List      No changes were made to your prescriptions during this visit.

## 2023-05-01 NOTE — PLAN OF CARE
Problem: Adult Inpatient Plan of Care  Goal: Plan of Care Review  Outcome: Ongoing, Progressing  Flowsheets (Taken 5/1/2023 0329)  Progress: no change  Plan of Care Reviewed With: patient  Outcome Evaluation: Pt complained of pain x1 and nausea, see MAR. Voiding. Ambulating with no difficulties. IVF and IV abx. Accu check. Urology consult this AM. Safety maintained.

## 2023-05-01 NOTE — CASE MANAGEMENT/SOCIAL WORK
Discharge Planning Assessment   [place]     Patient Name: Iftikhar Francis  MRN: [id number]  Today's Date: 5/1/2023    Admit Date: 4/30/2023        Discharge Needs Assessment     Row Name 05/01/23 1023       Living Environment    People in Home spouse;child(kt), adult    Name(s) of People in Home Deborah Francis    Current Living Arrangements home    Potentially Unsafe Housing Conditions none    Primary Care Provided by self    Provides Primary Care For child(kt)    Family Caregiver if Needed spouse    Quality of Family Relationships involved;helpful;supportive    Able to Return to Prior Arrangements yes       Resource/Environmental Concerns    Resource/Environmental Concerns none    Transportation Concerns none       Food Insecurity    Within the past 12 months, you worried that your food would run out before you got the money to buy more. Never true    Within the past 12 months, the food you bought just didn't last and you didn't have money to get more. Never true       Transition Planning    Patient/Family Anticipates Transition to home    Patient/Family Anticipated Services at Transition none    Transportation Anticipated car, drives self;family or friend will provide       Discharge Needs Assessment    Readmission Within the Last 30 Days no previous admission in last 30 days    Equipment Currently Used at Home bipap    Concerns to be Addressed discharge planning;denies needs/concerns at this time    Anticipated Changes Related to Illness none    Discharge Coordination/Progress Pt very [place]; lives with spouse and adult child; has PCP and RX coverage; independent and drives self, denies concerns at this time, will continue to follow. Pt denies PDHD.               Discharge Plan    No documentation.               Continued Care and Services - Admitted Since 4/30/2023    Coordination has not been started for this encounter.          Demographic Summary    No documentation.                Functional Status    No  documentation.                Psychosocial    No documentation.                Abuse/Neglect    No documentation.                Legal    No documentation.                Substance Abuse    No documentation.                Patient Forms    No documentation.                   Amanda Davenport RN

## 2023-05-01 NOTE — CONSULTS
"Referring Provider: Nomi  Reason for Consultation: pyelonephritis    Chief complaint: [emesis, subjective fever, decreased appetite]    Subjective     History of present illness:    Mr. Francis underwent stent removal 5 days ago with Dr. Cedillo. The next day started developing emesis and decreased appetite. Also reports subjective fever at home but did not measure his temperature. Admitted to hospitalist overnight. Urology consulted this morning.    On examination, patient reports his is currently \"starving\" since he's been NPO - which he reports is an improvement.    Review of Systems  Pertinent items are noted in HPI, all other systems reviewed and negative     History  Past Medical History:   Diagnosis Date   • Anxiety    • Bilateral kidney stones    • Bronchitis    • Hearing loss    • Heart attack    • Left ureteral stone    • Myasthenia gravis    • Peptic ulceration    • Pneumonia    • Sleep apnea     bi-pap at times   • Type 2 diabetes mellitus with circulatory disorder, with long-term current use of insulin 09/27/2022       Past Surgical History:   Procedure Laterality Date   • CARDIAC CATHETERIZATION Right 09/11/2022    Procedure: Left Heart Cath;  Surgeon: Tal Greenberg DO;  Location:  [facility];  Service: Cardiovascular;  Laterality: Right;   • CARDIAC ELECTROPHYSIOLOGY PROCEDURE N/A 12/08/2022    Procedure: EP/Ablation;  Surgeon: Mahin Cunha MD;  Location:  [facility];  Service: Cardiology;  Laterality: N/A;   • CARDIAC ELECTROPHYSIOLOGY PROCEDURE Left 12/19/2022    Procedure: Device Implant, [employer], single chamber;  Surgeon: Mahin Cunha MD;  Location:  [facility];  Service: Cardiology;  Laterality: Left;   • COLONOSCOPY     • CORONARY ANGIOPLASTY WITH STENT PLACEMENT     • HERNIA REPAIR Left     Inguinal Hernia   • SHOULDER SURGERY Left    • TUMOR REMOVAL     • URETEROSCOPY LASER LITHOTRIPSY WITH STENT INSERTION Right " 4/21/2023    Procedure: URETEROSCOPY LASER LITHOTRIPSY WITH STENT INSERTION-right;  Surgeon: Raghavendra Cedillo MD;  Location: [facility];  Service: Urology;  Laterality: Right;   • VASECTOMY         Family History   Problem Relation Age of Onset   • Hypertension Father    • No Known Problems Mother    • No Known Problems Sister    • No Known Problems Brother        Social History     Socioeconomic History   • Marital status:    Tobacco Use   • Smoking status: Never   • Smokeless tobacco: Never   Vaping Use   • Vaping Use: Never used   Substance and Sexual Activity   • Alcohol use: Yes     Comment: occassionally   • Drug use: Never   • Sexual activity: Defer       Current Facility-Administered Medications   Medication Dose Route Frequency Provider Last Rate Last Admin   • atorvastatin (LIPITOR) tablet 40 mg  40 mg Oral Daily Noemy Mosher DO       • busPIRone (BUSPAR) tablet 15 mg  15 mg Oral BID Noemy Mosher DO   15 mg at 05/01/23 0957   • cefTRIAXone (ROCEPHIN) 2 g in sodium chloride 0.9 % 100 mL IVPB  2 g Intravenous Q24H Noemy Mosher  mL/hr at 05/01/23 0017 2 g at 05/01/23 0017   • dextrose (D50W) (25 g/50 mL) IV injection 25 g  25 g Intravenous Q15 Min PRN Noemy Mosher DO       • dextrose (GLUTOSE) oral gel 15 g  15 g Oral Q15 Min PRN Noemy Mosher DO       • empagliflozin (JARDIANCE) tablet 10 mg  10 mg Oral Daily Noemy Mosher DO       • glucagon (human recombinant) (GLUCAGEN DIAGNOSTIC) injection 1 mg  1 mg Intramuscular Q15 Min PRN Noemy Mosher DO       • HYDROcodone-acetaminophen (NORCO) 7.5-325 MG per tablet 1 tablet  1 tablet Oral Q6H PRN Noemy Mosher DO   1 tablet at 05/01/23 0630   • insulin detemir (LEVEMIR) injection 15 Units  15 Units Subcutaneous Nightly Noemy Mosher DO       • insulin regular (humuLIN R,novoLIN R) injection 2-7 Units  2-7 Units Subcutaneous Q6H Noemy Mosher DO       • lactated ringers infusion  75 mL/hr  Intravenous Continuous Noemy Mosher DO 75 mL/hr at 05/01/23 0016 75 mL/hr at 05/01/23 0016   • nitroglycerin (NITROSTAT) SL tablet 0.4 mg  0.4 mg Sublingual Q5 Min PRN Noemy Mosher DO       • ondansetron (ZOFRAN) injection 4 mg  4 mg Intravenous Q6H PRN Noemy Mosher DO   4 mg at 05/01/23 0423   • ondansetron ODT (ZOFRAN-ODT) disintegrating tablet 4 mg  4 mg Oral Q6H PRN Noemy Mosher DO       • oxybutynin (DITROPAN) tablet 5 mg  5 mg Oral Q8H PRN Noemy Mosher DO       • pantoprazole (PROTONIX) EC tablet 40 mg  40 mg Oral Q AM Noemy Mosher DO   40 mg at 05/01/23 0649   • phenazopyridine (PYRIDIUM) tablet 100 mg  100 mg Oral TID PRN Noemy Mosher DO       • ranolazine (RANEXA) 12 hr tablet 500 mg  500 mg Oral BID Noemy Mosher DO   500 mg at 05/01/23 0957   • sodium chloride 0.9 % flush 10 mL  10 mL Intravenous PRN Rigo Henson MD       • sodium chloride 0.9 % flush 10 mL  10 mL Intravenous PRN Vandana Crowe MD       • sodium chloride 0.9 % flush 10 mL  10 mL Intravenous Q12H Noemy Mosher DO   10 mL at 05/01/23 0050   • sodium chloride 0.9 % flush 10 mL  10 mL Intravenous PRN Noemy Mosher DO       • sodium chloride 0.9 % infusion 40 mL  40 mL Intravenous PRN Noemy Mosher DO       • tamsulosin (FLOMAX) 24 hr capsule 0.4 mg  0.4 mg Oral Daily Noemy Mosher DO   0.4 mg at 05/01/23 0957   • ticagrelor (BRILINTA) tablet 90 mg  90 mg Oral BID Noeym Mosher DO   90 mg at 05/01/23 0958   • verapamil SR (CALAN-SR) CR tablet 180 mg  180 mg Oral Nightly Noemy Mosher,    180 mg at 05/01/23 0050        Allergies   Allergen Reactions   • Levofloxacin Other (See Comments)     Causes eye problems  Double vision.     • Moxifloxacin Other (See Comments)     Causes eye problems  Double vision       Objective     Vital Signs   Temp:  [98 °F (36.7 °C)-100.6 °F (38.1 °C)] 98 °F (36.7 °C)  Heart Rate:  [] 91  Resp:  [18-20] 18  BP:  (108-141)/() 108/71    Intake/Output Summary (Last 24 hours) at 5/1/2023 1117  Last data filed at 5/1/2023 0900  Gross per 24 hour   Intake 2150 ml   Output 1300 ml   Net 850 ml       Physical Exam:    General: Alert, cooperative, nontoxic, comfortable  Head:  Normocephalic, without obvious abnormality, atraumatic  Eyes:   Lids and lashes normal, no icterus, no pallor  Ears:  Ears appear intact with no abnormalities noted  Neck:  Supple  Back:  No costovertebral angle tenderness  Lungs: Unlabored respirations  Heart:  Regular rhythm and normal rate  Abdomen: Soft, nontender, nondistended  :  Deferred  Extremities: Moves all extremities well  Skin:  Warm and dry  Neurologic: Cranial nerves 2 - 12 grossly intact    Data Review:    Urinalysis With Culture If Indicated - Urine, Clean Catch (04/30/2023 20:54)   Urinalysis, Microscopic Only - Urine, Clean Catch (04/30/2023 20:54)   Lactic Acid, Plasma (04/30/2023 18:52)   Comprehensive Metabolic Panel (04/30/2023 18:52)   CBC & Differential (04/30/2023 18:52)   CT Abdomen Pelvis Without Contrast (04/30/2023 20:26)       Assessment and Plan:    Pyelonephritis: Continue broad spectrum antibiotics. Follow up cultures. Will need 14 days of culture specific antibiotics for complicated UTI. Continue tamsulosin. Patient reports mild dizziness that may be related to poor PO fluid intake over last few days and acute illness. No urologic intervention at this time. Patient may resume a diet if appropriate.    UROLOGICAL DISPOSITION: I will continue to follow this patient.    I discussed the patient's findings and my recommendations with patient and family    (Please note that portions of this note were completed with a voice recognition program.)    Doroteo Long MD  05/01/23  11:17 CDT    Time: Time spent: 45 minutes spent performing evaluation and management, chart review, and discussion with patient, > 50% of time spent in face-to-face encounter

## 2023-05-02 PROBLEM — I95.1 ORTHOSTASIS: Status: ACTIVE | Noted: 2023-05-02

## 2023-05-02 LAB
ANION GAP SERPL CALCULATED.3IONS-SCNC: 15 MMOL/L (ref 5–15)
BUN SERPL-MCNC: 16 MG/DL (ref 6–20)
BUN/CREAT SERPL: 17.2 (ref 7–25)
CALCIUM SPEC-SCNC: 8.9 MG/DL (ref 8.6–10.5)
CHLORIDE SERPL-SCNC: 96 MMOL/L (ref 98–107)
CO2 SERPL-SCNC: 21 MMOL/L (ref 22–29)
CREAT SERPL-MCNC: 0.93 MG/DL (ref 0.76–1.27)
DEPRECATED RDW RBC AUTO: 49.5 FL (ref 37–54)
EGFRCR SERPLBLD CKD-EPI 2021: 95.8 ML/MIN/1.73
ERYTHROCYTE [DISTWIDTH] IN BLOOD BY AUTOMATED COUNT: 15.6 % (ref 12.3–15.4)
GLUCOSE BLDC GLUCOMTR-MCNC: 118 MG/DL (ref 70–130)
GLUCOSE BLDC GLUCOMTR-MCNC: 136 MG/DL (ref 70–130)
GLUCOSE BLDC GLUCOMTR-MCNC: 139 MG/DL (ref 70–130)
GLUCOSE BLDC GLUCOMTR-MCNC: 141 MG/DL (ref 70–130)
GLUCOSE BLDC GLUCOMTR-MCNC: 81 MG/DL (ref 70–130)
GLUCOSE BLDC GLUCOMTR-MCNC: 90 MG/DL (ref 70–130)
GLUCOSE SERPL-MCNC: 101 MG/DL (ref 65–99)
HCT VFR BLD AUTO: 37.5 % (ref 37.5–51)
HGB BLD-MCNC: 11.8 G/DL (ref 13–17.7)
MCH RBC QN AUTO: 27.2 PG (ref 26.6–33)
MCHC RBC AUTO-ENTMCNC: 31.5 G/DL (ref 31.5–35.7)
MCV RBC AUTO: 86.4 FL (ref 79–97)
PLATELET # BLD AUTO: 313 10*3/MM3 (ref 140–450)
PMV BLD AUTO: 9.5 FL (ref 6–12)
POTASSIUM SERPL-SCNC: 3.6 MMOL/L (ref 3.5–5.2)
RBC # BLD AUTO: 4.34 10*6/MM3 (ref 4.14–5.8)
SODIUM SERPL-SCNC: 132 MMOL/L (ref 136–145)
WBC NRBC COR # BLD: 12.83 10*3/MM3 (ref 3.4–10.8)

## 2023-05-02 PROCEDURE — 80048 BASIC METABOLIC PNL TOTAL CA: CPT

## 2023-05-02 PROCEDURE — 99232 SBSQ HOSP IP/OBS MODERATE 35: CPT | Performed by: UROLOGY

## 2023-05-02 PROCEDURE — 85027 COMPLETE CBC AUTOMATED: CPT

## 2023-05-02 PROCEDURE — 82948 REAGENT STRIP/BLOOD GLUCOSE: CPT

## 2023-05-02 PROCEDURE — 25010000002 ONDANSETRON PER 1 MG: Performed by: INTERNAL MEDICINE

## 2023-05-02 PROCEDURE — 63710000001 INSULIN DETEMIR PER 5 UNITS: Performed by: INTERNAL MEDICINE

## 2023-05-02 PROCEDURE — 25010000002 CEFTRIAXONE PER 250 MG: Performed by: INTERNAL MEDICINE

## 2023-05-02 RX ORDER — SODIUM CHLORIDE 9 MG/ML
100 INJECTION, SOLUTION INTRAVENOUS CONTINUOUS
Status: DISCONTINUED | OUTPATIENT
Start: 2023-05-02 | End: 2023-05-03 | Stop reason: HOSPADM

## 2023-05-02 RX ADMIN — VERAPAMIL HYDROCHLORIDE 180 MG: 180 TABLET, FILM COATED, EXTENDED RELEASE ORAL at 21:54

## 2023-05-02 RX ADMIN — EMPAGLIFLOZIN 10 MG: 10 TABLET, FILM COATED ORAL at 08:31

## 2023-05-02 RX ADMIN — SODIUM CHLORIDE 100 ML/HR: 9 INJECTION, SOLUTION INTRAVENOUS at 11:42

## 2023-05-02 RX ADMIN — BUSPIRONE HYDROCHLORIDE 15 MG: 5 TABLET ORAL at 21:53

## 2023-05-02 RX ADMIN — TICAGRELOR 90 MG: 90 TABLET ORAL at 08:31

## 2023-05-02 RX ADMIN — ONDANSETRON 4 MG: 2 INJECTION INTRAMUSCULAR; INTRAVENOUS at 22:11

## 2023-05-02 RX ADMIN — RANOLAZINE 500 MG: 500 TABLET, FILM COATED, EXTENDED RELEASE ORAL at 08:31

## 2023-05-02 RX ADMIN — CEFTRIAXONE SODIUM 2 G: 2 INJECTION, POWDER, FOR SOLUTION INTRAMUSCULAR; INTRAVENOUS at 00:41

## 2023-05-02 RX ADMIN — BUSPIRONE HYDROCHLORIDE 15 MG: 5 TABLET ORAL at 08:31

## 2023-05-02 RX ADMIN — SODIUM CHLORIDE 100 ML/HR: 9 INJECTION, SOLUTION INTRAVENOUS at 23:03

## 2023-05-02 RX ADMIN — TICAGRELOR 90 MG: 90 TABLET ORAL at 21:54

## 2023-05-02 RX ADMIN — ASPIRIN 81 MG: 81 TABLET, COATED ORAL at 08:31

## 2023-05-02 RX ADMIN — Medication 10 ML: at 08:32

## 2023-05-02 RX ADMIN — TAMSULOSIN HYDROCHLORIDE 0.4 MG: 0.4 CAPSULE ORAL at 08:31

## 2023-05-02 RX ADMIN — SODIUM CHLORIDE 1000 ML: 9 INJECTION, SOLUTION INTRAVENOUS at 11:41

## 2023-05-02 RX ADMIN — RANOLAZINE 500 MG: 500 TABLET, FILM COATED, EXTENDED RELEASE ORAL at 21:54

## 2023-05-02 RX ADMIN — HYDROCODONE BITARTRATE AND ACETAMINOPHEN 1 TABLET: 7.5; 325 TABLET ORAL at 12:07

## 2023-05-02 RX ADMIN — ATORVASTATIN CALCIUM 40 MG: 40 TABLET, FILM COATED ORAL at 21:54

## 2023-05-02 RX ADMIN — INSULIN DETEMIR 15 UNITS: 100 INJECTION, SOLUTION SUBCUTANEOUS at 22:00

## 2023-05-02 RX ADMIN — PANTOPRAZOLE SODIUM 40 MG: 40 TABLET, DELAYED RELEASE ORAL at 06:22

## 2023-05-02 NOTE — PLAN OF CARE
Goal Outcome Evaluation:  A&O. RA. Pt has been resting throughout the day. Complained of a headache and dizziness when standing, orthro BP was assessed positive. Ambulated in hallway with assistance once, tolerated well. No complaints of SOB, no fever noted. No BM on my shift. Good output of urine, no complaints of burning. Bolus given, IV fluids infusing. BG monitored. Family at bedside. VSS.

## 2023-05-02 NOTE — PROGRESS NOTES
[facility] Medicine Services  INPATIENT PROGRESS NOTE    Patient Name: Iftikhar Francis  Date of Admission: 4/30/2023  Today's Date: 05/02/23  Length of Stay: 2  Primary Care Physician: Tacho Eng MD    Subjective   Chief Complaint: Nausea, decreased appetite, lower back pain, fever, chills  HPI   Patient examined sitting up in bed on room air.  No visitors at bedside.  He tells me his lower back pain is improved.  He does complain of an intermittent mild headache.  His main complaint today is dizziness.  He endorses this specifically with standing.  I explained he was potentially still on the dehydrated side.  When I asked nursing to perform orthostatic measurements, they were positive.  I explained we would do an IV fluid bolus and continue IV fluids thereafter.  He denies chest pain, shortness of breath, palpitations.  He is otherwise agreeable to his plan of care of continuing IV antibiotics as we await culture results.    ROS:  All pertinent negatives and positives are as above. All other systems have been reviewed and are negative unless otherwise stated.     Objective    Temp:  [98.2 °F (36.8 °C)-99.6 °F (37.6 °C)] 98.2 °F (36.8 °C)  Heart Rate:  [] 86  Resp:  [16-18] 16  BP: ()/(66-87) 104/74  Physical Exam  Vitals reviewed.   Constitutional:       Appearance: Normal appearance.   HENT:      Head: Normocephalic and atraumatic.      Right Ear: External ear normal.      Left Ear: External ear normal.      Ears:      Comments: Patient is hard of hearing     Nose: Nose normal.   Eyes:      General: No scleral icterus.     Conjunctiva/sclera: Conjunctivae normal.   Cardiovascular:      Rate and Rhythm: Regular rhythm. Tachycardia resolved.     Heart sounds: Normal heart sounds.   Pulmonary:      Effort: Pulmonary effort is normal.      Breath sounds: Normal breath sounds.   Abdominal:      General: There is no distension.      Palpations: Abdomen is  soft.      Tenderness: There is no abdominal tenderness.   Musculoskeletal:         General: No swelling or tenderness.      Cervical back: Normal range of motion and neck supple. Lower back pain.  Skin:     General: Skin is warm and dry.   Neurological:      Mental Status: He is alert and oriented to person, place, and time.      Cranial Nerves: No cranial nerve deficit.   Psychiatric:         Mood and Affect: Mood normal.         Behavior: Behavior normal.        Results Review:  I have reviewed the labs, radiology results, and diagnostic studies.    Laboratory Data:   Results from last 7 days   Lab Units 05/02/23  0501 05/01/23 0417 04/30/23  1852   WBC 10*3/mm3 12.83* 15.07* 20.04*   HEMOGLOBIN g/dL 11.8* 13.0 14.4   HEMATOCRIT % 37.5 40.1 46.3   PLATELETS 10*3/mm3 313 312 415        Results from last 7 days   Lab Units 05/02/23  0501 05/01/23 0417 04/30/23  1852   SODIUM mmol/L 132* 133* 130*   POTASSIUM mmol/L 3.6 4.1 4.2   CHLORIDE mmol/L 96* 96* 89*   CO2 mmol/L 21.0* 19.0* 21.0*   BUN mg/dL 16 19 22*   CREATININE mg/dL 0.93 1.03 1.23   CALCIUM mg/dL 8.9 8.6 10.1   BILIRUBIN mg/dL  --  0.4 0.8   ALK PHOS U/L  --  38* 49   ALT (SGPT) U/L  --  11 11   AST (SGOT) U/L  --  14 15   GLUCOSE mg/dL 101* 102* 124*       Culture Data:   Blood Culture   Date Value Ref Range Status   04/30/2023 Abnormal Stain (C)  Preliminary     I have reviewed the patient's current medications.     Assessment/Plan   Assessment  Active Hospital Problems    Diagnosis    • **Pyelonephritis    • Orthostasis    • Sepsis    • Type 2 diabetes mellitus with circulatory disorder, with long-term current use of insulin    • Essential hypertension    • Mixed hyperlipidemia        Treatment Plan  Pyelonephritis meeting sepsis criteria-  Recent kidney stone removal.  Urinalysis shows hematuria and concerns for UTI.  Urine culture pending.  Lactate 2.5 originally, normalized thereafter.  WBC 20 decreased to 12.  Blood cultures x2 blood showing  "gram-positive cocci, concern for contamination, will continue to monitor.  Continue 2 g IV Rocephin daily.  Will need a total of 14-day appropriate antibiotic treatment.  Urology consulted and following.    Concern for V. tach-  Run of nonsustained V. tach questionable on telemetry 5/1.  Electrophysiology consulted. Dr. Cunha evaluated patient and felt  \"His telemetry tracing is consistent with an RV auto algorithm to check the function of the device.  This is normal device behavior without other evidence of VT.\" recommended no changes at this time.    Dizziness-  Likely related to some hypovolemia in the presence of infection.  Orthostatic blood pressures positive.  Normal saline 1 L IV bolus x1.  Normal saline IV infusion at 100 mL/h.    Type 2 diabetes-  Jardiance, Levemir, sliding scale.    CAD-  Continue Ranexa, Brilinta, Lipitor, aspirin.    Anxiety-  Continue Buspar    Medical Decision Making  Number and Complexity of problems: 2 acute problems of high complexity and pyelonephritis with sepsis.  3 chronic problems of moderate complexity and type 2 diabetes, CAD, anxiety  Differential Diagnosis: None    Conditions and Status      Status improving     [facility] Data  External documents reviewed: Prior urology note  Cardiac tracing (EKG, telemetry) interpretation: Telemetry reviewed  Radiology interpretation: Chest x-ray, CT abdomen pelvis reviewed per radiologist interpretation  Labs reviewed: Urinalysis, CBC, BMP  Any tests that were considered but not ordered: None     Decision rules/scores evaluated (example [id number], [place], etc): None     Discussed with: Patient     Care Planning  Shared decision making: Plan of care discussed with patient and Dr. Burton, patient is agreeable.  Code status and discussions: Full    Disposition  Social Determinants of Health that impact treatment or disposition: None  I expect the patient to be discharged to home depending on blood and urine culture results and need for IV versus " oral antibiotics.    Electronically signed by JAE Humphries, 05/02/23, 12:27 CDT.

## 2023-05-02 NOTE — PLAN OF CARE
Problem: Adult Inpatient Plan of Care  Goal: Plan of Care Review  Outcome: Ongoing, Progressing  Flowsheets (Taken 5/2/2023 0403)  Progress: no change  Plan of Care Reviewed With: patient  Outcome Evaluation: Pt complained of pain, see MAR. No complaints of nausea. IV abx. SCDs. Up ad jaycee. Voiding. Accu check. Safety maintained.

## 2023-05-02 NOTE — CONSULTS
"EP CONSULT NOTE    Subjective        History of Present Illness    EP History:  1.  Ventricular tachycardia  - 12/8/22:  VT ablation, multiple VT morphologies including fascicular VT  2.  Presence of an ICD  -12/19/2022: DOI, single-chamber [employer], [place]     Cardiology history:  1.  CAD w/ prior NSTEMI  - 9/2022: LAD and LCX PCI  2.  HTN  3.  HLD     Medical History:  1.  Myasthenia gravis  2.  DM2  3.  Kidney stones    Iftkihar Francis is a 57 y.o. male with problem list as above for whom EP is consulted regarding possible VT.    He is here for pyelonephritis.  No acute cardiac complaints.  At 2:35PM today, he was noted to have WCT on his telemetry. Given this, EP was called.    Family History:  family history includes Hypertension in his father; No Known Problems in his brother, mother, and sister.    Social History:  Social History     Tobacco Use   • Smoking status: Never   • Smokeless tobacco: Never   Vaping Use   • Vaping Use: Never used   Substance Use Topics   • Alcohol use: Yes     Comment: occassionally   • Drug use: Never       Allergies:  Levofloxacin and Moxifloxacin      Objective   Vital Signs:  /68 (BP Location: Left arm, Patient Position: Lying)   Pulse 103   Temp 98.3 °F (36.8 °C) (Oral)   Resp 18   Ht 182.9 cm (72\")   Wt 77.2 kg (170 lb 3.2 oz)   SpO2 94%   BMI 23.08 kg/m²   Estimated body mass index is 23.08 kg/m² as calculated from the following:    Height as of this encounter: 182.9 cm (72\").    Weight as of this encounter: 77.2 kg (170 lb 3.2 oz).      Physical Exam  Vitals reviewed.   Constitutional:       Appearance: Normal appearance.   HENT:      Head: Normocephalic and atraumatic.   Eyes:      Extraocular Movements: Extraocular movements intact.      Conjunctiva/sclera: Conjunctivae normal.   Cardiovascular:      Rate and Rhythm: Normal rate and regular rhythm.      Pulses: Normal pulses.      Heart sounds: Normal heart sounds.      Comments: Pulse generator is " clean and intact  Pulmonary:      Effort: Pulmonary effort is normal.      Breath sounds: Normal breath sounds.   Musculoskeletal:         General: No swelling.   Neurological:      General: No focal deficit present.      Mental Status: He is alert and oriented to person, place, and time.   Psychiatric:         Mood and Affect: Mood normal.         Judgment: Judgment normal.          Result Review :  The following data was reviewed by: Mahin Cunha MD on 04/30/2023:  CMP        4/20/2023    12:59 4/30/2023    18:52 5/1/2023    04:17   CMP   Glucose 130   124   102     BUN 28   22   19     Creatinine 1.10   1.23   1.03     EGFR 78.3   68.5   84.7     Sodium 136   130   133     Potassium 5.2   4.2   4.1     Chloride 99   89   96     Calcium 10.1   10.1   8.6     Total Protein  8.4   6.7     Albumin  4.7   3.7     Globulin  3.7   3.0     Total Bilirubin  0.8   0.4     Alkaline Phosphatase  49   38     AST (SGOT)  15   14     ALT (SGPT)  11   11     Albumin/Globulin Ratio  1.3   1.2     BUN/Creatinine Ratio 25.5   17.9   18.4     Anion Gap 13.0   20.0   18.0       CBC        4/20/2023    12:59 4/30/2023    18:52 5/1/2023    04:17   CBC   WBC 10.62   20.04   15.07     RBC 5.63   5.25   4.61     Hemoglobin 15.5   14.4   13.0     Hematocrit 50.6   46.3   40.1     MCV 89.9   88.2   87.0     MCH 27.5   27.4   28.2     MCHC 30.6   31.1   32.4     RDW 16.4   15.7   15.6     Platelets 446   415   312       TSH        12/7/2022    10:33   TSH   TSH 1.900         Telemetry reviewed:  Single episode of ventricular pacing lasting 30s at a rate of 110bpm (sinus rate 105bpm).  Occasional PVCs.           Assessment and Plan   Problems:  1. Ventricular tachycardia    Iftikhar Francis is a 57 y.o. male with problem list as above for whom EP is consulted regarding possible VT.  His telemetry tracing is consistent with an RV auto algorithm to check the function of the device.  This is normal device behavior without other evidence of  VT.  Given this, no changes at this time.    Plan:  - Normal device behavior  - No medication changes  - Will discuss the treatment plan with Dr. Burton             Part of this note may be an electronic transcription/translation of spoken language to printed text using the Dragon Dictation System.

## 2023-05-03 ENCOUNTER — READMISSION MANAGEMENT (OUTPATIENT)
Dept: CALL CENTER | Facility: HOSPITAL | Age: 58
End: 2023-05-03
Payer: MEDICARE

## 2023-05-03 VITALS
BODY MASS INDEX: 23.05 KG/M2 | OXYGEN SATURATION: 96 % | WEIGHT: 170.2 LBS | TEMPERATURE: 98.6 F | SYSTOLIC BLOOD PRESSURE: 114 MMHG | HEART RATE: 86 BPM | RESPIRATION RATE: 16 BRPM | HEIGHT: 72 IN | DIASTOLIC BLOOD PRESSURE: 77 MMHG

## 2023-05-03 LAB
ANION GAP SERPL CALCULATED.3IONS-SCNC: 11 MMOL/L (ref 5–15)
BACTERIA SPEC AEROBE CULT: ABNORMAL
BACTERIA SPEC AEROBE CULT: ABNORMAL
BUN SERPL-MCNC: 17 MG/DL (ref 6–20)
BUN/CREAT SERPL: 20.5 (ref 7–25)
CALCIUM SPEC-SCNC: 8.7 MG/DL (ref 8.6–10.5)
CHLORIDE SERPL-SCNC: 103 MMOL/L (ref 98–107)
CO2 SERPL-SCNC: 24 MMOL/L (ref 22–29)
CREAT SERPL-MCNC: 0.83 MG/DL (ref 0.76–1.27)
DEPRECATED RDW RBC AUTO: 48.5 FL (ref 37–54)
EGFRCR SERPLBLD CKD-EPI 2021: 102.1 ML/MIN/1.73
ERYTHROCYTE [DISTWIDTH] IN BLOOD BY AUTOMATED COUNT: 15.4 % (ref 12.3–15.4)
GLUCOSE BLDC GLUCOMTR-MCNC: 116 MG/DL (ref 70–130)
GLUCOSE BLDC GLUCOMTR-MCNC: 117 MG/DL (ref 70–130)
GLUCOSE SERPL-MCNC: 121 MG/DL (ref 65–99)
GRAM STN SPEC: ABNORMAL
GRAM STN SPEC: ABNORMAL
HCT VFR BLD AUTO: 37.5 % (ref 37.5–51)
HGB BLD-MCNC: 12.1 G/DL (ref 13–17.7)
ISOLATED FROM: ABNORMAL
ISOLATED FROM: ABNORMAL
MCH RBC QN AUTO: 27.6 PG (ref 26.6–33)
MCHC RBC AUTO-ENTMCNC: 32.3 G/DL (ref 31.5–35.7)
MCV RBC AUTO: 85.4 FL (ref 79–97)
PLATELET # BLD AUTO: 385 10*3/MM3 (ref 140–450)
PMV BLD AUTO: 9.7 FL (ref 6–12)
POTASSIUM SERPL-SCNC: 3.6 MMOL/L (ref 3.5–5.2)
RBC # BLD AUTO: 4.39 10*6/MM3 (ref 4.14–5.8)
SODIUM SERPL-SCNC: 138 MMOL/L (ref 136–145)
WBC NRBC COR # BLD: 9.3 10*3/MM3 (ref 3.4–10.8)

## 2023-05-03 PROCEDURE — 85027 COMPLETE CBC AUTOMATED: CPT

## 2023-05-03 PROCEDURE — 82948 REAGENT STRIP/BLOOD GLUCOSE: CPT

## 2023-05-03 PROCEDURE — 80048 BASIC METABOLIC PNL TOTAL CA: CPT

## 2023-05-03 PROCEDURE — 25010000002 CEFTRIAXONE PER 250 MG: Performed by: INTERNAL MEDICINE

## 2023-05-03 PROCEDURE — 94799 UNLISTED PULMONARY SVC/PX: CPT

## 2023-05-03 RX ORDER — CEFDINIR 300 MG/1
300 CAPSULE ORAL 2 TIMES DAILY
Qty: 22 CAPSULE | Refills: 0 | Status: SHIPPED | OUTPATIENT
Start: 2023-05-04 | End: 2023-05-15

## 2023-05-03 RX ADMIN — CEFTRIAXONE SODIUM 2 G: 2 INJECTION, POWDER, FOR SOLUTION INTRAMUSCULAR; INTRAVENOUS at 01:58

## 2023-05-03 RX ADMIN — TAMSULOSIN HYDROCHLORIDE 0.4 MG: 0.4 CAPSULE ORAL at 08:10

## 2023-05-03 RX ADMIN — Medication 10 ML: at 08:10

## 2023-05-03 RX ADMIN — ASPIRIN 81 MG: 81 TABLET, COATED ORAL at 08:10

## 2023-05-03 RX ADMIN — EMPAGLIFLOZIN 10 MG: 10 TABLET, FILM COATED ORAL at 08:11

## 2023-05-03 RX ADMIN — PANTOPRAZOLE SODIUM 40 MG: 40 TABLET, DELAYED RELEASE ORAL at 08:10

## 2023-05-03 RX ADMIN — TICAGRELOR 90 MG: 90 TABLET ORAL at 08:10

## 2023-05-03 RX ADMIN — RANOLAZINE 500 MG: 500 TABLET, FILM COATED, EXTENDED RELEASE ORAL at 08:10

## 2023-05-03 RX ADMIN — BUSPIRONE HYDROCHLORIDE 15 MG: 5 TABLET ORAL at 08:10

## 2023-05-03 NOTE — PLAN OF CARE
Problem: Adult Inpatient Plan of Care  Goal: Plan of Care Review  Outcome: Ongoing, Progressing  Flowsheets (Taken 5/3/2023 0404)  Progress: no change  Plan of Care Reviewed With: patient  Outcome Evaluation: Pt c/o nausea. See MAR. No complaints of pain. IVF infusing. Abx. Voiding. Up ad jaycee. Safety maintained.

## 2023-05-03 NOTE — DISCHARGE SUMMARY
[facility] Medicine Services  DISCHARGE SUMMARY       Date of Admission: 4/30/2023  Date of Discharge:  5/3/2023  Primary Care Physician: Tacho Eng MD    Presenting Problem/History of Present Illness:  Pyelonephritis    Final Discharge Diagnoses:  Active Hospital Problems    Diagnosis    • **Pyelonephritis    • Orthostasis    • Sepsis    • Type 2 diabetes mellitus with circulatory disorder, with long-term current use of insulin    • Essential hypertension    • Mixed hyperlipidemia        Consults: Urology-Dr. Long    Procedures Performed: None    Pertinent Test Results:   Results for orders placed during the hospital encounter of 12/07/22    Adult Transthoracic Echo Complete w/ Color, Spectral and Contrast if Necessary Per Protocol    Interpretation Summary  •  Left ventricular systolic function is low normal. Left ventricular ejection fraction appears to be 51 - 55%.  •  Normal right ventricular cavity size and systolic function noted.  •  No hemodynamically significant valvular abnormalities identified on this study.      Imaging Results (All)     Procedure Component Value Units Date/Time    CT Abdomen Pelvis Without Contrast [[id number]] Collected: 04/30/23 2035     Updated: 04/30/23 2044    Narrative:      CT ABDOMEN PELVIS WO CONTRAST- 4/30/2023 8:18 PM CDT     HISTORY: Flank pain, kidney stone suspected      COMPARISON: CT scan dated 01/02/2019      DOSE LENGTH PRODUCT: 400 mGy cm. Automated exposure control was also  utilized to decrease patient radiation dose.     TECHNIQUE: Noncontrast enhanced images of the abdomen and pelvis  obtained without oral contrast. Multiplanar reformatted images were  provided for review.      FINDINGS:   LOWER CHEST: Left lower lobe bronchiectasis. No consolidation. Coronary  atheromatous calcification.      LIVER: Grossly normal without contrast.      BILIARY SYSTEM: Cholelithiasis. Gallbladder is nondistended.  Biliary  tree is nondilated.     PANCREAS: Grossly normal without contrast.      SPLEEN: Unremarkable.      KIDNEYS: Left kidney is unremarkable. Nonobstructing right inferior pole  renal stones. There is a mild right hydroureteronephrosis as well as a  periureteral and perinephric fat stranding.      ADRENALS: Unremarkable.     RETROPERITONEUM: No adenopathy or hemorrhage.      GI TRACT: Stomach and small bowel are unremarkable. Moderate colonic  stool. Normal appendix.     OTHER: There is no mesenteric mass, lymphadenopathy or fluid collection.  Bones are diffusely osteopenic. Slight degenerative retrolisthesis at  L5-S1.     PELVIS: No mass lesion, fluid collection or significant lymphadenopathy  is seen in the pelvis. The urinary bladder is normal in appearance.       Impression:      1. There is a mild right-sided hydroureteronephrosis as well as a  periureteral and perinephric inflammatory change/stranding. I do not see  any obstructing distal ureteral stone. Differential would include  recently passed stone versus upper urinary tract  infection/pyelonephritis.  2. There are 2 right inferior pole nonobstructing stones.  3. No acute and/or inflammatory change identified along the bowel.  4. Left lower lobe bronchiectasis.  This report was finalized on 04/30/2023 20:41 by Dr Alvino Wilson, .    XR Chest 1 View [[id number]] Collected: 04/30/23 1942     Updated: 04/30/23 1948    Narrative:      Frontal upright radiograph of the chest 4/30/2023 7:19 PM CDT     HISTORY: Fever     COMPARISON: Chest exam dated 12/19/2022.     FINDINGS:     The lungs are clear. The cardiomediastinal silhouette and pulmonary  vascularity are within normal limits. Left chest wall AICD.     The osseous structures and surrounding soft tissues demonstrate no acute  abnormality.       Impression:      1. Stable chest exam without acute process. No visualized pulmonary  infiltrate.        This report was finalized on 04/30/2023 19:44 by   Alvino Wilson, .        LAB RESULTS:      Lab 05/03/23  0540 05/02/23  0501 05/01/23  0417 04/30/23  2303 04/30/23  1852   WBC 9.30 12.83* 15.07*  --  20.04*   HEMOGLOBIN 12.1* 11.8* 13.0  --  14.4   HEMATOCRIT 37.5 37.5 40.1  --  46.3   PLATELETS 385 313 312  --  415   NEUTROS ABS  --   --  12.75*  --  16.84*   IMMATURE GRANS (ABS)  --   --  0.09*  --  0.16*   LYMPHS ABS  --   --  0.59*  --  0.96   MONOS ABS  --   --  1.61*  --  2.03*   EOS ABS  --   --  0.01  --  0.01   MCV 85.4 86.4 87.0  --  88.2   LACTATE  --   --   --  1.0 2.5*   PROTIME  --   --   --   --  13.1         Lab 05/03/23  0541 05/02/23  0501 05/01/23 0417 04/30/23  1852   SODIUM 138 132* 133* 130*   POTASSIUM 3.6 3.6 4.1 4.2   CHLORIDE 103 96* 96* 89*   CO2 24.0 21.0* 19.0* 21.0*   ANION GAP 11.0 15.0 18.0* 20.0*   BUN 17 16 19 22*   CREATININE 0.83 0.93 1.03 1.23   EGFR 102.1 95.8 84.7 68.5   GLUCOSE 121* 101* 102* 124*   CALCIUM 8.7 8.9 8.6 10.1         Lab 05/01/23 0417 04/30/23  1852   TOTAL PROTEIN 6.7 8.4   ALBUMIN 3.7 4.7   GLOBULIN 3.0 3.7   ALT (SGPT) 11 11   AST (SGOT) 14 15   BILIRUBIN 0.4 0.8   ALK PHOS 38* 49   LIPASE  --  17         Lab 04/30/23 1852   PROTIME 13.1   INR 0.98                 Lab 04/30/23 2043   PH, ARTERIAL 7.421   PCO2, ARTERIAL 34.9*   PO2 ART 40.2*   O2 SATURATION ART 67.8*   FIO2 21   HCO3 ART 22.7   BASE EXCESS ART -1.2*     Brief Urine Lab Results  (Last result in the past 365 days)      Color   Clarity   Blood   Leuk Est   Nitrite   Protein   CREAT   Urine HCG        04/30/23 2054 Dark Yellow   Cloudy   Large (3+)   Small (1+)   Negative   100 mg/dL (2+)               Microbiology Results (last 10 days)     Procedure Component Value - Date/Time    Urine Culture - Urine, Urine, Clean Catch [[id number]]  (Normal) Collected: 04/30/23 2054    Lab Status: Final result Specimen: Urine, Clean Catch Updated: 05/01/23 2142     Urine Culture No growth    Blood Culture - Blood, Arm, Left [[id number]]  (Abnormal)  Collected: 04/30/23 1953    Lab Status: Final result Specimen: Blood from Arm, Left Updated: 05/03/23 0527     Blood Culture Staphylococcus epidermidis     Isolated from Anaerobic Bottle     Gram Stain Anaerobic Bottle Gram positive cocci in clusters    Narrative:      Refer to previous blood culture collected on 04/30/2023 1953 for MICs    Blood Culture - Blood, Arm, Right [[id number]]  (Abnormal)  (Susceptibility) Collected: 04/30/23 1853    Lab Status: Final result Specimen: Blood from Arm, Right Updated: 05/03/23 0527     Blood Culture Staphylococcus epidermidis     Isolated from Aerobic Bottle     Gram Stain Aerobic Bottle Gram positive cocci in clusters    Susceptibility      Staphylococcus epidermidis      RICKY      Oxacillin Resistant     Vancomycin Susceptible                       Susceptibility Comments     Staphylococcus epidermidis    This isolate does not demonstrate inducible clindamycin resistance in vitro.               Blood Culture ID, PCR - Blood, Arm, Right [[id number]]  (Abnormal) Collected: 04/30/23 1853    Lab Status: Final result Specimen: Blood from Arm, Right Updated: 05/01/23 1539     BCID, PCR Staph spp, not aureus or lugdunensis. Identification by BCID2 PCR.     BOTTLE TYPE Aerobic Bottle          Hospital Course:  Upon arrival:  Patient presented to [facility] with emergency department on 4/30/2023 with complaints of nausea, dry heaves, low back pain.  He recently had a renal stone removed.  Postoperatively, he reports his hematuria had resolved. Thereafter, he developed chills and nausea.  He also reported a great deal of anxiety related to situation. Urology was consulted.  Decision for inpatient monitoring and management.    Thereafter:  Pyelonephritis meeting sepsis criteria-  Recent kidney stone removal.  Urinalysis shows hematuria and concerns for UTI.  Urine culture ultimately showed no growth. Lactate 2.5 originally, normalized thereafter.  WBC 20 decreased to 9. Blood cultures x2  "blood showing likely contaminant.    2 g IV Rocephin daily utilized inpatient, transition to cefdinir upon discharge for total of 14-day therapy.  Urology consulted and evaluated patient during admission.  Also recommends 14-day antibiotic therapy and no intervention necessary at this time.     Concern for V. tach-  Run of nonsustained V. tach questionable on telemetry 5/1.  Electrophysiology consulted. Dr. Cunha evaluated patient and felt  \"His telemetry tracing is consistent with an RV auto algorithm to check the function of the device.  This is normal device behavior without other evidence of VT.\" recommended no changes at this time.     Dizziness-  Likely related to some hypovolemia in the presence of infection.  Orthostatic blood pressures positive.  Normal saline 1 L IV bolus x1 on 5/2.  Normal saline IV infusion at 100 mL/h thereafter.  Morning of discharge, patient reports multiple ambulations without orthostasis or dizziness.     Type 2 diabetes-  Jardiance, Levemir, sliding scale while inpatient.  Discharged on previous diabetes regimen.     CAD-  Continue Ranexa, Brilinta, Lipitor, aspirin utilized inpatient, continue upon discharge.    Counseled with patient and wife.  They have no further needs or questions at this time.  They expressed understanding of the discharge plan.    Recommend urology follow-up as previously scheduled on 6/13/2023 unless problems arise before.  PCP follow in 1 week.  Patient educated to seek care if flank pain, fever, dysuria returns.  Patient continues to complain of mild lower back pain.  He palpates his lower spine when describing this.  If this persists, recommend exploring musculoskeletal etiology.    Physical Exam on Discharge:  /63 (BP Location: Left arm, Patient Position: Lying)   Pulse 104   Temp 98.8 °F (37.1 °C) (Oral)   Resp 18   Ht 182.9 cm (72\")   Wt 77.2 kg (170 lb 3.2 oz)   SpO2 95%   BMI 23.08 kg/m²   Physical Exam  Vitals reviewed. "   Constitutional:       Appearance: Normal appearance.  Up in bed, appears well.  No distress.  HENT:      Head: Normocephalic and atraumatic.      Right Ear: External ear normal.      Left Ear: External ear normal.      Ears:      Comments: Patient is hard of hearing     Nose: Nose normal.   Eyes:      General: No scleral icterus.     Conjunctiva/sclera: Conjunctivae normal.   Cardiovascular:      Rate and Rhythm: Regular rhythm. Tachycardia resolved.     Heart sounds: Normal heart sounds.   Pulmonary:      Effort: Pulmonary effort is normal.      Breath sounds: Normal breath sounds.   Abdominal:      General: There is no distension.      Palpations: Abdomen is soft.      Tenderness: There is no abdominal tenderness.   Musculoskeletal:         General: No swelling or tenderness.      Cervical back: Normal range of motion and neck supple. Mild lower back pain but ultimately improved.  Skin:     General: Skin is warm and dry.   Neurological:      Mental Status: He is alert and oriented to person, place, and time.      Cranial Nerves: No cranial nerve deficit.   Psychiatric:         Mood and Affect: Mood normal.         Behavior: Behavior normal.     Condition on Discharge: Stable    Discharge Disposition: Discharge home  Home or Self Care    Discharge Medications:     Discharge Medications      New Medications      Instructions Start Date   cefdinir 300 MG capsule  Commonly known as: OMNICEF   300 mg, Oral, 2 Times Daily   Start Date: May 4, 2023        Continue These Medications      Instructions Start Date   aspirin 81 MG EC tablet   81 mg, Oral, Daily      atorvastatin 40 MG tablet  Commonly known as: LIPITOR   40 mg, Oral, Daily      busPIRone 15 MG tablet  Commonly known as: BUSPAR   15 mg, Oral, 3 Times Daily      docusate sodium 100 MG capsule  Commonly known as: Colace   100 mg, Oral, 2 Times Daily      Farxiga 5 MG tablet tablet  Generic drug: dapagliflozin   5 mg, Oral, Daily      fenofibrate 160 MG  tablet   160 mg, Oral, Daily      HYDROcodone-acetaminophen 7.5-325 MG per tablet  Commonly known as: NORCO   1 tablet, Oral, Every 6 Hours PRN      icosapent ethyl 1 g capsule capsule  Commonly known as: VASCEPA   2 g, Oral, 2 Times Daily With Meals      Insulin Glargine-yfgn 100 UNIT/ML solution pen-injector   16 Units, Subcutaneous, Nightly      Januvia 100 MG tablet  Generic drug: SITagliptin   100 mg, Oral, Daily      metFORMIN 1000 MG tablet  Commonly known as: GLUCOPHAGE   1,000 mg, Oral, 2 Times Daily With Meals      nitroglycerin 0.4 MG SL tablet  Commonly known as: NITROSTAT   0.4 mg, Sublingual, Every 5 Minutes PRN, Take no more than 3 doses in 15 minutes.      NovoLOG FlexPen 100 UNIT/ML solution pen-injector sc pen  Generic drug: insulin aspart   1-10 Units, 3 Times Daily With Meals, Sliding Scale      omeprazole 20 MG capsule  Commonly known as: priLOSEC   20 mg, Oral, Daily      ondansetron ODT 4 MG disintegrating tablet  Commonly known as: ZOFRAN-ODT   4 mg, Translingual, Every 6 Hours PRN      oxybutynin 5 MG tablet  Commonly known as: DITROPAN   5 mg, Oral, Every 8 Hours PRN      predniSONE 10 MG tablet  Commonly known as: DELTASONE   10 mg, Oral, Daily      ranolazine 500 MG 12 hr tablet  Commonly known as: RANEXA   500 mg, Oral, 2 Times Daily      tamsulosin 0.4 MG capsule 24 hr capsule  Commonly known as: FLOMAX   0.4 mg, Oral, Daily      ticagrelor 90 MG tablet tablet  Commonly known as: BRILINTA   90 mg, Oral, 2 Times Daily      valACYclovir 1000 MG tablet  Commonly known as: VALTREX   2,000 mg, Oral, 2 Times Daily PRN, TAKE 2 TABLETS BY MOUTH AT ONSET OF COLD SORE SYMPTOMS, THEN TAKE 2 MORE TABLETS BY MOUTH 12 HOURS LATER      verapamil  MG CR tablet  Commonly known as: CALAN-SR   180 mg, Oral, Nightly      vitamin D3 125 MCG (5000 UT) capsule capsule   5,000 Units, Oral, Daily         Stop These Medications    phenazopyridine 100 MG tablet  Commonly known as: PYRIDIUM           Discharge Diet:   Diet Instructions     Diet: Cardiac Diets, Diabetic Diets; No Salt Packet; Regular Texture (IDDSI 7); Thin (IDDSI 0); Consistent Carbohydrate      Discharge Diet:  Cardiac Diets  Diabetic Diets       Cardiac Diet: No Salt Packet    Texture: Regular Texture (IDDSI 7)    Fluid Consistency: Thin (IDDSI 0)    Diabetic Diet: Consistent Carbohydrate           Activity at Discharge:   Activity Instructions     Activity as Tolerated            Follow-up Appointments:   Future Appointments   Date Time Provider Department Center   6/13/2023  1:45 PM Lary Garvey APRN [facility]   7/7/2023 10:00 AM Arleth Chavarria PA [facility]   2/8/2024  2:00 PM [facility] DEVICE CHECK [facility]       Test Results Pending at Discharge: None     Electronically signed by JAE Humphries, 05/03/23, 07:38 CDT.    Time: 35 minutes.

## 2023-05-03 NOTE — PAYOR COMM NOTE
"REF:  [id number]    [facility]  [place],   [phone]  OR  FAX  [phone]      Iftikhar Francis (57 y.o. Male)     Date of Birth   1965    Social Security Number       Address   [address] [facility] [address]    Home Phone   [phone]    MRN   [id number]       Taoism   Gnosticism    Marital Status                               Admission Date   4/30/23    Admission Type   Emergency    Admitting Provider   Alton Burton DO    Attending Provider       Department, Room/Bed   [facility] 3C, 360/1       Discharge Date   5/3/2023    Discharge Disposition   Home or Self Care    Discharge Destination                               Attending Provider: (none)   Allergies: Levofloxacin, Moxifloxacin    Isolation: None   Infection: None   Code Status: Prior    Ht: 182.9 cm (72\")   Wt: 77.2 kg (170 lb 3.2 oz)    Admission Cmt: None   Principal Problem: Pyelonephritis [N12]                 Active Insurance as of 4/30/2023     Primary Coverage     Payor Plan Insurance Group Employer/Plan Group    HUMANA MEDICARE REPLACEMENT HUMANA MEDICARE REPLACEMENT [id number]     Payor Plan Address Payor Plan Phone Number Payor Plan Fax Number Effective Dates    [address] [address] [phone]  4/1/2023 - None Entered    [facility] [id number]       Subscriber Name Subscriber Birth Date Member ID       IFTIKHAR FRANCIS 1965 [id number]                 Emergency Contacts      (Rel.) Home Phone Work Phone Mobile Phone    Deborah Francis (Spouse) -- -- [phone]               Discharge Summary      Vasile Sabillon APRN at 05/03/23 0718                [facility] Medicine Services  DISCHARGE SUMMARY       Date of Admission: 4/30/2023  Date of Discharge:  5/3/2023  Primary Care Physician: Tacho Eng MD    Presenting Problem/History of Present Illness:  Pyelonephritis    Final Discharge Diagnoses:  Active Hospital Problems    " Diagnosis    • **Pyelonephritis    • Orthostasis    • Sepsis    • Type 2 diabetes mellitus with circulatory disorder, with long-term current use of insulin    • Essential hypertension    • Mixed hyperlipidemia        Consults: Urology-Dr. Long    Procedures Performed: None    Pertinent Test Results:   Results for orders placed during the hospital encounter of 12/07/22    Adult Transthoracic Echo Complete w/ Color, Spectral and Contrast if Necessary Per Protocol    Interpretation Summary  •  Left ventricular systolic function is low normal. Left ventricular ejection fraction appears to be 51 - 55%.  •  Normal right ventricular cavity size and systolic function noted.  •  No hemodynamically significant valvular abnormalities identified on this study.      Imaging Results (All)     Procedure Component Value Units Date/Time    CT Abdomen Pelvis Without Contrast [[id number]] Collected: 04/30/23 2035     Updated: 04/30/23 2044    Narrative:      CT ABDOMEN PELVIS WO CONTRAST- 4/30/2023 8:18 PM CDT     HISTORY: Flank pain, kidney stone suspected      COMPARISON: CT scan dated 01/02/2019      DOSE LENGTH PRODUCT: 400 mGy cm. Automated exposure control was also  utilized to decrease patient radiation dose.     TECHNIQUE: Noncontrast enhanced images of the abdomen and pelvis  obtained without oral contrast. Multiplanar reformatted images were  provided for review.      FINDINGS:   LOWER CHEST: Left lower lobe bronchiectasis. No consolidation. Coronary  atheromatous calcification.      LIVER: Grossly normal without contrast.      BILIARY SYSTEM: Cholelithiasis. Gallbladder is nondistended. Biliary  tree is nondilated.     PANCREAS: Grossly normal without contrast.      SPLEEN: Unremarkable.      KIDNEYS: Left kidney is unremarkable. Nonobstructing right inferior pole  renal stones. There is a mild right hydroureteronephrosis as well as a  periureteral and perinephric fat stranding.      ADRENALS: Unremarkable.      RETROPERITONEUM: No adenopathy or hemorrhage.      GI TRACT: Stomach and small bowel are unremarkable. Moderate colonic  stool. Normal appendix.     OTHER: There is no mesenteric mass, lymphadenopathy or fluid collection.  Bones are diffusely osteopenic. Slight degenerative retrolisthesis at  L5-S1.     PELVIS: No mass lesion, fluid collection or significant lymphadenopathy  is seen in the pelvis. The urinary bladder is normal in appearance.       Impression:      1. There is a mild right-sided hydroureteronephrosis as well as a  periureteral and perinephric inflammatory change/stranding. I do not see  any obstructing distal ureteral stone. Differential would include  recently passed stone versus upper urinary tract  infection/pyelonephritis.  2. There are 2 right inferior pole nonobstructing stones.  3. No acute and/or inflammatory change identified along the bowel.  4. Left lower lobe bronchiectasis.  This report was finalized on 04/30/2023 20:41 by Dr Alvino Wilson, .    XR Chest 1 View [[id number]] Collected: 04/30/23 1942     Updated: 04/30/23 1948    Narrative:      Frontal upright radiograph of the chest 4/30/2023 7:19 PM CDT     HISTORY: Fever     COMPARISON: Chest exam dated 12/19/2022.     FINDINGS:     The lungs are clear. The cardiomediastinal silhouette and pulmonary  vascularity are within normal limits. Left chest wall AICD.     The osseous structures and surrounding soft tissues demonstrate no acute  abnormality.       Impression:      1. Stable chest exam without acute process. No visualized pulmonary  infiltrate.        This report was finalized on 04/30/2023 19:44 by Dr Alvino Wilson, .        LAB RESULTS:      Lab 05/03/23  0540 05/02/23  0501 05/01/23  0417 04/30/23  2303 04/30/23  1852   WBC 9.30 12.83* 15.07*  --  20.04*   HEMOGLOBIN 12.1* 11.8* 13.0  --  14.4   HEMATOCRIT 37.5 37.5 40.1  --  46.3   PLATELETS 385 313 312  --  415   NEUTROS ABS  --   --  12.75*  --  16.84*   IMMATURE GRANS  (ABS)  --   --  0.09*  --  0.16*   LYMPHS ABS  --   --  0.59*  --  0.96   MONOS ABS  --   --  1.61*  --  2.03*   EOS ABS  --   --  0.01  --  0.01   MCV 85.4 86.4 87.0  --  88.2   LACTATE  --   --   --  1.0 2.5*   PROTIME  --   --   --   --  13.1         Lab 05/03/23  0541 05/02/23  0501 05/01/23 0417 04/30/23  1852   SODIUM 138 132* 133* 130*   POTASSIUM 3.6 3.6 4.1 4.2   CHLORIDE 103 96* 96* 89*   CO2 24.0 21.0* 19.0* 21.0*   ANION GAP 11.0 15.0 18.0* 20.0*   BUN 17 16 19 22*   CREATININE 0.83 0.93 1.03 1.23   EGFR 102.1 95.8 84.7 68.5   GLUCOSE 121* 101* 102* 124*   CALCIUM 8.7 8.9 8.6 10.1         Lab 05/01/23 0417 04/30/23  1852   TOTAL PROTEIN 6.7 8.4   ALBUMIN 3.7 4.7   GLOBULIN 3.0 3.7   ALT (SGPT) 11 11   AST (SGOT) 14 15   BILIRUBIN 0.4 0.8   ALK PHOS 38* 49   LIPASE  --  17         Lab 04/30/23 1852   PROTIME 13.1   INR 0.98                 Lab 04/30/23 2043   PH, ARTERIAL 7.421   PCO2, ARTERIAL 34.9*   PO2 ART 40.2*   O2 SATURATION ART 67.8*   FIO2 21   HCO3 ART 22.7   BASE EXCESS ART -1.2*     Brief Urine Lab Results  (Last result in the past 365 days)      Color   Clarity   Blood   Leuk Est   Nitrite   Protein   CREAT   Urine HCG        04/30/23 2054 Dark Yellow   Cloudy   Large (3+)   Small (1+)   Negative   100 mg/dL (2+)               Microbiology Results (last 10 days)     Procedure Component Value - Date/Time    Urine Culture - Urine, Urine, Clean Catch [[id number]]  (Normal) Collected: 04/30/23 2054    Lab Status: Final result Specimen: Urine, Clean Catch Updated: 05/01/23 2142     Urine Culture No growth    Blood Culture - Blood, Arm, Left [[id number]]  (Abnormal) Collected: 04/30/23 1953    Lab Status: Final result Specimen: Blood from Arm, Left Updated: 05/03/23 0527     Blood Culture Staphylococcus epidermidis     Isolated from Anaerobic Bottle     Gram Stain Anaerobic Bottle Gram positive cocci in clusters    Narrative:      Refer to previous blood culture collected on 04/30/2023 1953 for  MICs    Blood Culture - Blood, Arm, Right [[id number]]  (Abnormal)  (Susceptibility) Collected: 04/30/23 1853    Lab Status: Final result Specimen: Blood from Arm, Right Updated: 05/03/23 0527     Blood Culture Staphylococcus epidermidis     Isolated from Aerobic Bottle     Gram Stain Aerobic Bottle Gram positive cocci in clusters    Susceptibility      Staphylococcus epidermidis      RICKY      Oxacillin Resistant     Vancomycin Susceptible                       Susceptibility Comments     Staphylococcus epidermidis    This isolate does not demonstrate inducible clindamycin resistance in vitro.               Blood Culture ID, PCR - Blood, Arm, Right [[id number]]  (Abnormal) Collected: 04/30/23 1853    Lab Status: Final result Specimen: Blood from Arm, Right Updated: 05/01/23 1539     BCID, PCR Staph spp, not aureus or lugdunensis. Identification by BCID2 PCR.     BOTTLE TYPE Aerobic Bottle          Hospital Course:  Upon arrival:  Patient presented to [facility] with emergency department on 4/30/2023 with complaints of nausea, dry heaves, low back pain.  He recently had a renal stone removed.  Postoperatively, he reports his hematuria had resolved. Thereafter, he developed chills and nausea.  He also reported a great deal of anxiety related to situation. Urology was consulted.  Decision for inpatient monitoring and management.    Thereafter:  Pyelonephritis meeting sepsis criteria-  Recent kidney stone removal.  Urinalysis shows hematuria and concerns for UTI.  Urine culture ultimately showed no growth. Lactate 2.5 originally, normalized thereafter.  WBC 20 decreased to 9. Blood cultures x2 blood showing likely contaminant.    2 g IV Rocephin daily utilized inpatient, transition to cefdinir upon discharge for total of 14-day therapy.  Urology consulted and evaluated patient during admission.  Also recommends 14-day antibiotic therapy and no intervention necessary at this time.     Concern for V. tach-  Run of nonsustained  "V. tach questionable on telemetry 5/1.  Electrophysiology consulted. Dr. Cunha evaluated patient and felt  \"His telemetry tracing is consistent with an RV auto algorithm to check the function of the device.  This is normal device behavior without other evidence of VT.\" recommended no changes at this time.     Dizziness-  Likely related to some hypovolemia in the presence of infection.  Orthostatic blood pressures positive.  Normal saline 1 L IV bolus x1 on 5/2.  Normal saline IV infusion at 100 mL/h thereafter.  Morning of discharge, patient reports multiple ambulations without orthostasis or dizziness.     Type 2 diabetes-  Jardiance, Levemir, sliding scale while inpatient.  Discharged on previous diabetes regimen.     CAD-  Continue Ranexa, Brilinta, Lipitor, aspirin utilized inpatient, continue upon discharge.    Counseled with patient and wife.  They have no further needs or questions at this time.  They expressed understanding of the discharge plan.    Recommend urology follow-up as previously scheduled on 6/13/2023 unless problems arise before.  PCP follow in 1 week.  Patient educated to seek care if flank pain, fever, dysuria returns.  Patient continues to complain of mild lower back pain.  He palpates his lower spine when describing this.  If this persists, recommend exploring musculoskeletal etiology.    Physical Exam on Discharge:  /63 (BP Location: Left arm, Patient Position: Lying)   Pulse 104   Temp 98.8 °F (37.1 °C) (Oral)   Resp 18   Ht 182.9 cm (72\")   Wt 77.2 kg (170 lb 3.2 oz)   SpO2 95%   BMI 23.08 kg/m²   Physical Exam  Vitals reviewed.   Constitutional:       Appearance: Normal appearance.  Up in bed, appears well.  No distress.  HENT:      Head: Normocephalic and atraumatic.      Right Ear: External ear normal.      Left Ear: External ear normal.      Ears:      Comments: Patient is hard of hearing     Nose: Nose normal.   Eyes:      General: No scleral " icterus.     Conjunctiva/sclera: Conjunctivae normal.   Cardiovascular:      Rate and Rhythm: Regular rhythm. Tachycardia resolved.     Heart sounds: Normal heart sounds.   Pulmonary:      Effort: Pulmonary effort is normal.      Breath sounds: Normal breath sounds.   Abdominal:      General: There is no distension.      Palpations: Abdomen is soft.      Tenderness: There is no abdominal tenderness.   Musculoskeletal:         General: No swelling or tenderness.      Cervical back: Normal range of motion and neck supple. Mild lower back pain but ultimately improved.  Skin:     General: Skin is warm and dry.   Neurological:      Mental Status: He is alert and oriented to person, place, and time.      Cranial Nerves: No cranial nerve deficit.   Psychiatric:         Mood and Affect: Mood normal.         Behavior: Behavior normal.     Condition on Discharge: Stable    Discharge Disposition: Discharge home  Home or Self Care    Discharge Medications:     Discharge Medications      New Medications      Instructions Start Date   cefdinir 300 MG capsule  Commonly known as: OMNICEF   300 mg, Oral, 2 Times Daily   Start Date: May 4, 2023        Continue These Medications      Instructions Start Date   aspirin 81 MG EC tablet   81 mg, Oral, Daily      atorvastatin 40 MG tablet  Commonly known as: LIPITOR   40 mg, Oral, Daily      busPIRone 15 MG tablet  Commonly known as: BUSPAR   15 mg, Oral, 3 Times Daily      docusate sodium 100 MG capsule  Commonly known as: Colace   100 mg, Oral, 2 Times Daily      Farxiga 5 MG tablet tablet  Generic drug: dapagliflozin   5 mg, Oral, Daily      fenofibrate 160 MG tablet   160 mg, Oral, Daily      HYDROcodone-acetaminophen 7.5-325 MG per tablet  Commonly known as: NORCO   1 tablet, Oral, Every 6 Hours PRN      icosapent ethyl 1 g capsule capsule  Commonly known as: VASCEPA   2 g, Oral, 2 Times Daily With Meals      Insulin Glargine-yfgn 100 UNIT/ML solution pen-injector   16 Units,  Subcutaneous, Nightly      Januvia 100 MG tablet  Generic drug: SITagliptin   100 mg, Oral, Daily      metFORMIN 1000 MG tablet  Commonly known as: GLUCOPHAGE   1,000 mg, Oral, 2 Times Daily With Meals      nitroglycerin 0.4 MG SL tablet  Commonly known as: NITROSTAT   0.4 mg, Sublingual, Every 5 Minutes PRN, Take no more than 3 doses in 15 minutes.      NovoLOG FlexPen 100 UNIT/ML solution pen-injector sc pen  Generic drug: insulin aspart   1-10 Units, 3 Times Daily With Meals, Sliding Scale      omeprazole 20 MG capsule  Commonly known as: priLOSEC   20 mg, Oral, Daily      ondansetron ODT 4 MG disintegrating tablet  Commonly known as: ZOFRAN-ODT   4 mg, Translingual, Every 6 Hours PRN      oxybutynin 5 MG tablet  Commonly known as: DITROPAN   5 mg, Oral, Every 8 Hours PRN      predniSONE 10 MG tablet  Commonly known as: DELTASONE   10 mg, Oral, Daily      ranolazine 500 MG 12 hr tablet  Commonly known as: RANEXA   500 mg, Oral, 2 Times Daily      tamsulosin 0.4 MG capsule 24 hr capsule  Commonly known as: FLOMAX   0.4 mg, Oral, Daily      ticagrelor 90 MG tablet tablet  Commonly known as: BRILINTA   90 mg, Oral, 2 Times Daily      valACYclovir 1000 MG tablet  Commonly known as: VALTREX   2,000 mg, Oral, 2 Times Daily PRN, TAKE 2 TABLETS BY MOUTH AT ONSET OF COLD SORE SYMPTOMS, THEN TAKE 2 MORE TABLETS BY MOUTH 12 HOURS LATER      verapamil  MG CR tablet  Commonly known as: CALAN-SR   180 mg, Oral, Nightly      vitamin D3 125 MCG (5000 UT) capsule capsule   5,000 Units, Oral, Daily         Stop These Medications    phenazopyridine 100 MG tablet  Commonly known as: PYRIDIUM          Discharge Diet:   Diet Instructions     Diet: Cardiac Diets, Diabetic Diets; No Salt Packet; Regular Texture (IDDSI 7); Thin (IDDSI 0); Consistent Carbohydrate      Discharge Diet:  Cardiac Diets  Diabetic Diets       Cardiac Diet: No Salt Packet    Texture: Regular Texture (IDDSI 7)    Fluid Consistency: Thin (IDDSI 0)     Diabetic Diet: Consistent Carbohydrate           Activity at Discharge:   Activity Instructions     Activity as Tolerated            Follow-up Appointments:   Future Appointments   Date Time Provider Department Center   6/13/2023  1:45 PM Lary Garvey APRN [facility] U PAD PAD   7/7/2023 10:00 AM Arleth Chavarria PA [facility] PAD   2/8/2024  2:00 PM [facility] DEVICE CHECK [facility]       Test Results Pending at Discharge: None     Electronically signed by JAE Humphries, 05/03/23, 07:38 CDT.    Time: 35 minutes.         Electronically signed by Vasile Sabillon APRN at 05/03/23 0739       Discharge Order (From admission, onward)     Start     Ordered    05/03/23 0727  Discharge patient  Once        Expected Discharge Date: 05/03/23    Expected Discharge Time: 07:31    Discharge Disposition: Home or Self Care    Physician of Record for Attribution - Please select from Treatment Team: EZEQUIEL CASILLAS [[id number]]    Review needed by CMO to determine Physician of Record: No       Question Answer Comment   Physician of Record for Attribution - Please select from Treatment Team EZEQUIEL CASILLAS    Review needed by CMO to determine Physician of Record No        05/03/23 0731

## 2023-05-04 NOTE — OUTREACH NOTE
Prep Survey    Flowsheet Row Responses   [facility] patient discharged from? [place]   Is LACE score < 7 ? No   Eligibility Readm Mgmt   Discharge diagnosis Pyelonephritis   Does the patient have one of the following disease processes/diagnoses(primary or secondary)? Other   Does the patient have Home health ordered? No   Is there a DME ordered? No   Prep survey completed? Yes          Maile CELAYA - Registered Nurse

## 2023-05-10 DIAGNOSIS — I47.20 VENTRICULAR TACHYCARDIA: Primary | ICD-10-CM

## 2023-05-11 ENCOUNTER — TELEPHONE (OUTPATIENT)
Dept: CARDIOLOGY | Facility: CLINIC | Age: 58
End: 2023-05-11
Payer: MEDICARE

## 2023-05-11 NOTE — TELEPHONE ENCOUNTER
----- Message from Mahin Cunha MD sent at 5/10/2023  8:28 PM CDT -----  Can you let him know his defibrillator has noted some faster arrhythmias recently.  Nothing to suggest a life threatening rhythm, but I think it would be best if we did a wearable holter for 2 weeks to better define what these are.  I will place the order.    Thanks,  Mahin

## 2023-05-12 ENCOUNTER — READMISSION MANAGEMENT (OUTPATIENT)
Dept: CALL CENTER | Facility: HOSPITAL | Age: 58
End: 2023-05-12
Payer: MEDICARE

## 2023-05-12 NOTE — OUTREACH NOTE
Medical Week 2 Survey    Flowsheet Row Responses   [facility] patient discharged from? [place]   Does the patient have one of the following disease processes/diagnoses(primary or secondary)? Other   Week 2 attempt successful? Yes   Call start time 1534   Discharge diagnosis Pyelonephritis   Call end time 1536   Is patient permission given to speak with other caregiver? Yes   List who call center can speak with Deborah spouse    Person spoke with today (if not patient) and relationship Deborah spouse    Meds reviewed with patient/caregiver? Yes   Is the patient taking all medications as directed (includes completed medication regime)? Yes   Has the patient kept scheduled appointments due by today? Yes   Has home health visited the patient within 72 hours of discharge? N/A   Psychosocial issues? No   Did the patient receive a copy of their discharge instructions? Yes   Nursing interventions Reviewed instructions with patient   What is the patient's perception of their health status since discharge? Same   Is the patient/caregiver able to teach back signs and symptoms related to disease process for when to call PCP? Yes   Is the patient/caregiver able to teach back signs and symptoms related to disease process for when to call 911? Yes   Is the patient/caregiver able to teach back the hierarchy of who to call/visit for symptoms/problems? PCP, Specialist, Home health nurse, Urgent Care, ED, 911 Yes   If the patient is a current smoker, are they able to teach back resources for cessation? Not a smoker   Week 2 Call Completed? Yes          Vandana JONES - Registered Nurse

## 2023-05-24 ENCOUNTER — READMISSION MANAGEMENT (OUTPATIENT)
Dept: CALL CENTER | Facility: HOSPITAL | Age: 58
End: 2023-05-24
Payer: MEDICARE

## 2023-05-24 NOTE — OUTREACH NOTE
Medical Week 3 Survey    Flowsheet Row Responses   [facility] patient discharged from? [place]   Does the patient have one of the following disease processes/diagnoses(primary or secondary)? Other   Week 3 attempt successful? Yes   Call start time 1508   Call end time 1509   Discharge diagnosis Pyelonephritis   Person spoke with today (if not patient) and relationship Deborah spouse    Medication comments ATBs completed   Does the patient have a primary care provider?  Yes   Comments regarding PCP PCP f/u completed 5/11/23   Has the patient kept scheduled appointments due by today? Yes   Comments Urology appt on 6/13/23   Has home health visited the patient within 72 hours of discharge? N/A   Psychosocial issues? No   Did the patient receive a copy of their discharge instructions? Yes   What is the patient's perception of their health status since discharge? Improving  [Wife reports that pt has no further pain or hematuria. Denies issues or concerns--no questions today]   Is the patient/caregiver able to teach back signs and symptoms related to disease process for when to call PCP? Yes   Week 3 Call Completed? Yes   Graduated Yes          TAYLOR Palomo Registered Nurse

## 2023-06-08 NOTE — PROGRESS NOTES
Subjective    Mr. Francis is 57 y.o. male    Chief Complaint: kidney stone    History of Present Illness    57-year-old male established patient in for follow-up as patient is s/p right ureteroscopy laser lithotripsy with stent insertion by Dr. Cedillo 4/21/2023 due to a large right renal pelvic stone.  Patient became symptomatic with nausea vomiting and right flank pain shortly after stent removal requiring short course hospitalization 4/30/2023 for suspected pyelonephritis based on CT abdomen pelvis imaging however urine culture and blood culture showed no growth.  Patient was treated empirically and has completed all antibiotics at this time.  Patient is now currently asymptomatic.  During hospitalization CT imaging patient was found to have remaining nonobstructing left lower pole stone burden.    Renal ultrasound today showing no hydronephrosis or stones visualized.    The following portions of the patient's history were reviewed and updated as appropriate: allergies, current medications, past family history, past medical history, past social history, past surgical history and problem list.    Review of Systems   Constitutional:  Negative for chills and fever.   Gastrointestinal:  Negative for abdominal pain, anal bleeding, blood in stool, nausea and vomiting.   Genitourinary:  Negative for decreased urine volume, difficulty urinating, dysuria, flank pain, frequency, hematuria and urgency.       Current Outpatient Medications:     aspirin 81 MG EC tablet, Take 1 tablet by mouth Daily., Disp: , Rfl:     atorvastatin (LIPITOR) 40 MG tablet, Take 1 tablet by mouth Daily., Disp: 90 tablet, Rfl: 3    busPIRone (BUSPAR) 15 MG tablet, Take 1 tablet by mouth 3 (Three) Times a Day., Disp: , Rfl: 5    dapagliflozin (Farxiga) 5 MG tablet tablet, Take 1 tablet by mouth Daily., Disp: 30 tablet, Rfl: 11    docusate sodium (Colace) 100 MG capsule, Take 1 capsule by mouth 2 (Two) Times a Day., Disp: 60 capsule, Rfl: 1     ezetimibe (ZETIA) 10 MG tablet, Take 1 tablet by mouth Daily., Disp: , Rfl:     fenofibrate 160 MG tablet, Take 1 tablet by mouth Daily., Disp: , Rfl:     icosapent ethyl (VASCEPA) 1 g capsule capsule, Take 2 g by mouth 2 (Two) Times a Day With Meals., Disp: 360 capsule, Rfl: 3    Insulin Glargine-yfgn 100 UNIT/ML solution pen-injector, Inject 16 Units under the skin into the appropriate area as directed Every Night., Disp: , Rfl:     Januvia 100 MG tablet, Take 1 tablet by mouth Daily., Disp: , Rfl:     metFORMIN (GLUCOPHAGE) 1000 MG tablet, Take 1 tablet by mouth 2 (Two) Times a Day With Meals., Disp: , Rfl:     nitroglycerin (NITROSTAT) 0.4 MG SL tablet, Place 1 tablet under the tongue Every 5 (Five) Minutes As Needed for Chest Pain. Take no more than 3 doses in 15 minutes., Disp: , Rfl:     NovoLOG FlexPen 100 UNIT/ML solution pen-injector sc pen, 1-10 Units 3 (Three) Times a Day With Meals. Sliding Scale, Disp: , Rfl:     omeprazole (priLOSEC) 20 MG capsule, Take 1 capsule by mouth Daily., Disp: , Rfl:     ondansetron ODT (ZOFRAN-ODT) 4 MG disintegrating tablet, Place 1 tablet on the tongue Every 6 (Six) Hours As Needed for Nausea., Disp: 6 tablet, Rfl: 1    predniSONE (DELTASONE) 10 MG tablet, Take 1 tablet by mouth Daily., Disp: , Rfl:     ranolazine (RANEXA) 500 MG 12 hr tablet, Take 1 tablet by mouth 2 (Two) Times a Day., Disp: , Rfl:     tamsulosin (FLOMAX) 0.4 MG capsule 24 hr capsule, Take 1 capsule by mouth Daily., Disp: 30 capsule, Rfl: 0    ticagrelor (BRILINTA) 90 MG tablet tablet, Take 1 tablet by mouth 2 (Two) Times a Day., Disp: 60 tablet, Rfl: 11    valACYclovir (VALTREX) 1000 MG tablet, Take 2 tablets by mouth 2 (Two) Times a Day As Needed (cold sores). TAKE 2 TABLETS BY MOUTH AT ONSET OF COLD SORE SYMPTOMS, THEN TAKE 2 MORE TABLETS BY MOUTH 12 HOURS LATER, Disp: , Rfl:     verapamil SR (CALAN-SR) 180 MG CR tablet, Take 1 tablet by mouth Every Night., Disp: , Rfl:     vitamin D3 125 MCG (5000  UT) capsule capsule, Take 1 capsule by mouth Daily., Disp: , Rfl:     HYDROcodone-acetaminophen (NORCO) 7.5-325 MG per tablet, Take 1 tablet by mouth Every 6 (Six) Hours As Needed for Severe Pain (postop pain). (Patient not taking: Reported on 6/13/2023), Disp: 15 tablet, Rfl: 0    ondansetron (ZOFRAN) 4 MG tablet, Take 1 tablet by mouth Every 6 (Six) Hours As Needed., Disp: , Rfl:     oxybutynin (DITROPAN) 5 MG tablet, Take 1 tablet by mouth Every 8 (Eight) Hours As Needed (bladder spasms). (Patient not taking: Reported on 6/13/2023), Disp: 30 tablet, Rfl: 1    Past Medical History:   Diagnosis Date    Anxiety     Bilateral kidney stones     Bronchitis     Hearing loss     Heart attack     Left ureteral stone     Myasthenia gravis     Peptic ulceration     Pneumonia     Sleep apnea     bi-pap at times    Type 2 diabetes mellitus with circulatory disorder, with long-term current use of insulin 09/27/2022       Past Surgical History:   Procedure Laterality Date    CARDIAC CATHETERIZATION Right 09/11/2022    Procedure: Left Heart Cath;  Surgeon: Tal Greenberg DO;  Location:  [facility];  Service: Cardiovascular;  Laterality: Right;    CARDIAC ELECTROPHYSIOLOGY PROCEDURE N/A 12/08/2022    Procedure: EP/Ablation;  Surgeon: Mahin Cunha MD;  Location:  [facility];  Service: Cardiology;  Laterality: N/A;    CARDIAC ELECTROPHYSIOLOGY PROCEDURE Left 12/19/2022    Procedure: Device Implant, [employer], single chamber;  Surgeon: Mahin Cunha MD;  Location:  [facility];  Service: Cardiology;  Laterality: Left;    COLONOSCOPY      CORONARY ANGIOPLASTY WITH STENT PLACEMENT      HERNIA REPAIR Left     Inguinal Hernia    SHOULDER SURGERY Left     TUMOR REMOVAL      URETEROSCOPY LASER LITHOTRIPSY WITH STENT INSERTION Right 4/21/2023    Procedure: URETEROSCOPY LASER LITHOTRIPSY WITH STENT INSERTION-right;  Surgeon: Raghavendra Cedillo MD;  Location: [facility]  "OR;  Service: Urology;  Laterality: Right;    VASECTOMY         Social History     Socioeconomic History    Marital status:    Tobacco Use    Smoking status: Never    Smokeless tobacco: Never   Vaping Use    Vaping Use: Never used   Substance and Sexual Activity    Alcohol use: Yes     Comment: occassionally    Drug use: Never    Sexual activity: Defer       Family History   Problem Relation Age of Onset    Hypertension Father     No Known Problems Mother     No Known Problems Sister     No Known Problems Brother        Objective    Temp 97.1 °F (36.2 °C)   Ht 182.9 cm (72\")   Wt 75.7 kg (166 lb 12.8 oz)   BMI 22.62 kg/m²     Physical Exam  Constitutional:       Appearance: Normal appearance.   Abdominal:      Tenderness: There is no right CVA tenderness or left CVA tenderness.   Skin:     General: Skin is warm and dry.   Neurological:      Mental Status: He is alert and oriented to person, place, and time.   Psychiatric:         Mood and Affect: Mood normal.         Behavior: Behavior normal.           Results for orders placed or performed during the hospital encounter of 04/30/23   Blood Culture - Blood, Arm, Left    Specimen: Arm, Left; Blood   Result Value Ref Range    Blood Culture Staphylococcus epidermidis (C)     Isolated from Anaerobic Bottle     Gram Stain Anaerobic Bottle Gram positive cocci in clusters (C)    Blood Culture - Blood, Arm, Right    Specimen: Arm, Right; Blood   Result Value Ref Range    Blood Culture Staphylococcus epidermidis (C)     Isolated from Aerobic Bottle     Gram Stain Aerobic Bottle Gram positive cocci in clusters (C)        Susceptibility    Staphylococcus epidermidis - RICKY*     Oxacillin  Resistant ug/ml     Vancomycin  Susceptible ug/ml     * This isolate does not demonstrate inducible clindamycin resistance in vitro.     Urine Culture - Urine, Urine, Clean Catch    Specimen: Urine, Clean Catch   Result Value Ref Range    Urine Culture No growth    Blood Culture ID, " PCR - Blood, Arm, Right    Specimen: Arm, Right; Blood   Result Value Ref Range    BCID, PCR (A) Negative by BCID PCR. Culture to Follow.     Staph spp, not aureus or lugdunensis. Identification by BCID2 PCR.    BOTTLE TYPE Aerobic Bottle    Comprehensive Metabolic Panel    Specimen: Blood   Result Value Ref Range    Glucose 124 (H) 65 - 99 mg/dL    BUN 22 (H) 6 - 20 mg/dL    Creatinine 1.23 0.76 - 1.27 mg/dL    Sodium 130 (L) 136 - 145 mmol/L    Potassium 4.2 3.5 - 5.2 mmol/L    Chloride 89 (L) 98 - 107 mmol/L    CO2 21.0 (L) 22.0 - 29.0 mmol/L    Calcium 10.1 8.6 - 10.5 mg/dL    Total Protein 8.4 6.0 - 8.5 g/dL    Albumin 4.7 3.5 - 5.2 g/dL    ALT (SGPT) 11 1 - 41 U/L    AST (SGOT) 15 1 - 40 U/L    Alkaline Phosphatase 49 39 - 117 U/L    Total Bilirubin 0.8 0.0 - 1.2 mg/dL    Globulin 3.7 gm/dL    A/G Ratio 1.3 g/dL    BUN/Creatinine Ratio 17.9 7.0 - 25.0    Anion Gap 20.0 (H) 5.0 - 15.0 mmol/L    eGFR 68.5 >60.0 mL/min/1.73   Lipase    Specimen: Blood   Result Value Ref Range    Lipase 17 13 - 60 U/L   Lactic Acid, Plasma    Specimen: Blood   Result Value Ref Range    Lactate 2.5 (C) 0.5 - 2.0 mmol/L   Protime-INR    Specimen: Blood   Result Value Ref Range    Protime 13.1 11.8 - 14.8 Seconds    INR 0.98 0.91 - 1.09   Urinalysis With Culture If Indicated - Urine, Clean Catch    Specimen: Urine, Clean Catch   Result Value Ref Range    Color, UA Dark Yellow (A) Yellow, Straw    Appearance, UA Cloudy (A) Clear    pH, UA 5.5 5.0 - 8.0    Specific Gravity, UA 1.027 1.005 - 1.030    Glucose, UA >=1000 mg/dL (3+) (A) Negative    Ketones, UA 15 mg/dL (1+) (A) Negative    Bilirubin, UA Small (1+) (A) Negative    Blood, UA Large (3+) (A) Negative    Protein,  mg/dL (2+) (A) Negative    Leuk Esterase, UA Small (1+) (A) Negative    Nitrite, UA Negative Negative    Urobilinogen, UA 1.0 E.U./dL 0.2 - 1.0 E.U./dL   CBC Auto Differential    Specimen: Blood   Result Value Ref Range    WBC 20.04 (H) 3.40 - 10.80 10*3/mm3     RBC 5.25 4.14 - 5.80 10*6/mm3    Hemoglobin 14.4 13.0 - 17.7 g/dL    Hematocrit 46.3 37.5 - 51.0 %    MCV 88.2 79.0 - 97.0 fL    MCH 27.4 26.6 - 33.0 pg    MCHC 31.1 (L) 31.5 - 35.7 g/dL    RDW 15.7 (H) 12.3 - 15.4 %    RDW-SD 51.3 37.0 - 54.0 fl    MPV 9.6 6.0 - 12.0 fL    Platelets 415 140 - 450 10*3/mm3    Neutrophil % 84.1 (H) 42.7 - 76.0 %    Lymphocyte % 4.8 (L) 19.6 - 45.3 %    Monocyte % 10.1 5.0 - 12.0 %    Eosinophil % 0.0 (L) 0.3 - 6.2 %    Basophil % 0.2 0.0 - 1.5 %    Immature Grans % 0.8 (H) 0.0 - 0.5 %    Neutrophils, Absolute 16.84 (H) 1.70 - 7.00 10*3/mm3    Lymphocytes, Absolute 0.96 0.70 - 3.10 10*3/mm3    Monocytes, Absolute 2.03 (H) 0.10 - 0.90 10*3/mm3    Eosinophils, Absolute 0.01 0.00 - 0.40 10*3/mm3    Basophils, Absolute 0.04 0.00 - 0.20 10*3/mm3    Immature Grans, Absolute 0.16 (H) 0.00 - 0.05 10*3/mm3    nRBC 0.0 0.0 - 0.2 /100 WBC   Acetone    Specimen: Blood   Result Value Ref Range    Acetone Small (A) Negative   STAT Lactic Acid, Reflex    Specimen: Blood   Result Value Ref Range    Lactate 1.0 0.5 - 2.0 mmol/L   Blood Gas, Arterial -    Specimen: Arterial Blood   Result Value Ref Range    Site Right Radial     Juancho's Test Positive     pH, Arterial 7.421 7.350 - 7.450 pH units    pCO2, Arterial 34.9 (L) 35.0 - 45.0 mm Hg    pO2, Arterial 40.2 (C) 83.0 - 108.0 mm Hg    HCO3, Arterial 22.7 20.0 - 26.0 mmol/L    Base Excess, Arterial -1.2 (L) 0.0 - 2.0 mmol/L    O2 Saturation, Arterial 67.8 (L) 94.0 - 99.0 %    Temperature 37.0 C    Barometric Pressure for Blood Gas 743 mmHg    Modality Room Air     FIO2 21 %    Flow Rate 0.0 lpm    Ventilator Mode NA     Notified Who DR. JORDAN     Notified By SUNITHA     Notified Time 04/30/2023 20:49     Collected by SUNITHA     pCO2, Temperature Corrected 34.9 (L) 35 - 45 mm Hg    pH, Temp Corrected 7.421 7.350 - 7.450 pH Units    pO2, Temperature Corrected 40.2 (C) 83 - 108 mm Hg   Urinalysis, Microscopic Only - Urine, Clean Catch    Specimen: Urine,  Clean Catch   Result Value Ref Range    RBC, UA 13-20 (A) None Seen /HPF    WBC, UA 13-20 (A) None Seen /HPF    Bacteria, UA None Seen None Seen /HPF    Squamous Epithelial Cells, UA None Seen None Seen, 0-2 /HPF    Methodology Manual Light Microscopy    CBC Auto Differential    Specimen: Blood   Result Value Ref Range    WBC 15.07 (H) 3.40 - 10.80 10*3/mm3    RBC 4.61 4.14 - 5.80 10*6/mm3    Hemoglobin 13.0 13.0 - 17.7 g/dL    Hematocrit 40.1 37.5 - 51.0 %    MCV 87.0 79.0 - 97.0 fL    MCH 28.2 26.6 - 33.0 pg    MCHC 32.4 31.5 - 35.7 g/dL    RDW 15.6 (H) 12.3 - 15.4 %    RDW-SD 49.7 37.0 - 54.0 fl    MPV 9.6 6.0 - 12.0 fL    Platelets 312 140 - 450 10*3/mm3    Neutrophil % 84.6 (H) 42.7 - 76.0 %    Lymphocyte % 3.9 (L) 19.6 - 45.3 %    Monocyte % 10.7 5.0 - 12.0 %    Eosinophil % 0.1 (L) 0.3 - 6.2 %    Basophil % 0.1 0.0 - 1.5 %    Immature Grans % 0.6 (H) 0.0 - 0.5 %    Neutrophils, Absolute 12.75 (H) 1.70 - 7.00 10*3/mm3    Lymphocytes, Absolute 0.59 (L) 0.70 - 3.10 10*3/mm3    Monocytes, Absolute 1.61 (H) 0.10 - 0.90 10*3/mm3    Eosinophils, Absolute 0.01 0.00 - 0.40 10*3/mm3    Basophils, Absolute 0.02 0.00 - 0.20 10*3/mm3    Immature Grans, Absolute 0.09 (H) 0.00 - 0.05 10*3/mm3    nRBC 0.0 0.0 - 0.2 /100 WBC   Comprehensive Metabolic Panel    Specimen: Blood   Result Value Ref Range    Glucose 102 (H) 65 - 99 mg/dL    BUN 19 6 - 20 mg/dL    Creatinine 1.03 0.76 - 1.27 mg/dL    Sodium 133 (L) 136 - 145 mmol/L    Potassium 4.1 3.5 - 5.2 mmol/L    Chloride 96 (L) 98 - 107 mmol/L    CO2 19.0 (L) 22.0 - 29.0 mmol/L    Calcium 8.6 8.6 - 10.5 mg/dL    Total Protein 6.7 6.0 - 8.5 g/dL    Albumin 3.7 3.5 - 5.2 g/dL    ALT (SGPT) 11 1 - 41 U/L    AST (SGOT) 14 1 - 40 U/L    Alkaline Phosphatase 38 (L) 39 - 117 U/L    Total Bilirubin 0.4 0.0 - 1.2 mg/dL    Globulin 3.0 gm/dL    A/G Ratio 1.2 g/dL    BUN/Creatinine Ratio 18.4 7.0 - 25.0    Anion Gap 18.0 (H) 5.0 - 15.0 mmol/L    eGFR 84.7 >60.0 mL/min/1.73   CBC (No  Diff)    Specimen: Blood   Result Value Ref Range    WBC 12.83 (H) 3.40 - 10.80 10*3/mm3    RBC 4.34 4.14 - 5.80 10*6/mm3    Hemoglobin 11.8 (L) 13.0 - 17.7 g/dL    Hematocrit 37.5 37.5 - 51.0 %    MCV 86.4 79.0 - 97.0 fL    MCH 27.2 26.6 - 33.0 pg    MCHC 31.5 31.5 - 35.7 g/dL    RDW 15.6 (H) 12.3 - 15.4 %    RDW-SD 49.5 37.0 - 54.0 fl    MPV 9.5 6.0 - 12.0 fL    Platelets 313 140 - 450 10*3/mm3   Basic Metabolic Panel    Specimen: Blood   Result Value Ref Range    Glucose 101 (H) 65 - 99 mg/dL    BUN 16 6 - 20 mg/dL    Creatinine 0.93 0.76 - 1.27 mg/dL    Sodium 132 (L) 136 - 145 mmol/L    Potassium 3.6 3.5 - 5.2 mmol/L    Chloride 96 (L) 98 - 107 mmol/L    CO2 21.0 (L) 22.0 - 29.0 mmol/L    Calcium 8.9 8.6 - 10.5 mg/dL    BUN/Creatinine Ratio 17.2 7.0 - 25.0    Anion Gap 15.0 5.0 - 15.0 mmol/L    eGFR 95.8 >60.0 mL/min/1.73   Basic Metabolic Panel    Specimen: Blood   Result Value Ref Range    Glucose 121 (H) 65 - 99 mg/dL    BUN 17 6 - 20 mg/dL    Creatinine 0.83 0.76 - 1.27 mg/dL    Sodium 138 136 - 145 mmol/L    Potassium 3.6 3.5 - 5.2 mmol/L    Chloride 103 98 - 107 mmol/L    CO2 24.0 22.0 - 29.0 mmol/L    Calcium 8.7 8.6 - 10.5 mg/dL    BUN/Creatinine Ratio 20.5 7.0 - 25.0    Anion Gap 11.0 5.0 - 15.0 mmol/L    eGFR 102.1 >60.0 mL/min/1.73   CBC (No Diff)    Specimen: Blood   Result Value Ref Range    WBC 9.30 3.40 - 10.80 10*3/mm3    RBC 4.39 4.14 - 5.80 10*6/mm3    Hemoglobin 12.1 (L) 13.0 - 17.7 g/dL    Hematocrit 37.5 37.5 - 51.0 %    MCV 85.4 79.0 - 97.0 fL    MCH 27.6 26.6 - 33.0 pg    MCHC 32.3 31.5 - 35.7 g/dL    RDW 15.4 12.3 - 15.4 %    RDW-SD 48.5 37.0 - 54.0 fl    MPV 9.7 6.0 - 12.0 fL    Platelets 385 140 - 450 10*3/mm3   POC Glucose Once    Specimen: Blood   Result Value Ref Range    Glucose 113 70 - 130 mg/dL   POC Glucose Once    Specimen: Blood   Result Value Ref Range    Glucose 102 70 - 130 mg/dL   POC Glucose Once    Specimen: Blood   Result Value Ref Range    Glucose 101 70 - 130 mg/dL    POC Glucose Once    Specimen: Blood   Result Value Ref Range    Glucose 81 70 - 130 mg/dL   POC Glucose Once    Specimen: Blood   Result Value Ref Range    Glucose 90 70 - 130 mg/dL   POC Glucose Once    Specimen: Blood   Result Value Ref Range    Glucose 136 (H) 70 - 130 mg/dL   POC Glucose Once    Specimen: Blood   Result Value Ref Range    Glucose 118 70 - 130 mg/dL   POC Glucose Once    Specimen: Blood   Result Value Ref Range    Glucose 141 (H) 70 - 130 mg/dL   POC Glucose Once    Specimen: Blood   Result Value Ref Range    Glucose 139 (H) 70 - 130 mg/dL   POC Glucose Once    Specimen: Blood   Result Value Ref Range    Glucose 116 70 - 130 mg/dL   POC Glucose Once    Specimen: Blood   Result Value Ref Range    Glucose 117 70 - 130 mg/dL   ECG 12 Lead Tachycardia   Result Value Ref Range    QT Interval 322 ms    QTC Interval 480 ms   Green Top (Gel)   Result Value Ref Range    Extra Tube Hold for add-ons.    Lavender Top   Result Value Ref Range    Extra Tube hold for add-on    Red Top   Result Value Ref Range    Extra Tube Hold for add-ons.    Light Blue Top   Result Value Ref Range    Extra Tube Hold for add-ons.    Independent renal ultrasound review  The renal ultrasound is available for me to review.  Treatment recommendations require an independent review.  This film has been reviewed by the radiologist to determine any non urologic abnormalities that are presents.  I inspected the kidneys for size, symmetry, contour, parenchymal thickness, perinephric reaction, presence of calcifications, and intrarenal dilation of the collecting system.  This US shows no hydronephrosis.  No solid renal masses.  No renal or ureteral stones visualized.  CT Abdomen Pelvis Without Contrast (04/30/2023 20:26)   Assessment and Plan    Diagnoses and all orders for this visit:    1. Kidney stone (Primary)  -     Cancel: POC Urinalysis Dipstick, Multipro  -     XR Abdomen KUB; Future        57-year-old male established patient  in for follow-up as patient is s/p right ureteroscopy laser lithotripsy with stent insertion by Dr. Cedillo 4/21/2023 due to a large right renal pelvic stone.  Patient became symptomatic with nausea vomiting and right flank pain shortly after stent removal requiring short course hospitalization 4/30/2023 for suspected pyelonephritis based on CT abdomen pelvis imaging however urine culture and blood culture showed no growth.  Patient was treated empirically and has completed all antibiotics at this time.  Patient is now currently asymptomatic.  During hospitalization CT imaging patient was found to have remaining nonobstructing left lower pole stone burden.    Renal ultrasound today showing no hydronephrosis or stones visualized.    We will continue to monitor stone burden remaining in right lower pole seen on previous CT imaging recommend follow-up in 6 months with KUB prior

## 2023-06-12 ENCOUNTER — TELEPHONE (OUTPATIENT)
Dept: UROLOGY | Facility: CLINIC | Age: 58
End: 2023-06-12
Payer: MEDICARE

## 2023-06-12 DIAGNOSIS — N20.0 KIDNEY STONE: Primary | ICD-10-CM

## 2023-06-12 NOTE — TELEPHONE ENCOUNTER
Patient's wife called regarding an appointment the patient has tomorrow, 6/13/23, with Lary Garvey.  Per appointment notes, a renal ultrasound is needed prior to the appointment.  The patient's wife said she was unaware about this. The hub was going to send the patient's wife to central scheduling to try to schedule, but the patient's BH Verbal was not filled out completely.  Looking into this further, there is also not an order put in for the renal ultrasound, so we would not be able to schedule it anyway unless an order was put in.  I told the hub to tell the patient that we keep the patient scheduled for the appointment tomorrow, unless we need to move it to allow time for the renal ultrasound.  I will send a message to the nurse to see how to proceed.

## 2023-06-13 ENCOUNTER — OFFICE VISIT (OUTPATIENT)
Dept: UROLOGY | Facility: CLINIC | Age: 58
End: 2023-06-13
Payer: MEDICARE

## 2023-06-13 ENCOUNTER — HOSPITAL ENCOUNTER (OUTPATIENT)
Dept: ULTRASOUND IMAGING | Facility: HOSPITAL | Age: 58
Discharge: HOME | End: 2023-06-13
Payer: MEDICARE

## 2023-06-13 VITALS — BODY MASS INDEX: 22.59 KG/M2 | WEIGHT: 166.8 LBS | TEMPERATURE: 97.1 F | HEIGHT: 72 IN

## 2023-06-13 DIAGNOSIS — N20.0 KIDNEY STONE: Primary | ICD-10-CM

## 2023-06-13 PROCEDURE — 76775 US EXAM ABDO BACK WALL LIM: CPT

## 2023-06-13 RX ORDER — ONDANSETRON 4 MG/1
4 TABLET, FILM COATED ORAL EVERY 6 HOURS PRN
COMMUNITY
End: 2023-06-13

## 2023-06-13 RX ORDER — EZETIMIBE 10 MG/1
1 TABLET ORAL DAILY
COMMUNITY
Start: 2023-05-11

## 2023-06-14 ENCOUNTER — TELEPHONE (OUTPATIENT)
Dept: CARDIOLOGY | Facility: CLINIC | Age: 58
End: 2023-06-14
Payer: MEDICARE

## 2023-06-14 NOTE — TELEPHONE ENCOUNTER
----- Message from Mahin Cunha MD sent at 6/14/2023  7:10 AM CDT -----  Could you let him know his holter monitor showed fairly normal findings.  Only short and infrequent runs of any rhythm abnormalities.    Thanks,  Mahin

## 2023-07-26 ENCOUNTER — TELEPHONE (OUTPATIENT)
Dept: CARDIOLOGY | Facility: CLINIC | Age: 58
End: 2023-07-26

## 2023-07-26 NOTE — TELEPHONE ENCOUNTER
Caller: ArslanDeborah griffiths    Relationship: Emergency Contact    Best call back number: [phone]     What is the best time to reach you: ANYTIME    Who are you requesting to speak with (clinical staff, provider,  specific staff member): CLINICAL STAFF     What was the call regarding: PATIENT IS INQUIRING IF THEY HAVE TO HAVE LAB WORK DONE PRIOR TO THE VISIT 8.22.23. IF SO PLEASE FAX LAB ORDERS TO [facility] IN [place].     Is it okay if the provider responds through MyChart: PLEASE CALL PATIENT'S WIFE.

## 2023-08-22 ENCOUNTER — OFFICE VISIT (OUTPATIENT)
Dept: CARDIOLOGY | Facility: CLINIC | Age: 58
End: 2023-08-22
Payer: MEDICARE

## 2023-08-22 VITALS
SYSTOLIC BLOOD PRESSURE: 113 MMHG | HEART RATE: 87 BPM | OXYGEN SATURATION: 98 % | DIASTOLIC BLOOD PRESSURE: 60 MMHG | HEIGHT: 72 IN | BODY MASS INDEX: 22.89 KG/M2 | WEIGHT: 169 LBS

## 2023-08-22 DIAGNOSIS — I10 ESSENTIAL HYPERTENSION: ICD-10-CM

## 2023-08-22 DIAGNOSIS — I47.20 VENTRICULAR TACHYCARDIA: ICD-10-CM

## 2023-08-22 DIAGNOSIS — E11.59 TYPE 2 DIABETES MELLITUS WITH OTHER CIRCULATORY COMPLICATION, WITH LONG-TERM CURRENT USE OF INSULIN: ICD-10-CM

## 2023-08-22 DIAGNOSIS — E78.2 MIXED HYPERLIPIDEMIA: ICD-10-CM

## 2023-08-22 DIAGNOSIS — Z95.810 S/P IMPLANTATION OF AUTOMATIC CARDIOVERTER/DEFIBRILLATOR (AICD): ICD-10-CM

## 2023-08-22 DIAGNOSIS — Z79.4 TYPE 2 DIABETES MELLITUS WITH OTHER CIRCULATORY COMPLICATION, WITH LONG-TERM CURRENT USE OF INSULIN: ICD-10-CM

## 2023-08-22 DIAGNOSIS — I25.10 CORONARY ARTERY DISEASE INVOLVING NATIVE CORONARY ARTERY OF NATIVE HEART WITHOUT ANGINA PECTORIS: Primary | ICD-10-CM

## 2023-08-22 PROCEDURE — 93000 ELECTROCARDIOGRAM COMPLETE: CPT | Performed by: NURSE PRACTITIONER

## 2023-08-22 PROCEDURE — 3074F SYST BP LT 130 MM HG: CPT | Performed by: NURSE PRACTITIONER

## 2023-08-22 PROCEDURE — 3078F DIAST BP <80 MM HG: CPT | Performed by: NURSE PRACTITIONER

## 2023-08-22 PROCEDURE — 99214 OFFICE O/P EST MOD 30 MIN: CPT | Performed by: NURSE PRACTITIONER

## 2023-08-22 RX ORDER — ATORVASTATIN CALCIUM 40 MG/1
40 TABLET, FILM COATED ORAL DAILY
Qty: 90 TABLET | Refills: 3 | Status: SHIPPED | OUTPATIENT
Start: 2023-08-22

## 2023-08-22 RX ORDER — DAPAGLIFLOZIN 5 MG/1
5 TABLET, FILM COATED ORAL DAILY
Qty: 90 TABLET | Refills: 3 | Status: SHIPPED | OUTPATIENT
Start: 2023-08-22

## 2023-08-22 RX ORDER — RANOLAZINE 500 MG/1
500 TABLET, EXTENDED RELEASE ORAL 2 TIMES DAILY
Qty: 180 TABLET | Refills: 3 | Status: SHIPPED | OUTPATIENT
Start: 2023-08-22

## 2023-08-22 RX ORDER — FENOFIBRATE 160 MG/1
160 TABLET ORAL DAILY
Qty: 90 TABLET | Refills: 3 | Status: SHIPPED | OUTPATIENT
Start: 2023-08-22

## 2023-08-22 NOTE — PROGRESS NOTES
"    Subjective:     Encounter Date:08/22/2023      Patient ID: Iftikhar Francis is a 57 y.o. male     Chief Complaint:\"no complaints\"  Coronary Artery Disease  Presents for follow-up visit. Pertinent negatives include no chest pain, dizziness, leg swelling, palpitations, shortness of breath or weight gain. Risk factors include hyperlipidemia. The symptoms have been stable.   Hypertension  This is a chronic problem. The current episode started more than 1 year ago. The problem has been waxing and waning since onset. The problem is controlled. Associated symptoms include malaise/fatigue (chronic due to myasthenia). Pertinent negatives include no chest pain, orthopnea, palpitations, PND or shortness of breath.   Hyperlipidemia  This is a chronic problem. The current episode started more than 1 year ago. Pertinent negatives include no chest pain or shortness of breath.   Shortness of Breath  Pertinent negatives include no chest pain, leg swelling, orthopnea, PND, rash, sputum production, syncope or wheezing. His past medical history is significant for CAD.     Patient presents today for a routine follow up. Patient is followed for CAD with stenting to the RCA in 2014 and to the LCX and LAD in 9/2022 following a NSTEMI. He was last seen in my office in 12/2022 and noted to be in a wide complex tachycardia with . He was admitted to the hospital where Dr. Cunha was consulted. He underwent an EP study which noted multiple VT morphologies followed by a VT ablation with inducible VT at the end of the case. His EF was noted to be normal at 51-55% per echo while inpatient. He ultimately underwent placement of an AICD per Dr. Cunha on 12/19/22. His last device interrogation available for my review in July, revealed several episodes ATP treated VT.     He reports he has been well since discharge. He remains fatigued but attributes it to his myasthenia gravis. He denies chest pain, palpitations, edema, syncope and near " syncope.      The following portions of the patient's history were reviewed and updated as appropriate: allergies, current medications, past family history, past medical history, past social history, past surgical history and problem list.    Allergies   Allergen Reactions    Levofloxacin Other (See Comments)     Causes eye problems  Double vision.      Moxifloxacin Other (See Comments)     Causes eye problems  Double vision       Current Outpatient Medications:     aspirin 81 MG EC tablet, Take 1 tablet by mouth Daily., Disp: , Rfl:     atorvastatin (LIPITOR) 40 MG tablet, Take 1 tablet by mouth Daily., Disp: 90 tablet, Rfl: 3    busPIRone (BUSPAR) 15 MG tablet, Take 1 tablet by mouth 3 (Three) Times a Day., Disp: , Rfl: 5    dapagliflozin (Farxiga) 5 MG tablet tablet, Take 1 tablet by mouth Daily., Disp: 90 tablet, Rfl: 3    ezetimibe (ZETIA) 10 MG tablet, Take 1 tablet by mouth Daily., Disp: , Rfl:     fenofibrate 160 MG tablet, Take 1 tablet by mouth Daily., Disp: 90 tablet, Rfl: 3    Insulin Glargine-yfgn 100 UNIT/ML solution pen-injector, Inject 16 Units under the skin into the appropriate area as directed Every Night., Disp: , Rfl:     Januvia 100 MG tablet, Take 1 tablet by mouth Daily., Disp: , Rfl:     metFORMIN (GLUCOPHAGE) 1000 MG tablet, Take 1 tablet by mouth 2 (Two) Times a Day With Meals., Disp: , Rfl:     nitroglycerin (NITROSTAT) 0.4 MG SL tablet, Place 1 tablet under the tongue Every 5 (Five) Minutes As Needed for Chest Pain. Take no more than 3 doses in 15 minutes., Disp: , Rfl:     NovoLOG FlexPen 100 UNIT/ML solution pen-injector sc pen, 1-10 Units 3 (Three) Times a Day With Meals. Sliding Scale, Disp: , Rfl:     omeprazole (priLOSEC) 20 MG capsule, Take 1 capsule by mouth Daily., Disp: , Rfl:     ondansetron ODT (ZOFRAN-ODT) 4 MG disintegrating tablet, Place 1 tablet on the tongue Every 6 (Six) Hours As Needed for Nausea., Disp: 6 tablet, Rfl: 1    predniSONE (DELTASONE) 10 MG tablet, Take 1  tablet by mouth Daily., Disp: , Rfl:     ranolazine (RANEXA) 500 MG 12 hr tablet, Take 1 tablet by mouth 2 (Two) Times a Day., Disp: 180 tablet, Rfl: 3    ticagrelor (BRILINTA) 90 MG tablet tablet, Take 1 tablet by mouth 2 (Two) Times a Day., Disp: 60 tablet, Rfl: 11    valACYclovir (VALTREX) 1000 MG tablet, Take 2 tablets by mouth 2 (Two) Times a Day As Needed (cold sores). TAKE 2 TABLETS BY MOUTH AT ONSET OF COLD SORE SYMPTOMS, THEN TAKE 2 MORE TABLETS BY MOUTH 12 HOURS LATER, Disp: , Rfl:     verapamil SR (CALAN-SR) 180 MG CR tablet, Take 1 tablet by mouth Every Night., Disp: 90 tablet, Rfl: 3    vitamin D3 125 MCG (5000 UT) capsule capsule, Take 1 capsule by mouth Daily., Disp: , Rfl:   Past Medical History:   Diagnosis Date    Anxiety     Bilateral kidney stones     Bronchitis     Hearing loss     Heart attack     Left ureteral stone     Myasthenia gravis     Peptic ulceration     Pneumonia     Sleep apnea     bi-pap at times    Type 2 diabetes mellitus with circulatory disorder, with long-term current use of insulin 09/27/2022       Social History     Socioeconomic History    Marital status:    Tobacco Use    Smoking status: Never    Smokeless tobacco: Never   Vaping Use    Vaping Use: Never used   Substance and Sexual Activity    Alcohol use: Yes     Comment: occassionally    Drug use: Never    Sexual activity: Defer       Review of Systems   Constitutional: Positive for malaise/fatigue (chronic due to myasthenia). Negative for weight gain and weight loss.   Cardiovascular:  Negative for chest pain, dyspnea on exertion, irregular heartbeat, leg swelling, near-syncope, orthopnea, palpitations, paroxysmal nocturnal dyspnea and syncope.   Respiratory:  Negative for cough, shortness of breath, sleep disturbances due to breathing, sputum production and wheezing.    Skin:  Negative for dry skin, flushing, itching and rash.   Gastrointestinal:  Negative for hematemesis and hematochezia.   Neurological:   Negative for dizziness, light-headedness, loss of balance and weakness.   All other systems reviewed and are negative.       Objective:     Vitals reviewed.   Constitutional:       General: Not in acute distress.     Appearance: Healthy appearance. Well-developed. Not diaphoretic.   Eyes:      General: No scleral icterus.     Conjunctiva/sclera: Conjunctivae normal.      Pupils: Pupils are equal, round, and reactive to light.   HENT:      Head: Normocephalic.    Mouth/Throat:      Pharynx: No oropharyngeal exudate.   Neck:      Vascular: No JVR.   Pulmonary:      Effort: Pulmonary effort is normal. No respiratory distress.      Breath sounds: Normal breath sounds. No wheezing. No rhonchi. No rales.   Chest:      Chest wall: Not tender to palpatation.   Cardiovascular:      Normal rate. Regular rhythm.   Pulses:     Intact distal pulses.   Edema:     Peripheral edema absent.   Abdominal:      General: Bowel sounds are normal. There is no distension.      Palpations: Abdomen is soft.      Tenderness: There is no abdominal tenderness.   Musculoskeletal: Normal range of motion.      Cervical back: Normal range of motion and neck supple. Skin:     General: Skin is warm and dry.      Coloration: Skin is not pale.      Findings: No erythema or rash.   Neurological:      Mental Status: Alert, oriented to person, place, and time and oriented to person, place and time.      Deep Tendon Reflexes: Reflexes are normal and symmetric.   Psychiatric:         Behavior: Behavior normal.           ECG 12 Lead    Date/Time: 8/22/2023 11:14 AM  Performed by: Norma Yusuf APRN  Authorized by: Norma Yusuf APRN   Comparison: compared with previous ECG from 4/30/2023  Comparison to previous ECG: Ventricular rate has decreased by 47bpm  PVCs no longer present  QTc is now prolonged  Rhythm: sinus rhythm  Rate: normal  BPM: 87  Conduction: conduction normal  ST Segments: ST segments normal  T Waves: T waves normal  QRS axis:  "normal  Other findings: prolonged QTc interval    Clinical impression: abnormal EKG      /60   Pulse 87   Ht 182.9 cm (72\")   Wt 76.7 kg (169 lb)   SpO2 98%   BMI 22.92 kg/mý     Lab Review:   I have reviewed previous office notes, recent hospital records, recent labs and recent cardiac testing.       Results for orders placed during the hospital encounter of 12/07/22    Adult Transthoracic Echo Complete w/ Color, Spectral and Contrast if Necessary Per Protocol    Interpretation Summary    Left ventricular systolic function is low normal. Left ventricular ejection fraction appears to be 51 - 55%.    Normal right ventricular cavity size and systolic function noted.    No hemodynamically significant valvular abnormalities identified on this study.    Cath 9/11/2022:  Impression:  1.  Coronary artery disease as described above including hemodynamically significant lesions in both the left circumflex and LAD status post successful PCI with continuation of PATI-3 flow and no residual stenosis remaining  2.  Diabetes  3.  Hypertension  4.  Hyperlipidemia  5.  Myasthenia gravis             Assessment:          Diagnosis Plan   1. Coronary artery disease involving native coronary artery of native heart without angina pectoris        2. Ventricular tachycardia        3. S/P implantation of automatic cardioverter/defibrillator (AICD)        4. Essential hypertension        5. Mixed hyperlipidemia        6. Type 2 diabetes mellitus with other circulatory complication, with long-term current use of insulin                 Plan:       1. CAD- stable. No clinical signs of ongoing ischemia. PRANAV to the RCA in 2014 and to the LAD and LCX on 9/11/22. Continue DAPT, ASA and Brilinta, until 9/11/2023 at which time I will reduce his Brilinta to 60mg BID and he can stop ASA. Continue Verapamil, ranexa, and statin. Imdur was stopped in Jan due to orthostatic hypotension.   2. VT- s/p AICD. ATP treated VT noted on device " interrogation in July. S/p ablation per Dr. Cunha in 12/2022. Being managed per EP  3. S/p AICD- normal device function with ATP treated VT per device interrogation in July.   4. HTN- controlled. Continue current therapy.    5. HLD- uncontrolled with most recent lipid on file from 12/2022 and LDL at 82. Due for labs with PCP in December. If LDL remains > 70, would recommend increasing lipitor to 80mg daily.   6. DM2 with CAD- controlled with A1C 5.80 in December.       Follow up in 1 year or sooner if symptoms worsen.     I spent 30 minutes caring for Iftikhar on this date of service. This time includes time spent by me in the following activities:preparing for the visit, reviewing tests, obtaining and/or reviewing a separately obtained history, performing a medically appropriate examination and/or evaluation , counseling and educating the patient/family/caregiver, ordering medications, tests, or procedures, documenting information in the medical record, independently interpreting results and communicating that information with the patient/family/caregiver and care coordination       I spent 2 minutes on the separately reported service of EKG interpretation. This time is not included in the time used to support the E/M service also reported today.

## 2023-08-28 ENCOUNTER — LAB (OUTPATIENT)
Dept: LAB | Facility: HOSPITAL | Age: 58
End: 2023-08-28
Payer: MEDICARE

## 2023-08-28 ENCOUNTER — OFFICE VISIT (OUTPATIENT)
Dept: CARDIOLOGY | Facility: CLINIC | Age: 58
End: 2023-08-28
Payer: MEDICARE

## 2023-08-28 ENCOUNTER — CLINICAL SUPPORT NO REQUIREMENTS (OUTPATIENT)
Dept: CARDIOLOGY | Facility: CLINIC | Age: 58
End: 2023-08-28
Payer: MEDICARE

## 2023-08-28 VITALS
BODY MASS INDEX: 22.89 KG/M2 | HEIGHT: 72 IN | HEART RATE: 77 BPM | SYSTOLIC BLOOD PRESSURE: 98 MMHG | DIASTOLIC BLOOD PRESSURE: 62 MMHG | WEIGHT: 169 LBS | OXYGEN SATURATION: 98 %

## 2023-08-28 DIAGNOSIS — I47.20 VENTRICULAR TACHYCARDIA: ICD-10-CM

## 2023-08-28 DIAGNOSIS — R00.2 PALPITATIONS: ICD-10-CM

## 2023-08-28 DIAGNOSIS — Z95.810 S/P IMPLANTATION OF AUTOMATIC CARDIOVERTER/DEFIBRILLATOR (AICD): Primary | ICD-10-CM

## 2023-08-28 DIAGNOSIS — Z95.810 S/P IMPLANTATION OF AUTOMATIC CARDIOVERTER/DEFIBRILLATOR (AICD): ICD-10-CM

## 2023-08-28 DIAGNOSIS — I47.20 VT (VENTRICULAR TACHYCARDIA): Primary | ICD-10-CM

## 2023-08-28 DIAGNOSIS — I47.20 VT (VENTRICULAR TACHYCARDIA): ICD-10-CM

## 2023-08-28 LAB
ANION GAP SERPL CALCULATED.3IONS-SCNC: 14 MMOL/L (ref 5–15)
BUN SERPL-MCNC: 33 MG/DL (ref 6–20)
BUN/CREAT SERPL: 26.2 (ref 7–25)
CALCIUM SPEC-SCNC: 9.8 MG/DL (ref 8.6–10.5)
CHLORIDE SERPL-SCNC: 102 MMOL/L (ref 98–107)
CO2 SERPL-SCNC: 24 MMOL/L (ref 22–29)
CREAT SERPL-MCNC: 1.26 MG/DL (ref 0.76–1.27)
EGFRCR SERPLBLD CKD-EPI 2021: 66.5 ML/MIN/1.73
GLUCOSE SERPL-MCNC: 136 MG/DL (ref 65–99)
MAGNESIUM SERPL-MCNC: 2.1 MG/DL (ref 1.6–2.6)
POTASSIUM SERPL-SCNC: 4.6 MMOL/L (ref 3.5–5.2)
SODIUM SERPL-SCNC: 140 MMOL/L (ref 136–145)
TSH SERPL DL<=0.05 MIU/L-ACNC: 1.32 UIU/ML (ref 0.27–4.2)

## 2023-08-28 PROCEDURE — 84443 ASSAY THYROID STIM HORMONE: CPT

## 2023-08-28 PROCEDURE — 83735 ASSAY OF MAGNESIUM: CPT

## 2023-08-28 PROCEDURE — 80048 BASIC METABOLIC PNL TOTAL CA: CPT

## 2023-08-28 PROCEDURE — 36415 COLL VENOUS BLD VENIPUNCTURE: CPT

## 2023-08-28 NOTE — PROGRESS NOTES
Single Chamber AICD Interrogation Report  IN OFFICE    August 28, 2023    Primary Cardiologist: Guerline  : [employer] Model: Yeimint EL ICD D232  Implant date: 12/19/2022    Reason for evaluation:  Provider Requested  Indication for AICD: Ventricular Tachycardia    Interrogation performed by:  Kayli Washington RN    Measurements  Ventricular sensing - R wave: 12.3 mV  Ventricular threshold: 0.5 V @ 0.4 ms  Ventricular lead impedance: 853 ohms  Shock Impedance: RV 83 ohms    Manual sensing and threshold testing performed: Yes     Diagnostic Data  Paced: <1 %    Episodes/Alerts since 7/26/2023:  Several high ventricular rate episodes on 8/20/23 between 15:56-17:05, rates 139-148 bpm, longest duration 3 minutes 45 seconds.  No ATP or shocks administered since episodes on 6.21.2023 (see remote report).  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  Patient reports being asymptomatic.      Battery status: Satisfactory, estimated 14 years remaining     Final Parameters  Mode: VVI  Lower rate: 40 bpm   Ventricular - Amplitude: 2 V   Pulse width: 0.4 ms   Sensitivity: 0.6 mV     Changes made: No changes.    Conclusions: Normal AICD Function, No Therapy Delivered, Stable Pacing and Sensing Thresholds, and Adequate Battery Reserve    Remote Monitor:  Yes, connected.    Follow up: Every 3 months via latitude, annually in office.     Final impression:  Data above reviewed.  Normally functioning device with stable lead parameters.  Asymptomatic HVR episodes noted.

## 2023-08-28 NOTE — Clinical Note
AICD check.  Recent ATP in June but none since - HVR noted on 8/20/23 but no therapy.  Patient saw Arleth today as well.  Please review and sign.

## 2023-08-28 NOTE — PROGRESS NOTES
[facility] -  CLINIC FOLLOW UP     Patient Care Team:  Tacho Eng MD as PCP - General  Tacho Eng MD as PCP - Family Medicine  Bakari Perkins MD as Cardiologist (Cardiology)  Norma Yusuf APRN as Nurse Practitioner (Cardiology)    Chief Complaint:    Subjective   P History:  1.  Ventricular tachycardia  - 12/8/22:  VT ablation, multiple VT morphologies including fascicular VT  2.  Presence of an ICD  -12/19/2022: DOI, single-chamber [employer], [place]     Cardiology history:  1.  CAD w/ prior NSTEMI  - 9/2022: LAD and LCX PCI  2.  HTN  3.  HLD     Medical History:  1.  Myasthenia gravis  2.  DM2  3.  Kidney stones    HPI: Today I had the pleasure of seeing Iftikhar Francis in the cardiology clinic for follow up. He is a 57 year old male with a history of CAD followed by Dr. Colon who was admitted for WCT in 12/2022 and required VT ablation of multiple VT morphologies and fasicular VT. His LV had patchy scar suggestive of a NICM distribution. Given his history of CAD and VT, Dr. Cunha implanted a single chamber ICD as well and had previously loaded him on Amiodarone given his fascicular CT at the end of the procedure.     After device implant, his Amiodarone was stopped in January. In April he had a laser lithotripsy for stone management. In May, he was hospitalized for emesis and decreased appeptite.     He wore a Holter monitor 5/15 through 5/29 and overall did not have a high burden of VT and only 3% PVC burden. Since June until now, he has had recurrent VT with ATP episodes. Specifically, June 17, 20, 21st had repeat ATP episodes, for a total of 17 ATPs over his last transmission. He is asymptomatic and does not note any symptoms surrounding those days. He denies any presyncope, palpitations, or chest discomfort.     During August,  he has had several high ventricular rate episodea on 8/20/23 between 15:56-17:05, rates 144-148 bpm.  Per patient, he was  "doing photography at the time and was asymptomatic. Nothing since August 20th though and no ATP during this month.       Objective     Visit Vitals  BP 98/62   Pulse 77   Ht 182.9 cm (72.01\")   Wt 76.7 kg (169 lb)   SpO2 98%   BMI 22.92 kg/mý           Vitals reviewed.   Constitutional:       Appearance: Healthy appearance. Not in distress.   Eyes:      Extraocular Movements: Extraocular movements intact.      Conjunctiva/sclera: Conjunctivae normal.      Pupils: Pupils are equal, round, and reactive to light.   HENT:      Head: Normocephalic and atraumatic.      Nose: Nose normal.    Mouth/Throat:      Lips: Pink.      Mouth: Mucous membranes are moist.      Pharynx: Oropharynx is clear.   Neck:      Vascular: No carotid bruit or JVD. JVD normal.   Pulmonary:      Effort: Pulmonary effort is normal.      Breath sounds: Normal breath sounds.   Chest:      Chest wall: Not tender to palpatation.   Cardiovascular:      PMI at left midclavicular line. Normal rate. Regular rhythm. Normal S1. Normal S2.       Murmurs: There is no murmur.      No gallop.  No rub.   Pulses:     Radial: 2+ bilaterally.     Posterior tibial: 2+ bilaterally.  Edema:     Peripheral edema absent.   Abdominal:      General: Bowel sounds are normal.      Palpations: Abdomen is soft.   Musculoskeletal: Normal range of motion.      Extremities: No clubbing present.     Cervical back: Normal range of motion. Skin:     General: Skin is warm and dry.   Neurological:      General: No focal deficit present.      Mental Status: Alert and oriented to person, place, and time.      Cranial Nerves: Cranial nerves are intact.   Psychiatric:         Attention and Perception: Attention normal.         Mood and Affect: Affect normal.         Speech: Speech normal.         Behavior: Behavior normal.         Cognition and Memory: Cognition normal.           The following portions of the patient's history were reviewed and updated as appropriate: allergies, current " medications, past medical history, past social history, past and problem list.     Review of Systems   Constitutional: Negative.    HENT: Negative.     Eyes: Negative.    Respiratory: Negative.     Cardiovascular: Negative.    Gastrointestinal: Negative.    Endocrine: Negative.    Genitourinary: Negative.    Musculoskeletal: Negative.    Skin: Negative.    Allergic/Immunologic: Negative.    Neurological: Negative.    Hematological: Negative.    Psychiatric/Behavioral: Negative.              ECG 12 Lead    Date/Time: 8/28/2023 11:49 AM  Performed by: Arleth Chavarria PA  Authorized by: Arleth Chavarria PA   Comparison: compared with previous ECG from 8/22/2023  Rhythm: sinus rhythm  Rate: normal          Medication Review: yes    Current Outpatient Medications:     aspirin 81 MG EC tablet, Take 1 tablet by mouth Daily., Disp: , Rfl:     atorvastatin (LIPITOR) 40 MG tablet, Take 1 tablet by mouth Daily., Disp: 90 tablet, Rfl: 3    busPIRone (BUSPAR) 15 MG tablet, Take 1 tablet by mouth 3 (Three) Times a Day., Disp: , Rfl: 5    dapagliflozin (Farxiga) 5 MG tablet tablet, Take 1 tablet by mouth Daily., Disp: 90 tablet, Rfl: 3    ezetimibe (ZETIA) 10 MG tablet, Take 1 tablet by mouth Daily., Disp: , Rfl:     fenofibrate 160 MG tablet, Take 1 tablet by mouth Daily., Disp: 90 tablet, Rfl: 3    Insulin Glargine-yfgn 100 UNIT/ML solution pen-injector, Inject 16 Units under the skin into the appropriate area as directed Every Night., Disp: , Rfl:     Januvia 100 MG tablet, Take 1 tablet by mouth Daily., Disp: , Rfl:     metFORMIN (GLUCOPHAGE) 1000 MG tablet, Take 1 tablet by mouth 2 (Two) Times a Day With Meals., Disp: , Rfl:     nitroglycerin (NITROSTAT) 0.4 MG SL tablet, Place 1 tablet under the tongue Every 5 (Five) Minutes As Needed for Chest Pain. Take no more than 3 doses in 15 minutes., Disp: , Rfl:     NovoLOG FlexPen 100 UNIT/ML solution pen-injector sc pen, 1-10 Units 3 (Three) Times a Day With Meals. Sliding  Scale, Disp: , Rfl:     omeprazole (priLOSEC) 20 MG capsule, Take 1 capsule by mouth Daily., Disp: , Rfl:     ondansetron ODT (ZOFRAN-ODT) 4 MG disintegrating tablet, Place 1 tablet on the tongue Every 6 (Six) Hours As Needed for Nausea., Disp: 6 tablet, Rfl: 1    predniSONE (DELTASONE) 10 MG tablet, Take 1 tablet by mouth Daily., Disp: , Rfl:     ranolazine (RANEXA) 500 MG 12 hr tablet, Take 1 tablet by mouth 2 (Two) Times a Day., Disp: 180 tablet, Rfl: 3    ticagrelor (BRILINTA) 90 MG tablet tablet, Take 1 tablet by mouth 2 (Two) Times a Day., Disp: 60 tablet, Rfl: 11    valACYclovir (VALTREX) 1000 MG tablet, Take 2 tablets by mouth 2 (Two) Times a Day As Needed (cold sores). TAKE 2 TABLETS BY MOUTH AT ONSET OF COLD SORE SYMPTOMS, THEN TAKE 2 MORE TABLETS BY MOUTH 12 HOURS LATER, Disp: , Rfl:     verapamil SR (CALAN-SR) 180 MG CR tablet, Take 1 tablet by mouth Every Night., Disp: 90 tablet, Rfl: 3    vitamin D3 125 MCG (5000 UT) capsule capsule, Take 1 capsule by mouth Daily., Disp: , Rfl:    Allergies   Allergen Reactions    Levofloxacin Other (See Comments)     Causes eye problems  Double vision.      Moxifloxacin Other (See Comments)     Causes eye problems  Double vision       I have reviewed       CBC:  Lab Results - Last 18 Months   Lab Units 05/03/23  0540   WBC 10*3/mm3 9.30   HEMOGLOBIN g/dL 12.1*   HEMATOCRIT % 37.5   PLATELETS 10*3/mm3 385      BMP/CMP:  Lab Results - Last 18 Months   Lab Units 08/28/23  1205   SODIUM mmol/L 140   POTASSIUM mmol/L 4.6   CHLORIDE mmol/L 102   CO2 mmol/L 24.0   GLUCOSE mg/dL 136*   BUN mg/dL 33*   CREATININE mg/dL 1.26   CALCIUM mg/dL 9.8     BNP:   Lab Results - Last 18 Months   Lab Units 12/07/22  1033   PROBNP pg/mL 1,298.0*      THYROID:  Lab Results - Last 18 Months   Lab Units 08/28/23  1205 12/07/22  1033   TSH uIU/mL 1.320 1.900   FREE T4 ng/dL  --  1.30       Results for orders placed during the hospital encounter of 12/07/22    Adult Transthoracic Echo Complete  w/ Color, Spectral and Contrast if Necessary Per Protocol    Interpretation Summary    Left ventricular systolic function is low normal. Left ventricular ejection fraction appears to be 51 - 55%.    Normal right ventricular cavity size and systolic function noted.    No hemodynamically significant valvular abnormalities identified on this study.     Assessment:   Diagnoses and all orders for this visit:    1. VT (ventricular tachycardia) (Primary)  -     ECG 12 Lead; Future  -     Basic Metabolic Panel; Future  -     Magnesium; Future  -     TSH; Future    2. Palpitations  -     TSH; Future    3. Ventricular tachycardia    4. S/P implantation of automatic cardioverter/defibrillator (AICD)    Other orders  -     ECG 12 Lead    VT with episodes of ATP in June: Labs today are unremarkable with normal Mag and K+. TSH normal as well. We were able to check his device today documenting Aug 20th VT episodes without any ATP or shocks.   -Will restart beta blocker Toprol XL 25mg daily and may need to hold Verapamil given borderline BP.   -Will discuss with Dr. Cunha regarding recurrent VT episodes. No obvious meds on list to cause QT prolongation, labs normal. Previously was on Amiodarone prior to ICD implant, but given his young age this would not be ideal treatment.     CAD: No symptoms of angina currently. He had PRANAV placed to the LAD and circ in 9/2022. Currently on DAPT, statin, and ranexa. Recently seen by Norma ROWE.     I spent 40 minutes caring for Iftikhar on this date of service. This time includes time spent by me in the following activities:preparing for the visit, reviewing tests, obtaining and/or reviewing a separately obtained history, performing a medically appropriate examination and/or evaluation , counseling and educating the patient/family/caregiver, ordering medications, tests, or procedures, referring and communicating with other health care professionals , documenting information in the medical  record, and independently interpreting results and communicating that information with the patient/family/caregiver        Electronically signed by CHENCHO Tellez

## 2023-10-11 ENCOUNTER — OFFICE VISIT (OUTPATIENT)
Dept: CARDIOLOGY | Facility: CLINIC | Age: 58
End: 2023-10-11
Payer: MEDICARE

## 2023-10-11 VITALS
HEART RATE: 81 BPM | OXYGEN SATURATION: 98 % | SYSTOLIC BLOOD PRESSURE: 110 MMHG | WEIGHT: 172 LBS | HEIGHT: 72 IN | RESPIRATION RATE: 18 BRPM | BODY MASS INDEX: 23.3 KG/M2 | DIASTOLIC BLOOD PRESSURE: 70 MMHG

## 2023-10-11 DIAGNOSIS — I47.20 VENTRICULAR TACHYCARDIA: Primary | ICD-10-CM

## 2023-10-11 NOTE — PROGRESS NOTES
"Chief Complaint  VT (6 wk fu )    Subjective        History of Present Illness    EP History:  1.  Ventricular tachycardia  - 12/8/22:  VT ablation, multiple VT morphologies including fascicular VT  2.  Presence of an ICD  -12/19/2022: DOI, single-chamber [employer], [place]     Cardiology history:  1.  CAD w/ prior NSTEMI  - 9/2022: LAD and LCX PCI  2.  HTN  3.  HLD     Medical History:  1.  Myasthenia gravis  2.  DM2  3.  Kidney stones      Iftikhar Francis is a 57 y.o. male with problem list as above who presents to the clinic for follow up of ventricular tachycardia.  Unfortunately, he continues to have breakthrough episodes of ventricular tachycardia seen on his device monitoring.  He is of resulted in numerous episodes of ATP.  He generally feels okay but does note that these episodes occur based on his Apple Watch recordings.    Objective   Vital Signs:  /70 (BP Location: Right arm, Patient Position: Sitting)   Pulse 81   Resp 18   Ht 182.9 cm (72\")   Wt 78 kg (172 lb)   SpO2 98%   BMI 23.33 kg/mý   Estimated body mass index is 23.33 kg/mý as calculated from the following:    Height as of this encounter: 182.9 cm (72\").    Weight as of this encounter: 78 kg (172 lb).      Physical Exam  Vitals reviewed.   Constitutional:       Appearance: Normal appearance.   HENT:      Head: Normocephalic and atraumatic.   Eyes:      Extraocular Movements: Extraocular movements intact.      Conjunctiva/sclera: Conjunctivae normal.   Cardiovascular:      Rate and Rhythm: Normal rate and regular rhythm.      Pulses: Normal pulses.      Heart sounds: Normal heart sounds.   Pulmonary:      Effort: Pulmonary effort is normal.      Breath sounds: Normal breath sounds.   Musculoskeletal:         General: No swelling.   Neurological:      General: No focal deficit present.      Mental Status: He is alert and oriented to person, place, and time.   Psychiatric:         Mood and Affect: Mood normal.         Judgment: " Judgment normal.        Result Review :  The following data was reviewed by: Mahin Cunha MD on 10/11/2023:                 Assessment and Plan   There are no diagnoses linked to this encounter.    Iftikhar Francis is a 57 y.o. male with problem list as above who presents to the clinic for follow up of ventricular tachycardia.  We discussed the risks and benefits of treating this rhythm further with consideration of either antiarrhythmics or ablation.  Given the fact that these episodes are relatively slow in nature and not causing significant symptoms, I do think that a very reasonable option would be to disable ATP in the sound and just monitor to see how he does.  He agrees and would like this to be done.  Given these findings, we disabled ATP in the VT zone along.    Plan:  -Continue verapamil  -No additional medication changes at this time  -VT therapy disabled for rates less than 180 bpm       I spent 54 minutes caring for Iftikhar on this date of service (excluding time spent on ECG interpretation and documentation). This time includes time spent by me in the following activities:preparing for the visit, reviewing tests, obtaining and/or reviewing a separately obtained history, counseling and educating the patient/family/caregiver, and documenting information in the medical record  Follow Up   Return in about 3 months (around 1/11/2024).  Patient was given instructions and counseling regarding his condition or for health maintenance advice. Please see specific information pulled into the AVS if appropriate.     Part of this note may be an electronic transcription/translation of spoken language to printed text using the Dragon Dictation System.

## 2024-01-09 ENCOUNTER — OFFICE VISIT (OUTPATIENT)
Dept: CARDIOLOGY | Facility: CLINIC | Age: 59
End: 2024-01-09
Payer: MEDICARE

## 2024-01-09 VITALS
OXYGEN SATURATION: 98 % | DIASTOLIC BLOOD PRESSURE: 82 MMHG | HEIGHT: 72 IN | SYSTOLIC BLOOD PRESSURE: 120 MMHG | BODY MASS INDEX: 23.43 KG/M2 | WEIGHT: 173 LBS | HEART RATE: 109 BPM

## 2024-01-09 DIAGNOSIS — I47.20 VENTRICULAR TACHYCARDIA: Primary | ICD-10-CM

## 2024-01-09 PROCEDURE — 1159F MED LIST DOCD IN RCRD: CPT | Performed by: STUDENT IN AN ORGANIZED HEALTH CARE EDUCATION/TRAINING PROGRAM

## 2024-01-09 PROCEDURE — 99213 OFFICE O/P EST LOW 20 MIN: CPT | Performed by: STUDENT IN AN ORGANIZED HEALTH CARE EDUCATION/TRAINING PROGRAM

## 2024-01-09 PROCEDURE — 3079F DIAST BP 80-89 MM HG: CPT | Performed by: STUDENT IN AN ORGANIZED HEALTH CARE EDUCATION/TRAINING PROGRAM

## 2024-01-09 PROCEDURE — 93000 ELECTROCARDIOGRAM COMPLETE: CPT | Performed by: STUDENT IN AN ORGANIZED HEALTH CARE EDUCATION/TRAINING PROGRAM

## 2024-01-09 PROCEDURE — 3074F SYST BP LT 130 MM HG: CPT | Performed by: STUDENT IN AN ORGANIZED HEALTH CARE EDUCATION/TRAINING PROGRAM

## 2024-01-09 PROCEDURE — 1160F RVW MEDS BY RX/DR IN RCRD: CPT | Performed by: STUDENT IN AN ORGANIZED HEALTH CARE EDUCATION/TRAINING PROGRAM

## 2024-01-09 NOTE — PROGRESS NOTES
"Chief Complaint  Ventricular tachycardia (3 MON FU)    Subjective        History of Present Illness    EP History:  1.  Ventricular tachycardia  - 12/8/22:  VT ablation, multiple VT morphologies including fascicular VT  2.  Presence of an ICD  -12/19/2022: DOI, single-chamber [employer], [employer]     Cardiology history:  1.  CAD w/ prior NSTEMI  - 9/2022: LAD and LCX PCI  2.  HTN  3.  HLD     Medical History:  1.  Myasthenia gravis  2.  DM2  3.  Kidney stones      Iftikhar Francis is a 58 y.o. male with problem list as above who presents to the clinic for follow up of ventricular tachycardia.  Last time he was seen in clinic we reprogrammed his device to be less aggressive regarding ATP within the VT zone.  He has done well since last time he was seen without any significant presyncope, syncope, or ICD shocks.  He did have an episode last Saturday where he felt like his heart was skipping and racing but no severe associated symptoms.    Objective   Vital Signs:  /82   Pulse 109   Ht 182.9 cm (72.01\")   Wt 78.5 kg (173 lb)   SpO2 98%   BMI 23.46 kg/m²   Estimated body mass index is 23.46 kg/m² as calculated from the following:    Height as of this encounter: 182.9 cm (72.01\").    Weight as of this encounter: 78.5 kg (173 lb).      Physical Exam  Vitals reviewed.   Constitutional:       Appearance: Normal appearance.   Cardiovascular:      Rate and Rhythm: Normal rate and regular rhythm.      Pulses: Normal pulses.      Heart sounds: Normal heart sounds.   Pulmonary:      Effort: Pulmonary effort is normal.      Breath sounds: Normal breath sounds.   Musculoskeletal:         General: No swelling.   Neurological:      Mental Status: He is alert and oriented to person, place, and time.   Psychiatric:         Mood and Affect: Mood normal.         Judgment: Judgment normal.        Result Review :  The following data was reviewed by: Mahin Cunha MD on 01/09/2024:          ECG 12 Lead    Date/Time: " 1/9/2024 2:47 PM  Performed by: Mahin Cunha MD    Authorized by: Mahin Cunha MD  Comparison: compared with previous ECG from 10/26/2023  Similar to previous ECG  Rhythm: sinus rhythm  Rate: normal  Conduction: conduction normal  QRS axis: normal  Other: no other findings    Clinical impression: normal ECG              Assessment and Plan   Diagnoses and all orders for this visit:    1. Ventricular tachycardia (Primary)    Other orders  -     verapamil SR (CALAN-SR) 180 MG CR tablet; Take 1 tablet by mouth Every Night.  Dispense: 90 tablet; Refill: 3        Iftikhar Francis is a 58 y.o. male with problem list as above who presents to the clinic for follow up of ventricular tachycardia.  Continues to have some breakthrough episodes however these are quite well-tolerated hemodynamically.  No ICD shocks from his device.  With this in mind, we will continue current medical therapy with verapamil and reconsider further interventions should the episodes worsen over time.    Plan:  -No medication changes  -Return to clinic in 6 months         Follow Up   Return in about 6 months (around 7/9/2024).  Patient was given instructions and counseling regarding his condition or for health maintenance advice. Please see specific information pulled into the AVS if appropriate.     Part of this note may be an electronic transcription/translation of spoken language to printed text using the Dragon Dictation System.

## 2024-02-28 ENCOUNTER — TELEPHONE (OUTPATIENT)
Dept: UROLOGY | Facility: CLINIC | Age: 59
End: 2024-02-28
Payer: MEDICARE

## 2024-04-01 NOTE — PROGRESS NOTES
"Chief Complaint: Follow-up arrhythmia    S: No acute events overnight.  Cardiac rhythm has remained stable.  The patient denies having any palpitations.  No lightheadedness, dizziness.  Breathing is stable.  No problems with previous access site as the dressing was removed this morning.  The patient says that he is fearful that taking verapamil might allow some recurrence of myasthenia gravis symptoms.  He wonders whether or not he can restart his steroids today.    Medications: Reviewed    Review of Systems: All pertinent negative and positives as noted above.  Otherwise, all systems reviewed and found to be negative.    Telemetry: Sinus rhythm, mild sinus tachycardia, no significant prolonged arrhythmias    O:  /81 (BP Location: Left arm, Patient Position: Lying)   Pulse 87   Temp 98.1 °F (36.7 °C) (Oral)   Resp 16   Ht 182.9 cm (72\")   Wt 78.7 kg (173 lb 6.4 oz)   SpO2 96%   BMI 23.52 kg/m²   Temp:  [98.1 °F (36.7 °C)-99.6 °F (37.6 °C)] 98.1 °F (36.7 °C)  Heart Rate:  [] 87  Resp:  [16] 16  BP: ()/(60-84) 116/81    General: No acute distress, lying in bed  CV: Regular rate and rhythm without murmurs, rubs, gallops appreciated  Pulmonary: Clear to auscultation bilaterally  GI: Soft, nontender, nondistended, active bowel sounds  Extremities: No significant edema, right femoral access site with no significant abnormalities    Diagnostic Data:    Lab Results   Component Value Date    WBC 11.05 (H) 12/10/2022    HGB 14.1 12/10/2022    HCT 45.0 12/10/2022    MCV 90.9 12/10/2022     12/10/2022     Lab Results   Component Value Date    GLUCOSE 132 (H) 12/10/2022    CALCIUM 8.9 12/10/2022     12/10/2022    K 3.9 12/10/2022    CO2 25.0 12/10/2022     12/10/2022    BUN 18 12/10/2022    CREATININE 0.96 12/10/2022    EGFRIFNONA >60 01/20/2020    BCR 18.8 12/10/2022    ANIONGAP 10.0 12/10/2022     Magnesium 2.2    ASSESSMENT/PLAN:    1.  Ventricular tachycardia  2.  Coronary artery " disease in native coronary artery  3.  Essential hypertension  4.  Mixed hyperlipidemia  5.  Type II diabetes mellitus  6.  Myasthenia gravis    -Cardiac rhythm has remained stable overnight.  -Continue current medications including oral amiodarone at 400 mg twice daily, verapamil 180 mg daily.  -Continue dual antiplatelet therapy after PCI.  Continue high intensity statin therapy.  Also, continue Ranexa, isosorbide mononitrate.  -Resume home dose of prednisone today.  -Repeat BMP and magnesium levels tomorrow morning.  -Continue to monitor cardiac rhythm.  If stable over the next 24 hours, consider discharge as early as Monday.     intact

## 2024-07-09 ENCOUNTER — OFFICE VISIT (OUTPATIENT)
Dept: CARDIOLOGY | Facility: CLINIC | Age: 59
End: 2024-07-09
Payer: MEDICARE

## 2024-07-09 VITALS
OXYGEN SATURATION: 97 % | DIASTOLIC BLOOD PRESSURE: 80 MMHG | WEIGHT: 172 LBS | HEART RATE: 89 BPM | BODY MASS INDEX: 23.3 KG/M2 | HEIGHT: 72 IN | SYSTOLIC BLOOD PRESSURE: 118 MMHG

## 2024-07-09 DIAGNOSIS — I47.20 VENTRICULAR TACHYCARDIA: Primary | ICD-10-CM

## 2024-07-09 DIAGNOSIS — I25.10 CORONARY ARTERY DISEASE INVOLVING NATIVE CORONARY ARTERY OF NATIVE HEART WITHOUT ANGINA PECTORIS: ICD-10-CM

## 2024-07-09 PROCEDURE — G2211 COMPLEX E/M VISIT ADD ON: HCPCS | Performed by: STUDENT IN AN ORGANIZED HEALTH CARE EDUCATION/TRAINING PROGRAM

## 2024-07-09 PROCEDURE — 3079F DIAST BP 80-89 MM HG: CPT | Performed by: STUDENT IN AN ORGANIZED HEALTH CARE EDUCATION/TRAINING PROGRAM

## 2024-07-09 PROCEDURE — 99214 OFFICE O/P EST MOD 30 MIN: CPT | Performed by: STUDENT IN AN ORGANIZED HEALTH CARE EDUCATION/TRAINING PROGRAM

## 2024-07-09 PROCEDURE — 1160F RVW MEDS BY RX/DR IN RCRD: CPT | Performed by: STUDENT IN AN ORGANIZED HEALTH CARE EDUCATION/TRAINING PROGRAM

## 2024-07-09 PROCEDURE — 3074F SYST BP LT 130 MM HG: CPT | Performed by: STUDENT IN AN ORGANIZED HEALTH CARE EDUCATION/TRAINING PROGRAM

## 2024-07-09 PROCEDURE — 1159F MED LIST DOCD IN RCRD: CPT | Performed by: STUDENT IN AN ORGANIZED HEALTH CARE EDUCATION/TRAINING PROGRAM

## 2024-07-09 RX ORDER — RANOLAZINE 500 MG/1
500 TABLET, EXTENDED RELEASE ORAL 2 TIMES DAILY
Qty: 180 TABLET | Refills: 3 | Status: SHIPPED | OUTPATIENT
Start: 2024-07-09

## 2024-07-09 NOTE — PROGRESS NOTES
"Chief Complaint  VT    Subjective        History of Present Illness    EP History:  1.  Ventricular tachycardia  - 12/8/22:  VT ablation, multiple VT morphologies including fascicular VT  2.  Presence of an ICD  -12/19/2022: DOI, single-chamber [employer], [employer]     Cardiology history:  1.  CAD w/ prior NSTEMI  - 9/2022: LAD and LCX PCI  2.  HTN  3.  HLD     Medical History:  1.  Myasthenia gravis  2.  DM2  3.  Kidney stones      Iftikhar Francis is a 58 y.o. male with problem list as above who presents to the clinic for follow up of ventricular tachycardia.  No major issues since last clinic visit.  Denies any episodes of syncope or near syncope.  States that he has been feeling reasonably well.    Objective   Vital Signs:  /80   Pulse 89   Ht 182.9 cm (72\")   Wt 78 kg (172 lb)   SpO2 97%   BMI 23.33 kg/m²   Estimated body mass index is 23.33 kg/m² as calculated from the following:    Height as of this encounter: 182.9 cm (72\").    Weight as of this encounter: 78 kg (172 lb).      Physical Exam  Vitals reviewed.   Constitutional:       Appearance: Normal appearance.   Cardiovascular:      Rate and Rhythm: Normal rate and regular rhythm.      Pulses: Normal pulses.      Heart sounds: Normal heart sounds.   Pulmonary:      Effort: Pulmonary effort is normal.      Breath sounds: Normal breath sounds.   Musculoskeletal:         General: No swelling.   Neurological:      Mental Status: He is alert and oriented to person, place, and time.   Psychiatric:         Mood and Affect: Mood normal.         Judgment: Judgment normal.        Result Review :  The following data was reviewed by: Mahin Cunha MD on today's date:    ECG today: Sinus rhythm, inferior Q-wave pattern, otherwise normal    Device interrogation today reveals 48 episodes of ventricular tachycardia following into the VT 1 monitor zone lasting up to 4 minutes in duration.  No episodes of rapid VT requiring ICD shocks or ATP.         "   Assessment and Plan   Diagnoses and all orders for this visit:    1. Ventricular tachycardia (Primary)    2. Coronary artery disease involving native coronary artery of native heart without angina pectoris    Other orders  -     ranolazine (RANEXA) 500 MG 12 hr tablet; Take 1 tablet by mouth 2 (Two) Times a Day.  Dispense: 180 tablet; Refill: 3          Iftikhar Francis is a 58 y.o. male with problem list as above who presents to the clinic for follow up of ventricular tachycardia and CAD.  He continues to have episodes of ventricular tachycardia that are relatively short and slow.  Given lack of associated symptoms, I do think that continued medical therapy is appropriate.  Otherwise, from a CAD standpoint, it has been more than 1 year since the time of his last PCI.  I do think that is reasonable.  His Brilinta has resolved.    Plan:  -Stop ticagrelor  -Continue aspirin  -No additional medication changes for now  -Continue remote monitoring for ventricular tachycardia  -Long-term follow-up in our clinic for ventricular tachycardia         Follow Up   Return in about 6 months (around 1/9/2025).  Patient was given instructions and counseling regarding his condition or for health maintenance advice. Please see specific information pulled into the AVS if appropriate.     Part of this note may be an electronic transcription/translation of spoken language to printed text using the Dragon Dictation System.

## 2024-08-26 ENCOUNTER — OFFICE VISIT (OUTPATIENT)
Dept: CARDIOLOGY | Facility: CLINIC | Age: 59
End: 2024-08-26
Payer: MEDICARE

## 2024-08-26 VITALS
HEART RATE: 94 BPM | HEIGHT: 72 IN | BODY MASS INDEX: 23.16 KG/M2 | SYSTOLIC BLOOD PRESSURE: 110 MMHG | OXYGEN SATURATION: 96 % | WEIGHT: 171 LBS | DIASTOLIC BLOOD PRESSURE: 70 MMHG

## 2024-08-26 DIAGNOSIS — I25.10 CORONARY ARTERY DISEASE INVOLVING NATIVE CORONARY ARTERY OF NATIVE HEART WITHOUT ANGINA PECTORIS: Primary | ICD-10-CM

## 2024-08-26 DIAGNOSIS — E78.2 MIXED HYPERLIPIDEMIA: ICD-10-CM

## 2024-08-26 DIAGNOSIS — I10 ESSENTIAL HYPERTENSION: ICD-10-CM

## 2024-08-26 DIAGNOSIS — Z79.4 TYPE 2 DIABETES MELLITUS WITH OTHER CIRCULATORY COMPLICATION, WITH LONG-TERM CURRENT USE OF INSULIN: ICD-10-CM

## 2024-08-26 DIAGNOSIS — I47.20 VENTRICULAR TACHYCARDIA: ICD-10-CM

## 2024-08-26 DIAGNOSIS — Z95.810 S/P IMPLANTATION OF AUTOMATIC CARDIOVERTER/DEFIBRILLATOR (AICD): ICD-10-CM

## 2024-08-26 DIAGNOSIS — E11.59 TYPE 2 DIABETES MELLITUS WITH OTHER CIRCULATORY COMPLICATION, WITH LONG-TERM CURRENT USE OF INSULIN: ICD-10-CM

## 2024-08-26 PROCEDURE — 99214 OFFICE O/P EST MOD 30 MIN: CPT | Performed by: NURSE PRACTITIONER

## 2024-08-26 PROCEDURE — 93000 ELECTROCARDIOGRAM COMPLETE: CPT | Performed by: NURSE PRACTITIONER

## 2024-08-26 PROCEDURE — 1160F RVW MEDS BY RX/DR IN RCRD: CPT | Performed by: NURSE PRACTITIONER

## 2024-08-26 PROCEDURE — 3078F DIAST BP <80 MM HG: CPT | Performed by: NURSE PRACTITIONER

## 2024-08-26 PROCEDURE — 3074F SYST BP LT 130 MM HG: CPT | Performed by: NURSE PRACTITIONER

## 2024-08-26 PROCEDURE — 1159F MED LIST DOCD IN RCRD: CPT | Performed by: NURSE PRACTITIONER

## 2024-08-26 RX ORDER — FENOFIBRATE 160 MG/1
160 TABLET ORAL DAILY
Qty: 90 TABLET | Refills: 3 | Status: SHIPPED | OUTPATIENT
Start: 2024-08-26

## 2024-08-26 RX ORDER — RANOLAZINE 500 MG/1
500 TABLET, EXTENDED RELEASE ORAL 2 TIMES DAILY
Qty: 180 TABLET | Refills: 3 | Status: SHIPPED | OUTPATIENT
Start: 2024-08-26

## 2024-08-26 NOTE — PROGRESS NOTES
"    Subjective:     Encounter Date:08/26/2024      Patient ID: Iftikhar Francis is a 58 y.o. male     Chief Complaint:\"no complaints\"  Coronary Artery Disease  Presents for follow-up visit. Pertinent negatives include no chest pain, dizziness, leg swelling, palpitations, shortness of breath or weight gain. Risk factors include hyperlipidemia. The symptoms have been stable.   Hypertension  This is a chronic problem. The current episode started more than 1 year ago. The problem has been waxing and waning since onset. The problem is controlled. Associated symptoms include malaise/fatigue (chronic due to myasthenia). Pertinent negatives include no chest pain, orthopnea, palpitations, PND or shortness of breath.   Hyperlipidemia  This is a chronic problem. The current episode started more than 1 year ago. Pertinent negatives include no chest pain or shortness of breath.   Shortness of Breath  Pertinent negatives include no chest pain, leg swelling, orthopnea, PND, rash, sputum production, syncope or wheezing. His past medical history is significant for CAD.     Patient presents today for a routine follow up. Patient is followed for CAD with stenting to the RCA in 2014 and to the LCX and LAD in 9/2022 following a NSTEMI. He was seen in my office in 12/2022 and noted to be in a wide complex tachycardia with . He was admitted to the hospital where Dr. Cunha was consulted. He underwent an EP study which noted multiple VT morphologies followed by a VT ablation with inducible VT at the end of the case. His EF was noted to be normal at 51-55% per echo while inpatient. He ultimately underwent placement of an AICD per Dr. Cunha on 12/19/22. His last device interrogation available for my review in July, revealed several episodes  VT however none of which required treatment. He last saw Dr. Cunha in January at which time no medication changes were recommended.     He reports he has been well and denies complaints. He denies " chest pain, palpitations, edema, syncope and near syncope as well as lightheadedness, dizziness and AICD discharge.      The following portions of the patient's history were reviewed and updated as appropriate: allergies, current medications, past family history, past medical history, past social history, past surgical history and problem list.    Allergies   Allergen Reactions    Levofloxacin Other (See Comments)     Causes eye problems  Double vision.      Moxifloxacin Other (See Comments)     Causes eye problems  Double vision       Current Outpatient Medications:     aspirin 81 MG EC tablet, Take 1 tablet by mouth Daily., Disp: , Rfl:     atorvastatin (LIPITOR) 40 MG tablet, Take 1 tablet by mouth Daily., Disp: 90 tablet, Rfl: 3    busPIRone (BUSPAR) 15 MG tablet, Take 1 tablet by mouth 3 (Three) Times a Day., Disp: , Rfl: 5    dapagliflozin (Farxiga) 5 MG tablet tablet, Take 1 tablet by mouth Daily., Disp: 90 tablet, Rfl: 3    ezetimibe (ZETIA) 10 MG tablet, Take 1 tablet by mouth Daily., Disp: , Rfl:     fenofibrate 160 MG tablet, Take 1 tablet by mouth Daily., Disp: 90 tablet, Rfl: 3    Insulin Glargine-yfgn 100 UNIT/ML solution pen-injector, Inject 16 Units under the skin into the appropriate area as directed Every Night., Disp: , Rfl:     metFORMIN (GLUCOPHAGE) 1000 MG tablet, Take 1 tablet by mouth 2 (Two) Times a Day With Meals., Disp: , Rfl:     nitroglycerin (NITROSTAT) 0.4 MG SL tablet, Place 1 tablet under the tongue Every 5 (Five) Minutes As Needed for Chest Pain. Take no more than 3 doses in 15 minutes., Disp: , Rfl:     NovoLOG FlexPen 100 UNIT/ML solution pen-injector sc pen, 1-10 Units 3 (Three) Times a Day With Meals. Sliding Scale, Disp: , Rfl:     omeprazole (priLOSEC) 20 MG capsule, Take 1 capsule by mouth Daily., Disp: , Rfl:     ondansetron ODT (ZOFRAN-ODT) 4 MG disintegrating tablet, Place 1 tablet on the tongue Every 6 (Six) Hours As Needed for Nausea., Disp: 6 tablet, Rfl: 1     predniSONE (DELTASONE) 10 MG tablet, Take 1 tablet by mouth Daily., Disp: , Rfl:     ranolazine (RANEXA) 500 MG 12 hr tablet, Take 1 tablet by mouth 2 (Two) Times a Day., Disp: 180 tablet, Rfl: 3    verapamil SR (CALAN-SR) 180 MG CR tablet, Take 1 tablet by mouth Every Night., Disp: 90 tablet, Rfl: 3  Past Medical History:   Diagnosis Date    Anxiety     Bilateral kidney stones     Bronchitis     Hearing loss     Heart attack     Left ureteral stone     Myasthenia gravis     Peptic ulceration     Pneumonia     Sleep apnea     bi-pap at times    Type 2 diabetes mellitus with circulatory disorder, with long-term current use of insulin 09/27/2022       Social History     Socioeconomic History    Marital status:    Tobacco Use    Smoking status: Never    Smokeless tobacco: Never   Vaping Use    Vaping status: Never Used   Substance and Sexual Activity    Alcohol use: Yes     Comment: occassionally    Drug use: Never    Sexual activity: Defer       Review of Systems   Constitutional: Positive for malaise/fatigue (chronic due to myasthenia). Negative for weight gain and weight loss.   Cardiovascular:  Negative for chest pain, dyspnea on exertion, irregular heartbeat, leg swelling, near-syncope, orthopnea, palpitations, paroxysmal nocturnal dyspnea and syncope.   Respiratory:  Negative for cough, shortness of breath, sleep disturbances due to breathing, sputum production and wheezing.    Skin:  Negative for dry skin, flushing, itching and rash.   Gastrointestinal:  Negative for hematemesis and hematochezia.   Neurological:  Negative for dizziness, light-headedness, loss of balance and weakness.   All other systems reviewed and are negative.         Objective:     Vitals reviewed.   Constitutional:       General: Not in acute distress.     Appearance: Healthy appearance. Well-developed. Not diaphoretic.   Eyes:      General: No scleral icterus.     Conjunctiva/sclera: Conjunctivae normal.      Pupils: Pupils are  "equal, round, and reactive to light.   HENT:      Head: Normocephalic.    Mouth/Throat:      Pharynx: No oropharyngeal exudate.   Neck:      Vascular: No JVR.   Pulmonary:      Effort: Pulmonary effort is normal. No respiratory distress.      Breath sounds: Normal breath sounds. No wheezing. No rhonchi. No rales.   Chest:      Chest wall: Not tender to palpatation.   Cardiovascular:      Normal rate. Regular rhythm.   Pulses:     Intact distal pulses.   Edema:     Peripheral edema absent.   Abdominal:      General: Bowel sounds are normal. There is no distension.      Palpations: Abdomen is soft.      Tenderness: There is no abdominal tenderness.   Musculoskeletal: Normal range of motion.      Cervical back: Normal range of motion and neck supple. Skin:     General: Skin is warm and dry.      Coloration: Skin is not pale.      Findings: No erythema or rash.   Neurological:      Mental Status: Alert, oriented to person, place, and time and oriented to person, place and time.      Deep Tendon Reflexes: Reflexes are normal and symmetric.   Psychiatric:         Behavior: Behavior normal.             ECG 12 Lead    Date/Time: 8/26/2024 2:27 PM  Performed by: Norma Yusuf APRN    Authorized by: Norma Yusuf APRN  Comparison: compared with previous ECG from 1/9/2024  Rhythm: sinus rhythm  Rate: normal  BPM: 91  Conduction: conduction normal  ST Segments: ST segments normal  T Waves: T waves normal  QRS axis: normal  Other: no other findings    Clinical impression: normal ECG        /70   Pulse 94   Ht 182.9 cm (72\")   Wt 77.6 kg (171 lb)   SpO2 96%   BMI 23.19 kg/m²     Lab Review:   I have reviewed previous office notes, recent hospital records, recent labs and recent cardiac testing.     Lipid 6/2024:     Results for orders placed during the hospital encounter of 12/07/22    Adult Transthoracic Echo Complete w/ Color, Spectral and Contrast if Necessary Per Protocol    Interpretation Summary    Left " ventricular systolic function is low normal. Left ventricular ejection fraction appears to be 51 - 55%.    Normal right ventricular cavity size and systolic function noted.    No hemodynamically significant valvular abnormalities identified on this study.    Cath 9/11/2022:  Impression:  1.  Coronary artery disease as described above including hemodynamically significant lesions in both the left circumflex and LAD status post successful PCI with continuation of PATI-3 flow and no residual stenosis remaining  2.  Diabetes  3.  Hypertension  4.  Hyperlipidemia  5.  Myasthenia gravis             Assessment:          Diagnosis Plan   1. Coronary artery disease involving native coronary artery of native heart without angina pectoris        2. Ventricular tachycardia        3. S/P implantation of automatic cardioverter/defibrillator (AICD)        4. Essential hypertension        5. Mixed hyperlipidemia        6. Type 2 diabetes mellitus with other circulatory complication, with long-term current use of insulin               Plan:       1. CAD- stable. No clinical signs of ongoing ischemia. PRANAV to the RCA in 2014 and to the LAD and LCX on 9/11/22. Continue ASA, Verapamil, ranexa, and statin. Imdur was stopped in 1/2023 due to orthostatic hypotension.   2. VT- s/p AICD. Several episodes of VT noted on device interrogation in July with no device intervention needed. Asymptomatic. S/p ablation per Dr. Cunha in 12/2022. Being managed per EP  3. S/p AICD- normal device function per device interrogation in July. Managed per Dr. Cunha.  4. HTN- controlled. Continue current therapy.    5. HLD- controlled with LDL 50 per labs in June. Continue lipitor 40mg daily.   6. DM2 with CAD- reports controlled. Managed per PCP.      Follow up in 1 year or sooner if symptoms worsen.     I spent 30 minutes caring for Iftikhar on this date of service. This time includes time spent by me in the following activities:preparing for the visit, reviewing  tests, obtaining and/or reviewing a separately obtained history, performing a medically appropriate examination and/or evaluation , counseling and educating the patient/family/caregiver, ordering medications, tests, or procedures, documenting information in the medical record, independently interpreting results and communicating that information with the patient/family/caregiver and care coordination       I spent 2 minutes on the separately reported service of EKG interpretation. This time is not included in the time used to support the E/M service also reported today.

## 2025-01-14 ENCOUNTER — OFFICE VISIT (OUTPATIENT)
Dept: CARDIOLOGY | Facility: CLINIC | Age: 60
End: 2025-01-14
Payer: MEDICARE

## 2025-01-14 VITALS
HEIGHT: 72 IN | WEIGHT: 181 LBS | DIASTOLIC BLOOD PRESSURE: 70 MMHG | BODY MASS INDEX: 24.52 KG/M2 | HEART RATE: 74 BPM | SYSTOLIC BLOOD PRESSURE: 118 MMHG

## 2025-01-14 DIAGNOSIS — I47.20 VENTRICULAR TACHYCARDIA: Primary | ICD-10-CM

## 2025-01-14 DIAGNOSIS — I10 ESSENTIAL HYPERTENSION: ICD-10-CM

## 2025-01-14 NOTE — PATIENT INSTRUCTIONS
1.  6 month follow up  2.  No medication changes  3.  Call us with any episodes of syncope or near syncope

## 2025-01-14 NOTE — PROGRESS NOTES
"Chief Complaint  VT    Subjective        History of Present Illness    EP History:  1.  Ventricular tachycardia  - 12/8/22:  VT ablation, multiple VT morphologies including fascicular VT  2.  Presence of an ICD  -12/19/2022: MIHAI, single-chamber [employer], [place]     Cardiology history:  1.  CAD w/ prior NSTEMI  - 9/2022: LAD and LCX PCI  2.  HTN  3.  HLD     Medical History:  1.  Myasthenia gravis  2.  DM2  3.  Kidney stones    History of Present Illness  The patient is a 59-year-old male presenting to the clinic for follow-up of ventricular tachycardia. He was last seen in the clinic in July, at which time he was doing well.    He continues to have episodes of slow VT recorded by his device, which are asymptomatic, self-terminating, and fall into the monitor zone only. He reports no recent shocks from his defibrillator. However, he has been experiencing increased fatigue, which he attributes to his myasthenia gravis.    MEDICATIONS  verapamil      Objective   Vital Signs:  /70   Pulse 74   Ht 182.9 cm (72\")   Wt 82.1 kg (181 lb)   BMI 24.55 kg/m²   Estimated body mass index is 24.55 kg/m² as calculated from the following:    Height as of this encounter: 182.9 cm (72\").    Weight as of this encounter: 82.1 kg (181 lb).      Physical Exam     Physical Exam  The patient is a well-appearing man, showing no signs of acute distress.  The lungs are clear to auscultation on both sides.  The heart rhythm is regular with a normal rate.    Result Review :  The following data was reviewed by: Mahin Cunha MD on today's date:    ECG today shows sinus rhythm, borderline LVH, nonspecific ST and T wave changes           Assessment and Plan   Diagnoses and all orders for this visit:    1. Ventricular tachycardia (Primary)    2. Essential hypertension        Assessment & Plan  1. Ventricular Tachycardia:  - Continue remote monitoring for ICD  - Continue verapamil at current dose  - Call with episodes of syncope or " near syncope  - Call if fatigue remains persistent and not improving  - If symptoms become more symptomatic, consider more aggressive interventions such as another ablation    2. Fatigue:  - Suspect related to myasthenia gravis rather than medications or VT  - If fatigue worsens or does not improve, consider adjusting the verapamil dose    3. Hypertension:  - Blood pressures are under good control  - Continue current regimen    Follow-up:  - The patient will follow up in 6 months.  Long-term follow-up in our clinic for ventricular tachycardia and ICD    PROCEDURE  The patient has a defibrillator inserted previously.               Follow Up   Return in about 6 months (around 7/14/2025).  Patient was given instructions and counseling regarding his condition or for health maintenance advice. Please see specific information pulled into the AVS if appropriate.     Part of this note may be an electronic transcription/translation of spoken language to printed text using the Dragon Dictation System.    Patient or patient representative verbalized consent for the use of Ambient Listening during the visit with  Mahin Cunha MD for chart documentation. 1/14/2025  08:15 CST

## 2025-03-03 ENCOUNTER — TELEPHONE (OUTPATIENT)
Dept: CARDIOLOGY | Facility: CLINIC | Age: 60
End: 2025-03-03
Payer: MEDICARE

## 2025-03-03 RX ORDER — VERAPAMIL HYDROCHLORIDE 180 MG/1
180 TABLET, FILM COATED, EXTENDED RELEASE ORAL NIGHTLY
Qty: 90 TABLET | Refills: 3 | Status: SHIPPED | OUTPATIENT
Start: 2025-03-03

## 2025-03-03 NOTE — TELEPHONE ENCOUNTER
Hub staff attempted to follow warm transfer process and was unsuccessful     Caller: Deborah Francis    Relationship to patient: Emergency Contact    Best call back number: [phone]    Patient is needing: PATIENT'S WIFE CALLING BACK. PLEASE RETURN CALL. THANK YOU!

## 2025-03-03 NOTE — TELEPHONE ENCOUNTER
Wife reports that patient has been nauseated for the past week.  AICD report received today does show increased number of NonSustV in monitor only zone on 3.2.2025.  Single lead VVI device, rates 140-143 bpm, longest duration 33 seconds.  HR on presenting EGM received overnight was approximately 80 bpm.   It is unlikely that arrhythmias caused his nausea as episodes were only noted yesterday and one episode on 2.26.25.  RN will notify Dr. Cunha and will reach out with his recommendations.

## 2025-03-03 NOTE — TELEPHONE ENCOUNTER
Caller: Deborah Francis    Relationship: Emergency Contact    Best call back number: [phone]    What is the best time to reach you: ANY    Who are you requesting to speak with (clinical staff, provider,  specific staff member): CLINICAL    Do you know the name of the person who called: DEBORAH MICHAEL    What was the call regarding: PRASANNA NOT BEEN FEELING GOOD THE PAST WEEK. CHECKED BLOOD SUGAR AND HEART RATE, BUT SEEMS FINE. PT'S WIFE WANTS JASON ALBARO TO CHECK HIS DEVICE TO SEE IF THAT'S MAYBE WHY HE ISN'T FEELING WELL.    Is it okay if the provider responds through MyChart: NO

## 2025-07-15 ENCOUNTER — OFFICE VISIT (OUTPATIENT)
Dept: CARDIOLOGY | Facility: CLINIC | Age: 60
End: 2025-07-15
Payer: MEDICARE

## 2025-07-15 VITALS
OXYGEN SATURATION: 97 % | HEIGHT: 72 IN | WEIGHT: 173 LBS | SYSTOLIC BLOOD PRESSURE: 122 MMHG | BODY MASS INDEX: 23.43 KG/M2 | DIASTOLIC BLOOD PRESSURE: 74 MMHG | HEART RATE: 74 BPM

## 2025-07-15 DIAGNOSIS — I47.20 VENTRICULAR TACHYCARDIA: Primary | ICD-10-CM

## 2025-07-15 DIAGNOSIS — I10 ESSENTIAL HYPERTENSION: ICD-10-CM

## 2025-07-15 PROCEDURE — 99214 OFFICE O/P EST MOD 30 MIN: CPT

## 2025-07-15 PROCEDURE — 93000 ELECTROCARDIOGRAM COMPLETE: CPT

## 2025-07-15 PROCEDURE — 3074F SYST BP LT 130 MM HG: CPT

## 2025-07-15 PROCEDURE — 3078F DIAST BP <80 MM HG: CPT

## 2025-07-15 RX ORDER — MYCOPHENOLATE MOFETIL 500 MG/1
TABLET ORAL
COMMUNITY

## 2025-07-15 NOTE — PROGRESS NOTES
[facility] Electrophysiology   Reason For Visit:  Ventricular tachycardia     Subjective        EP History:  1.  Ventricular tachycardia  - 12/8/22:  VT ablation, multiple VT morphologies including fascicular VT  2.  Presence of an ICD  -12/19/2022: DOI, single-chamber [employer], [employer]     Cardiology history:  1.  CAD w/ prior NSTEMI  - 9/2022: LAD and LCX PCI  2.  HTN  3.  HLD     Medical History:  1.  Myasthenia gravis  2.  DM2  3.  Kidney stones      Iftikhar Francis is a 59 y.o. male with above pertinent PMH who presents for follow-up of VT.    Patient last seen in the EP clinic in January 2025.  He was having some slow episodes of VT on his device which were self terminating.  Patient was complaining of fatigue, however likely unrelated to his VT.  No adjustments were made.    Today the patient is doing well.  He has not noticed any significant change in his symptoms over the last 6 months.  Denies any dizziness or lightheadedness.  Denies any syncope.  Tolerating his medical regimen well.  He does have fear of doing activities due to his underlying VT.      ROS: Pertinent findings as noted above        Pertinent past medical, surgical, family, and social history were reviewed.      Current Outpatient Medications:     aspirin 81 MG EC tablet, Take 1 tablet by mouth Daily., Disp: , Rfl:     atorvastatin (LIPITOR) 40 MG tablet, Take 1 tablet by mouth Daily., Disp: 90 tablet, Rfl: 3    busPIRone (BUSPAR) 15 MG tablet, Take 1 tablet by mouth 3 (Three) Times a Day., Disp: , Rfl: 5    dapagliflozin (Farxiga) 5 MG tablet tablet, Take 1 tablet by mouth Daily., Disp: 90 tablet, Rfl: 3    ezetimibe (ZETIA) 10 MG tablet, Take 1 tablet by mouth Daily., Disp: , Rfl:     fenofibrate 160 MG tablet, Take 1 tablet by mouth Daily., Disp: 90 tablet, Rfl: 3    Insulin Glargine-yfgn 100 UNIT/ML solution pen-injector, Inject 16 Units under the skin into the appropriate area as directed Every Night., Disp: , Rfl:      "metFORMIN (GLUCOPHAGE) 1000 MG tablet, Take 1 tablet by mouth 2 (Two) Times a Day With Meals., Disp: , Rfl:     mycophenolate (CELLCEPT) 500 MG tablet, TAKE 1 TABLET BY MOUTH TWICE DAILY FOR 7 DAYS THEN 2 TWICE DAILY, Disp: , Rfl:     nitroglycerin (NITROSTAT) 0.4 MG SL tablet, Place 1 tablet under the tongue Every 5 (Five) Minutes As Needed for Chest Pain. Take no more than 3 doses in 15 minutes., Disp: , Rfl:     NovoLOG FlexPen 100 UNIT/ML solution pen-injector sc pen, 1-10 Units 3 (Three) Times a Day With Meals. Sliding Scale, Disp: , Rfl:     omeprazole (priLOSEC) 20 MG capsule, Take 1 capsule by mouth Daily., Disp: , Rfl:     ondansetron ODT (ZOFRAN-ODT) 4 MG disintegrating tablet, Place 1 tablet on the tongue Every 6 (Six) Hours As Needed for Nausea., Disp: 6 tablet, Rfl: 1    predniSONE (DELTASONE) 10 MG tablet, Take 1 tablet by mouth Daily., Disp: , Rfl:     ranolazine (RANEXA) 500 MG 12 hr tablet, Take 1 tablet by mouth 2 (Two) Times a Day., Disp: 180 tablet, Rfl: 3    verapamil SR (CALAN-SR) 180 MG CR tablet, TAKE 1 TABLET BY MOUTH ONCE DAILY AT NIGHT, Disp: 90 tablet, Rfl: 3     Objective   Vital Signs:  /74   Pulse 74   Ht 182.9 cm (72.01\")   Wt 78.5 kg (173 lb)   SpO2 97%   BMI 23.46 kg/m²   Estimated body mass index is 23.46 kg/m² as calculated from the following:    Height as of this encounter: 182.9 cm (72.01\").    Weight as of this encounter: 78.5 kg (173 lb).      Constitutional:       Appearance: Healthy appearance. Not in distress.   Pulmonary:      Effort: Pulmonary effort is normal.      Breath sounds: Normal breath sounds and air entry.   Cardiovascular:      PMI at left midclavicular line. Normal rate. Regular rhythm. Normal S1. Normal S2.       Murmurs: There is no murmur.      No gallop.  No click. No rub.   Pulses:     Intact distal pulses.   Edema:     Peripheral edema absent.   Neurological:      Mental Status: Alert and oriented to person, place and time.        Result " Review :           ECG 12 Lead    Date/Time: 7/15/2025 8:24 AM  Performed by: Byron Montana APRN    Authorized by: Byron Montana APRN  Comparison: compared with previous ECG from 8/26/2024  Similar to previous ECG  Comparison to previous ECG: Normal sinus rhythm  Rhythm: sinus rhythm  Rate: normal  QRS axis: normal    Clinical impression: normal ECG            Assessment and Plan   Diagnoses and all orders for this visit:    1. Ventricular tachycardia (Primary)  2. Essential hypertension  -Sinus rhythm on EKG today  - Recent device interrogation shows episodes of VT, however none require treatment  - No additional therapy at this time  - Continue verapamil 180 mg nightly  - Follow-up in 6 months                      I spent 2 minutes on the separately reported service of EKG interpretation. This time is not included in the time used to support the E/M service also reported today.      Follow Up   Return in about 6 months (around 1/15/2026).  Patient was given instructions and counseling regarding his condition or for health maintenance advice. Please see specific information pulled into the AVS if appropriate.       Part of this note may be an electronic transcription/translation of spoken language to printed text using the Dragon Dictation System.

## 2025-07-23 LAB
MC_CV_MDC_IDC_RATE_1: 140
MC_CV_MDC_IDC_RATE_1: 180
MC_CV_MDC_IDC_RATE_1: 200
MC_CV_MDC_IDC_SHOCK_MEASURED_IMPEDANCE: 77
MC_CV_MDC_IDC_THERAPIES: NORMAL
MC_CV_MDC_IDC_THERAPIES: NORMAL
MC_CV_MDC_IDC_ZONE_ID: 1
MC_CV_MDC_IDC_ZONE_ID: 2
MC_CV_MDC_IDC_ZONE_ID: 3
MDC_IDC_MSMT_BATTERY_REMAINING_LONGEVITY: 150 MO
MDC_IDC_MSMT_BATTERY_REMAINING_PERCENTAGE: 100 %
MDC_IDC_MSMT_BATTERY_STATUS: NORMAL
MDC_IDC_MSMT_CAP_CHARGE_TIME: 10.8
MDC_IDC_MSMT_LEADCHNL_RV_DTM: NORMAL
MDC_IDC_MSMT_LEADCHNL_RV_IMPEDANCE_VALUE: 923
MDC_IDC_MSMT_LEADCHNL_RV_PACING_THRESHOLD_AMPLITUDE: 0.5
MDC_IDC_MSMT_LEADCHNL_RV_PACING_THRESHOLD_POLARITY: NORMAL
MDC_IDC_MSMT_LEADCHNL_RV_PACING_THRESHOLD_PULSEWIDTH: 0.4
MDC_IDC_MSMT_LEADCHNL_RV_SENSING_INTR_AMPL: 7.6
MDC_IDC_PG_IMPLANT_DTM: NORMAL
MDC_IDC_PG_MFG: NORMAL
MDC_IDC_PG_MODEL: NORMAL
MDC_IDC_PG_SERIAL: NORMAL
MDC_IDC_PG_TYPE: NORMAL
MDC_IDC_SESS_DTM: NORMAL
MDC_IDC_SESS_TYPE: NORMAL
MDC_IDC_SET_BRADY_LOWRATE: 40
MDC_IDC_SET_BRADY_MODE: NORMAL
MDC_IDC_SET_LEADCHNL_RV_PACING_AMPLITUDE: 2
MDC_IDC_SET_LEADCHNL_RV_PACING_POLARITY: NORMAL
MDC_IDC_SET_LEADCHNL_RV_PACING_PULSEWIDTH: 0.4
MDC_IDC_SET_LEADCHNL_RV_SENSING_POLARITY: NORMAL
MDC_IDC_SET_LEADCHNL_RV_SENSING_SENSITIVITY: 0.6
MDC_IDC_SET_ZONE_STATUS: NORMAL
MDC_IDC_SET_ZONE_TYPE: NORMAL
MDC_IDC_STAT_BRADY_RV_PERCENT_PACED: 0
MDC_IDC_STAT_TACHYTHERAPY_ATP_DELIVERED_RECENT: 0
MDC_IDC_STAT_TACHYTHERAPY_SHOCKS_ABORTED_RECENT: 0
MDC_IDC_STAT_TACHYTHERAPY_SHOCKS_DELIVERED_RECENT: 0

## 2025-08-27 ENCOUNTER — OFFICE VISIT (OUTPATIENT)
Dept: CARDIOLOGY | Facility: CLINIC | Age: 60
End: 2025-08-27
Payer: MEDICARE

## 2025-08-27 VITALS
HEIGHT: 72 IN | WEIGHT: 174 LBS | OXYGEN SATURATION: 97 % | SYSTOLIC BLOOD PRESSURE: 136 MMHG | DIASTOLIC BLOOD PRESSURE: 90 MMHG | BODY MASS INDEX: 23.57 KG/M2 | HEART RATE: 87 BPM

## 2025-08-27 DIAGNOSIS — I47.20 VENTRICULAR TACHYCARDIA: ICD-10-CM

## 2025-08-27 DIAGNOSIS — I10 ESSENTIAL HYPERTENSION: ICD-10-CM

## 2025-08-27 DIAGNOSIS — I25.10 CORONARY ARTERY DISEASE INVOLVING NATIVE CORONARY ARTERY OF NATIVE HEART WITHOUT ANGINA PECTORIS: Primary | ICD-10-CM

## 2025-08-27 DIAGNOSIS — Z95.810 S/P IMPLANTATION OF AUTOMATIC CARDIOVERTER/DEFIBRILLATOR (AICD): ICD-10-CM

## 2025-08-27 DIAGNOSIS — Z79.4 TYPE 2 DIABETES MELLITUS WITH OTHER CIRCULATORY COMPLICATION, WITH LONG-TERM CURRENT USE OF INSULIN: ICD-10-CM

## 2025-08-27 DIAGNOSIS — E11.59 TYPE 2 DIABETES MELLITUS WITH OTHER CIRCULATORY COMPLICATION, WITH LONG-TERM CURRENT USE OF INSULIN: ICD-10-CM

## 2025-08-27 DIAGNOSIS — E78.2 MIXED HYPERLIPIDEMIA: ICD-10-CM

## 2025-08-27 RX ORDER — INSULIN GLARGINE 100 [IU]/ML
INJECTION, SOLUTION SUBCUTANEOUS
COMMUNITY
Start: 2025-06-03

## (undated) DEVICE — Device: Brand: DECANAV

## (undated) DEVICE — PK CATH CARD 30 CA/4

## (undated) DEVICE — GLV SURG BIOGEL M LTX PF 7 1/2

## (undated) DEVICE — NDL TRNSEP BRK XS LNG 18G 98CM A/

## (undated) DEVICE — GLIDESHEATH SLENDER STAINLESS STEEL KIT: Brand: GLIDESHEATH SLENDER

## (undated) DEVICE — SHEATH URETRL ACC NAVIGATOR 11/13F 36CM 5PK

## (undated) DEVICE — SYS CLS VASC/VENI VASCADE MVP 6TO12F

## (undated) DEVICE — OCTA,GALAXY,2-5-2-5-2,D-CURVE: Brand: OCTARAY MAPPING CATHETER

## (undated) DEVICE — PINNACLE INTRODUCER SHEATH: Brand: PINNACLE

## (undated) DEVICE — SUP ARMBRD ART/LINE BLU

## (undated) DEVICE — FIBR LASR MOSES 200 DFL 2J 80HZ

## (undated) DEVICE — TBG PENCL TELESCP MEGADYNE SMOKE EVAC 10FT

## (undated) DEVICE — TBG PRESS/MONITR FIX M/F LL A/ 48IN STRL

## (undated) DEVICE — ENDOSCOPIC SEAL URO 1 SIZE FITS ALL: Brand: ENDOSCOPIC SEAL

## (undated) DEVICE — SOL IRR NACL 0.9PCT BT 1000ML

## (undated) DEVICE — CANN NASL ETCO2 LO/FLO A/

## (undated) DEVICE — DEV TORQ GW HOT/PINK

## (undated) DEVICE — CATH F6INF PIG 145 110CM 6SH: Brand: INFINITI

## (undated) DEVICE — SI AVANTI+ 8F STD W/GW  NO OBT: Brand: AVANTI

## (undated) DEVICE — SI AVANTI+ 10F STD W/GW: Brand: AVANTI

## (undated) DEVICE — GW SENSR DUALFLEX NITNL STR .038IN 3X150CM

## (undated) DEVICE — PAD, DEFIB, ADULT, RADIOTRANS, PHYSIO: Brand: MEDLINE

## (undated) DEVICE — Device: Brand: REFERENCE PATCH CARTO 3

## (undated) DEVICE — INFLATION DEVICE: Brand: ENCORE™ 26

## (undated) DEVICE — DISPOSABLE SURGICAL CABLE

## (undated) DEVICE — CATH URETRL OPN/END 5F70CM

## (undated) DEVICE — NITINOL STONE RETRIEVAL BASKET: Brand: ZERO TIP

## (undated) DEVICE — SI AVANTI+ 6F STD W/GW  NO OBT: Brand: AVANTI

## (undated) DEVICE — Device: Brand: THERMOCOOL SMARTTOUCH SF

## (undated) DEVICE — SENSOR-URETERORENOSCOPE 9FR WL 600MM  FLEXIBLE, WORKING CHANNEL 1:  3,6FR, WORKING CHANNEL 2: 1,65FR, PU= 3 PCS, STERILE, FOR SINGLE USE: Brand: RIWO D-URS

## (undated) DEVICE — 6F .070 XB 3.5 100CM: Brand: VISTA BRITE TIP

## (undated) DEVICE — BHS TURNOVER CYSTO: Brand: MEDLINE INDUSTRIES, INC.

## (undated) DEVICE — INTRO TEAR AWAY/LVD W/SD PRT 9F 13CM

## (undated) DEVICE — PK PM 30

## (undated) DEVICE — COPILOT BLEEDBACK CONTROL VALVE: Brand: COPILOT

## (undated) DEVICE — SOLIDIFIER LIQUI LOC PLUS 2000CC

## (undated) DEVICE — HI-TORQUE WHISPER ES GUIDE WIRE .014 STRAIGHTTIP 3.0 CM X 190 CM: Brand: HI-TORQUE WHISPER

## (undated) DEVICE — HI-TORQUE BALANCE MIDDLEWEIGHT UNIVERSAL II GUIDE WIRE STRAIGHT TIP PAK  190 CM: Brand: HI-TORQUE BALANCE MIDDLEWEIGHT UNIVERSAL II

## (undated) DEVICE — Device: Brand: VIZIGO

## (undated) DEVICE — DRSNG WND GZ PAD BORDERED LF 4X5IN STRL

## (undated) DEVICE — STRIP,CLOSURE,WOUND,MEDI-STRIP,1/2X4: Brand: MEDLINE

## (undated) DEVICE — APPL CHLORAPREP HI/LITE 26ML ORNG

## (undated) DEVICE — MINI TREK CORONARY DILATATION CATHETER 2.0 MM X 12 MM / RAPID-EXCHANGE: Brand: MINI TREK

## (undated) DEVICE — MODEL AT P65, P/N 701554-001KIT CONTENTS: HAND CONTROLLER, 3-WAY HIGH-PRESSURE STOPCOCK WITH ROTATING END AND PREMIUM HIGH-PRESSURE TUBING: Brand: ANGIOTOUCH® KIT

## (undated) DEVICE — Device: Brand: WEBSTER

## (undated) DEVICE — PATIENT RETURN ELECTRODE, SINGLE-USE, CONTACT QUALITY MONITORING, ADULT, WITH 9FT CORD, FOR PATIENTS WEIGING OVER 33LBS. (15KG): Brand: MEGADYNE

## (undated) DEVICE — ANGIO-SEAL VIP VASCULAR CLOSURE DEVICE: Brand: ANGIO-SEAL

## (undated) DEVICE — Device: Brand: SOUNDSTAR

## (undated) DEVICE — STERILE (15.2 TAPERED TO 7.6 X 183CM) POLYETHYLENE ACCORDION-FOLDED COVER FOR USE WITH SIEMENS ACUNAV ULTRASOUND CATHETER FAMILY CONNECTOR: Brand: SWIFTLINK TRANSDUCER COVER

## (undated) DEVICE — RADIFOCUS OPTITORQUE ANGIOGRAPHIC CATHETER: Brand: OPTITORQUE

## (undated) DEVICE — MODEL BT2000 P/N 700287-012KIT CONTENTS: MANIFOLD WITH SALINE AND CONTRAST PORTS, SALINE TUBING WITH SPIKE AND HAND SYRINGE, TRANSDUCER: Brand: BT2000 AUTOMATED MANIFOLD KIT

## (undated) DEVICE — PK CYSTO 30

## (undated) DEVICE — TREK CORONARY DILATATION CATHETER 2.50 MM X 12 MM / RAPID-EXCHANGE: Brand: TREK

## (undated) DEVICE — GW PTFE FIX/CORE FLXTIP .038 3X150CM

## (undated) DEVICE — SI AVANTI+ 7F STD W/GW  NO OBT: Brand: AVANTI

## (undated) DEVICE — SOL NS 500ML

## (undated) DEVICE — A2000 MULTI-USE SYRINGE KIT, P/N 701277-003KIT CONTENTS: 100ML CONTRAST RESERVOIR AND TUBING WITH CONTRAST SPIKE AND CLAMP: Brand: A2000 MULTI-USE SYRINGE KIT

## (undated) DEVICE — GW STARTER FXD CORE J .035 3X150CM 3MM

## (undated) DEVICE — KT NDL GUIDE STRL 18GA

## (undated) DEVICE — NDL ART SECUR LK 18G 2 3/4IN

## (undated) DEVICE — DUAL LUMEN URETERAL CATHETER

## (undated) DEVICE — TOOL INSRT GW MTL OR PLSTC

## (undated) DEVICE — Device: Brand: SMARTABLATE

## (undated) DEVICE — PAD E/S GRND SGL/FOIL 9FT/CORD DISP

## (undated) DEVICE — 3M™ IOBAN™ 2 ANTIMICROBIAL INCISE DRAPE 6650EZ: Brand: IOBAN™ 2

## (undated) DEVICE — DRSNG SURESITE WNDW 4X4.5

## (undated) DEVICE — TIBURON BRACHIAL ANGIOGRAPHY DRAPE: Brand: CONVERTORS

## (undated) DEVICE — TR BAND RADIAL ARTERY COMPRESSION DEVICE: Brand: TR BAND